# Patient Record
Sex: MALE | Race: BLACK OR AFRICAN AMERICAN | Employment: UNEMPLOYED | URBAN - METROPOLITAN AREA
[De-identification: names, ages, dates, MRNs, and addresses within clinical notes are randomized per-mention and may not be internally consistent; named-entity substitution may affect disease eponyms.]

---

## 2019-10-17 ENCOUNTER — APPOINTMENT (OUTPATIENT)
Dept: GENERAL RADIOLOGY | Age: 64
End: 2019-10-17
Attending: EMERGENCY MEDICINE
Payer: MEDICARE

## 2019-10-17 ENCOUNTER — HOSPITAL ENCOUNTER (EMERGENCY)
Age: 64
Discharge: HOME | End: 2019-10-17
Attending: EMERGENCY MEDICINE
Payer: MEDICARE

## 2019-10-17 VITALS
SYSTOLIC BLOOD PRESSURE: 116 MMHG | OXYGEN SATURATION: 94 % | HEART RATE: 57 BPM | RESPIRATION RATE: 16 BRPM | TEMPERATURE: 98 F | DIASTOLIC BLOOD PRESSURE: 74 MMHG

## 2019-10-17 DIAGNOSIS — R07.9 CHEST PAIN, UNSPECIFIED TYPE: Primary | ICD-10-CM

## 2019-10-17 LAB
ALBUMIN SERPL-MCNC: 3.2 G/DL (ref 3.5–5)
ALBUMIN/GLOB SERPL: 0.7 {RATIO} (ref 1.1–2.2)
ALP SERPL-CCNC: 135 U/L (ref 45–117)
ALT SERPL-CCNC: 73 U/L (ref 12–78)
ANION GAP SERPL CALC-SCNC: 4 MMOL/L (ref 5–15)
APTT PPP: 28 SEC (ref 22.1–32)
AST SERPL-CCNC: 76 U/L (ref 15–37)
BASOPHILS # BLD: 0 K/UL (ref 0–0.1)
BASOPHILS NFR BLD: 1 % (ref 0–1)
BILIRUB SERPL-MCNC: 0.6 MG/DL (ref 0.2–1)
BUN SERPL-MCNC: 25 MG/DL (ref 6–20)
BUN/CREAT SERPL: 25 (ref 12–20)
CALCIUM SERPL-MCNC: 8.6 MG/DL (ref 8.5–10.1)
CHLORIDE SERPL-SCNC: 106 MMOL/L (ref 97–108)
CO2 SERPL-SCNC: 24 MMOL/L (ref 21–32)
COMMENT, HOLDF: NORMAL
CREAT SERPL-MCNC: 0.99 MG/DL (ref 0.7–1.3)
D DIMER PPP FEU-MCNC: 0.45 MG/L FEU (ref 0–0.65)
DIFFERENTIAL METHOD BLD: ABNORMAL
EOSINOPHIL # BLD: 0.2 K/UL (ref 0–0.4)
EOSINOPHIL NFR BLD: 4 % (ref 0–7)
ERYTHROCYTE [DISTWIDTH] IN BLOOD BY AUTOMATED COUNT: 18.8 % (ref 11.5–14.5)
GLOBULIN SER CALC-MCNC: 4.8 G/DL (ref 2–4)
GLUCOSE SERPL-MCNC: 82 MG/DL (ref 65–100)
HCT VFR BLD AUTO: 40.3 % (ref 36.6–50.3)
HGB BLD-MCNC: 13 G/DL (ref 12.1–17)
IMM GRANULOCYTES # BLD AUTO: 0 K/UL (ref 0–0.04)
IMM GRANULOCYTES NFR BLD AUTO: 0 % (ref 0–0.5)
INR PPP: 1.1 (ref 0.9–1.1)
LIPASE SERPL-CCNC: 128 U/L (ref 73–393)
LYMPHOCYTES # BLD: 1.5 K/UL (ref 0.8–3.5)
LYMPHOCYTES NFR BLD: 41 % (ref 12–49)
MCH RBC QN AUTO: 28.7 PG (ref 26–34)
MCHC RBC AUTO-ENTMCNC: 32.3 G/DL (ref 30–36.5)
MCV RBC AUTO: 89 FL (ref 80–99)
MONOCYTES # BLD: 0.6 K/UL (ref 0–1)
MONOCYTES NFR BLD: 16 % (ref 5–13)
NEUTS SEG # BLD: 1.4 K/UL (ref 1.8–8)
NEUTS SEG NFR BLD: 38 % (ref 32–75)
NRBC # BLD: 0 K/UL (ref 0–0.01)
NRBC BLD-RTO: 0 PER 100 WBC
PLATELET # BLD AUTO: 82 K/UL (ref 150–400)
PMV BLD AUTO: 12.1 FL (ref 8.9–12.9)
POTASSIUM SERPL-SCNC: 4.1 MMOL/L (ref 3.5–5.1)
PROT SERPL-MCNC: 8 G/DL (ref 6.4–8.2)
PROTHROMBIN TIME: 11.4 SEC (ref 9–11.1)
RBC # BLD AUTO: 4.53 M/UL (ref 4.1–5.7)
SAMPLES BEING HELD,HOLD: NORMAL
SODIUM SERPL-SCNC: 134 MMOL/L (ref 136–145)
THERAPEUTIC RANGE,PTTT: NORMAL SECS (ref 58–77)
TROPONIN I BLD-MCNC: <0.04 NG/ML (ref 0–0.08)
TROPONIN I SERPL-MCNC: <0.05 NG/ML
TROPONIN I SERPL-MCNC: <0.05 NG/ML
WBC # BLD AUTO: 3.7 K/UL (ref 4.1–11.1)

## 2019-10-17 PROCEDURE — 85025 COMPLETE CBC W/AUTO DIFF WBC: CPT

## 2019-10-17 PROCEDURE — 80053 COMPREHEN METABOLIC PANEL: CPT

## 2019-10-17 PROCEDURE — 74011000250 HC RX REV CODE- 250: Performed by: EMERGENCY MEDICINE

## 2019-10-17 PROCEDURE — 85730 THROMBOPLASTIN TIME PARTIAL: CPT

## 2019-10-17 PROCEDURE — 84484 ASSAY OF TROPONIN QUANT: CPT

## 2019-10-17 PROCEDURE — 74011250636 HC RX REV CODE- 250/636: Performed by: EMERGENCY MEDICINE

## 2019-10-17 PROCEDURE — 36415 COLL VENOUS BLD VENIPUNCTURE: CPT

## 2019-10-17 PROCEDURE — 94640 AIRWAY INHALATION TREATMENT: CPT

## 2019-10-17 PROCEDURE — 85379 FIBRIN DEGRADATION QUANT: CPT

## 2019-10-17 PROCEDURE — 74011250637 HC RX REV CODE- 250/637: Performed by: EMERGENCY MEDICINE

## 2019-10-17 PROCEDURE — 83690 ASSAY OF LIPASE: CPT

## 2019-10-17 PROCEDURE — 85610 PROTHROMBIN TIME: CPT

## 2019-10-17 PROCEDURE — 99285 EMERGENCY DEPT VISIT HI MDM: CPT

## 2019-10-17 PROCEDURE — 96374 THER/PROPH/DIAG INJ IV PUSH: CPT

## 2019-10-17 PROCEDURE — 71045 X-RAY EXAM CHEST 1 VIEW: CPT

## 2019-10-17 PROCEDURE — 93005 ELECTROCARDIOGRAM TRACING: CPT

## 2019-10-17 RX ORDER — NITROGLYCERIN 0.4 MG/1
0.4 TABLET SUBLINGUAL
Status: DISCONTINUED | OUTPATIENT
Start: 2019-10-17 | End: 2019-10-18 | Stop reason: HOSPADM

## 2019-10-17 RX ORDER — IPRATROPIUM BROMIDE AND ALBUTEROL SULFATE 2.5; .5 MG/3ML; MG/3ML
3 SOLUTION RESPIRATORY (INHALATION)
Status: COMPLETED | OUTPATIENT
Start: 2019-10-17 | End: 2019-10-17

## 2019-10-17 RX ORDER — PANTOPRAZOLE SODIUM 40 MG/1
40 TABLET, DELAYED RELEASE ORAL DAILY
Qty: 20 TAB | Refills: 0 | Status: SHIPPED | OUTPATIENT
Start: 2019-10-17 | End: 2019-11-06

## 2019-10-17 RX ADMIN — FAMOTIDINE 20 MG: 10 INJECTION, SOLUTION INTRAVENOUS at 22:48

## 2019-10-17 RX ADMIN — IPRATROPIUM BROMIDE AND ALBUTEROL SULFATE 3 ML: .5; 3 SOLUTION RESPIRATORY (INHALATION) at 22:55

## 2019-10-17 RX ADMIN — NITROGLYCERIN 0.4 MG: 0.4 TABLET, ORALLY DISINTEGRATING SUBLINGUAL at 18:18
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
## 2019-10-17 NOTE — ED PROVIDER NOTES
75 y.o. male with past medical history significant for NIDDM, DJD, substance abuse, depression, HTN who presents via EMS to the ED with chief complaint of chest pain. Patient reports onset of chest pain that is a 8/10 in severity 45 minutes PTA. Patient describes pain as sharp, with no radiation to back, and associated shortness of breath. EMS reports that patient was given ASA en route. Patient endorses history of angina when he was younger and states that he has a history of bleeding ulcer. He also states that he has diabetes, for which he takes Metformin. Patient denies nausea, vomiting or blood in stool. There are no other acute medical concerns at this time. Social Hx: current Tobacco use (0.25 ppd), no EtOH use, no Illicit Drug use  PCP: John Meyers MD    Note written by Daysi Mosher, as dictated by Elisa Guardado MD 5:46 PM      The history is provided by the patient. No  was used.         Past Medical History:   Diagnosis Date    Depression     DJD (degenerative joint disease) 3/8/2010    HTN (hypertension) 3/8/2010    STATES NO LONGER TAKING MEDICATION-STATES MD STOPPED    Insomnia     NIDDM (non-insulin dependent diabetes mellitus) 3/8/2010    Other ill-defined conditions(799.89)     BPH    Other ill-defined conditions(799.89)     previous hx of DTs from ETOH withdrawal    Other ill-defined conditions(799.89)     DDD/back problems    Psychiatric disorder     paranoid schizophrenia, anxiety    Substance abuse ([facility] [address]     Suicidal thoughts        Past Surgical History:   Procedure Laterality Date    BIOPSY PROSTATE      HX UROLOGICAL      turp         Family History:   Problem Relation Age of Onset    Cancer Mother         lymphoma    Heart Disease Father         CHF       Social History     Socioeconomic History    Marital status: LEGALLY      Spouse name: Not on file    Number of children: Not on file    Years of education: Not on file  Highest education level: Not on file   Occupational History    Not on file   Social Needs    Financial resource strain: Not on file    Food insecurity:     Worry: Not on file     Inability: Not on file    Transportation needs:     Medical: Not on file     Non-medical: Not on file   Tobacco Use    Smoking status: Current Every Day Smoker     Packs/day: 0.25    Smokeless tobacco: Never Used    Tobacco comment: patient has been decreasing amount of cigarettes   Substance and Sexual Activity    Alcohol use: No     Alcohol/week: 35.0 standard drinks     Types: 42 Cans of beer per week     Comment: states stopped drinking 2 ,onths ago    Drug use: No    Sexual activity: Not on file   Lifestyle    Physical activity:     Days per week: Not on file     Minutes per session: Not on file    Stress: Not on file   Relationships    Social connections:     Talks on phone: Not on file     Gets together: Not on file     Attends Rastafari service: Not on file     Active member of club or organization: Not on file     Attends meetings of clubs or organizations: Not on file     Relationship status: Not on file    Intimate partner violence:     Fear of current or ex partner: Not on file     Emotionally abused: Not on file     Physically abused: Not on file     Forced sexual activity: Not on file   Other Topics Concern    Not on file   Social History Narrative    Not on file         ALLERGIES: Patient has no known allergies. Review of Systems   Constitutional: Negative for activity change, appetite change and fatigue. HENT: Negative for ear pain, facial swelling, sore throat and trouble swallowing. Eyes: Negative for pain, discharge and visual disturbance. Respiratory: Positive for shortness of breath. Negative for chest tightness and wheezing. Cardiovascular: Positive for chest pain. Negative for palpitations. Gastrointestinal: Negative for abdominal pain, blood in stool, nausea and vomiting. Genitourinary: Negative for difficulty urinating, flank pain and hematuria. Musculoskeletal: Negative for arthralgias, joint swelling, myalgias and neck pain. Skin: Negative for color change and rash. Neurological: Negative for dizziness, weakness, numbness and headaches. Hematological: Negative for adenopathy. Does not bruise/bleed easily. Psychiatric/Behavioral: Negative for behavioral problems, confusion and sleep disturbance. All other systems reviewed and are negative. Vitals:    10/17/19 1743   BP: 123/80   Pulse: 62   Resp: 18   Temp: 97.8 °F (36.6 °C)   SpO2: 96%            Physical Exam   Constitutional: He is oriented to person, place, and time. He appears well-developed and well-nourished. No distress. HENT:   Head: Normocephalic and atraumatic. Nose: Nose normal.   Mouth/Throat: Oropharynx is clear and moist.   Eyes: Pupils are equal, round, and reactive to light. Conjunctivae and EOM are normal. No scleral icterus. Neck: Normal range of motion. Neck supple. No JVD present. No tracheal deviation present. No thyromegaly present. No carotid bruits noted. Cardiovascular: Normal rate, regular rhythm, normal heart sounds and intact distal pulses. Exam reveals no gallop and no friction rub. No murmur heard. Pulmonary/Chest: Effort normal and breath sounds normal. No respiratory distress. He has no wheezes. He has no rales. He exhibits no tenderness. Abdominal: Soft. Bowel sounds are normal. He exhibits no distension and no mass. There is no tenderness. There is no rebound and no guarding. Musculoskeletal: Normal range of motion. He exhibits no edema or tenderness. Lymphadenopathy:     He has no cervical adenopathy. Neurological: He is alert and oriented to person, place, and time. He has normal reflexes. No cranial nerve deficit. Coordination normal.   Skin: Skin is warm and dry. No rash noted. No erythema. Psychiatric: He has a normal mood and affect.  His behavior is normal. Judgment and thought content normal.   Nursing note and vitals reviewed. Note written by Daysi Byrd, as dictated by Keron Chan MD 5:46 PM    MDM  Number of Diagnoses or Management Options  Chest pain, unspecified type: new and requires workup     Amount and/or Complexity of Data Reviewed  Clinical lab tests: ordered and reviewed  Tests in the radiology section of CPT®: ordered and reviewed  Decide to obtain previous medical records or to obtain history from someone other than the patient: yes  Review and summarize past medical records: yes  Independent visualization of images, tracings, or specimens: yes    Risk of Complications, Morbidity, and/or Mortality  Presenting problems: high  Diagnostic procedures: high  Management options: high    Patient Progress  Patient progress: stable         Procedures    ED EKG interpretation:  Rhythm: normal sinus rhythm; and regular . Rate (approx.): 60 bpm; Axis: normal; ST/T wave: No acute changes; normal intervals     Note written by Daysi Byrd, as dictated by Keron Chan MD 6:46 PM    PROGRESS NOTE:  7:08 PM  Patient is feeling better, chest pain is gone, he now states that he is hungry    PROGRESS NOTE:  7:18 PM  Patient has history of ulcers, lipase and troponin were normal. First troponin was completed at 1800 and a repeat Troponin will be completed at 2120     PROGRESS NOTE:  9:21 PM  Currently patient is receiving repeat troponin    PROGRESS NOTE:  10:00 PM  No elevation in troponin, suspect that pain is related to GI.  Will treat conservatively if repeat troponin is normal.

## 2019-10-18 LAB
ATRIAL RATE: 60 BPM
CALCULATED P AXIS, ECG09: 9 DEGREES
CALCULATED R AXIS, ECG10: 52 DEGREES
CALCULATED T AXIS, ECG11: 43 DEGREES
DIAGNOSIS, 93000: NORMAL
P-R INTERVAL, ECG05: 184 MS
Q-T INTERVAL, ECG07: 456 MS
QRS DURATION, ECG06: 96 MS
QTC CALCULATION (BEZET), ECG08: 456 MS
VENTRICULAR RATE, ECG03: 60 BPM

## 2019-10-18 NOTE — GROUP NOTE
IP  GROUP DOCUMENTATION INDIVIDUAL Group Therapy Note Date: October 17 Group Start Time: 2100 Group End Time: 2130 Group Topic: Reflection/Relaxation [address] [place] Hai Lombardo RN 
 
[facility] GROUP DOCUMENTATION GROUP Group Therapy Note Attendees Attendance: {[facility] GROUP DOC ATTENDANCE:67223} Patient's Goal:  *** Interventions/techniques: {[facility] GROUP DOC INTERVENTIONS/TECHNIQUES:12149} Follows Directions: {[facility] GROUP DOC FOLLOWS DIRECTION:00802} Interactions: {[facility] GROUP DOC INTERACTIONS:00839} Mental Status: {[facility] MENTAL REEBRY:71163} Behavior/appearance: {[facility] GROUP DOC BEHAVIOR/APPEARANCE:46632} Goals Achieved: {[facility] GROUP DOC GOALS ACHIEVED:31560} Additional Notes:  *** Kandi Brooke RN

## 2019-10-18 NOTE — DISCHARGE INSTRUCTIONS
Home to stay on a bland diet. Use the medications as directed. Liquid antacids 1 to 2 tablespoons every hour or 2. Do this for pain. Follow-up with your own physician in 3 to 4 days if no improvement in symptoms. Also try to cut back on your cigarette smoking. See your doctor sooner or return if symptoms worsen.

## 2019-11-12 ENCOUNTER — HOSPITAL ENCOUNTER (OUTPATIENT)
Dept: ULTRASOUND IMAGING | Age: 64
Discharge: HOME | End: 2019-11-12
Attending: FAMILY MEDICINE
Payer: MEDICARE

## 2019-11-12 DIAGNOSIS — D69.6 THROMBOCYTOPENIA, UNSPECIFIED (HCC): ICD-10-CM

## 2019-11-12 PROCEDURE — 76700 US EXAM ABDOM COMPLETE: CPT

## 2019-11-12 PROCEDURE — 76705 ECHO EXAM OF ABDOMEN: CPT

## 2019-11-14 ENCOUNTER — TELEPHONE (OUTPATIENT)
Dept: NEUROLOGY | Age: 64
End: 2019-11-14

## 2019-11-20 ENCOUNTER — TELEPHONE (OUTPATIENT)
Dept: NEUROLOGY | Age: 64
End: 2019-11-20

## 2019-11-20 NOTE — TELEPHONE ENCOUNTER
Called, spoke to Janell Dakins was informed that I called patient three times include 11/19/19, pt never return the call the schedule EMG.

## 2019-11-20 NOTE — TELEPHONE ENCOUNTER
----- Message from Beatriz Francis sent at 11/19/2019  3:30 PM EST -----  Regarding: FW: Dr Aracely Og      ----- Message -----  From: Lamar Patel  Sent: 11/19/2019   2:56 PM EST  To: [facility] Front Office  Subject: Dr Aracely Og                              General Message/Vendor Calls    Caller's first and last name: Bob Addison      Reason for call: caller is following up on appt requested to be scheduled for an EMG with Dr Cecelia Duarte required yes/no and why:yes      Best contact number(s):[phone]      Details to clarify the request: caller was advised that pt has scheduled NP appt  With Dr Luigi Park

## 2019-12-17 ENCOUNTER — HOSPITAL ENCOUNTER (OUTPATIENT)
Dept: ULTRASOUND IMAGING | Age: 64
Discharge: HOME | End: 2019-12-17
Attending: FAMILY MEDICINE

## 2019-12-17 DIAGNOSIS — D69.6 THROMBOCYTOPENIA, UNSPECIFIED (HCC): ICD-10-CM

## 2019-12-31 ENCOUNTER — OFFICE VISIT (OUTPATIENT)
Dept: NEUROLOGY | Age: 64
End: 2019-12-31

## 2019-12-31 VITALS
BODY MASS INDEX: 36.06 KG/M2 | HEART RATE: 76 BPM | DIASTOLIC BLOOD PRESSURE: 70 MMHG | WEIGHT: 290 LBS | RESPIRATION RATE: 16 BRPM | SYSTOLIC BLOOD PRESSURE: 110 MMHG | OXYGEN SATURATION: 97 % | HEIGHT: 75 IN

## 2019-12-31 DIAGNOSIS — M79.642 PAIN IN BOTH HANDS: Primary | ICD-10-CM

## 2019-12-31 DIAGNOSIS — M79.641 PAIN IN BOTH HANDS: Primary | ICD-10-CM

## 2019-12-31 NOTE — PROGRESS NOTES
[facility]  [address]  Phone [phone] Fax [phone]  Test Date:  2019    Patient: Tash Elizabeth : 1955 Physician: Rosa Isela Reynoso MD   Sex: Male Height: 6' 3\" Ref Phys: Sudarshan Lakhani MD   ID#: [id number] Weight: 290 lbs. Technician: Paula Severino. .EMG TECH     Patient Complaints:    Bilateral upper extremity pain    Patient History / Exam:    Mr. Claudene Franks is a pleasant [age]year-old right-hand-dominant former small referred to EMG lab for evaluation of bilateral hand pain particularly with use/movement of the thumb. Examination demonstrates painful passive manipulation of the thumb, with 5 out of 5 strength otherwise noted. Referred for evaluation of carpal tunnel. NCV & EMG Findings:  Sensory nerve conduction study (NCS) of the right median nerve and right radial demonstrated normal peak latency and amplitude. Sensory NCS of the right ulnar nerve was unobtainable. Sensory NCS of the left ulnar nerve demonstrated severe peak latency prolongation with normal amplitude. Motor NCS of the right median nerve demonstrated normal onset latency, amplitude and conduction velocity. Motor NCS of the right ulnar nerve demonstrated normal onset latency, amplitude and conduction velocity. F-wave latency of the right ulnar nerve was normal.    Electromyography (EMG) of the right FDI, ABP, biceps, triceps and deltoid were without evidence for active denervation or chronic reinnervation changes. Impression:    Is a technically abnormal nerve condition study/EMG based on absent right ulnar sensory response and significant latency prolongation in the left ulnar nerve, which are felt to represent possible incipient ulnar neuropathy is not correlated on motor studies. Otherwise no evidence for peripheral neuropathy, median neuropathy or cervical polyradiculopathy are noted. Recommendations:   If not already done so, could consider imaging the joints of the hand as patient's complaints appear related to musculoskeletal etiology, given the absence of neuropathic symptoms (numbness, tingling, weakness) and repeat production of pain with manipulation of the thumbs and deep palpation of the proximal joint at the base of the thumb. Regarding the absent ulnar sensory findings these could be considered findings of an subclinical ulnar neuropathy and the patient was provided education adding behavioral measures to mitigate against worsening of such process.   They are unrelated to his clinical complaint.    ___________________________  Bing Guzman MD        Nerve Conduction Studies  Anti Sensory Summary Table     Stim Site NR Peak (ms) Norm Peak (ms) P-T Amp (µV) Norm P-T Amp Onset (ms) Site1 Site2 Delta-P (ms) Dist (cm) Braxton (m/s) Norm Braxton (m/s)   Right Median Anti Sensory (2nd Digit)  32.9°C   Wrist    3.5 <3.6 10.3 >10 2.9 Wrist 2nd Digit 3.5 14.0 40 >39   Right Radial Anti Sensory (Base 1st Digit)  33.5°C   Wrist    2.1 <3.1 24.5  1.7 Wrist Base 1st Digit 2.1 10.0 48    Left Ulnar Anti Sensory (5th Digit)  33.4°C   Wrist    6.4 <3.7 17.0 >15.0 5.9 Wrist 5th Digit 6.4 14.0 22 >38   Right Ulnar Anti Sensory (5th Digit)  33.4°C   Wrist NR  <3.7  >15.0  Wrist 5th Digit  14.0  >38     Motor Summary Table     Stim Site NR Onset (ms) Norm Onset (ms) O-P Amp (mV) Norm O-P Amp Site1 Site2 Delta-0 (ms) Dist (cm) Braxton (m/s) Norm Braxton (m/s)   Right Median Motor (Abd Poll Brev)  32.6°C   Wrist    3.9 <4.2 8.9 >5 Elbow Wrist 4.5 25.0 56 >50   Elbow    8.4  8.5          Right Ulnar Motor (Abd Dig Minimi)  33.4°C   Wrist    3.0 <4.2 8.8 >3 B Elbow Wrist 4.6 23.0 50 >53   B Elbow    7.6  8.5  A Elbow B Elbow 2.0 10.0 50 >53   A Elbow    9.6  8.3            F Wave Studies     NR F-Lat (ms) Lat Norm (ms) L-R F-Lat (ms) L-R Lat Norm   Right Ulnar (Mrkrs) (Abd Dig Min)  33.3°C      33.86 <36  <2.5     EMG     Side Muscle Nerve Root Ins Act Fibs Psw Amp Dur Poly Recrt Int S[place] Comment   Right Abd Poll Brev Median C8-T1 Nml Nml Nml Nml Nml 0 Nml Nml    Right 1stDorInt Ulnar C8-T1 Nml Nml Nml Nml Nml 0 Nml Nml    Right Biceps Musculocut C5-6 Nml Nml Nml Nml Nml 0 Nml Nml    Right Triceps Radial C6-7-8 Nml Nml Nml Nml Nml 0 Nml Nml    Right Deltoid Axillary C5-6 Nml Nml Nml Nml Nml 0 Nml Nml          Waveforms:

## 2020-03-16 ENCOUNTER — HOSPITAL ENCOUNTER (INPATIENT)
Age: 65
LOS: 3 days | Discharge: LEFT BEFORE DISCHARGE | DRG: 894 | End: 2020-03-19
Attending: EMERGENCY MEDICINE | Admitting: STUDENT IN AN ORGANIZED HEALTH CARE EDUCATION/TRAINING PROGRAM
Payer: MEDICARE

## 2020-03-16 ENCOUNTER — APPOINTMENT (OUTPATIENT)
Dept: CT IMAGING | Age: 65
DRG: 894 | End: 2020-03-16
Attending: STUDENT IN AN ORGANIZED HEALTH CARE EDUCATION/TRAINING PROGRAM
Payer: MEDICARE

## 2020-03-16 DIAGNOSIS — F10.10 ALCOHOL ABUSE: Primary | ICD-10-CM

## 2020-03-16 DIAGNOSIS — F10.932 ALCOHOL WITHDRAWAL SYNDROME WITH PERCEPTUAL DISTURBANCE (HCC): ICD-10-CM

## 2020-03-16 PROBLEM — F10.939 ALCOHOL WITHDRAWAL (HCC): Status: ACTIVE | Noted: 2020-03-16

## 2020-03-16 LAB
ALBUMIN SERPL-MCNC: 3.2 G/DL (ref 3.5–5)
ALBUMIN/GLOB SERPL: 0.7 {RATIO} (ref 1.1–2.2)
ALP SERPL-CCNC: 128 U/L (ref 45–117)
ALT SERPL-CCNC: 111 U/L (ref 12–78)
ANION GAP SERPL CALC-SCNC: 7 MMOL/L (ref 5–15)
AST SERPL-CCNC: 126 U/L (ref 15–37)
BASOPHILS # BLD: 0 K/UL (ref 0–0.1)
BASOPHILS NFR BLD: 0 % (ref 0–1)
BILIRUB SERPL-MCNC: 1.2 MG/DL (ref 0.2–1)
BUN SERPL-MCNC: 24 MG/DL (ref 6–20)
BUN/CREAT SERPL: 24 (ref 12–20)
CALCIUM SERPL-MCNC: 8.1 MG/DL (ref 8.5–10.1)
CHLORIDE SERPL-SCNC: 100 MMOL/L (ref 97–108)
CO2 SERPL-SCNC: 28 MMOL/L (ref 21–32)
CREAT SERPL-MCNC: 1 MG/DL (ref 0.7–1.3)
DIFFERENTIAL METHOD BLD: ABNORMAL
EOSINOPHIL # BLD: 0 K/UL (ref 0–0.4)
EOSINOPHIL NFR BLD: 0 % (ref 0–7)
ERYTHROCYTE [DISTWIDTH] IN BLOOD BY AUTOMATED COUNT: 13.3 % (ref 11.5–14.5)
ETHANOL SERPL-MCNC: <10 MG/DL
GLOBULIN SER CALC-MCNC: 4.7 G/DL (ref 2–4)
GLUCOSE SERPL-MCNC: 123 MG/DL (ref 65–100)
HCT VFR BLD AUTO: 44.1 % (ref 36.6–50.3)
HGB BLD-MCNC: 15 G/DL (ref 12.1–17)
IMM GRANULOCYTES # BLD AUTO: 0 K/UL
IMM GRANULOCYTES NFR BLD AUTO: 0 %
INR PPP: 1.2 (ref 0.9–1.1)
LACTATE SERPL-SCNC: 2.1 MMOL/L (ref 0.4–2)
LYMPHOCYTES # BLD: 2 K/UL (ref 0.8–3.5)
LYMPHOCYTES NFR BLD: 41 % (ref 12–49)
MAGNESIUM SERPL-MCNC: 1.8 MG/DL (ref 1.6–2.4)
MCH RBC QN AUTO: 29.6 PG (ref 26–34)
MCHC RBC AUTO-ENTMCNC: 34 G/DL (ref 30–36.5)
MCV RBC AUTO: 87 FL (ref 80–99)
MONOCYTES # BLD: 0.8 K/UL (ref 0–1)
MONOCYTES NFR BLD: 17 % (ref 5–13)
NEUTS SEG # BLD: 2.1 K/UL (ref 1.8–8)
NEUTS SEG NFR BLD: 42 % (ref 32–75)
NRBC # BLD: 0 K/UL (ref 0–0.01)
NRBC BLD-RTO: 0 PER 100 WBC
PLATELET # BLD AUTO: 84 K/UL (ref 150–400)
PMV BLD AUTO: 11.6 FL (ref 8.9–12.9)
POTASSIUM SERPL-SCNC: 5.2 MMOL/L (ref 3.5–5.1)
PROT SERPL-MCNC: 7.9 G/DL (ref 6.4–8.2)
PROTHROMBIN TIME: 11.9 SEC (ref 9–11.1)
RBC # BLD AUTO: 5.07 M/UL (ref 4.1–5.7)
RBC MORPH BLD: ABNORMAL
SODIUM SERPL-SCNC: 135 MMOL/L (ref 136–145)
WBC # BLD AUTO: 4.9 K/UL (ref 4.1–11.1)

## 2020-03-16 PROCEDURE — 70450 CT HEAD/BRAIN W/O DYE: CPT

## 2020-03-16 PROCEDURE — 80307 DRUG TEST PRSMV CHEM ANLYZR: CPT

## 2020-03-16 PROCEDURE — 74011250637 HC RX REV CODE- 250/637: Performed by: STUDENT IN AN ORGANIZED HEALTH CARE EDUCATION/TRAINING PROGRAM

## 2020-03-16 PROCEDURE — 80053 COMPREHEN METABOLIC PANEL: CPT

## 2020-03-16 PROCEDURE — 65270000029 HC RM PRIVATE

## 2020-03-16 PROCEDURE — 85610 PROTHROMBIN TIME: CPT

## 2020-03-16 PROCEDURE — 94761 N-INVAS EAR/PLS OXIMETRY MLT: CPT

## 2020-03-16 PROCEDURE — 93005 ELECTROCARDIOGRAM TRACING: CPT

## 2020-03-16 PROCEDURE — 96365 THER/PROPH/DIAG IV INF INIT: CPT

## 2020-03-16 PROCEDURE — 74011250637 HC RX REV CODE- 250/637: Performed by: EMERGENCY MEDICINE

## 2020-03-16 PROCEDURE — 83605 ASSAY OF LACTIC ACID: CPT

## 2020-03-16 PROCEDURE — 74011000250 HC RX REV CODE- 250: Performed by: EMERGENCY MEDICINE

## 2020-03-16 PROCEDURE — 85025 COMPLETE CBC W/AUTO DIFF WBC: CPT

## 2020-03-16 PROCEDURE — 74011000258 HC RX REV CODE- 258: Performed by: STUDENT IN AN ORGANIZED HEALTH CARE EDUCATION/TRAINING PROGRAM

## 2020-03-16 PROCEDURE — 99285 EMERGENCY DEPT VISIT HI MDM: CPT

## 2020-03-16 PROCEDURE — 83735 ASSAY OF MAGNESIUM: CPT

## 2020-03-16 PROCEDURE — 74011250636 HC RX REV CODE- 250/636: Performed by: EMERGENCY MEDICINE

## 2020-03-16 PROCEDURE — 36415 COLL VENOUS BLD VENIPUNCTURE: CPT

## 2020-03-16 RX ORDER — DEXTROSE MONOHYDRATE AND SODIUM CHLORIDE 5; .45 G/100ML; G/100ML
75 INJECTION, SOLUTION INTRAVENOUS CONTINUOUS
Status: DISCONTINUED | OUTPATIENT
Start: 2020-03-16 | End: 2020-03-17

## 2020-03-16 RX ORDER — PROMETHAZINE HYDROCHLORIDE 25 MG/ML
12.5 INJECTION, SOLUTION INTRAMUSCULAR; INTRAVENOUS
Status: DISCONTINUED | OUTPATIENT
Start: 2020-03-16 | End: 2020-03-20 | Stop reason: HOSPADM

## 2020-03-16 RX ORDER — LORAZEPAM 1 MG/1
2 TABLET ORAL
Status: DISCONTINUED | OUTPATIENT
Start: 2020-03-16 | End: 2020-03-17

## 2020-03-16 RX ORDER — LORAZEPAM 2 MG/ML
2 INJECTION INTRAMUSCULAR
Status: DISCONTINUED | OUTPATIENT
Start: 2020-03-16 | End: 2020-03-17

## 2020-03-16 RX ORDER — LANOLIN ALCOHOL/MO/W.PET/CERES
100 CREAM (GRAM) TOPICAL DAILY
Status: DISCONTINUED | OUTPATIENT
Start: 2020-03-17 | End: 2020-03-20 | Stop reason: HOSPADM

## 2020-03-16 RX ORDER — IPRATROPIUM BROMIDE AND ALBUTEROL SULFATE 2.5; .5 MG/3ML; MG/3ML
3 SOLUTION RESPIRATORY (INHALATION)
Status: DISCONTINUED | OUTPATIENT
Start: 2020-03-16 | End: 2020-03-20 | Stop reason: HOSPADM

## 2020-03-16 RX ORDER — LORAZEPAM 1 MG/1
2 TABLET ORAL
Status: DISPENSED | OUTPATIENT
Start: 2020-03-16 | End: 2020-03-16

## 2020-03-16 RX ORDER — IBUPROFEN 200 MG
1 TABLET ORAL DAILY
Status: DISCONTINUED | OUTPATIENT
Start: 2020-03-17 | End: 2020-03-20 | Stop reason: HOSPADM

## 2020-03-16 RX ORDER — FOLIC ACID 1 MG/1
1 TABLET ORAL DAILY
Status: DISCONTINUED | OUTPATIENT
Start: 2020-03-17 | End: 2020-03-20 | Stop reason: HOSPADM

## 2020-03-16 RX ORDER — LORAZEPAM 2 MG/ML
4 INJECTION INTRAMUSCULAR
Status: DISCONTINUED | OUTPATIENT
Start: 2020-03-16 | End: 2020-03-17

## 2020-03-16 RX ORDER — PHENOBARBITAL 30 MG/1
30 TABLET ORAL 4 TIMES DAILY
COMMUNITY
End: 2020-03-26

## 2020-03-16 RX ADMIN — GABAPENTIN 400 MG: 100 CAPSULE ORAL at 20:23

## 2020-03-16 RX ADMIN — DEXTROSE MONOHYDRATE AND SODIUM CHLORIDE 75 ML/HR: 5; .45 INJECTION, SOLUTION INTRAVENOUS at 20:22

## 2020-03-16 RX ADMIN — LORAZEPAM 2 MG: 1 TABLET ORAL at 20:23

## 2020-03-16 RX ADMIN — LORAZEPAM 2 MG: 1 TABLET ORAL at 16:11

## 2020-03-16 RX ADMIN — LORAZEPAM 2 MG: 1 TABLET ORAL at 17:35

## 2020-03-16 RX ADMIN — FOLIC ACID: 5 INJECTION, SOLUTION INTRAMUSCULAR; INTRAVENOUS; SUBCUTANEOUS at 16:04

## 2020-03-16 NOTE — PROGRESS NOTES
TRANSFER - IN REPORT:    Verbal report received from [address] Salgado Rn(name) on Shani Marie  being received from ED(unit) for routine progression of care      Report consisted of patients Situation, Background, Assessment and   Recommendations(SBAR). Information from the following report(s) SBAR, Kardex, ED Summary, Intake/Output, MAR, Recent Results, Med Rec Status and Cardiac Rhythm NSR was reviewed with the receiving nurse. Opportunity for questions and clarification was provided. Assessment completed upon patients arrival to unit and care assumed.

## 2020-03-16 NOTE — ED NOTES
Pt given two doses of ativan 2 mg po. Repeat CIWA is 8 down from initial score of 20. Dr. Huynh Button at bedside third dose held.

## 2020-03-16 NOTE — ED NOTES
Pt here for evaluation of ETOH withdrawal. According to pt he drinks everyday and his last drink was two days ago. Pt CIWA score is 20  Pt is A+Ox3, clear speaking. Emergency Department Nursing Plan of Care       The Nursing Plan of Care is developed from the Nursing assessment and Emergency Department Attending provider initial evaluation. The plan of care may be reviewed in the ED Provider note.     The Plan of Care was developed with the following considerations:   Patient / Family readiness to learn indicated by:verbalized understanding  Persons(s) to be included in education: patient  Barriers to Learning/Limitations:No    Signed     Gerhard Kat RN    3/16/2020   2:58 PM

## 2020-03-16 NOTE — ED PROVIDER NOTES
EMERGENCY DEPARTMENT HISTORY AND PHYSICAL EXAM      Date: 3/16/2020  Patient Name: Amirah Strong    History of Presenting Illness     Chief Complaint   Patient presents with    Alcohol Problem     History Provided By: Patient    HPI: Amirah Strong, 77 y.o. male with past medical history significant for anxiety, depression, hypertension, diabetes, substance abuse, and a documented history of paranoid schizophrenia (though patient denies) who presents via private vehicle to the ED with cc of alcohol withdrawal.  Patient states he normally drinks 2x750 mL bottles of wine a day and his last drink was 2 days ago. He endorses tremors, nausea, vomiting, diarrhea, and frequent falls over the past 24 hours. He has tried to quit drinking in the past with the most recent time being approximately 4 to 5 weeks ago. He usually starts to develop withdrawal symptoms about 24 hours after his last drink. In the past when he is withdrawn, he states that he has had withdrawal seizures and even had visual hallucinations. He denies any seizures or hallucinations currently. He does state that he was seen by [facility] for his psychiatric illnesses in the past but his case was recently closed. PMHx: Anxiety, depression, hypertension, diabetes, questionable paranoid schizophrenia  Social Hx: Smokes 1 pack/day, daily alcohol use, denies illegal drug use    PCP: Michelle Sevilla MD    There are no other complaints, changes, or physical findings at this time. No current facility-administered medications on file prior to encounter. Current Outpatient Medications on File Prior to Encounter   Medication Sig Dispense Refill    albuterol sulfate 90 mcg/actuation aepb Take 2 Puffs by inhalation every four (4) hours as needed (breathing). 1 Inhaler 0    traMADol (ULTRAM) 50 mg tablet Take 1 Tab by mouth every six (6) hours as needed for Pain.  Max Daily Amount: 200 mg. 12 Tab 0    methocarbamol (ROBAXIN) 500 mg tablet Take 1 Tab by mouth three (3) times daily. 15 Tab 0    pregabalin (LYRICA) 150 mg capsule Take  by mouth.  IBUPROFEN (MOTRIN PO) Take  by mouth.  metformin (GLUCOPHAGE) 500 mg tablet Take 500 mg by mouth two (2) times daily (with meals). Past History     Past Medical History:  Past Medical History:   Diagnosis Date    Depression     DJD (degenerative joint disease) 3/8/2010    HTN (hypertension) 3/8/2010    STATES NO LONGER TAKING MEDICATION-STATES MD STOPPED    Insomnia     NIDDM (non-insulin dependent diabetes mellitus) 3/8/2010    Other ill-defined conditions(799.89)     BPH    Other ill-defined conditions(799.89)     previous hx of DTs from ETOH withdrawal    Other ill-defined conditions(799.89)     DDD/back problems    Psychiatric disorder     paranoid schizophrenia, anxiety    Substance abuse ([facility] [address]     Suicidal thoughts      Past Surgical History:  Past Surgical History:   Procedure Laterality Date    BIOPSY PROSTATE      HX UROLOGICAL      turp     Family History:  Family History   Problem Relation Age of Onset    Cancer Mother         lymphoma    Heart Disease Father         CHF     Social History:  Social History     Tobacco Use    Smoking status: Current Every Day Smoker     Packs/day: 0.25    Smokeless tobacco: Never Used    Tobacco comment: patient has been decreasing amount of cigarettes   Substance Use Topics    Alcohol use: No     Alcohol/week: 35.0 standard drinks     Types: 42 Cans of beer per week     Comment: states stopped drinking 2 ,onths ago    Drug use: No     Allergies:  No Known Allergies  Review of Systems   Review of Systems   Constitutional: Positive for diaphoresis. Negative for chills and fever. HENT: Negative for congestion, rhinorrhea, sneezing and sore throat. Eyes: Negative for redness and visual disturbance. Respiratory: Negative for shortness of breath. Cardiovascular: Negative for chest pain and leg swelling.    Gastrointestinal: Positive for diarrhea, nausea and vomiting. Negative for abdominal pain. Genitourinary: Negative for difficulty urinating and frequency. Musculoskeletal: Negative for back pain, myalgias and neck stiffness. Skin: Negative for rash. Neurological: Positive for dizziness and tremors. Negative for syncope, weakness and headaches. Hematological: Negative for adenopathy. Psychiatric/Behavioral: Positive for agitation. The patient is nervous/anxious. All other systems reviewed and are negative. Physical Exam   Physical Exam  Vitals signs and nursing note reviewed. Constitutional:       Appearance: Normal appearance. He is well-developed. HENT:      Head: Normocephalic. Abrasion (Tip of the nose) present. Neck:      Musculoskeletal: Full passive range of motion without pain, normal range of motion and neck supple. Cardiovascular:      Rate and Rhythm: Normal rate and regular rhythm. Pulses: Normal pulses. Heart sounds: Normal heart sounds. No murmur. Pulmonary:      Effort: Pulmonary effort is normal. No respiratory distress. Breath sounds: Normal breath sounds. Chest:      Chest wall: No tenderness. Abdominal:      General: Bowel sounds are normal.      Palpations: Abdomen is soft. Tenderness: There is no abdominal tenderness. There is no guarding or rebound. Skin:     General: Skin is warm and dry. Findings: No erythema or rash. Neurological:      Mental Status: He is alert. Motor: Tremor present. Comments: Alert and oriented to self   Psychiatric:         Speech: Speech normal.         Behavior: Behavior normal.         Thought Content: Thought content normal.         Judgment: Judgment normal.      Comments: Avoids eye contact       Diagnostic Study Results   Labs -     Recent Results (from the past 12 hour(s))   CBC WITH AUTOMATED DIFF    Collection Time: 03/16/20  3:38 PM   Result Value Ref Range    WBC 4.9 4.1 - 11.1 K/uL    RBC 5.07 4. 10 - 5.70 M/uL    HGB 15.0 12.1 - 17.0 g/dL    HCT 44.1 36.6 - 50.3 %    MCV 87.0 80.0 - 99.0 FL    MCH 29.6 26.0 - 34.0 PG    MCHC 34.0 30.0 - 36.5 g/dL    RDW 13.3 11.5 - 14.5 %    PLATELET 84 (L) 223 - 400 K/uL    MPV 11.6 8.9 - 12.9 FL    NRBC 0.0 0  WBC    ABSOLUTE NRBC 0.00 0.00 - 0.01 K/uL    NEUTROPHILS 42 32 - 75 %    LYMPHOCYTES 41 12 - 49 %    MONOCYTES 17 (H) 5 - 13 %    EOSINOPHILS 0 0 - 7 %    BASOPHILS 0 0 - 1 %    IMMATURE GRANULOCYTES 0 %    ABS. NEUTROPHILS 2.1 1.8 - 8.0 K/UL    ABS. LYMPHOCYTES 2.0 0.8 - 3.5 K/UL    ABS. MONOCYTES 0.8 0.0 - 1.0 K/UL    ABS. EOSINOPHILS 0.0 0.0 - 0.4 K/UL    ABS. BASOPHILS 0.0 0.0 - 0.1 K/UL    ABS. IMM. GRANS. 0.0 K/UL    DF SMEAR SCANNED      RBC COMMENTS NORMOCYTIC, NORMOCHROMIC     ETHYL ALCOHOL    Collection Time: 03/16/20  3:38 PM   Result Value Ref Range    ALCOHOL(ETHYL),SERUM <10 <10 MG/DL   MAGNESIUM    Collection Time: 03/16/20  3:38 PM   Result Value Ref Range    Magnesium 1.8 1.6 - 2.4 mg/dL   METABOLIC PANEL, COMPREHENSIVE    Collection Time: 03/16/20  3:38 PM   Result Value Ref Range    Sodium 135 (L) 136 - 145 mmol/L    Potassium 5.2 (H) 3.5 - 5.1 mmol/L    Chloride 100 97 - 108 mmol/L    CO2 28 21 - 32 mmol/L    Anion gap 7 5 - 15 mmol/L    Glucose 123 (H) 65 - 100 mg/dL    BUN 24 (H) 6 - 20 MG/DL    Creatinine 1.00 0.70 - 1.30 MG/DL    BUN/Creatinine ratio 24 (H) 12 - 20      GFR est AA >60 >60 ml/min/1.73m2    GFR est non-AA >60 >60 ml/min/1.73m2    Calcium 8.1 (L) 8.5 - 10.1 MG/DL    Bilirubin, total 1.2 (H) 0.2 - 1.0 MG/DL    ALT (SGPT) 111 (H) 12 - 78 U/L    AST (SGOT) 126 (H) 15 - 37 U/L    Alk. phosphatase 128 (H) 45 - 117 U/L    Protein, total 7.9 6.4 - 8.2 g/dL    Albumin 3.2 (L) 3.5 - 5.0 g/dL    Globulin 4.7 (H) 2.0 - 4.0 g/dL    A-G Ratio 0.7 (L) 1.1 - 2.2         Radiologic Studies -   No orders to display     No results found. Medical Decision Making   I am the first provider for this patient.     I reviewed the vital signs, available nursing notes, past medical history, past surgical history, family history and social history. Vital Signs-Reviewed the patient's vital signs. Patient Vitals for the past 12 hrs:   Temp Pulse Resp BP SpO2   03/16/20 1513  93 20  96 %   03/16/20 1505  92 24  96 %   03/16/20 1451   18 146/79 97 %   03/16/20 1414 98.7 °F (37.1 °C) 99 18 (!) 152/91 97 %     Pulse Oximetry Analysis - 96% on RA    Cardiac Monitor:   Rate: 99 bpm  Rhythm: Normal Sinus Rhythm     Records Reviewed: Nursing Notes and Old Medical Records    Provider Notes (Medical Decision Making):   69-year-old male presents with multiple symptoms concerning for alcohol withdrawal.  He endorses DTs and withdrawal seizures from past withdrawal episodes. Will initiate CIWA protocol and check labs. ED Course:   Initial assessment performed. The patients presenting problems have been discussed, and they are in agreement with the care plan formulated and outlined with them. I have encouraged them to ask questions as they arise throughout their visit. 3:12 PM  Per nursing, patient has an initial CIWA score of 20. Progress Note  5:19 PM  I have re-evaluated pt and his heart rate is still just below 100 but he continues to have some mild tremors. Will discuss with the hospitalist for possible admission. 5:23 PM  Bridgett Green MD spoke with Dr. Elena Larios, Consult for Hospitalist. Discussed HPI and PE, available diagnostic tests and clinical findings. She is in agreement with care plans as outlined. She agrees to admit the patient. She would also like us to check coags which she will order. Progress Note:   Updated pt on all returned results and findings. Discussed the importance of proper follow up as referred below along with return precautions. Pt in agreement with the care plan and expresses agreement with and understanding of all items discussed. Disposition:  ADMIT NOTE:  5:24 PM  The patient is being admitted to the hospital by Dr. Elena Larios. The results of their tests and reasons for their admission have been discussed with the patient and/or available family. They convey agreement and understanding for the need to be admitted and for their admission diagnosis. PLAN:  1. Admit    Diagnosis     Clinical Impression:   1. Alcohol abuse    2. Alcohol withdrawal syndrome with perceptual disturbance [facility])            Please note that this dictation was completed with Dragon, computer voice recognition software. Quite often unanticipated grammatical, syntax, homophones, and other interpretive errors are inadvertently transcribed by the computer software. Please disregard these errors. Additionally, please excuse any errors that have escaped final proofreading.

## 2020-03-16 NOTE — H&P
Hospitalist Admission Note    NAME: Joseph Goode   :  1955   MRN:  [id number]   Room Number: ER10/10  @ [facility]     Date/Time:  3/16/2020 6:37 PM    Patient PCP: Adán Valdez MD  ______________________________________________________________________  Given the patient's current clinical presentation, I have a high level of concern for decompensation if discharged from the emergency department. Complex decision making was performed, which includes reviewing the patient's available past medical records, laboratory results, and x-ray films. My assessment of this patient's clinical condition and my plan of care is as follows. Assessment / Plan: Active Problems:    Alcohol withdrawal ([facility] [address] (3/16/2020)    Alcohol withdrawal   Alcohol dependence, severe   Hx of complicated alcohol withdrawal with seizures, hallucinations, ICU admission in the past.     - Admit to step down  - Loading dose lorazepam, PRN Lorazepam per CIWA  - Thiamine, folic acid. - Patient was counseled extensively on the need to abstain from alcohol  its addictive tendencies, its deleterious effects on the brain, cardiovascular system, liver as well as its financial & social sequelae. Elevated lactic acid :   Due to hypovolemia. - Provide hydration and recheck. Recurrent falls   Ecchymoses and abrasion on face  Patient reports that he had multiple falls with head trauma and syncope. Reports it was when he was withdrawing.     - Local wound care per nursing  - Check CT Head   - Fall precautions      Elevated transaminases :   Due to alcohol abuse. Last US in 2019 shows hepatic steatosis, splenomegaly.     - Monitor LFT  - Alcohol cessation counseling provided. - check viral hepatitis panel. Thrombocytopenia :   Chronic, due to chronic alcohol induced BM suppression, splenomegaly.     - Monitor for bleeding. Hypertension   Documented in records.  Not on medications at home. - Treat underlying alcohol withdrawal.       Low back pain   - lidocaine patch  - Gabapentin TID        Type 2 diabetes  On metformin at home. Last A1c as of December 2019 was 5.5%. - insulin sliding scale  - consistent carb diet  - recheck A1C      Low back pain   - continue lidocaine patch. Substance induced mood disorder  Patient declines history of the schizophrenia, depression. Per chart review, he has previously been on risperdal.     - Outpatient follow up with PCP, mental health practitioner. Tobacco dependence   - continue nicotine patch    Body mass index is 30.81 kg/m². Obesity   Counseled on Lifestyle modifications, AHA Diet ,weight loss strategies. Code Status:full   Surrogate Decision Maker:    DVT Prophylaxis: Hep SQ  GI Prophylaxis: not indicated    Baseline: ambulatory independently         Subjective:   CHIEF COMPLAINT: alcohol withdrawal.     HISTORY OF PRESENT ILLNESS:     Ana Maria Mancini is a 77 y.o.  male with PMH of HTN, Depression, Paranoid schizophrenia, diabetes, substance abuse who presents to ED with c/o alcohol withdrawal     Patient reports last drink was 2 days ago. Usually drinks 2 bottles of wine daily. Decided to quit drinking 2 days ago and since then has been experiencing anxiety, tremors, craving, diaphoresis, nausea, vomiting, diarrhea, frequent falls. Currently having visual hallucinations seeing people. Initial CIWA score was 20. He has a history of withdrawal seizures and hallucinations. Received loading dose lorazepam in the ED. Uses nicotine patch at home  Denies current drug use  We were asked to admit for work up and evaluation of the above problems.      Past Medical History:   Diagnosis Date    Depression     DJD (degenerative joint disease) 3/8/2010    HTN (hypertension) 3/8/2010    STATES NO LONGER TAKING MEDICATION-STATES MD STOPPED    Insomnia     NIDDM (non-insulin dependent diabetes mellitus) 3/8/2010    Other ill-defined conditions(799.89)     BPH    Other ill-defined conditions(799.89)     previous hx of DTs from ETOH withdrawal    Other ill-defined conditions(799.89)     DDD/back problems    Psychiatric disorder     paranoid schizophrenia, anxiety    Substance abuse ([facility] [address]     Suicidal thoughts         Past Surgical History:   Procedure Laterality Date    BIOPSY PROSTATE      HX UROLOGICAL      turp       Social History     Tobacco Use    Smoking status: Current Every Day Smoker     Packs/day: 0.25    Smokeless tobacco: Never Used    Tobacco comment: patient has been decreasing amount of cigarettes   Substance Use Topics    Alcohol use: No     Alcohol/week: 35.0 standard drinks     Types: 42 Cans of beer per week     Comment: states stopped drinking 2 ,onths ago        Family History   Problem Relation Age of Onset    Cancer Mother         lymphoma    Heart Disease Father         CHF     No Known Allergies     Prior to Admission medications    Medication Sig Start Date End Date Taking? Authorizing Provider   albuterol sulfate 90 mcg/actuation aepb Take 2 Puffs by inhalation every four (4) hours as needed (breathing). 10/17/19   Arian Moreno MD   traMADol Mykel Rubins) 50 mg tablet Take 1 Tab by mouth every six (6) hours as needed for Pain. Max Daily Amount: 200 mg. 12/12/16   HERRERA Chavez   methocarbamol (ROBAXIN) 500 mg tablet Take 1 Tab by mouth three (3) times daily. 12/12/16   HERRERA Chavez   pregabalin (LYRICA) 150 mg capsule Take  by mouth. Provider, Historical   IBUPROFEN (MOTRIN PO) Take  by mouth. Provider, Historical   metformin (GLUCOPHAGE) 500 mg tablet Take 500 mg by mouth two (2) times daily (with meals).     Damaso Sidhu MD       REVIEW OF SYSTEMS:       Total of 12 systems reviewed as follows:       POSITIVE= underlined text  Negative = text not underlined  General:  fever, chills, sweats, generalized weakness, weight loss/gain,      loss of appetite   Eyes:    blurred vision, eye pain, loss of vision, double vision  ENT:    rhinorrhea, pharyngitis   Respiratory:   cough, sputum production, SOB, SAPP, wheezing, pleuritic pain   Cardiology:   chest pain, palpitations, orthopnea, PND, edema, syncope   Gastrointestinal:  abdominal pain , N/V, diarrhea, dysphagia, constipation, bleeding   Genitourinary:  frequency, urgency, dysuria, hematuria, incontinence   Muskuloskeletal :  arthralgia, myalgia, back pain  Hematology:  easy bruising, nose or gum bleeding, lymphadenopathy   Dermatological: rash, ulceration, pruritis, color change / jaundice  Endocrine:   hot flashes or polydipsia   Neurological:  Tremors. headache, dizziness, confusion, focal weakness, paresthesia,     Speech difficulties, memory loss, gait difficulty  Psychological: Feelings of anxiety, depression, agitation    Objective:   VITALS:    Visit Vitals  /58 (BP 1 Location: Right arm, BP Patient Position: At rest)   Pulse 90   Temp 98.7 °F (37.1 °C)   Resp 23   Ht 6' 2\" (1.88 m)   Wt 108.9 kg (240 lb)   SpO2 95%   BMI 30.81 kg/m²       PHYSICAL EXAM:    General:    Alert, cooperative, appears stated age. HEENT: Atraumatic, anicteric sclerae, pink conjunctivae     No oral ulcers, mucosa moist, throat clear, dentition fair  Neck:  Supple, symmetrical,  thyroid: non tender  Lungs:   Clear to auscultation bilaterally. No Wheezing or Rhonchi. No rales. Chest wall:  No tenderness  No Accessory muscle use. Heart:   Regular  rhythm,  No  murmur   No edema  Abdomen:   Soft, non-tender. Not distended. Bowel sounds normal  Extremities: No cyanosis. No clubbing,      Skin turgor normal, Capillary refill normal, Radial dial pulse 2+  Skin:     Not pale. Not Jaundiced  No rashes   Psych:  Good insight. Not depressed. Anxious. Not  agitated. Neurologic: EOMs intact. No facial asymmetry. No aphasia or slurred speech. Symmetrical strength, Sensation grossly intact.  Alert and oriented X 4. + tremors. ______________________________________________________________________    Care Plan discussed with:  Patient/Family, Nurse and     Expected  Disposition:  Home w/Family  ________________________________________________________________________  TOTAL TIME:  40 Minutes    Critical Care Provided     Minutes non procedure based      Comments     Reviewed previous records   >50% of visit spent in counseling and coordination of care  Discussion with patient and/or family and questions answered       ________________________________________________________________________  Signed: Karen Parsons MD    Procedures: see electronic medical records for all procedures/Xrays and details which were not copied into this note but were reviewed prior to creation of Plan. LAB DATA REVIEWED:    Recent Results (from the past 24 hour(s))   CBC WITH AUTOMATED DIFF    Collection Time: 03/16/20  3:38 PM   Result Value Ref Range    WBC 4.9 4.1 - 11.1 K/uL    RBC 5.07 4. 10 - 5.70 M/uL    HGB 15.0 12.1 - 17.0 g/dL    HCT 44.1 36.6 - 50.3 %    MCV 87.0 80.0 - 99.0 FL    MCH 29.6 26.0 - 34.0 PG    MCHC 34.0 30.0 - 36.5 g/dL    RDW 13.3 11.5 - 14.5 %    PLATELET 84 (L) 992 - 400 K/uL    MPV 11.6 8.9 - 12.9 FL    NRBC 0.0 0  WBC    ABSOLUTE NRBC 0.00 0.00 - 0.01 K/uL    NEUTROPHILS 42 32 - 75 %    LYMPHOCYTES 41 12 - 49 %    MONOCYTES 17 (H) 5 - 13 %    EOSINOPHILS 0 0 - 7 %    BASOPHILS 0 0 - 1 %    IMMATURE GRANULOCYTES 0 %    ABS. NEUTROPHILS 2.1 1.8 - 8.0 K/UL    ABS. LYMPHOCYTES 2.0 0.8 - 3.5 K/UL    ABS. MONOCYTES 0.8 0.0 - 1.0 K/UL    ABS. EOSINOPHILS 0.0 0.0 - 0.4 K/UL    ABS. BASOPHILS 0.0 0.0 - 0.1 K/UL    ABS. IMM.  GRANS. 0.0 K/UL    DF SMEAR SCANNED      RBC COMMENTS NORMOCYTIC, NORMOCHROMIC     ETHYL ALCOHOL    Collection Time: 03/16/20  3:38 PM   Result Value Ref Range    ALCOHOL(ETHYL),SERUM <10 <10 MG/DL   MAGNESIUM    Collection Time: 03/16/20  3:38 PM   Result Value Ref Range    Magnesium 1.8 1.6 - 2.4 mg/dL   METABOLIC PANEL, COMPREHENSIVE    Collection Time: 03/16/20  3:38 PM   Result Value Ref Range    Sodium 135 (L) 136 - 145 mmol/L    Potassium 5.2 (H) 3.5 - 5.1 mmol/L    Chloride 100 97 - 108 mmol/L    CO2 28 21 - 32 mmol/L    Anion gap 7 5 - 15 mmol/L    Glucose 123 (H) 65 - 100 mg/dL    BUN 24 (H) 6 - 20 MG/DL    Creatinine 1.00 0.70 - 1.30 MG/DL    BUN/Creatinine ratio 24 (H) 12 - 20      GFR est AA >60 >60 ml/min/1.73m2    GFR est non-AA >60 >60 ml/min/1.73m2    Calcium 8.1 (L) 8.5 - 10.1 MG/DL    Bilirubin, total 1.2 (H) 0.2 - 1.0 MG/DL    ALT (SGPT) 111 (H) 12 - 78 U/L    AST (SGOT) 126 (H) 15 - 37 U/L    Alk.  phosphatase 128 (H) 45 - 117 U/L    Protein, total 7.9 6.4 - 8.2 g/dL    Albumin 3.2 (L) 3.5 - 5.0 g/dL    Globulin 4.7 (H) 2.0 - 4.0 g/dL    A-G Ratio 0.7 (L) 1.1 - 2.2

## 2020-03-16 NOTE — PROGRESS NOTES
Pt arrived on the unit,pt ambulate to his bed with unsteady gait,call bell in reach. Bedside and Verbal shift change report given to Miley Mendenhall (oncoming nurse) by Ela Chopra (offgoing nurse). Report included the following information SBAR, Kardex, ED Summary, Intake/Output, MAR, Recent Results, Med Rec Status and Cardiac Rhythm NSR.

## 2020-03-16 NOTE — ED TRIAGE NOTES
Patient presents to the ED with c/o alcohol withdraw. Pt reports last drink two days ago. Pt reports a fall yesterday. Pt reports drinking two bottles of wine daily. Pt reports vomiting and diarrhea.  Pt reports dizziness

## 2020-03-16 NOTE — ED NOTES
Per pharmacy, unable to make goodie bag at this time, it may take 30-45 mins before pharmacy can make.  This RN informed pharmacy we will prepare in the ED

## 2020-03-16 NOTE — ED NOTES
Report called to unit and accepted by RN. Plan of care accepted by pt. TRANSFER - OUT REPORT:    Verbal report given to Chaitanya RN(name) on Landon Miller  being transferred to PCU3(unit) for routine progression of care       Report consisted of patients Situation, Background, Assessment and   Recommendations(SBAR). Information from the following report(s) SBAR, Kardex and ED Summary was reviewed with the receiving nurse. Lines:   Peripheral IV 03/16/20 Left Antecubital (Active)   Site Assessment Clean, dry, & intact 3/16/2020  3:45 PM   Phlebitis Assessment 0 3/16/2020  3:45 PM   Infiltration Assessment 0 3/16/2020  3:45 PM   Dressing Status Clean, dry, & intact 3/16/2020  3:45 PM   Hub Color/Line Status Pink 3/16/2020  3:45 PM        Opportunity for questions and clarification was provided.       Patient transported with:   Registered Nurse

## 2020-03-17 LAB
ALBUMIN SERPL-MCNC: 2.8 G/DL (ref 3.5–5)
ALBUMIN/GLOB SERPL: 0.7 {RATIO} (ref 1.1–2.2)
ALP SERPL-CCNC: 118 U/L (ref 45–117)
ALT SERPL-CCNC: 83 U/L (ref 12–78)
AMPHET UR QL SCN: NEGATIVE
ANION GAP SERPL CALC-SCNC: 4 MMOL/L (ref 5–15)
AST SERPL-CCNC: 86 U/L (ref 15–37)
ATRIAL RATE: 76 BPM
ATRIAL RATE: 89 BPM
BARBITURATES UR QL SCN: POSITIVE
BASOPHILS # BLD: 0 K/UL (ref 0–0.1)
BASOPHILS NFR BLD: 1 % (ref 0–1)
BENZODIAZ UR QL: NEGATIVE
BILIRUB DIRECT SERPL-MCNC: 0.3 MG/DL (ref 0–0.2)
BILIRUB SERPL-MCNC: 1.3 MG/DL (ref 0.2–1)
BUN SERPL-MCNC: 24 MG/DL (ref 6–20)
BUN/CREAT SERPL: 29 (ref 12–20)
CALCIUM SERPL-MCNC: 7.9 MG/DL (ref 8.5–10.1)
CALCULATED P AXIS, ECG09: 10 DEGREES
CALCULATED P AXIS, ECG09: 31 DEGREES
CALCULATED R AXIS, ECG10: 52 DEGREES
CALCULATED R AXIS, ECG10: 57 DEGREES
CALCULATED T AXIS, ECG11: 29 DEGREES
CALCULATED T AXIS, ECG11: 37 DEGREES
CANNABINOIDS UR QL SCN: NEGATIVE
CHLORIDE SERPL-SCNC: 102 MMOL/L (ref 97–108)
CO2 SERPL-SCNC: 30 MMOL/L (ref 21–32)
COCAINE UR QL SCN: NEGATIVE
CREAT SERPL-MCNC: 0.83 MG/DL (ref 0.7–1.3)
DIAGNOSIS, 93000: NORMAL
DIAGNOSIS, 93000: NORMAL
DIFFERENTIAL METHOD BLD: ABNORMAL
DRUG SCRN COMMENT,DRGCM: ABNORMAL
EOSINOPHIL # BLD: 0.1 K/UL (ref 0–0.4)
EOSINOPHIL NFR BLD: 2 % (ref 0–7)
ERYTHROCYTE [DISTWIDTH] IN BLOOD BY AUTOMATED COUNT: 13.1 % (ref 11.5–14.5)
GLOBULIN SER CALC-MCNC: 3.9 G/DL (ref 2–4)
GLUCOSE SERPL-MCNC: 105 MG/DL (ref 65–100)
HBV SURFACE AB SER QL: NONREACTIVE
HBV SURFACE AB SER QL: REACTIVE
HBV SURFACE AB SER-ACNC: 10.73 MIU/ML
HBV SURFACE AB SER-ACNC: 8.83 MIU/ML
HBV SURFACE AG SER QL: 718.07 INDEX
HBV SURFACE AG SER QL: >1000 INDEX
HBV SURFACE AG SER QL: POSITIVE
HBV SURFACE AG SER QL: POSITIVE
HCT VFR BLD AUTO: 40.2 % (ref 36.6–50.3)
HCV AB SERPL QL IA: NONREACTIVE
HCV COMMENT,HCGAC: NORMAL
HGB BLD-MCNC: 13.3 G/DL (ref 12.1–17)
IMM GRANULOCYTES # BLD AUTO: 0 K/UL (ref 0–0.04)
IMM GRANULOCYTES NFR BLD AUTO: 0 % (ref 0–0.5)
LACTATE SERPL-SCNC: 1.1 MMOL/L (ref 0.4–2)
LYMPHOCYTES # BLD: 2.3 K/UL (ref 0.8–3.5)
LYMPHOCYTES NFR BLD: 49 % (ref 12–49)
MAGNESIUM SERPL-MCNC: 2 MG/DL (ref 1.6–2.4)
MCH RBC QN AUTO: 30 PG (ref 26–34)
MCHC RBC AUTO-ENTMCNC: 33.1 G/DL (ref 30–36.5)
MCV RBC AUTO: 90.5 FL (ref 80–99)
METHADONE UR QL: NEGATIVE
MONOCYTES # BLD: 0.7 K/UL (ref 0–1)
MONOCYTES NFR BLD: 15 % (ref 5–13)
NEUTS SEG # BLD: 1.6 K/UL (ref 1.8–8)
NEUTS SEG NFR BLD: 33 % (ref 32–75)
NRBC # BLD: 0 K/UL (ref 0–0.01)
NRBC BLD-RTO: 0 PER 100 WBC
OPIATES UR QL: NEGATIVE
P-R INTERVAL, ECG05: 166 MS
P-R INTERVAL, ECG05: 180 MS
PCP UR QL: NEGATIVE
PHOSPHATE SERPL-MCNC: 2.5 MG/DL (ref 2.6–4.7)
PLATELET # BLD AUTO: 66 K/UL (ref 150–400)
PMV BLD AUTO: 10.7 FL (ref 8.9–12.9)
POTASSIUM SERPL-SCNC: 4 MMOL/L (ref 3.5–5.1)
PROT SERPL-MCNC: 6.7 G/DL (ref 6.4–8.2)
Q-T INTERVAL, ECG07: 408 MS
Q-T INTERVAL, ECG07: 502 MS
QRS DURATION, ECG06: 170 MS
QRS DURATION, ECG06: 90 MS
QTC CALCULATION (BEZET), ECG08: 459 MS
QTC CALCULATION (BEZET), ECG08: 610 MS
RBC # BLD AUTO: 4.44 M/UL (ref 4.1–5.7)
SODIUM SERPL-SCNC: 136 MMOL/L (ref 136–145)
VENTRICULAR RATE, ECG03: 76 BPM
VENTRICULAR RATE, ECG03: 89 BPM
WBC # BLD AUTO: 4.8 K/UL (ref 4.1–11.1)

## 2020-03-17 PROCEDURE — 86704 HEP B CORE ANTIBODY TOTAL: CPT

## 2020-03-17 PROCEDURE — 80048 BASIC METABOLIC PNL TOTAL CA: CPT

## 2020-03-17 PROCEDURE — 87340 HEPATITIS B SURFACE AG IA: CPT

## 2020-03-17 PROCEDURE — 74011250637 HC RX REV CODE- 250/637: Performed by: STUDENT IN AN ORGANIZED HEALTH CARE EDUCATION/TRAINING PROGRAM

## 2020-03-17 PROCEDURE — 86803 HEPATITIS C AB TEST: CPT

## 2020-03-17 PROCEDURE — 86706 HEP B SURFACE ANTIBODY: CPT

## 2020-03-17 PROCEDURE — 74011000258 HC RX REV CODE- 258: Performed by: STUDENT IN AN ORGANIZED HEALTH CARE EDUCATION/TRAINING PROGRAM

## 2020-03-17 PROCEDURE — 83605 ASSAY OF LACTIC ACID: CPT

## 2020-03-17 PROCEDURE — 65270000029 HC RM PRIVATE

## 2020-03-17 PROCEDURE — 80307 DRUG TEST PRSMV CHEM ANLYZR: CPT

## 2020-03-17 PROCEDURE — 74011250637 HC RX REV CODE- 250/637: Performed by: EMERGENCY MEDICINE

## 2020-03-17 PROCEDURE — 84100 ASSAY OF PHOSPHORUS: CPT

## 2020-03-17 PROCEDURE — 74011250636 HC RX REV CODE- 250/636: Performed by: STUDENT IN AN ORGANIZED HEALTH CARE EDUCATION/TRAINING PROGRAM

## 2020-03-17 PROCEDURE — 93005 ELECTROCARDIOGRAM TRACING: CPT

## 2020-03-17 PROCEDURE — 85025 COMPLETE CBC W/AUTO DIFF WBC: CPT

## 2020-03-17 PROCEDURE — 80076 HEPATIC FUNCTION PANEL: CPT

## 2020-03-17 PROCEDURE — 36415 COLL VENOUS BLD VENIPUNCTURE: CPT

## 2020-03-17 PROCEDURE — 83735 ASSAY OF MAGNESIUM: CPT

## 2020-03-17 RX ORDER — LANOLIN ALCOHOL/MO/W.PET/CERES
400 CREAM (GRAM) TOPICAL DAILY
Status: DISCONTINUED | OUTPATIENT
Start: 2020-03-18 | End: 2020-03-20 | Stop reason: HOSPADM

## 2020-03-17 RX ORDER — LANOLIN ALCOHOL/MO/W.PET/CERES
400 CREAM (GRAM) TOPICAL ONCE
Status: COMPLETED | OUTPATIENT
Start: 2020-03-17 | End: 2020-03-17

## 2020-03-17 RX ORDER — PANTOPRAZOLE SODIUM 40 MG/1
40 TABLET, DELAYED RELEASE ORAL
Status: DISCONTINUED | OUTPATIENT
Start: 2020-03-18 | End: 2020-03-20 | Stop reason: HOSPADM

## 2020-03-17 RX ORDER — TAMSULOSIN HYDROCHLORIDE 0.4 MG/1
0.4 CAPSULE ORAL ONCE
Status: COMPLETED | OUTPATIENT
Start: 2020-03-17 | End: 2020-03-17

## 2020-03-17 RX ORDER — GABAPENTIN 300 MG/1
600 CAPSULE ORAL 3 TIMES DAILY
Status: DISCONTINUED | OUTPATIENT
Start: 2020-03-17 | End: 2020-03-20 | Stop reason: HOSPADM

## 2020-03-17 RX ORDER — IBUPROFEN 200 MG
1 TABLET ORAL EVERY 24 HOURS
Status: ON HOLD | COMMUNITY
End: 2020-03-22 | Stop reason: CLARIF

## 2020-03-17 RX ORDER — HEPARIN SODIUM 5000 [USP'U]/ML
5000 INJECTION, SOLUTION INTRAVENOUS; SUBCUTANEOUS EVERY 8 HOURS
Status: DISCONTINUED | OUTPATIENT
Start: 2020-03-17 | End: 2020-03-20 | Stop reason: HOSPADM

## 2020-03-17 RX ORDER — TAMSULOSIN HYDROCHLORIDE 0.4 MG/1
0.4 CAPSULE ORAL DAILY
Status: DISCONTINUED | OUTPATIENT
Start: 2020-03-18 | End: 2020-03-20 | Stop reason: HOSPADM

## 2020-03-17 RX ORDER — PANTOPRAZOLE SODIUM 40 MG/1
40 TABLET, DELAYED RELEASE ORAL ONCE
Status: COMPLETED | OUTPATIENT
Start: 2020-03-17 | End: 2020-03-17

## 2020-03-17 RX ORDER — TAMSULOSIN HYDROCHLORIDE 0.4 MG/1
0.4 CAPSULE ORAL DAILY
COMMUNITY
End: 2020-11-14

## 2020-03-17 RX ORDER — TRAMADOL HYDROCHLORIDE 50 MG/1
50 TABLET ORAL
COMMUNITY
End: 2020-03-26

## 2020-03-17 RX ORDER — BACLOFEN 10 MG/1
10 TABLET ORAL
COMMUNITY
End: 2020-11-14

## 2020-03-17 RX ORDER — GABAPENTIN 600 MG/1
600 TABLET ORAL 3 TIMES DAILY
Status: ON HOLD | COMMUNITY
End: 2021-08-23 | Stop reason: SDUPTHER

## 2020-03-17 RX ORDER — SERTRALINE HYDROCHLORIDE 50 MG/1
50 TABLET, FILM COATED ORAL DAILY
COMMUNITY
End: 2020-03-26

## 2020-03-17 RX ORDER — QUETIAPINE FUMARATE 100 MG/1
100 TABLET, FILM COATED ORAL
COMMUNITY
End: 2020-03-26

## 2020-03-17 RX ORDER — HYDROXYZINE 50 MG/1
50 TABLET, FILM COATED ORAL
Status: ON HOLD | COMMUNITY
End: 2020-03-25 | Stop reason: SDUPTHER

## 2020-03-17 RX ORDER — LORAZEPAM 1 MG/1
2 TABLET ORAL
Status: DISCONTINUED | OUTPATIENT
Start: 2020-03-17 | End: 2020-03-20 | Stop reason: HOSPADM

## 2020-03-17 RX ORDER — LORAZEPAM 1 MG/1
4 TABLET ORAL
Status: DISCONTINUED | OUTPATIENT
Start: 2020-03-17 | End: 2020-03-20 | Stop reason: HOSPADM

## 2020-03-17 RX ADMIN — FOLIC ACID 1 MG: 1 TABLET ORAL at 08:41

## 2020-03-17 RX ADMIN — LORAZEPAM 2 MG: 1 TABLET ORAL at 13:35

## 2020-03-17 RX ADMIN — LORAZEPAM 4 MG: 1 TABLET ORAL at 17:28

## 2020-03-17 RX ADMIN — HEPARIN SODIUM 5000 UNITS: 5000 INJECTION, SOLUTION INTRAVENOUS; SUBCUTANEOUS at 17:27

## 2020-03-17 RX ADMIN — GABAPENTIN 400 MG: 100 CAPSULE ORAL at 15:28

## 2020-03-17 RX ADMIN — Medication 100 MG: at 08:41

## 2020-03-17 RX ADMIN — GABAPENTIN 400 MG: 100 CAPSULE ORAL at 08:40

## 2020-03-17 RX ADMIN — LORAZEPAM 4 MG: 1 TABLET ORAL at 20:55

## 2020-03-17 RX ADMIN — Medication 400 MG: at 20:04

## 2020-03-17 RX ADMIN — LORAZEPAM 2 MG: 1 TABLET ORAL at 04:04

## 2020-03-17 RX ADMIN — LORAZEPAM 2 MG: 1 TABLET ORAL at 07:49

## 2020-03-17 RX ADMIN — TAMSULOSIN HYDROCHLORIDE 0.4 MG: 0.4 CAPSULE ORAL at 20:04

## 2020-03-17 RX ADMIN — LORAZEPAM 2 MG: 1 TABLET ORAL at 11:02

## 2020-03-17 RX ADMIN — LORAZEPAM 4 MG: 1 TABLET ORAL at 23:03

## 2020-03-17 RX ADMIN — PANTOPRAZOLE SODIUM 40 MG: 40 TABLET, DELAYED RELEASE ORAL at 20:04

## 2020-03-17 RX ADMIN — LORAZEPAM 2 MG: 1 TABLET ORAL at 15:28

## 2020-03-17 RX ADMIN — DEXTROSE MONOHYDRATE AND SODIUM CHLORIDE 75 ML/HR: 5; .45 INJECTION, SOLUTION INTRAVENOUS at 10:48

## 2020-03-17 RX ADMIN — GABAPENTIN 600 MG: 300 CAPSULE ORAL at 20:58

## 2020-03-17 RX ADMIN — LORAZEPAM 2 MG: 1 TABLET ORAL at 23:45

## 2020-03-17 NOTE — PROGRESS NOTES
Med list obtained from [facility]     Pantoprazole 40 mg daily  Magnesium Oxide 400 mg daily  Potassium chloride 10 meq daily  Tamsulosin 0.4 mg daily  Tramadol 50 mg twice daily as needed for pain   Acetaminophen 500 mg extra strength 1 tablet orally 4 times daily as needed  Baclofen 10 mg daily as needed  Folic acid 1 mg daily   Gabapentin 600 mg TID

## 2020-03-17 NOTE — PROGRESS NOTES
Reason for Admission:   77year old male admitted for alcohol withdrawal.  Medical history includes ETOH abuse,  Chronic Thrombocytopenia r/t ETOH abuse, HTN, back pain, DM II, Mood D/O related to substance abuse and is a smoker. RUR Score:     8                Plan for utilizing home health: For wound care    PCP: First and Last name:  Dr. Dianelys Anderson   [facility]    Are you a current patient: Yes/No: Yes   Approximate date of last visit:  One month ago                    Current Advanced Directive/Advance Care Plan:  No written plan in place. Transition of Care Plan:  Met with patient to begin plan of care and assessment. Follow-up PCP appointment arranged for 3/23 at 11:30am.    Care Management Interventions  PCP Verified by CM:  Yes  Mode of Transport at Discharge: Self  Transition of Care Consult (CM Consult): Discharge Planning, [address]: No  Reason Outside [place]: Managed care specific requirement  Physical Therapy Consult: Yes  Confirm Follow Up Transport: Self  The Plan for Transition of Care is Related to the Following Treatment Goals : PCP Follow-up, wound care, substance abuse resources  Name of the Patient Representative Who was Provided with a Choice of Provider and Agrees with the Discharge Plan: Patient  Freedom of Choice List was Provided with Basic Dialogue that Supports the Patient's Individualized Plan of Care/Goals, Treatment Preferences and Shares the Quality Data Associated with the Providers?: Yes  Discharge Location  Discharge Placement: Home with home health     BRIANDA Grover/CORRIE  [phone]

## 2020-03-17 NOTE — PROGRESS NOTES
Hospitalist Progress Note    NAME: Demetria Dwyer   :  1955   MRN:  [id number]   Room Number:  CVF2/61  @ [facility]       Interim Hospital Summary: 77 y.o. male whom presented on 3/16/2020 with      Assessment / Plan:  Alcohol withdrawal   Alcohol dependence, severe   Hx of complicated alcohol withdrawal with seizures, hallucinations, ICU admission in the past. Repeat EKG reviewed : normal sinus rhythm, narrow QRS, QTc 459 ms, no ST-T changes.        -  PRN Lorazepam orally per CIWA. - Thiamine, folic acid. -  Patient was counseled extensively on the need to abstain from alcohol  its addictive tendencies, its deleterious effects on the brain, cardiovascular system, liver as well as its financial & social sequelae.           Recurrent falls   Ecchymoses and abrasion on face  Patient reports that he had multiple falls with head trauma and syncope. Reports it was when he was withdrawing. CT Head reviewed - does not show acute hemorrhage.      - Local wound care per nursing  - Fall precautions          Elevated transaminases, downtrending :   Due to alcohol abuse. Last US in 2019 shows hepatic steatosis, splenomegaly. Hep C screen negative. Hepatitis B surface Ag and hepatitis B surface Ab both positive, not consistent with any clinical scenario. Per [facility] records and [facility] records (per Dr. Marcelina Lanes), history includes Chronic hepatitis B acquired from a blood transfusion. Patient does not recall ever being told he has hepatitis B.      - Alcohol cessation counseling provided. - Await Hep B Core Ab pending.   - Will repeat Hepatitis B panel.          Thrombocytopenia, PLT 66 :   Chronic, due to chronic alcohol induced BM suppression, splenomegaly.      - Monitor for bleeding.   - Repeat PLT tomorrow. - Will do SCD Px, Heparin sc.    - Pharmacologic DVT Px contra-indicated if PLT <50,000        GERD :   Hx of peptic ulcer disease     Pantoprazole      BPH :   - tamsulosin       Hypertension   Documented in records. Not on medications at home.      - Treat underlying alcohol withdrawal.           Low back pain :   - lidocaine patch  - Gabapentin TID           Type 2 diabetes  On metformin at home. Last A1c as of December 2019 was 5.5%.    - insulin sliding scale  - consistent carb diet        Substance induced mood disorder  Patient declines history of the schizophrenia but reports he has depression,anxiety. Per chart review, he has previously been on risperdal. Per Aldon File, he has had multiple psych admissions to [facility], Feb 2/9-2/16 -> SI w/ cocaine and alcohol use. Recent inpatient psych admission to [facility] for 9 days, DC on 3/11/2020. He was apparently accepted at [address] [place] [address] for detox but decided to go home to pay bills first and started drinking again.      - Will need outpatient follow up with [facility]. I have communicated with [facility] director of transitional care, Dr. Sandy Judge (cell : [phone]).    Tobacco dependence   - continue nicotine patch       Elevated lactic acid, resolved:   Due to hypovolemia. Body mass index is 30.81 kg/m². Obesity   Counseled on Lifestyle modifications, AHA Diet ,weight loss strategies.          Code Status:full   Surrogate Decision Maker: wife, Chris Vuong      DVT Prophylaxis: Hep SQ  GI Prophylaxis: not indicated     Baseline: ambulatory independently       Lines : PIV, left antecubital 3/16  Drains : none  Mcfadden : none  Restraints : none  Wounds : none  Baseline: ambulatory independently         Subjective:     Chief Complaint / Reason for Physician Visit  \"less tremors today\". Discussed with RN events overnight. Review of Systems:  No fevers, chills, appetite change, cough, sputum production, shortness of breath, dyspnea on exertion, nausea, vomitting, diarrhea, constipation, chest pain, leg edema, abdominal pain, joint pain, rash, itching. Tolerating diet.        Objective:     VITALS: Last 24hrs VS reviewed since prior progress note. Most recent are:  Patient Vitals for the past 24 hrs:   Temp Pulse Resp BP SpO2   03/17/20 1500    129/67    03/17/20 1300  76 19 122/62 99 %   03/17/20 1212 98.2 °F (36.8 °C) 78 15 133/78 99 %   03/17/20 1100  77 20 127/69 96 %   03/17/20 0900  75 19 124/71 96 %   03/17/20 0800  71 13 130/73 97 %   03/17/20 0746 98.1 °F (36.7 °C) 71 15 132/71 97 %   03/17/20 0300 98.7 °F (37.1 °C) 73  132/71 96 %   03/17/20 0200  74  121/66 94 %   03/17/20 0100  75  123/70 95 %   03/17/20 0000 98.7 °F (37.1 °C) 77 20 129/72 95 %   03/16/20 2100  83 20 124/68 95 %   03/16/20 2000  87 24 128/73 96 %   03/16/20 1936 98.7 °F (37.1 °C) 92 19 128/75 96 %   03/16/20 1830  90 28 133/73 95 %   03/16/20 1800  90 23 120/58 95 %   03/16/20 1730  89 16 125/78 95 %   03/16/20 1700  86 24 129/67 95 %   03/16/20 1630  87 18 140/79 94 %   03/16/20 1611     95 %   03/16/20 1610 98.7 °F (37.1 °C) 93 15 134/73        Intake/Output Summary (Last 24 hours) at 3/17/2020 1554  Last data filed at 3/17/2020 0747  Gross per 24 hour   Intake 2485 ml   Output 525 ml   Net 1960 ml        PHYSICAL EXAM:  General: WD, WN. Alert, cooperative, no acute distress    EENT:  EOMI. Anicteric sclerae. MMM  Resp:  CTA bilaterally, no wheezing or rales. No accessory muscle use  CV:  Regular  rhythm,  normal S1/S2, no murmurs rubs gallops, No edema  GI:  Soft, Non distended, Non tender. +Bowel sounds  Neurologic:  Alert and oriented X 3, normal speech, + mild tremors  Psych:   Good insight. Not anxious nor agitated  Skin:  No rashes.   No jaundice    Reviewed most current lab test results and cultures  YES  Reviewed most current radiology test results   YES  Review and summation of old records today    NO  Reviewed patient's current orders and MAR    YES  PMH/SH reviewed - no change compared to H&P  ________________________________________________________________________  Care Plan discussed with:    Comments   Patient x    Family      RN x    Care Manager     Consultant                        Multidiciplinary team rounds were held today with , nursing, pharmacist and clinical coordinator. Patient's plan of care was discussed; medications were reviewed and discharge planning was addressed. ________________________________________________________________________  Total NON critical care TIME: 35 Minutes    Total CRITICAL CARE TIME Spent:   Minutes non procedure based      Comments   >50% of visit spent in counseling and coordination of care     ________________________________________________________________________  Lucy Gómez MD     Procedures: see electronic medical records for all procedures/Xrays and details which were not copied into this note but were reviewed prior to creation of Plan. LABS:  I reviewed today's most current labs and imaging studies.   Pertinent labs include:  Recent Labs     03/17/20 0255 03/16/20  1538   WBC 4.8 4.9   HGB 13.3 15.0   HCT 40.2 44.1   PLT 66* 84*     Recent Labs     03/17/20  0255 03/16/20  1838 03/16/20  1538     --  135*   K 4.0  --  5.2*     --  100   CO2 30  --  28   *  --  123*   BUN 24*  --  24*   CREA 0.83  --  1.00   CA 7.9*  --  8.1*   MG 2.0  --  1.8   PHOS 2.5*  --   --    ALB 2.8*  --  3.2*   TBILI 1.3*  --  1.2*   SGOT 86*  --  126*   ALT 83*  --  111*   INR  --  1.2*  --        Signed: Lucy Gómez MD

## 2020-03-17 NOTE — ACP (ADVANCE CARE PLANNING)
Advance Care Planning (ACP) Provider Note - Comprehensive     Date of ACP Conversation: 03/17/20  Persons included in Conversation:  patient  Length of ACP Conversation in minutes:  <16 minutes (Non-Billable)    Authorized Decision Maker (if patient is incapable of making informed decisions): This person is: Other Legally Authorized Decision Maker (e.g. Next of Kin)          General ACP for ALL Patients with Decision Making Capacity:   Importance of advance care planning, including choosing a healthcare agent to communicate patient's healthcare decisions if patient lost the ability to make decisions, such as after a sudden illness or accident  Understanding of the healthcare agent role was assessed and information provided  Exploration of values, goals, and preferences if recovery is not expected, even with continued medical treatment in the event of: Imminent death  Severe, permanent brain injury  \"In these circumstances, what matters most to you? \"  Care focused more on comfort or quality of life. Review of Existing Advance Directive:  Does not have existing Advance directive    For Serious or Chronic Illness:  Understanding of medical condition    Understanding of CPR, goals and expected outcomes, benefits and burdens discussed. Information on CPR success rates provided (e.g. for CPR in hospital, survival to d/c at two weeks is 22%, for chronically ill or elderly/frail survival is less than 3%); Individual asked to communicate understanding of information in his/her own words.   Explored fears and concerns regarding CPR or possible outcomes    Interventions Provided:  Referral made for ACP follow-up assistance to:    Recommended communicating the plan and making copies for the healthcare agent, personal physician, and others as appropriate (e.g., health system)  Recommended review of completed ACP document annually or upon change in health status

## 2020-03-17 NOTE — PROGRESS NOTES
Pt medicated with ativan as needed per Ciwa scale. Bedside and Verbal shift change report given to Miley Mendenhall (oncoming nurse) by Nolberto Herrera (offgoing nurse).  Report included the following information SBAR, Kardex, Intake/Output, MAR, Recent Results, Med Rec Status and Cardiac Rhythm SR.

## 2020-03-17 NOTE — PROGRESS NOTES
Problem: Falls - Risk of  Goal: *Absence of Falls  Description: Document Bo Josue Fall Risk and appropriate interventions in the flowsheet. Outcome: Progressing Towards Goal  Note: Fall Risk Interventions:  Mobility Interventions: Communicate number of staff needed for ambulation/transfer, Patient to call before getting OOB    Mentation Interventions: Adequate sleep, hydration, pain control    Medication Interventions: Bed/chair exit alarm, Patient to call before getting OOB    Elimination Interventions:  Toilet paper/wipes in reach, Toileting schedule/hourly rounds, Urinal in reach    History of Falls Interventions: Bed/chair exit alarm, Door open when patient unattended, Evaluate medications/consider consulting pharmacy, Room close to nurse's station         Problem: Patient Education: Go to Patient Education Activity  Goal: Patient/Family Education  Outcome: Progressing Towards Goal     Problem: General Medical Care Plan  Goal: *Vital signs within specified parameters  Outcome: Progressing Towards Goal  Goal: *Labs within defined limits  Outcome: Progressing Towards Goal  Goal: *Absence of infection signs and symptoms  Outcome: Progressing Towards Goal  Goal: *Optimal pain control at patient's stated goal  Outcome: Progressing Towards Goal  Goal: *Skin integrity maintained  Outcome: Progressing Towards Goal  Goal: *Fluid volume balance  Outcome: Progressing Towards Goal  Goal: *Optimize nutritional status  Outcome: Progressing Towards Goal  Goal: *Anxiety reduced or absent  Outcome: Progressing Towards Goal  Goal: *Progressive mobility and function (eg: ADL's)  Outcome: Progressing Towards Goal     Problem: Alcohol Withdrawal  Goal: Interventions  Outcome: Progressing Towards Goal

## 2020-03-18 LAB
ALBUMIN SERPL-MCNC: 2.7 G/DL (ref 3.5–5)
ALBUMIN/GLOB SERPL: 0.7 {RATIO} (ref 1.1–2.2)
ALP SERPL-CCNC: 134 U/L (ref 45–117)
ALT SERPL-CCNC: 70 U/L (ref 12–78)
ANION GAP SERPL CALC-SCNC: 5 MMOL/L (ref 5–15)
AST SERPL-CCNC: 72 U/L (ref 15–37)
BASOPHILS # BLD: 0 K/UL (ref 0–0.1)
BASOPHILS NFR BLD: 1 % (ref 0–1)
BILIRUB DIRECT SERPL-MCNC: 0.3 MG/DL (ref 0–0.2)
BILIRUB SERPL-MCNC: 1 MG/DL (ref 0.2–1)
BUN SERPL-MCNC: 19 MG/DL (ref 6–20)
BUN/CREAT SERPL: 22 (ref 12–20)
CALCIUM SERPL-MCNC: 8 MG/DL (ref 8.5–10.1)
CHLORIDE SERPL-SCNC: 104 MMOL/L (ref 97–108)
CO2 SERPL-SCNC: 27 MMOL/L (ref 21–32)
CREAT SERPL-MCNC: 0.85 MG/DL (ref 0.7–1.3)
DIFFERENTIAL METHOD BLD: ABNORMAL
EOSINOPHIL # BLD: 0.1 K/UL (ref 0–0.4)
EOSINOPHIL NFR BLD: 4 % (ref 0–7)
ERYTHROCYTE [DISTWIDTH] IN BLOOD BY AUTOMATED COUNT: 12.6 % (ref 11.5–14.5)
GLOBULIN SER CALC-MCNC: 4 G/DL (ref 2–4)
GLUCOSE SERPL-MCNC: 88 MG/DL (ref 65–100)
HBV CORE AB SERPL QL IA: POSITIVE
HBV CORE IGM SERPL QL IA: NEGATIVE
HCT VFR BLD AUTO: 38.4 % (ref 36.6–50.3)
HGB BLD-MCNC: 12.7 G/DL (ref 12.1–17)
IMM GRANULOCYTES # BLD AUTO: 0 K/UL (ref 0–0.04)
IMM GRANULOCYTES NFR BLD AUTO: 0 % (ref 0–0.5)
LYMPHOCYTES # BLD: 1.5 K/UL (ref 0.8–3.5)
LYMPHOCYTES NFR BLD: 48 % (ref 12–49)
MAGNESIUM SERPL-MCNC: 2 MG/DL (ref 1.6–2.4)
MCH RBC QN AUTO: 29.7 PG (ref 26–34)
MCHC RBC AUTO-ENTMCNC: 33.1 G/DL (ref 30–36.5)
MCV RBC AUTO: 89.9 FL (ref 80–99)
MONOCYTES # BLD: 0.4 K/UL (ref 0–1)
MONOCYTES NFR BLD: 11 % (ref 5–13)
NEUTS SEG # BLD: 1.2 K/UL (ref 1.8–8)
NEUTS SEG NFR BLD: 36 % (ref 32–75)
NRBC # BLD: 0 K/UL (ref 0–0.01)
NRBC BLD-RTO: 0 PER 100 WBC
PHOSPHATE SERPL-MCNC: 3.4 MG/DL (ref 2.6–4.7)
PLATELET # BLD AUTO: 53 K/UL (ref 150–400)
PMV BLD AUTO: 10.8 FL (ref 8.9–12.9)
POTASSIUM SERPL-SCNC: 4 MMOL/L (ref 3.5–5.1)
PROT SERPL-MCNC: 6.7 G/DL (ref 6.4–8.2)
RBC # BLD AUTO: 4.27 M/UL (ref 4.1–5.7)
SODIUM SERPL-SCNC: 136 MMOL/L (ref 136–145)
WBC # BLD AUTO: 3.2 K/UL (ref 4.1–11.1)

## 2020-03-18 PROCEDURE — 80076 HEPATIC FUNCTION PANEL: CPT

## 2020-03-18 PROCEDURE — 65270000029 HC RM PRIVATE

## 2020-03-18 PROCEDURE — 83735 ASSAY OF MAGNESIUM: CPT

## 2020-03-18 PROCEDURE — 80048 BASIC METABOLIC PNL TOTAL CA: CPT

## 2020-03-18 PROCEDURE — 74011250637 HC RX REV CODE- 250/637: Performed by: STUDENT IN AN ORGANIZED HEALTH CARE EDUCATION/TRAINING PROGRAM

## 2020-03-18 PROCEDURE — 85025 COMPLETE CBC W/AUTO DIFF WBC: CPT

## 2020-03-18 PROCEDURE — 36415 COLL VENOUS BLD VENIPUNCTURE: CPT

## 2020-03-18 PROCEDURE — 84100 ASSAY OF PHOSPHORUS: CPT

## 2020-03-18 PROCEDURE — 74011250636 HC RX REV CODE- 250/636: Performed by: STUDENT IN AN ORGANIZED HEALTH CARE EDUCATION/TRAINING PROGRAM

## 2020-03-18 RX ORDER — LORAZEPAM 1 MG/1
4 TABLET ORAL EVERY 4 HOURS
Status: DISCONTINUED | OUTPATIENT
Start: 2020-03-18 | End: 2020-03-18

## 2020-03-18 RX ORDER — LORAZEPAM 1 MG/1
2 TABLET ORAL EVERY 4 HOURS
Status: DISCONTINUED | OUTPATIENT
Start: 2020-03-18 | End: 2020-03-19

## 2020-03-18 RX ADMIN — Medication 100 MG: at 09:38

## 2020-03-18 RX ADMIN — LORAZEPAM 4 MG: 1 TABLET ORAL at 01:10

## 2020-03-18 RX ADMIN — FOLIC ACID 1 MG: 1 TABLET ORAL at 09:38

## 2020-03-18 RX ADMIN — LORAZEPAM 4 MG: 1 TABLET ORAL at 07:28

## 2020-03-18 RX ADMIN — GABAPENTIN 600 MG: 300 CAPSULE ORAL at 15:35

## 2020-03-18 RX ADMIN — LORAZEPAM 2 MG: 1 TABLET ORAL at 15:35

## 2020-03-18 RX ADMIN — LORAZEPAM 2 MG: 1 TABLET ORAL at 20:51

## 2020-03-18 RX ADMIN — LORAZEPAM 2 MG: 1 TABLET ORAL at 19:48

## 2020-03-18 RX ADMIN — GABAPENTIN 600 MG: 300 CAPSULE ORAL at 20:55

## 2020-03-18 RX ADMIN — PANTOPRAZOLE SODIUM 40 MG: 40 TABLET, DELAYED RELEASE ORAL at 07:28

## 2020-03-18 RX ADMIN — HEPARIN SODIUM 5000 UNITS: 5000 INJECTION, SOLUTION INTRAVENOUS; SUBCUTANEOUS at 01:10

## 2020-03-18 RX ADMIN — HEPARIN SODIUM 5000 UNITS: 5000 INJECTION, SOLUTION INTRAVENOUS; SUBCUTANEOUS at 10:48

## 2020-03-18 RX ADMIN — TAMSULOSIN HYDROCHLORIDE 0.4 MG: 0.4 CAPSULE ORAL at 09:38

## 2020-03-18 RX ADMIN — HEPARIN SODIUM 5000 UNITS: 5000 INJECTION, SOLUTION INTRAVENOUS; SUBCUTANEOUS at 17:00

## 2020-03-18 RX ADMIN — LORAZEPAM 2 MG: 1 TABLET ORAL at 17:00

## 2020-03-18 RX ADMIN — Medication 400 MG: at 09:38

## 2020-03-18 RX ADMIN — LORAZEPAM 2 MG: 1 TABLET ORAL at 11:59

## 2020-03-18 RX ADMIN — LORAZEPAM 2 MG: 1 TABLET ORAL at 23:48

## 2020-03-18 RX ADMIN — GABAPENTIN 600 MG: 300 CAPSULE ORAL at 09:38

## 2020-03-18 RX ADMIN — LORAZEPAM 2 MG: 1 TABLET ORAL at 04:41

## 2020-03-18 NOTE — PROGRESS NOTES
Problem: Falls - Risk of  Goal: *Absence of Falls  Description: Document Diego Alas Fall Risk and appropriate interventions in the flowsheet.   Outcome: Progressing Towards Goal  Note: Fall Risk Interventions:  Mobility Interventions: Bed/chair exit alarm, Utilize walker, cane, or other assistive device    Mentation Interventions: Bed/chair exit alarm, Room close to nurse's station    Medication Interventions: Bed/chair exit alarm, Patient to call before getting OOB    Elimination Interventions: Bed/chair exit alarm, Urinal in reach    History of Falls Interventions: Bed/chair exit alarm, Room close to nurse's station         Problem: Patient Education: Go to Patient Education Activity  Goal: Patient/Family Education  Outcome: Progressing Towards Goal     Problem: General Medical Care Plan  Goal: *Vital signs within specified parameters  Outcome: Progressing Towards Goal  Goal: *Labs within defined limits  Outcome: Progressing Towards Goal  Goal: *Absence of infection signs and symptoms  Outcome: Progressing Towards Goal  Goal: *Optimal pain control at patient's stated goal  Outcome: Progressing Towards Goal  Goal: *Skin integrity maintained  Outcome: Progressing Towards Goal  Goal: *Fluid volume balance  Outcome: Progressing Towards Goal  Goal: *Optimize nutritional status  Outcome: Progressing Towards Goal  Goal: *Anxiety reduced or absent  Outcome: Progressing Towards Goal  Goal: *Progressive mobility and function (eg: ADL's)  Outcome: Progressing Towards Goal     Problem: Alcohol Withdrawal  Goal: Interventions  Outcome: Progressing Towards Goal     Problem: Breathing Pattern - Ineffective  Goal: *Absence of hypoxia  Outcome: Progressing Towards Goal

## 2020-03-18 NOTE — PROGRESS NOTES
Hospitalist Progress Note    NAME: Mana Bose   :  1955   MRN:  [id number]   Room Number:  BEB9/55  @ [facility]       Interim Hospital Summary: 77 y.o. male whom presented on 3/16/2020 with      Assessment / Plan:  Alcohol withdrawal   Alcohol dependence, severe   Hx of complicated alcohol withdrawal with seizures, hallucinations, ICU admission in the past.     - Scheduled lorazepam 2 mg Q4H oral.   - PRN Lorazepam orally per CIWA. - Thiamine, folic acid. - Patient was counseled extensively on the need to abstain from alcohol  its addictive tendencies, its deleterious effects on the brain, cardiovascular system, liver as well as its financial & social sequelae.            Recurrent falls   Ecchymoses and abrasion on face  Patient reports that he had multiple falls with head trauma and syncope. Reports it was when he was withdrawing. CT Head reviewed - does not show acute hemorrhage.      - Local wound care per nursing  - Fall precautions           Elevated transaminases, downtrending   Hepatitis B infection   Due to alcohol abuse. Last US in 2019 shows hepatic steatosis, splenomegaly. Hep C screen negative. Hepatitis B surface Ag and hepatitis B surface Ab both positive, not consistent with any clinical scenario. Per [facility] records and [facility] psych records (per Dr. David Mendiola), history includes Chronic hepatitis B acquired from a blood transfusion ? Patient not aware of the same. Repeat Hepatitis B panel suggestive of Hepatitis B infection.      - Alcohol cessation counseling provided. - Await Hep B Core Ab pending.   - Outpatient hepatology and infectious disease follow up.            Thrombocytopenia, :   Chronic, due to chronic alcohol induced BM suppression, splenomegaly.      - Monitor for bleeding.   - Will do SCD Px, Heparin sc.    - Pharmacologic DVT Px contra-indicated if PLT <50,000  - Repeat CBC tomorrow.           GERD :   Hx of peptic ulcer disease    Pantoprazole          BPH :   - tamsulosin          Hypertension   Documented in records. Not on medications at home.              Low back pain :   - lidocaine patch  - Gabapentin TID           Type 2 diabetes  On metformin at home. Last A1c as of December 2019 was 5.5%.    - insulin sliding scale  - consistent carb diet           Substance induced mood disorder  Patient declines history of the schizophrenia but reports he has depression,anxiety. Per chart review, he has previously been on risperdal. Per Rudolph Metcalf, he has had multiple psych admissions to [facility], Feb 2/9-2/16 -> SI w/ cocaine and alcohol use. Recent inpatient psych admission to [facility] for 9 days, DC on 3/11/2020. He was apparently accepted at [facility] for detox but decided to go home to pay bills first and started drinking again.      - Will need outpatient follow up with [facility]. I have communicated with [facility] director of transitional care, Dr. Alice Vera (cell : [phone]).       Tobacco dependence   - continue nicotine patch        Elevated lactic acid, resolved:   Due to hypovolemia.            Body mass index is 30.81 kg/m².   Obesity   Counseled on Lifestyle modifications, AHA Diet ,weight loss strategies.          Code Status:full   Surrogate Decision Maker: wife, Carly Houston      DVT Prophylaxis: Hep SQ  GI Prophylaxis: not indicated     Baseline: ambulatory independently         Lines : PIV, left antecubital 3/16  Drains : none  Mcfadden : none  Restraints : none  Wounds : none  Baseline: ambulatory independently        Subjective:     Chief Complaint / Reason for Physician Visit  \"I feel ok now. I will stop drinking. I want to walk\". Discussed with RN events overnight -> patient's CIWA scores remained high due to disorientation, confusion, anxiety. Received several PRNs overnight.      Review of Systems:  No fevers, chills, appetite change, cough, sputum production, shortness of breath, dyspnea on exertion, nausea, vomitting, diarrhea, constipation, chest pain, leg edema, abdominal pain, joint pain, rash, itching. Tolerating diet. Objective:     VITALS:   Last 24hrs VS reviewed since prior progress note. Most recent are:  Patient Vitals for the past 24 hrs:   Temp Pulse Resp BP SpO2   03/18/20 1200 97.8 °F (36.6 °C) 73 18 121/71 98 %   03/18/20 0830  72 18  97 %   03/18/20 0732 98 °F (36.7 °C) 65 18 127/71 97 %   03/18/20 0600  66 17 127/79 99 %   03/18/20 0500  67 19 136/86 98 %   03/18/20 0400  68 19 133/81 97 %   03/18/20 0300 98.8 °F (37.1 °C) 67 24 122/79 98 %   03/18/20 0200  69 21 129/84 98 %   03/18/20 0100  71 22 132/79 97 %   03/18/20 0000  69 22 134/79 98 %   03/17/20 2300 98.7 °F (37.1 °C) 70 21 (!) 120/93 98 %   03/17/20 2200  71 19 126/68 98 %   03/17/20 2100  75 19 125/67 100 %   03/17/20 2000 98.7 °F (37.1 °C) 76 23 106/62 95 %   03/17/20 1900  77 22 113/64 95 %   03/17/20 1800  78 19 116/77 100 %   03/17/20 1730  70 17 134/79 96 %   03/17/20 1700  70 21 111/53 95 %   03/17/20 1530 98.6 °F (37 °C) 71 18 126/66 99 %   03/17/20 1500    129/67        Intake/Output Summary (Last 24 hours) at 3/18/2020 1318  Last data filed at 3/18/2020 1204  Gross per 24 hour   Intake 240 ml   Output 1875 ml   Net -1635 ml        PHYSICAL EXAM:  General: WD, WN. Alert, cooperative, no acute distress    EENT:  EOMI. Anicteric sclerae. MMM  Resp:  CTA bilaterally, no wheezing or rales. No accessory muscle use  CV:  Regular  rhythm,  normal S1/S2, no murmurs rubs gallops, No edema  GI:  Soft, Non distended, Non tender. +Bowel sounds  Neurologic:  Alert and oriented X 3, normal speech,   Psych:   Good insight. Not anxious nor agitated  Skin:  No rashes.   No jaundice    Reviewed most current lab test results and cultures  YES  Reviewed most current radiology test results   YES  Review and summation of old records today    NO  Reviewed patient's current orders and MAR    YES  PMH/SH reviewed - no change compared to H&P  ________________________________________________________________________  Care Plan discussed with:    Comments   Patient x    Family      RN x    Care Manager x    Consultant                        Multidiciplinary team rounds were held today with , nursing, pharmacist and clinical coordinator. Patient's plan of care was discussed; medications were reviewed and discharge planning was addressed. ________________________________________________________________________  Total NON critical care TIME:  25   Minutes    Total CRITICAL CARE TIME Spent:   Minutes non procedure based      Comments   >50% of visit spent in counseling and coordination of care     ________________________________________________________________________  Adam Love MD     Procedures: see electronic medical records for all procedures/Xrays and details which were not copied into this note but were reviewed prior to creation of Plan. LABS:  I reviewed today's most current labs and imaging studies.   Pertinent labs include:  Recent Labs     03/18/20  0440 03/17/20  0255 03/16/20  1538   WBC 3.2* 4.8 4.9   HGB 12.7 13.3 15.0   HCT 38.4 40.2 44.1   PLT 53* 66* 84*     Recent Labs     03/18/20  0440 03/17/20  0255 03/16/20  1838 03/16/20  1538    136  --  135*   K 4.0 4.0  --  5.2*    102  --  100   CO2 27 30  --  28   GLU 88 105*  --  123*   BUN 19 24*  --  24*   CREA 0.85 0.83  --  1.00   CA 8.0* 7.9*  --  8.1*   MG 2.0 2.0  --  1.8   PHOS 3.4 2.5*  --   --    ALB 2.7* 2.8*  --  3.2*   TBILI 1.0 1.3*  --  1.2*   SGOT 72* 86*  --  126*   ALT 70 83*  --  111*   INR  --   --  1.2*  --        Signed: Adam Love MD

## 2020-03-18 NOTE — PROGRESS NOTES
Problem: Falls - Risk of  Goal: *Absence of Falls  Description: Document Diego Alas Fall Risk and appropriate interventions in the flowsheet.   Outcome: Progressing Towards Goal  Note: Fall Risk Interventions:  Mobility Interventions: Communicate number of staff needed for ambulation/transfer, Patient to call before getting OOB    Mentation Interventions: Bed/chair exit alarm, Door open when patient unattended, Reorient patient, Room close to nurse's station, Toileting rounds    Medication Interventions: Patient to call before getting OOB, Teach patient to arise slowly    Elimination Interventions: Bed/chair exit alarm, Call light in reach, Patient to call for help with toileting needs    History of Falls Interventions: Bed/chair exit alarm, Room close to nurse's station         Problem: Patient Education: Go to Patient Education Activity  Goal: Patient/Family Education  Outcome: Progressing Towards Goal     Problem: General Medical Care Plan  Goal: *Vital signs within specified parameters  Outcome: Progressing Towards Goal  Goal: *Labs within defined limits  Outcome: Progressing Towards Goal  Goal: *Absence of infection signs and symptoms  Outcome: Progressing Towards Goal  Goal: *Optimal pain control at patient's stated goal  Outcome: Progressing Towards Goal  Goal: *Skin integrity maintained  Outcome: Progressing Towards Goal  Goal: *Fluid volume balance  Outcome: Progressing Towards Goal  Goal: *Optimize nutritional status  Outcome: Progressing Towards Goal  Goal: *Anxiety reduced or absent  Outcome: Progressing Towards Goal  Goal: *Progressive mobility and function (eg: ADL's)  Outcome: Progressing Towards Goal     Problem: Alcohol Withdrawal  Goal: Interventions  Outcome: Progressing Towards Goal     Problem: Breathing Pattern - Ineffective  Goal: *Absence of hypoxia  Outcome: Progressing Towards Goal

## 2020-03-18 NOTE — PROGRESS NOTES
Initial Nutrition Assessment:    INTERVENTIONS/RECOMMENDATIONS:   ·  CC diet as tolerated  ·  2200 kcal diet ordered  · Supplements: Commercial supplement, Multivitamin/mineral, Vitamin (specify)(thiamin, folate)  ·  Glucerna lunch trays  ·  cont thiamine, folate  ·  Add B12  ·  Vit D3 2000 IUs/day    ASSESSMENT:   Pt admitted with ETOH w/drawal.  Has had complicated w/drawals in the past.  Thiamine, folate ordered. I increased his kcal to meet his ~ needs at [facility]. Hx notable for GERD, HTH, NIDDM (metformin at home, last A1c in Dec was 5.5). Pt meal compliant, wanted more food. Glucerna on lunch trays for supplementation, nutrients    Diet Order: Consistent carb  % Eaten:  No data found. %Supplement Intake:    Pertinent Medications: [x]Reviewed []Other  Pertinent Labs: [x]Reviewed []Other  Food Allergies: [x]None []Other   Last BM:    [x]Active     []Hyperactive  []Hypoactive       [] Absent BS  Skin:    [x] Intact   [] Incision  [] Breakdown  [] Other:    Anthropometrics:   Height: 6' 2\" (188 cm) Weight: 110.7 kg (244 lb)   IBW (%IBW): 86.2 kg (190 lb) (128.42 %) UBW (%UBW):   (  %)   Last Weight Metrics:  Weight Loss Metrics 3/18/2020 12/31/2019 12/23/2016 12/12/2016 4/11/2016 9/6/2014 12/15/2012   Today's Wt 244 lb 290 lb 290 lb 290 lb 315 lb 290 lb 265 lb   BMI 31.33 kg/m2 36.25 kg/m2 36.25 kg/m2 36.25 kg/m2 39.37 kg/m2 37.22 kg/m2 33.12 kg/m2   Some encounter information is confidential and restricted. Go to Review Flowsheets activity to see all data. BMI: Body mass index is 31.33 kg/m². This BMI is indicative of:   []Underweight    []Normal    []Overweight    [x] Obesity   [] Extreme Obesity (BMI>40)     Estimated Nutrition Needs (Based on):   2269 Kcals/day(2450 x 1.2 - 300) , 81.3 g(.73 g/kg) Protein  Carbohydrate:  At Least 130 g/day  Fluids: 2270 mL/day (1ml/kcal)    NUTRITION DIAGNOSES:   Problem:  Limited adherence to nutrition-related recommendations      Etiology: related to poor diet, poor compliance, ETOH abuse     Signs/Symptoms: as evidenced by BMI, med hx, labs, UDS      NUTRITION INTERVENTIONS:      Supplements: Commercial supplement, Multivitamin/mineral, Vitamin (specify)(thiamin, folate)              GOAL:   PO intake of meals and ONS > 75% 4-7 days    LEARNING NEEDS (Diet, Food/Nutrient-Drug Interaction):    [x] None Identified   [] Identified and Education Provided/Documented   [x] Identified and Pt declined/was not appropriate     Cultureal, Adventist, OR Ethnic Dietary Needs:    [x] None Identified   [] Identified and Addressed     [x] Interdisciplinary Care Plan Reviewed/Documented    [x] Discharge Planning: CC diet with supplements. MONITORING /EVALUATION:   Behavioral-Environmental Outcomes: Behavior, Readiness to change  Food/Nutrient Intake Outcomes:  Total energy intake  Physical Signs/Symptoms Outcomes: Weight/weight change, Acid-base balance, Electrolyte and renal profile, Glucose profile    NUTRITION RISK:    [] Patient At Nutritional Risk        [x] Patient Not At Nutritional Risk    PT SEEN FOR:    []  MD Consult: []Calorie Count      []Diabetic Diet Education        []Diet Education     []Electrolyte Management     [x]General Nutrition Management and Supplements     []Management of Tube Feeding     []TPN Recommendations    []  RN Referral:  []MST score >=2     []Enteral/Parenteral Nutrition PTA     []Pregnant: Gestational DM or Multigestation     []Pressure Ulcer/Wound Care needs        []  Low BMI  []  SADE Navarrete, PhD, [address] [place] [address]   Pager [phone]

## 2020-03-18 NOTE — PROGRESS NOTES
Pt ambulated w/ walker and 1 staff assist for 1 lap around unit. Slow but steady gait. Pt freq requesting Ativan \"for his shakes. \" No tremors observed by nurse. Pt is calm and cooperative, very mild anxiety only. CIWA score currently remains at a 3. Will wait until scheduled Ativan is due.

## 2020-03-18 NOTE — PROGRESS NOTES
Patient increasingly agitated despite P.O. [address] Schwab. Took to the bathroom and then patient insisted on getting in the shower. Sat on commode in shower and had a bowel movement. Assisted back to bed, bed alarm on. CIWA remains above 8 treated per orders.

## 2020-03-18 NOTE — PROGRESS NOTES
Transition of Care     CM discussed in rounds with MD and team this am.     Received call from  at [facility] this am. Ms.C. Rivers Bumpers [phone]. Indicates   patient was at [place] recently about 9 days at that time was supposed to go to the [place] campus. He missed his follow-up with them. Asked had he been approved. Not able to provide this information. Agreed we will discuss with patient again options for Substance Abuse Treatment and what he is willing to accept help with. Will need to see who is accepting patient at this time for inpatient treatment. With the state of emergency due to COVID-19 lots of changes made for inpatient care is limited now. Ms. Mercy Medina  BSW to see patient and discussed the above again.      [facility] MSW RN   [phone]

## 2020-03-19 VITALS
SYSTOLIC BLOOD PRESSURE: 132 MMHG | HEART RATE: 80 BPM | OXYGEN SATURATION: 94 % | DIASTOLIC BLOOD PRESSURE: 80 MMHG | HEIGHT: 74 IN | RESPIRATION RATE: 18 BRPM | BODY MASS INDEX: 31.32 KG/M2 | TEMPERATURE: 97.2 F | WEIGHT: 244 LBS

## 2020-03-19 LAB
ALBUMIN SERPL-MCNC: 2.9 G/DL (ref 3.5–5)
ALBUMIN/GLOB SERPL: 0.7 {RATIO} (ref 1.1–2.2)
ALP SERPL-CCNC: 139 U/L (ref 45–117)
ALT SERPL-CCNC: 72 U/L (ref 12–78)
ANION GAP SERPL CALC-SCNC: 7 MMOL/L (ref 5–15)
AST SERPL-CCNC: 70 U/L (ref 15–37)
BASOPHILS # BLD: 0 K/UL (ref 0–0.1)
BASOPHILS NFR BLD: 1 % (ref 0–1)
BILIRUB DIRECT SERPL-MCNC: 0.2 MG/DL (ref 0–0.2)
BILIRUB SERPL-MCNC: 0.7 MG/DL (ref 0.2–1)
BUN SERPL-MCNC: 17 MG/DL (ref 6–20)
BUN/CREAT SERPL: 22 (ref 12–20)
CALCIUM SERPL-MCNC: 8.2 MG/DL (ref 8.5–10.1)
CHLORIDE SERPL-SCNC: 103 MMOL/L (ref 97–108)
CO2 SERPL-SCNC: 26 MMOL/L (ref 21–32)
CREAT SERPL-MCNC: 0.79 MG/DL (ref 0.7–1.3)
DIFFERENTIAL METHOD BLD: ABNORMAL
EOSINOPHIL # BLD: 0.2 K/UL (ref 0–0.4)
EOSINOPHIL NFR BLD: 5 % (ref 0–7)
ERYTHROCYTE [DISTWIDTH] IN BLOOD BY AUTOMATED COUNT: 12.6 % (ref 11.5–14.5)
GLOBULIN SER CALC-MCNC: 4.3 G/DL (ref 2–4)
GLUCOSE SERPL-MCNC: 87 MG/DL (ref 65–100)
HBV CORE AB SERPL QL IA: POSITIVE
HBV CORE IGM SERPL QL IA: NEGATIVE
HCT VFR BLD AUTO: 40 % (ref 36.6–50.3)
HGB BLD-MCNC: 13.4 G/DL (ref 12.1–17)
IMM GRANULOCYTES # BLD AUTO: 0 K/UL (ref 0–0.04)
IMM GRANULOCYTES NFR BLD AUTO: 0 % (ref 0–0.5)
LYMPHOCYTES # BLD: 1.5 K/UL (ref 0.8–3.5)
LYMPHOCYTES NFR BLD: 41 % (ref 12–49)
MAGNESIUM SERPL-MCNC: 2 MG/DL (ref 1.6–2.4)
MCH RBC QN AUTO: 29.8 PG (ref 26–34)
MCHC RBC AUTO-ENTMCNC: 33.5 G/DL (ref 30–36.5)
MCV RBC AUTO: 89.1 FL (ref 80–99)
MONOCYTES # BLD: 0.3 K/UL (ref 0–1)
MONOCYTES NFR BLD: 9 % (ref 5–13)
NEUTS SEG # BLD: 1.7 K/UL (ref 1.8–8)
NEUTS SEG NFR BLD: 45 % (ref 32–75)
NRBC # BLD: 0 K/UL (ref 0–0.01)
NRBC BLD-RTO: 0 PER 100 WBC
PHOSPHATE SERPL-MCNC: 4.2 MG/DL (ref 2.6–4.7)
PLATELET # BLD AUTO: 53 K/UL (ref 150–400)
PMV BLD AUTO: 11 FL (ref 8.9–12.9)
POTASSIUM SERPL-SCNC: 4 MMOL/L (ref 3.5–5.1)
PROT SERPL-MCNC: 7.2 G/DL (ref 6.4–8.2)
RBC # BLD AUTO: 4.49 M/UL (ref 4.1–5.7)
SODIUM SERPL-SCNC: 136 MMOL/L (ref 136–145)
WBC # BLD AUTO: 3.8 K/UL (ref 4.1–11.1)

## 2020-03-19 PROCEDURE — 77010033678 HC OXYGEN DAILY

## 2020-03-19 PROCEDURE — 84100 ASSAY OF PHOSPHORUS: CPT

## 2020-03-19 PROCEDURE — 85025 COMPLETE CBC W/AUTO DIFF WBC: CPT

## 2020-03-19 PROCEDURE — 80048 BASIC METABOLIC PNL TOTAL CA: CPT

## 2020-03-19 PROCEDURE — 74011250636 HC RX REV CODE- 250/636: Performed by: STUDENT IN AN ORGANIZED HEALTH CARE EDUCATION/TRAINING PROGRAM

## 2020-03-19 PROCEDURE — 83735 ASSAY OF MAGNESIUM: CPT

## 2020-03-19 PROCEDURE — 36415 COLL VENOUS BLD VENIPUNCTURE: CPT

## 2020-03-19 PROCEDURE — 74011250637 HC RX REV CODE- 250/637: Performed by: STUDENT IN AN ORGANIZED HEALTH CARE EDUCATION/TRAINING PROGRAM

## 2020-03-19 PROCEDURE — 80076 HEPATIC FUNCTION PANEL: CPT

## 2020-03-19 RX ORDER — LORAZEPAM 1 MG/1
1 TABLET ORAL EVERY 4 HOURS
Status: DISCONTINUED | OUTPATIENT
Start: 2020-03-19 | End: 2020-03-20 | Stop reason: HOSPADM

## 2020-03-19 RX ADMIN — GABAPENTIN 600 MG: 300 CAPSULE ORAL at 15:31

## 2020-03-19 RX ADMIN — LORAZEPAM 2 MG: 1 TABLET ORAL at 03:58

## 2020-03-19 RX ADMIN — GABAPENTIN 600 MG: 300 CAPSULE ORAL at 08:32

## 2020-03-19 RX ADMIN — LORAZEPAM 1 MG: 1 TABLET ORAL at 15:31

## 2020-03-19 RX ADMIN — HEPARIN SODIUM 5000 UNITS: 5000 INJECTION, SOLUTION INTRAVENOUS; SUBCUTANEOUS at 01:00

## 2020-03-19 RX ADMIN — LORAZEPAM 2 MG: 1 TABLET ORAL at 02:41

## 2020-03-19 RX ADMIN — HEPARIN SODIUM 5000 UNITS: 5000 INJECTION, SOLUTION INTRAVENOUS; SUBCUTANEOUS at 08:33

## 2020-03-19 RX ADMIN — TAMSULOSIN HYDROCHLORIDE 0.4 MG: 0.4 CAPSULE ORAL at 08:32

## 2020-03-19 RX ADMIN — LORAZEPAM 1 MG: 1 TABLET ORAL at 19:22

## 2020-03-19 RX ADMIN — LORAZEPAM 1 MG: 1 TABLET ORAL at 14:12

## 2020-03-19 RX ADMIN — LORAZEPAM 2 MG: 1 TABLET ORAL at 07:42

## 2020-03-19 RX ADMIN — PANTOPRAZOLE SODIUM 40 MG: 40 TABLET, DELAYED RELEASE ORAL at 07:42

## 2020-03-19 RX ADMIN — FOLIC ACID 1 MG: 1 TABLET ORAL at 08:32

## 2020-03-19 RX ADMIN — LORAZEPAM 2 MG: 1 TABLET ORAL at 10:38

## 2020-03-19 RX ADMIN — Medication 100 MG: at 08:32

## 2020-03-19 RX ADMIN — Medication 400 MG: at 08:32

## 2020-03-19 RX ADMIN — HEPARIN SODIUM 5000 UNITS: 5000 INJECTION, SOLUTION INTRAVENOUS; SUBCUTANEOUS at 17:26

## 2020-03-19 NOTE — PROGRESS NOTES
0700 - Bedside and Verbal shift change report given to dixon lópez (oncoming nurse) by NeoVista grabiel (offgoing nurse). Report included the following information SBAR, Kardex, Intake/Output, MAR, Recent Results and Cardiac Rhythm NSR.   1915 - Bedside and Verbal shift change report given to Darien Fernandez (oncoming nurse) by Sparkle Alanis (offgoing nurse). Report included the following information SBAR, Kardex, Intake/Output, MAR, Recent Results and Cardiac Rhythm NSR.

## 2020-03-19 NOTE — PROGRESS NOTES
Problem: Falls - Risk of  Goal: *Absence of Falls  Description: Document Candido Vidal Fall Risk and appropriate interventions in the flowsheet.   Outcome: Progressing Towards Goal  Note: Fall Risk Interventions:  Mobility Interventions: Bed/chair exit alarm    Mentation Interventions: Bed/chair exit alarm    Medication Interventions: Bed/chair exit alarm    Elimination Interventions: Bed/chair exit alarm    History of Falls Interventions: Bed/chair exit alarm         Problem: Patient Education: Go to Patient Education Activity  Goal: Patient/Family Education  Outcome: Progressing Towards Goal     Problem: General Medical Care Plan  Goal: *Vital signs within specified parameters  Outcome: Progressing Towards Goal  Goal: *Labs within defined limits  Outcome: Progressing Towards Goal  Goal: *Absence of infection signs and symptoms  Outcome: Progressing Towards Goal  Goal: *Optimal pain control at patient's stated goal  Outcome: Progressing Towards Goal  Goal: *Skin integrity maintained  Outcome: Progressing Towards Goal  Goal: *Fluid volume balance  Outcome: Progressing Towards Goal  Goal: *Optimize nutritional status  Outcome: Progressing Towards Goal  Goal: *Anxiety reduced or absent  Outcome: Progressing Towards Goal  Goal: *Progressive mobility and function (eg: ADL's)  Outcome: Progressing Towards Goal     Problem: Alcohol Withdrawal  Goal: Interventions  Outcome: Progressing Towards Goal     Problem: Breathing Pattern - Ineffective  Goal: *Absence of hypoxia  Outcome: Progressing Towards Goal

## 2020-03-19 NOTE — PROGRESS NOTES
IDT met to review plan of care. Anticipated discharge over the weekend. CM met with patient to discuss plans. Patient stated that he is open to inpatient treatment for substance abuse. CM to look into programs and give him information to make call. Patient also needs to requalify  for [employer] housing. He need to requalify every 2 years. CM to call [facility] Phone: [phone]   and inquire about what needs to be done as patient is currently inpatient. CM called [facility] and was unable to speak with a representative. Left a message for a return call. CM called [facility] to inquire about process for treatment. Given number that patient will need to call for services. Will also provide patient with handout regarding other substance abuse programs. 2:52pm- Gave patient information about substance abuse programs in the area to call and check about availability.         BRIANDA Avalos/CORRIE  [phone]

## 2020-03-19 NOTE — PROGRESS NOTES
Hospitalist Progress Note    NAME: Pedro Menard   :  1955   MRN:  [id number]   Room Number:  CKO1/09  @ [facility]       Interim Hospital Summary: 77 y.o. male whom presented on 3/16/2020 with      Assessment / Plan:  Alcohol withdrawal, improving gradually  Alcohol dependence, severe   Hx of complicated alcohol withdrawal with seizures, hallucinations, ICU admission in the past.     - Scheduled lorazepam 1 mg Q4H oral.   - PRN Lorazepam orally per CIWA.   - Thiamine, folic acid. - Patient was counseled extensively on the need to abstain from alcohol  its addictive tendencies, its deleterious effects on the brain, cardiovascular system, liver as well as its financial & social sequelae.            Recurrent falls   Ecchymoses and abrasion on face  Patient reports that he had multiple falls with head trauma and syncope. Reports it was when he was withdrawing. CT Head reviewed - does not show acute hemorrhage.      - Local wound care per nursing  - Fall precautions           Elevated transaminases, downtrending   Chronic active Hepatitis B infection   Due to alcohol abuse. Last US in 2019 shows hepatic steatosis, splenomegaly. Hep C screen negative. Hepatitis B surface Ag and hepatitis B surface Ab both positive, not consistent with any clinical scenario. Per [facility] records and [facility] records (per Dr. Edmund Olson), history includes Chronic hepatitis B acquired from a blood transfusion ? Patient not aware of the same. Repeat Hepatitis B panel suggestive of chronic active Hepatitis B infection.      - Alcohol cessation counseling provided.    - Outpatient hepatology and infectious disease follow up.            Thrombocytopenia, PLT 53:   Chronic, due to chronic alcohol induced BM suppression, splenomegaly.      - Monitor for bleeding.   - Will do SCD Px, Heparin sc.   - Pharmacologic DVT Px contra-indicated if PLT <50,000  - Repeat CBC tomorrow.            GERD :   Hx of peptic ulcer disease      - Pantoprazole           BPH :   - tamsulosin           Hypertension   Documented in records. Not on medications at home.               Low back pain :   - lidocaine patch  - Gabapentin TID           Type 2 diabetes  On metformin at home. Last A1c as of December 2019 was 5.5%.    - insulin sliding scale  - consistent carb diet           Substance induced mood disorder  Patient declines history of the schizophrenia but reports he has depression,anxiety. Per chart review, he has previously been on risperdal. Per James Judge, he has had multiple psych admissions to [facility], Feb 2/9-2/16 -> SI w/ cocaine and alcohol use. Recent inpatient psych admission to [place] for 9 days, DC on 3/11/2020. He was apparently accepted at [facility] for detox but decided to go home to pay bills first and started drinking again.      - Will need outpatient follow up with [facility]. I have communicated with [facility] director of transitional care, Dr. Sophie Dotson (cell : [phone]).       Tobacco dependence   - continue nicotine patch        Elevated lactic acid, resolved:   Due to hypovolemia.            Body mass index is 30.81 kg/m².   Obesity   Counseled on Lifestyle modifications, AHA Diet ,weight loss strategies.          Code Status:full   Surrogate Decision Maker: wife, Vielka Rojas      DVT Prophylaxis: Hep SQ  GI Prophylaxis: not indicated     Baseline: ambulatory independently         Lines : PIV, left antecubital 3/16  Drains : none  [place]cfadden : none  Restraints : none  Wounds : none  Baseline: ambulatory independently     Subjective:     Chief Complaint / Reason for Physician Visit  \"I still have tremors\". Discussed with RN events overnight. Review of Systems:  No fevers, chills, appetite change, cough, sputum production, shortness of breath, dyspnea on exertion, nausea, vomitting, diarrhea, constipation, chest pain, leg edema, abdominal pain, joint pain, rash, itching. Tolerating diet. Objective:     VITALS:   Last 24hrs VS reviewed since prior progress note. Most recent are:  Patient Vitals for the past 24 hrs:   Temp Pulse Resp BP SpO2   03/19/20 1118 98 °F (36.7 °C) 74 16 123/77 96 %   03/19/20 0742 97.4 °F (36.3 °C) 70 14 140/75 96 %   03/19/20 0600  66 23 116/70 96 %   03/19/20 0400  65 20 139/83 94 %   03/19/20 0300  66 24  95 %   03/19/20 0250 98.7 °F (37.1 °C) 72 23 145/90 96 %   03/19/20 0243   23 142/90 97 %   03/19/20 0000 98.4 °F (36.9 °C) 67 21 133/86 96 %   03/18/20 2300  68 21  98 %   03/18/20 2200  69 20 139/90 97 %   03/18/20 2100  70 24  96 %   03/18/20 2000  70  123/71 97 %   03/18/20 1952 97.8 °F (36.6 °C)   122/78 94 %   03/18/20 1600 98.2 °F (36.8 °C) 74 19 134/82 99 %       Intake/Output Summary (Last 24 hours) at 3/19/2020 1320  Last data filed at 3/19/2020 1113  Gross per 24 hour   Intake 840 ml   Output 3525 ml   Net -2685 ml        PHYSICAL EXAM:  General: Alert, cooperative, no acute distress    EENT:  EOMI. Anicteric sclerae. MMM  Resp:  CTA bilaterally, no wheezing or rales. No accessory muscle use  CV:  Regular  rhythm,  normal S1/S2, no murmurs rubs gallops, No edema  GI:  Soft, Non distended, Non tender. +Bowel sounds  Neurologic:  Alert and oriented X 3, normal speech, + tremors   Psych:   Good insight. Not anxious nor agitated  Skin:  No rashes. No jaundice    Reviewed most current lab test results and cultures  YES  Reviewed most current radiology test results   YES  Review and summation of old records today    NO  Reviewed patient's current orders and MAR    YES  PMH/SH reviewed - no change compared to H&P  ________________________________________________________________________  Care Plan discussed with:    Comments   Patient x    Family      RN x    Care Manager x    Consultant                        Multidiciplinary team rounds were held today with , nursing, pharmacist and clinical coordinator.   Patient's plan of care was discussed; medications were reviewed and discharge planning was addressed. ________________________________________________________________________  Total NON critical care TIME:  25   Minutes    Total CRITICAL CARE TIME Spent:   Minutes non procedure based      Comments   >50% of visit spent in counseling and coordination of care     ________________________________________________________________________  Dk Sharma MD     Procedures: see electronic medical records for all procedures/Xrays and details which were not copied into this note but were reviewed prior to creation of Plan. LABS:  I reviewed today's most current labs and imaging studies.   Pertinent labs include:  Recent Labs     03/19/20  0244 03/18/20  0440 03/17/20  0255   WBC 3.8* 3.2* 4.8   HGB 13.4 12.7 13.3   HCT 40.0 38.4 40.2   PLT 53* 53* 66*     Recent Labs     03/19/20  0244 03/18/20  0440 03/17/20  0255 03/16/20  1838    136 136  --    K 4.0 4.0 4.0  --     104 102  --    CO2 26 27 30  --    GLU 87 88 105*  --    BUN 17 19 24*  --    CREA 0.79 0.85 0.83  --    CA 8.2* 8.0* 7.9*  --    MG 2.0 2.0 2.0  --    PHOS 4.2 3.4 2.5*  --    ALB 2.9* 2.7* 2.8*  --    TBILI 0.7 1.0 1.3*  --    SGOT 70* 72* 86*  --    ALT 72 70 83*  --    INR  --   --   --  1.2*       Signed: Dk Sharma MD

## 2020-03-19 NOTE — PROGRESS NOTES
Problem: Falls - Risk of  Goal: *Absence of Falls  Description: Document Jhoana Poe Fall Risk and appropriate interventions in the flowsheet. Outcome: Progressing Towards Goal  Note: Fall Risk Interventions:  Mobility Interventions: Bed/chair exit alarm, Patient to call before getting OOB    Mentation Interventions: Bed/chair exit alarm, Adequate sleep, hydration, pain control    Medication Interventions: Patient to call before getting OOB, Bed/chair exit alarm    Elimination Interventions: Patient to call for help with toileting needs, Call light in reach    History of Falls Interventions:  Investigate reason for fall         Problem: General Medical Care Plan  Goal: *Vital signs within specified parameters  Outcome: Progressing Towards Goal  Goal: *Labs within defined limits  Outcome: Progressing Towards Goal     Problem: Alcohol Withdrawal  Goal: Interventions  Outcome: Progressing Towards Goal

## 2020-03-20 ENCOUNTER — PATIENT OUTREACH (OUTPATIENT)
Dept: INTERNAL MEDICINE CLINIC | Age: 65
End: 2020-03-20
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
## 2020-03-20 NOTE — PROGRESS NOTES
Patient has been instructed that they have been given Ativan* which contains opioids, benzodiazepines, or other sedating drugs. Patient is aware that they  will need to refrain from driving or operating heavy machinery after taking this medication. Patient also instructed that they need to avoid drinking alcohol and using other products containing opioids, benzodiazepines, or other sedating drugs. Patient verbalized understanding.

## 2020-03-20 NOTE — DISCHARGE SUMMARY
Hospitalist Discharge Summary     Patient ID:  Stefan Giles  [id number]  89 y.o.  1955    PCP on record: Harman Jules MD    Admit date: 3/16/2020  Discharge date and time: 3/20/2020      Admission Diagnoses: Alcohol withdrawal [facility]) [F10.239]    Discharge Diagnoses: Active Problems:    Alcohol withdrawal ([address] (3/16/2020)       Patient left AMA on 3/19/2020. Hospital Course:   Alcohol withdrawal, improving gradually  Alcohol dependence, severe   Hx of complicated alcohol withdrawal with seizures, hallucinations, ICU admission in the past.Patient was counseled extensively on the need to abstain from alcohol  its addictive tendencies, its deleterious effects on the brain, cardiovascular system, liver as well as its financial & social sequelae. He was on treatment with lorazepam per [facility] protocol but decided to leave AMA.          Recurrent falls   Ecchymoses and abrasion on face  Patient reports that he had multiple falls with head trauma and syncope. Reports it was when he was withdrawing. CT Head reviewed - does not show acute hemorrhage.      - Local wound care per nursing  - Fall precautions           Elevated transaminases, downtrending   Chronic active Hepatitis B infection   Due to alcohol abuse. Last US in November 2019 shows hepatic steatosis, splenomegaly. Hep C screen negative. Hepatitis B surface Ag and hepatitis B surface Ab both positive, not consistent with any clinical scenario. Per [facility] records and [place] psych records (per Dr. Ramya Fernando), history includes Chronic hepatitis B acquired from a blood transfusion ? Patient not aware of the same. Repeat Hepatitis B panel suggestive of chronic active Hepatitis B infection.      - Alcohol cessation counseling provided.    - Outpatient hepatology and infectious disease follow up.            Thrombocytopenia, PLT 53:   Chronic, due to chronic alcohol induced BM suppression, splenomegaly.              GERD :   Hx of peptic ulcer disease      - Pantoprazole           BPH :   - tamsulosin           Hypertension   Documented in records. Not on medications at home.               Low back pain :   - lidocaine patch  - Gabapentin TID           Type 2 diabetes  On metformin at home. Last A1c as of December 2019 was 5.5%.          Substance induced mood disorder  Patient declines history of the schizophrenia but reports he has depression,anxiety. Per chart review, he has previously been on risperdal. Per Abel Shepherd, he has had multiple psych admissions to [facility], Feb 2/9-2/16 -> SI w/ cocaine and alcohol use. Recent inpatient psych admission to [facility] for 9 days, DC on 3/11/2020. He was apparently accepted at [facility] for detox but decided to go home to pay bills first and started drinking again.      - Will need outpatient follow up with [facility]. I have communicated with [facility] director of transitional care, Dr. Mercedez Almazan (cell : [phone]).       Tobacco dependence   continue nicotine patch        Elevated lactic acid, resolved:   Due to hypovolemia.            Body mass index is 30.81 kg/m².   Obesity   Counseled on Lifestyle modifications, AHA Diet ,weight loss strategies.           CONSULTATIONS:  None    Excerpted HPI from H&P of Jesus López MD:  77 y.o.  male with PMH of HTN, Depression, Paranoid schizophrenia, diabetes, substance abuse who presents to ED with c/o alcohol withdrawal      Patient reports last drink was 2 days ago. Usually drinks 2 bottles of wine daily. Decided to quit drinking 2 days ago and since then has been experiencing anxiety, tremors, craving, diaphoresis, nausea, vomiting, diarrhea, frequent falls. Currently having visual hallucinations seeing people. Initial CIWA score was 20. He has a history of withdrawal seizures and hallucinations.  Received loading dose lorazepam in the ED.      Uses nicotine patch at home  Denies current drug use  We were asked to admit for work up and evaluation of the above problems. ______________________________________________________________________  DISCHARGE SUMMARY/HOSPITAL COURSE:  for full details see H&P, daily progress notes, labs, consult notes. Please refer to examination documented on progress note from 3/19.   _______________________________________________________________________  DISCHARGE MEDICATIONS:   Discharge Medication List as of 3/19/2020  9:16 PM          My Recommended Diet, Activity, Wound Care, and follow-up labs are listed in the patient's Discharge Insturctions which I have personally completed and reviewed.     _______________________________________________________________________  DISPOSITION:     Home with Family:    Home with HH/PT/OT/RN:    SNF/LTC:    HI:    OTHER: AMA       Condition at Discharge:  Stable  _______________________________________________________________________  Follow up with:   PCP : Vic Lai MD  Follow-up Information     Follow up With Specialties Details Why Contact Info    Vic Lai, [facility]   [address]  [phone]                Total time in minutes spent coordinating this discharge (includes going over instructions, follow-up, prescriptions, and preparing report for sign off to her PCP) :  10 minutes    Signed:  Eulalia Dubose MD

## 2020-03-21 ENCOUNTER — HOSPITAL ENCOUNTER (EMERGENCY)
Age: 65
Discharge: PSYCHIATRIC OR SUBSTANCE USE FACILITY | End: 2020-03-21
Attending: EMERGENCY MEDICINE
Payer: MEDICARE

## 2020-03-21 ENCOUNTER — APPOINTMENT (OUTPATIENT)
Dept: GENERAL RADIOLOGY | Age: 65
End: 2020-03-21
Attending: EMERGENCY MEDICINE
Payer: MEDICARE

## 2020-03-21 ENCOUNTER — HOSPITAL ENCOUNTER (INPATIENT)
Age: 65
LOS: 5 days | Discharge: HOME | DRG: 885 | End: 2020-03-26
Attending: PSYCHIATRY & NEUROLOGY | Admitting: PSYCHIATRY & NEUROLOGY
Payer: MEDICARE

## 2020-03-21 ENCOUNTER — APPOINTMENT (OUTPATIENT)
Dept: CT IMAGING | Age: 65
End: 2020-03-21
Attending: EMERGENCY MEDICINE
Payer: MEDICARE

## 2020-03-21 VITALS
DIASTOLIC BLOOD PRESSURE: 79 MMHG | OXYGEN SATURATION: 98 % | WEIGHT: 40 LBS | RESPIRATION RATE: 18 BRPM | BODY MASS INDEX: 5.13 KG/M2 | SYSTOLIC BLOOD PRESSURE: 144 MMHG | HEART RATE: 93 BPM | HEIGHT: 74 IN | TEMPERATURE: 97.4 F

## 2020-03-21 DIAGNOSIS — F22 PARANOIA (HCC): ICD-10-CM

## 2020-03-21 DIAGNOSIS — Z91.199 COMPLIANCE POOR: ICD-10-CM

## 2020-03-21 DIAGNOSIS — F10.10 ALCOHOL ABUSE: Primary | ICD-10-CM

## 2020-03-21 PROBLEM — F19.94 SUBSTANCE INDUCED MOOD DISORDER (HCC): Status: ACTIVE | Noted: 2020-03-21

## 2020-03-21 LAB
ALBUMIN SERPL-MCNC: 3.3 G/DL (ref 3.5–5)
ALBUMIN/GLOB SERPL: 0.6 {RATIO} (ref 1.1–2.2)
ALP SERPL-CCNC: 162 U/L (ref 45–117)
ALT SERPL-CCNC: 96 U/L (ref 12–78)
AMPHET UR QL SCN: NEGATIVE
ANION GAP SERPL CALC-SCNC: 11 MMOL/L (ref 5–15)
ANION GAP SERPL CALC-SCNC: 20 MMOL/L (ref 5–15)
AST SERPL-CCNC: 92 U/L (ref 15–37)
BARBITURATES UR QL SCN: POSITIVE
BASOPHILS # BLD: 0.1 K/UL (ref 0–0.1)
BASOPHILS NFR BLD: 1 % (ref 0–1)
BENZODIAZ UR QL: NEGATIVE
BILIRUB SERPL-MCNC: 0.3 MG/DL (ref 0.2–1)
BUN SERPL-MCNC: 15 MG/DL (ref 6–20)
BUN SERPL-MCNC: 16 MG/DL (ref 6–20)
BUN/CREAT SERPL: 16 (ref 12–20)
BUN/CREAT SERPL: 16 (ref 12–20)
CALCIUM SERPL-MCNC: 8.4 MG/DL (ref 8.5–10.1)
CALCIUM SERPL-MCNC: 8.5 MG/DL (ref 8.5–10.1)
CANNABINOIDS UR QL SCN: NEGATIVE
CHLORIDE SERPL-SCNC: 103 MMOL/L (ref 97–108)
CHLORIDE SERPL-SCNC: 105 MMOL/L (ref 97–108)
CO2 SERPL-SCNC: 17 MMOL/L (ref 21–32)
CO2 SERPL-SCNC: 23 MMOL/L (ref 21–32)
COCAINE UR QL SCN: NEGATIVE
COMMENT, HOLDF: NORMAL
CREAT SERPL-MCNC: 0.94 MG/DL (ref 0.7–1.3)
CREAT SERPL-MCNC: 1.03 MG/DL (ref 0.7–1.3)
DIFFERENTIAL METHOD BLD: ABNORMAL
DRUG SCRN COMMENT,DRGCM: ABNORMAL
EOSINOPHIL # BLD: 0.1 K/UL (ref 0–0.4)
EOSINOPHIL NFR BLD: 2 % (ref 0–7)
ERYTHROCYTE [DISTWIDTH] IN BLOOD BY AUTOMATED COUNT: 13.4 % (ref 11.5–14.5)
ETHANOL SERPL-MCNC: 97 MG/DL
GLOBULIN SER CALC-MCNC: 5.1 G/DL (ref 2–4)
GLUCOSE BLD STRIP.AUTO-MCNC: 106 MG/DL (ref 65–100)
GLUCOSE SERPL-MCNC: 118 MG/DL (ref 65–100)
GLUCOSE SERPL-MCNC: 128 MG/DL (ref 65–100)
HCT VFR BLD AUTO: 46.1 % (ref 36.6–50.3)
HGB BLD-MCNC: 15.4 G/DL (ref 12.1–17)
IMM GRANULOCYTES # BLD AUTO: 0 K/UL (ref 0–0.04)
IMM GRANULOCYTES NFR BLD AUTO: 1 % (ref 0–0.5)
LIPASE SERPL-CCNC: 105 U/L (ref 73–393)
LYMPHOCYTES # BLD: 2.3 K/UL (ref 0.8–3.5)
LYMPHOCYTES NFR BLD: 46 % (ref 12–49)
MAGNESIUM SERPL-MCNC: 1.8 MG/DL (ref 1.6–2.4)
MCH RBC QN AUTO: 30.1 PG (ref 26–34)
MCHC RBC AUTO-ENTMCNC: 33.4 G/DL (ref 30–36.5)
MCV RBC AUTO: 90 FL (ref 80–99)
METHADONE UR QL: NEGATIVE
MONOCYTES # BLD: 0.6 K/UL (ref 0–1)
MONOCYTES NFR BLD: 12 % (ref 5–13)
NEUTS SEG # BLD: 2 K/UL (ref 1.8–8)
NEUTS SEG NFR BLD: 39 % (ref 32–75)
NRBC # BLD: 0 K/UL (ref 0–0.01)
NRBC BLD-RTO: 0 PER 100 WBC
OPIATES UR QL: NEGATIVE
PCP UR QL: NEGATIVE
PLATELET # BLD AUTO: 96 K/UL (ref 150–400)
PMV BLD AUTO: 10.6 FL (ref 8.9–12.9)
POTASSIUM SERPL-SCNC: 4 MMOL/L (ref 3.5–5.1)
POTASSIUM SERPL-SCNC: 4.1 MMOL/L (ref 3.5–5.1)
PROT SERPL-MCNC: 8.4 G/DL (ref 6.4–8.2)
RBC # BLD AUTO: 5.12 M/UL (ref 4.1–5.7)
SAMPLES BEING HELD,HOLD: NORMAL
SERVICE CMNT-IMP: ABNORMAL
SODIUM SERPL-SCNC: 139 MMOL/L (ref 136–145)
SODIUM SERPL-SCNC: 140 MMOL/L (ref 136–145)
TROPONIN I SERPL-MCNC: <0.05 NG/ML
WBC # BLD AUTO: 5.1 K/UL (ref 4.1–11.1)

## 2020-03-21 PROCEDURE — 83735 ASSAY OF MAGNESIUM: CPT

## 2020-03-21 PROCEDURE — 71045 X-RAY EXAM CHEST 1 VIEW: CPT

## 2020-03-21 PROCEDURE — 74011250637 HC RX REV CODE- 250/637: Performed by: NURSE PRACTITIONER

## 2020-03-21 PROCEDURE — 36415 COLL VENOUS BLD VENIPUNCTURE: CPT

## 2020-03-21 PROCEDURE — 85025 COMPLETE CBC W/AUTO DIFF WBC: CPT

## 2020-03-21 PROCEDURE — 96360 HYDRATION IV INFUSION INIT: CPT

## 2020-03-21 PROCEDURE — 72125 CT NECK SPINE W/O DYE: CPT

## 2020-03-21 PROCEDURE — 82962 GLUCOSE BLOOD TEST: CPT

## 2020-03-21 PROCEDURE — 74011250636 HC RX REV CODE- 250/636: Performed by: EMERGENCY MEDICINE

## 2020-03-21 PROCEDURE — 96361 HYDRATE IV INFUSION ADD-ON: CPT

## 2020-03-21 PROCEDURE — 70450 CT HEAD/BRAIN W/O DYE: CPT

## 2020-03-21 PROCEDURE — 99285 EMERGENCY DEPT VISIT HI MDM: CPT

## 2020-03-21 PROCEDURE — 84484 ASSAY OF TROPONIN QUANT: CPT

## 2020-03-21 PROCEDURE — 80307 DRUG TEST PRSMV CHEM ANLYZR: CPT

## 2020-03-21 PROCEDURE — 80053 COMPREHEN METABOLIC PANEL: CPT

## 2020-03-21 PROCEDURE — 65220000003 HC RM SEMIPRIVATE PSYCH

## 2020-03-21 PROCEDURE — 83690 ASSAY OF LIPASE: CPT

## 2020-03-21 PROCEDURE — 93005 ELECTROCARDIOGRAM TRACING: CPT

## 2020-03-21 RX ORDER — OLANZAPINE 2.5 MG/1
2.5 TABLET ORAL
Status: DISCONTINUED | OUTPATIENT
Start: 2020-03-21 | End: 2020-03-26 | Stop reason: HOSPADM

## 2020-03-21 RX ORDER — BENZTROPINE MESYLATE 1 MG/1
0.5 TABLET ORAL
Status: DISCONTINUED | OUTPATIENT
Start: 2020-03-21 | End: 2020-03-26 | Stop reason: HOSPADM

## 2020-03-21 RX ORDER — ADHESIVE BANDAGE
30 BANDAGE TOPICAL DAILY PRN
Status: DISCONTINUED | OUTPATIENT
Start: 2020-03-21 | End: 2020-03-26 | Stop reason: HOSPADM

## 2020-03-21 RX ORDER — DIPHENHYDRAMINE HYDROCHLORIDE 50 MG/ML
25 INJECTION, SOLUTION INTRAMUSCULAR; INTRAVENOUS
Status: DISCONTINUED | OUTPATIENT
Start: 2020-03-21 | End: 2020-03-26 | Stop reason: HOSPADM

## 2020-03-21 RX ORDER — FOLIC ACID 1 MG/1
1 TABLET ORAL DAILY
Status: DISCONTINUED | OUTPATIENT
Start: 2020-03-22 | End: 2020-03-26 | Stop reason: HOSPADM

## 2020-03-21 RX ORDER — HALOPERIDOL 5 MG/ML
2.5 INJECTION INTRAMUSCULAR
Status: DISCONTINUED | OUTPATIENT
Start: 2020-03-21 | End: 2020-03-26 | Stop reason: HOSPADM

## 2020-03-21 RX ORDER — PHENOBARBITAL 64.8 MG/1
64.8 TABLET ORAL 4 TIMES DAILY
Status: DISCONTINUED | OUTPATIENT
Start: 2020-03-21 | End: 2020-03-23

## 2020-03-21 RX ORDER — PHENOBARBITAL 64.8 MG/1
64.8 TABLET ORAL
Status: DISCONTINUED | OUTPATIENT
Start: 2020-03-21 | End: 2020-03-26 | Stop reason: HOSPADM

## 2020-03-21 RX ORDER — ACETAMINOPHEN 325 MG/1
650 TABLET ORAL
Status: DISCONTINUED | OUTPATIENT
Start: 2020-03-21 | End: 2020-03-26 | Stop reason: HOSPADM

## 2020-03-21 RX ORDER — LANOLIN ALCOHOL/MO/W.PET/CERES
100 CREAM (GRAM) TOPICAL DAILY
Status: DISCONTINUED | OUTPATIENT
Start: 2020-03-22 | End: 2020-03-26 | Stop reason: HOSPADM

## 2020-03-21 RX ADMIN — SODIUM CHLORIDE 1000 ML: 900 INJECTION, SOLUTION INTRAVENOUS at 13:38

## 2020-03-21 RX ADMIN — PHENOBARBITAL 64.8 MG: 64.8 TABLET ORAL at 23:29

## 2020-03-21 NOTE — ED PROVIDER NOTES
EMERGENCY DEPARTMENT HISTORY AND PHYSICAL EXAM      Date: 3/21/2020  Patient Name: Pavithra Purdy    History of Presenting Illness     Chief Complaint   Patient presents with    Alcohol Problem       History Provided By: Patient and EMS    HPI: Pavithra Purdy, 77 y.o. male presents to the ED with cc of concerned about alcohol withdrawal.  Patient is a poor historian as on arrival he is sluggish but responds with a very low voice. He was apparently admitted here 2 days prior for alcohol withdrawal and left AGAINST MEDICAL ADVICE. The patient called 911 today concerned he was having alcohol withdrawal.  EMS on arrival noted that he was incontinent of urine. Documentation reveals he has a history of alcohol withdrawal seizures. He states he did have a fall secondary to feeling generally weak today. He denies any other illicit drug use. He denies any midline neck pain, chest pain, abdominal pain. He states he did vomit once. Remaining history is limited due to the patient's mental status. Blood sugar per EMS was 124. There are no other complaints, changes, or physical findings at this time. PCP: Laura Anderson MD    No current facility-administered medications on file prior to encounter. Current Outpatient Medications on File Prior to Encounter   Medication Sig Dispense Refill    metformin (GLUCOPHAGE) 500 mg tablet Take 500 mg by mouth daily (with breakfast).  traMADoL (ULTRAM) 50 mg tablet Take 50 mg by mouth two (2) times daily as needed for Pain.  baclofen (LIORESAL) 10 mg tablet Take 10 mg by mouth daily as needed.  gabapentin (NEURONTIN) 600 mg tablet Take 600 mg by mouth three (3) times daily.  hydrOXYzine HCL (ATARAX) 50 mg tablet Take 50 mg by mouth every four (4) hours as needed for Anxiety.  nicotine (NICODERM CQ) 21 mg/24 hr 1 Patch by TransDERmal route every twenty-four (24) hours.  QUEtiapine (SEROquel) 100 mg tablet Take 100 mg by mouth nightly.       sertraline (ZOLOFT) 50 mg tablet Take 50 mg by mouth daily.  tamsulosin (FLOMAX) 0.4 mg capsule Take 0.4 mg by mouth daily.  PHENobarbitaL (LUMINAL) 30 mg tablet Take 30 mg by mouth four (4) times daily.  albuterol sulfate 90 mcg/actuation aepb Take 2 Puffs by inhalation every four (4) hours as needed (breathing). 1 Inhaler 0       Past History     Past Medical History:  Past Medical History:   Diagnosis Date    Depression     DJD (degenerative joint disease) 3/8/2010    HTN (hypertension) 3/8/2010    STATES NO LONGER TAKING MEDICATION-STATES MD STOPPED    Insomnia     NIDDM (non-insulin dependent diabetes mellitus) 3/8/2010    Other ill-defined conditions(799.89)     BPH    Other ill-defined conditions(799.89)     previous hx of DTs from ETOH withdrawal    Other ill-defined conditions(799.89)     DDD/back problems    Psychiatric disorder     paranoid schizophrenia, anxiety    Substance abuse ([facility] [address]     Suicidal thoughts        Past Surgical History:  Past Surgical History:   Procedure Laterality Date    BIOPSY PROSTATE      HX UROLOGICAL      turp       Family History:  Family History   Problem Relation Age of Onset    Cancer Mother         lymphoma    Heart Disease Father         CHF       Social History:  Social History     Tobacco Use    Smoking status: Current Every Day Smoker     Packs/day: 1.00     Years: 10.00     Pack years: 10.00    Smokeless tobacco: Never Used    Tobacco comment: patient has been decreasing amount of cigarettes   Substance Use Topics    Alcohol use: No     Alcohol/week: 35.0 standard drinks     Types: 42 Cans of beer per week     Comment: states stopped drinking 2 ,onths ago    Drug use: No       Allergies: Allergies   Allergen Reactions    Motrin [Ibuprofen] Hives         Review of Systems   Review of Systems   Unable to perform ROS: Mental status change       Physical Exam   Physical Exam  Vitals signs and nursing note reviewed.    Constitutional: General: He is not in acute distress. Appearance: He is not ill-appearing or diaphoretic. HENT:      Head: Normocephalic and atraumatic. Right Ear: Tympanic membrane normal.      Left Ear: Tympanic membrane normal.      Nose: Nose normal.      Mouth/Throat:      Mouth: Mucous membranes are moist.   Eyes:      General:         Right eye: No discharge. Left eye: No discharge. Pupils: Pupils are equal, round, and reactive to light. Neck:      Musculoskeletal: Normal range of motion and neck supple. Cardiovascular:      Rate and Rhythm: Normal rate and regular rhythm. Pulses: Normal pulses. Pulmonary:      Effort: Pulmonary effort is normal.   Abdominal:      General: Abdomen is flat. Bowel sounds are normal.   Musculoskeletal: Normal range of motion. General: No swelling, tenderness, deformity or signs of injury. Right lower leg: No edema. Left lower leg: No edema. Skin:     General: Skin is warm. Coloration: Skin is not jaundiced. Findings: No bruising. Neurological:      General: No focal deficit present. Mental Status: He is oriented to person, place, and time. 3:33 PM nurse states patient is more alert. Patient was able to get up to go to the bathroom. We will continue to monitor. Suspect possible alcohol withdrawal seizure. He has stable vital signs at this time. He has no other overt signs of withdrawal however. 4:56 PM ivette case with hospitalist.  They are aware the patient was recently in the hospital for similar complaints. They will come assist with final disposition. 5:25 PM-patient was seen by the hospitalist nurse practitioner who did not feel patient met criteria for medical admission. Discussed this with the patient and he felt that he was not stable to go home and he admits to noncompliance with his psychiatric medications.   Therefore will consult psychiatry to see if there is any need for further psychiatric stabilization. 5:51 PM psychiatric counseling has seen the patient and feels that he meets criteria for admission as he has paranoia and some subtle concerns about harming himself. She is aware that the patient is at risk for possible alcohol withdrawal but at this time does not appear to be having withdrawal symptoms. 6:25 PM the psychiatric counselor did talk with the patient. She states he expressed possibility of taking extra metformin this morning. On my discussion with the patient he states he took approximately 4 metformin 500 mg. Very forthcoming whether this was suicidal or not. He also states he took 2 back pain medications. On his initial blood work he did have a mild anion gap probably related to a seizure that he had had earlier. Plan will be to repeat basic metabolic  if that has normalized suspect this is not related to metformin toxicity. In fact if he only took force doubt that that represents a significant toxic dose. 6:52 PM patient had repeat basic metabolic that showed a normal blood sugar, normal CO2 and anion gap so doubt this represents a significant metformin ingestion if he took any at all. Diagnostic Study Results     Labs -     Recent Results (from the past 12 hour(s))   METABOLIC PANEL, COMPREHENSIVE    Collection Time: 03/22/20  4:24 AM   Result Value Ref Range    Sodium 137 136 - 145 mmol/L    Potassium 4.0 3.5 - 5.1 mmol/L    Chloride 105 97 - 108 mmol/L    CO2 25 21 - 32 mmol/L    Anion gap 7 5 - 15 mmol/L    Glucose 85 65 - 100 mg/dL    BUN 23 (H) 6 - 20 MG/DL    Creatinine 0.90 0.70 - 1.30 MG/DL    BUN/Creatinine ratio 26 (H) 12 - 20      GFR est AA >60 >60 ml/min/1.73m2    GFR est non-AA >60 >60 ml/min/1.73m2    Calcium 8.5 8.5 - 10.1 MG/DL    Bilirubin, total 0.8 0.2 - 1.0 MG/DL    ALT (SGPT) 80 (H) 12 - 78 U/L    AST (SGOT) 66 (H) 15 - 37 U/L    Alk.  phosphatase 128 (H) 45 - 117 U/L    Protein, total 7.3 6.4 - 8.2 g/dL    Albumin 3.0 (L) 3.5 - 5.0 g/dL Globulin 4.3 (H) 2.0 - 4.0 g/dL    A-G Ratio 0.7 (L) 1.1 - 2.2     GLUCOSE, FASTING    Collection Time: 03/22/20  4:24 AM   Result Value Ref Range    Glucose 85 65 - 100 MG/DL   TSH 3RD GENERATION    Collection Time: 03/22/20  4:24 AM   Result Value Ref Range    TSH 0.85 0.36 - 3.74 uIU/mL   LIPID PANEL    Collection Time: 03/22/20  4:24 AM   Result Value Ref Range    LIPID PROFILE          Cholesterol, total 141 <200 MG/DL    Triglyceride 71 <150 MG/DL    HDL Cholesterol 73 MG/DL    LDL, calculated 53.8 0 - 100 MG/DL    VLDL, calculated 14.2 MG/DL    CHOL/HDL Ratio 1.9 0.0 - 5.0         Radiologic Studies -   CT HEAD WO CONT   Final Result   IMPRESSION: No evidence of acute process. CT SPINE CERV WO CONT   Final Result   IMPRESSION: Degenerative disc disease C5-6 and C6-7 with no acute fracture or   aggressive lesion. XR CHEST PORT   Final Result   Impression:   No acute cardiopulmonary process. CT Results  (Last 48 hours)               03/21/20 1404  CT HEAD WO CONT Final result    Impression:  IMPRESSION: No evidence of acute process. Narrative:  EXAM: CT HEAD WO CONT       INDICATION: AMS       COMPARISON: 2/16/2020. CONTRAST: None. TECHNIQUE: Unenhanced CT of the head was performed using 5 mm images. Brain and   bone windows were generated. CT dose reduction was achieved through use of a   standardized protocol tailored for this examination and automatic exposure   control for dose modulation. FINDINGS:   The ventricles and sulci are normal in size, shape and configuration and   midline. There is no significant white matter disease. There is no intracranial   hemorrhage, extra-axial collection, mass, mass effect or midline shift. The   basilar cisterns are open. No acute infarct is identified. The bone windows   demonstrate no abnormalities.  The visualized portions of the paranasal sinuses   and mastoid air cells are clear.           03/21/20 1404  CT SPINE CERV WO CONT Final result    Impression:  IMPRESSION: Degenerative disc disease C5-6 and C6-7 with no acute fracture or   aggressive lesion. Narrative:  EXAM:  CT CERVICAL SPINE WITHOUT CONTRAST       INDICATION: Altered mental status. COMPARISON: None. CONTRAST:  None. TECHNIQUE: Multislice helical CT of the cervical spine was performed without   intravenous contrast administration. Sagittal and coronal reconstructions were   generated. CT dose reduction was achieved through use of a standardized   protocol tailored for this examination and automatic exposure control for dose   modulation. FINDINGS:       The alignment is within normal limits. There is no fracture or subluxation. The   odontoid process is intact. The craniocervical junction is within normal limits. The prevertebral soft tissues are within normal limits. The vertebral body   heights are preserved with no evident fracture or aggressive lesion. There is   loss of vertical disc height at the C5-6 and C6-7 level with secondary to   osteophytes. There is no spinal canal stenosis or neural foraminal narrowing   evident on an osseous basis. The surrounding soft tissues and visualized lung   bases appear unremarkable. CXR Results  (Last 48 hours)               03/21/20 1403  XR CHEST PORT Final result    Impression:  Impression:   No acute cardiopulmonary process. Narrative:  Chest portable AP       History: AMS       Comparison: 10/17/2019       Findings: The lungs are well expanded. No focal consolidation, pleural   effusion, or pneumothorax. The cardiomediastinal silhouette is unremarkable. The visualized osseous structures are unremarkable. Medical Decision Making   I am the first provider for this patient. I reviewed the vital signs, available nursing notes, past medical history, past surgical history, family history and social history.     Vital Signs-Reviewed the patient's vital signs. Patient Vitals for the past 12 hrs:   Temp   03/21/20 2000 97.4 °F (36.3 °C)       EKG interpretation: (Preliminary)  Rhythm: normal sinus rhythm; and regular . Rate (approx.): 96; Axis: normal; TN interval: normal; QRS interval: normal ; ST/T wave: normal; Other findings: normal.    Records Reviewed: Nursing Notes, Old Medical Records, Previous Radiology Studies and Previous Laboratory Studies    Provider Notes (Medical Decision Making):   Differential diagnosis-Warneke's Korsakoff syndrome, withdrawal seizure, electrolyte abnormality, alcohol withdrawal, CVA, intracranial hemorrhage, cervical spine injury    Impression/plan-patient 77years of age to the ER for possible alcohol withdrawal.  Patient is not very forthcoming with history and appears to be sluggish as if possibly postictal on arrival.  He called 911 concerned he was having alcohol withdrawal.  Review of the records reveal he was admitted 2 days prior for alcohol withdrawal and left AGAINST MEDICAL ADVICE. He states he was weak today and possibly had a fall. Plan will be to obtain head CT cervical CT blood work and monitor for seizure activity. Patient may need readmission due to recurrence of symptoms. ED Course:   Initial assessment performed. The patients presenting problems have been discussed, and they are in agreement with the care plan formulated and outlined with them. I have encouraged them to ask questions as they arise throughout their visit. Critical Care Time:   CRITICAL CARE NOTE :      Acacia Bailey MD      Disposition:  Transfer    DISCHARGE PLAN:  1. Discharge Medication List as of 3/21/2020  8:37 PM        2. Follow-up Information    None       3. Return to ED if worse     Diagnosis     Clinical Impression:   1. Alcohol abuse    2. Paranoia ([facility] [address]    3.  Compliance poor        Attestations:    Acacia Bailey MD    Please note that this dictation was completed with [employer], the [employer] voice recognition software. Quite often unanticipated grammatical, syntax, homophones, and other interpretive errors are inadvertently transcribed by the computer software. Please disregard these errors. Please excuse any errors that have escaped final proofreading. Thank you.

## 2020-03-21 NOTE — ED NOTES
Pt still unsteady, states he still feels bad, feels shaky, and that he wants to be admitted for his withdrawal.

## 2020-03-21 NOTE — ED NOTES
Per [facility], pt to be admitted, pt stating now visual hallucinations, that sometimes he \"sees a man in the dark. \" Pt now also stating that sometimes he feels SI, but does not have a plan. Denies HI.  Pt is cleared medically and does not need medical admit per the hospitalist.

## 2020-03-21 NOTE — ED NOTES
TRANSFER - OUT REPORT:    Verbal report given to Julita Beard on Sydney Iverson  being transferred to [address] [place] [address] (unit) for routine progression of care       Report consisted of patients Situation, Background, Assessment and   Recommendations(SBAR). Information from the following report(s) SBAR was reviewed with the receiving nurse. Lines:   Peripheral IV 03/21/20 Left Antecubital (Active)   Site Assessment Clean, dry, & intact 3/21/2020  1:04 PM   Phlebitis Assessment 0 3/21/2020  1:04 PM   Infiltration Assessment 0 3/21/2020  1:04 PM   Dressing Status Clean, dry, & intact 3/21/2020  1:04 PM   Dressing Type 4 X 4 3/21/2020  1:04 PM   Hub Color/Line Status Pink 3/21/2020  1:04 PM        Opportunity for questions and clarification was provided.       Patient transported with:   EMS

## 2020-03-21 NOTE — BH NOTES
TRANSFER - IN REPORT:    Verbal report received from Gisela Helene Amin  on Itmann Folds  being received from [facility]/ED for routine progression of care      Report consisted of patients Situation, Background, Assessment and   Recommendations(SBAR). Information from the following report(s) Kardex, ED Summary, MAR and Recent Results was reviewed with the receiving nurse. Opportunity for questions and clarification was provided. Patient ambulates independently and is Voluntary Status. Assessment completed upon patients arrival to unit and care assumed.

## 2020-03-21 NOTE — ED TRIAGE NOTES
Pt comes via EMS for etoh withdrawal, recently admitted for same and left AMA. VSS on arrival, pt drowsy. Emergency Department Nursing Plan of Care       The Nursing Plan of Care is developed from the Nursing assessment and Emergency Department Attending provider initial evaluation. The plan of care may be reviewed in the ED Provider note.     The Plan of Care was developed with the following considerations:   Patient / Family readiness to learn indicated by:verbalized understanding  Persons(s) to be included in education: patient  Barriers to Learning/Limitations:No    Signed     Ange Moore RN    3/21/2020   12:57 PM

## 2020-03-21 NOTE — ED NOTES
Bedside and Verbal shift change report given to Jae Mendoza. RN (oncoming nurse) by Joan Magana RN (offgoing nurse). Report included the following information SBAR, ED Summary, MAR and Recent Results.

## 2020-03-22 LAB
ALBUMIN SERPL-MCNC: 3 G/DL (ref 3.5–5)
ALBUMIN/GLOB SERPL: 0.7 {RATIO} (ref 1.1–2.2)
ALP SERPL-CCNC: 128 U/L (ref 45–117)
ALT SERPL-CCNC: 80 U/L (ref 12–78)
ANION GAP SERPL CALC-SCNC: 7 MMOL/L (ref 5–15)
AST SERPL-CCNC: 66 U/L (ref 15–37)
BILIRUB SERPL-MCNC: 0.8 MG/DL (ref 0.2–1)
BUN SERPL-MCNC: 23 MG/DL (ref 6–20)
BUN/CREAT SERPL: 26 (ref 12–20)
CALCIUM SERPL-MCNC: 8.5 MG/DL (ref 8.5–10.1)
CHLORIDE SERPL-SCNC: 105 MMOL/L (ref 97–108)
CHOLEST SERPL-MCNC: 141 MG/DL
CO2 SERPL-SCNC: 25 MMOL/L (ref 21–32)
CREAT SERPL-MCNC: 0.9 MG/DL (ref 0.7–1.3)
GLOBULIN SER CALC-MCNC: 4.3 G/DL (ref 2–4)
GLUCOSE P FAST SERPL-MCNC: 85 MG/DL (ref 65–100)
GLUCOSE SERPL-MCNC: 85 MG/DL (ref 65–100)
HDLC SERPL-MCNC: 73 MG/DL
HDLC SERPL: 1.9 {RATIO} (ref 0–5)
LDLC SERPL CALC-MCNC: 53.8 MG/DL (ref 0–100)
LIPID PROFILE,FLP: NORMAL
POTASSIUM SERPL-SCNC: 4 MMOL/L (ref 3.5–5.1)
PROT SERPL-MCNC: 7.3 G/DL (ref 6.4–8.2)
SODIUM SERPL-SCNC: 137 MMOL/L (ref 136–145)
TRIGL SERPL-MCNC: 71 MG/DL (ref ?–150)
TSH SERPL DL<=0.05 MIU/L-ACNC: 0.85 UIU/ML (ref 0.36–3.74)
VLDLC SERPL CALC-MCNC: 14.2 MG/DL

## 2020-03-22 PROCEDURE — 80053 COMPREHEN METABOLIC PANEL: CPT

## 2020-03-22 PROCEDURE — 84443 ASSAY THYROID STIM HORMONE: CPT

## 2020-03-22 PROCEDURE — 74011250637 HC RX REV CODE- 250/637: Performed by: NURSE PRACTITIONER

## 2020-03-22 PROCEDURE — 74011250637 HC RX REV CODE- 250/637: Performed by: INTERNAL MEDICINE

## 2020-03-22 PROCEDURE — 82947 ASSAY GLUCOSE BLOOD QUANT: CPT

## 2020-03-22 PROCEDURE — 74011250637 HC RX REV CODE- 250/637: Performed by: PSYCHIATRY & NEUROLOGY

## 2020-03-22 PROCEDURE — 80061 LIPID PANEL: CPT

## 2020-03-22 PROCEDURE — 36415 COLL VENOUS BLD VENIPUNCTURE: CPT

## 2020-03-22 PROCEDURE — 65220000003 HC RM SEMIPRIVATE PSYCH

## 2020-03-22 RX ORDER — HYDROXYZINE 50 MG/1
50 TABLET, FILM COATED ORAL 3 TIMES DAILY
Status: DISCONTINUED | OUTPATIENT
Start: 2020-03-22 | End: 2020-03-26 | Stop reason: HOSPADM

## 2020-03-22 RX ORDER — ALBUTEROL SULFATE 0.83 MG/ML
2.5 SOLUTION RESPIRATORY (INHALATION)
Status: DISCONTINUED | OUTPATIENT
Start: 2020-03-22 | End: 2020-03-26 | Stop reason: HOSPADM

## 2020-03-22 RX ORDER — GABAPENTIN 600 MG/1
600 TABLET ORAL 3 TIMES DAILY
Status: DISCONTINUED | OUTPATIENT
Start: 2020-03-22 | End: 2020-03-26 | Stop reason: HOSPADM

## 2020-03-22 RX ORDER — TAMSULOSIN HYDROCHLORIDE 0.4 MG/1
0.4 CAPSULE ORAL DAILY
Status: DISCONTINUED | OUTPATIENT
Start: 2020-03-22 | End: 2020-03-26 | Stop reason: HOSPADM

## 2020-03-22 RX ORDER — METFORMIN HYDROCHLORIDE 500 MG/1
500 TABLET ORAL
Status: DISCONTINUED | OUTPATIENT
Start: 2020-03-23 | End: 2020-03-26 | Stop reason: HOSPADM

## 2020-03-22 RX ORDER — SERTRALINE HYDROCHLORIDE 50 MG/1
50 TABLET, FILM COATED ORAL DAILY
Status: DISCONTINUED | OUTPATIENT
Start: 2020-03-23 | End: 2020-03-24

## 2020-03-22 RX ORDER — ALBUTEROL SULFATE 0.83 MG/ML
2.5 SOLUTION RESPIRATORY (INHALATION)
Status: DISCONTINUED | OUTPATIENT
Start: 2020-03-22 | End: 2020-03-22 | Stop reason: CLARIF

## 2020-03-22 RX ORDER — QUETIAPINE FUMARATE 100 MG/1
100 TABLET, FILM COATED ORAL
Status: DISCONTINUED | OUTPATIENT
Start: 2020-03-22 | End: 2020-03-24

## 2020-03-22 RX ADMIN — HYDROXYZINE HYDROCHLORIDE 50 MG: 50 TABLET ORAL at 16:42

## 2020-03-22 RX ADMIN — PHENOBARBITAL 64.8 MG: 64.8 TABLET ORAL at 04:14

## 2020-03-22 RX ADMIN — PHENOBARBITAL 64.8 MG: 64.8 TABLET ORAL at 17:34

## 2020-03-22 RX ADMIN — FOLIC ACID 1 MG: 1 TABLET ORAL at 08:41

## 2020-03-22 RX ADMIN — QUETIAPINE FUMARATE 100 MG: 100 TABLET ORAL at 21:35

## 2020-03-22 RX ADMIN — Medication 100 MG: at 08:42

## 2020-03-22 RX ADMIN — GABAPENTIN 600 MG: 600 TABLET, FILM COATED ORAL at 21:35

## 2020-03-22 RX ADMIN — PHENOBARBITAL 64.8 MG: 64.8 TABLET ORAL at 08:41

## 2020-03-22 RX ADMIN — PHENOBARBITAL 64.8 MG: 64.8 TABLET ORAL at 21:36

## 2020-03-22 RX ADMIN — HYDROXYZINE HYDROCHLORIDE 50 MG: 50 TABLET ORAL at 21:36

## 2020-03-22 RX ADMIN — MULTIPLE VITAMINS W/ MINERALS TAB 1 TABLET: TAB at 08:41

## 2020-03-22 RX ADMIN — TAMSULOSIN HYDROCHLORIDE 0.4 MG: 0.4 CAPSULE ORAL at 12:07

## 2020-03-22 RX ADMIN — PHENOBARBITAL 64.8 MG: 64.8 TABLET ORAL at 12:07

## 2020-03-22 RX ADMIN — GABAPENTIN 600 MG: 600 TABLET, FILM COATED ORAL at 16:42

## 2020-03-22 NOTE — PROGRESS NOTES
[address] [facility] 60  Master Treatment Plan for Deborah Starks    Date Treatment Plan Initiated: 03/22/2020    Treatment Plan Modalities:  Type of Modality Amount  (x minutes) Frequency (x/week) Duration (x days) Name of Responsible Staff   [address] [facility] meetings to encourage peer interactions 15 7 1 Tom MURO     Group psychotherapy to assist in building coping skills and internal controls 60 7 1 Sam Ramírez   Therapeutic activity groups to build coping skills 60 7 1 Sam Ramírez   Psychoeducation in group setting to address:   Medication education   15 7 1 Digna AVITIA RN   Coping skills         Relaxation techniques         Symptom management         Discharge planning   60 2 255 [facility]    60 2 1 Chaplain LOOMIS   61 1 1 volunteer   Recovery/AA/NA      volunteer   Physician medication management   13 7 1 Dr. Marina Pisano meeting/discharge planning   15 2 1400 [facility] and Ivette Joy                                Goals will be met by 03/29/2020    Problem: Falls - Risk of  Goal: *Absence of Falls  Description: Document Ramon Fall Risk and appropriate interventions in the flowsheet. Outcome: Progressing Towards Goal  Note: Fall Risk Interventions:            Medication Interventions: Teach patient to arise slowly                   Problem: Depressed Mood (Adult/Pediatric)  Goal: *STG: Participates in treatment plan  Outcome: Progressing Towards Goal  Note: Patient is participatory in treatment team. Feels depressed and appears disheveled. Diagnosed with schizophrenia \"maybe 20 years ago\". Previous psych history significant for depression and anxiety. States that he has been a heavy drinker since he was a teenager. Recently had AH \"about a week ago. I was seeing things\". Last had a seizure withdrawing from alcohol 2 years ago. Denies using any other drugs. Seroquel, Zoloft restarted.    Goal: *STG: Remains safe in hospital  Outcome: Progressing Towards Goal  Note: Safe  Goal: *STG: Complies with medication therapy  Outcome: Progressing Towards Goal  Note: Compliant  Goal: Interventions  Outcome: Progressing Towards Goal

## 2020-03-22 NOTE — CONSULTS
Hospitalist History and Physical  Jitendra Navarro MD  Answering service: [phone] OR [phone] from in house phone        Date of Service:  3/22/2020  NAME:  Demetria Dwyer  :  1955  MRN:  [id number]  Primary Care Provider: Makenzie Stokes MD    Chief Complaint: Suicidal ideation    History of Present Illness: Demetria Dwyer is a 77 y.o. male with past medical history of depression, alcoholism, chronic hep B infection who comes in with complaints of alcohol withdrawal and [place] ideation. Patient is awake, alert and oriented able to answer my question, follow my requests. Data also obtained from extensive chart review. As per collective reports, patient is a 49-year-old male with a history of alcoholism, chronic hep B, who was admitted at [facility] for alcohol withdrawal and alcohol dependence with recurrent falls who left AMA and came to ED yesterday complains of alcohol withdrawal as well as hallucinations along with suicidal ideation without any plan. He reports seeing things and hearing voices which were not there, there were concerns for self-harm and patient was sent to behavioral unit at [facility].  Patient is currently awake, alert, comfortable, denies any ongoing hallucination at the moment, denies any suicidal ideation to me, complains of back pain. Afebrile, no nausea or vomiting. He is receiving phenobarbital for withdrawal.    Chart reviewed at length. Review of Systems:  Pertinent positives noted in HPI. All other systems were reviewed and are negative.     Past Medical and surgical history:   Past Medical History:   Diagnosis Date    Depression     DJD (degenerative joint disease) 3/8/2010    HTN (hypertension) 3/8/2010    STATES NO LONGER TAKING MEDICATION-STATES MD STOPPED    Insomnia     NIDDM (non-insulin dependent diabetes mellitus) 3/8/2010    Other ill-defined conditions(799.89)     BPH    Other ill-defined conditions(799.89) previous hx of DTs from ETOH withdrawal    Other ill-defined conditions(799.89)     DDD/back problems    Psychiatric disorder     paranoid schizophrenia, anxiety    Substance abuse ([facility] [address]     Suicidal thoughts       Past Surgical History:   Procedure Laterality Date    BIOPSY PROSTATE      HX UROLOGICAL      turp       Home medications:  Prior to Admission medications    Medication Sig Start Date End Date Taking? Authorizing Provider   traMADoL (ULTRAM) 50 mg tablet Take 50 mg by mouth two (2) times daily as needed for Pain. Provider, Historical   baclofen (LIORESAL) 10 mg tablet Take 10 mg by mouth daily as needed. Provider, Historical   gabapentin (NEURONTIN) 600 mg tablet Take 600 mg by mouth three (3) times daily. Provider, Historical   hydrOXYzine HCL (ATARAX) 50 mg tablet Take 50 mg by mouth every four (4) hours as needed for Anxiety. Provider, Historical   nicotine (NICODERM CQ) 21 mg/24 hr 1 Patch by TransDERmal route every twenty-four (24) hours. Provider, Historical   QUEtiapine (SEROquel) 100 mg tablet Take 100 mg by mouth nightly. Provider, Historical   sertraline (ZOLOFT) 50 mg tablet Take 50 mg by mouth daily. Provider, Historical   tamsulosin (FLOMAX) 0.4 mg capsule Take 0.4 mg by mouth daily. Provider, Historical   PHENobarbitaL (LUMINAL) 30 mg tablet Take 30 mg by mouth four (4) times daily. Provider, Historical   albuterol sulfate 90 mcg/actuation aepb Take 2 Puffs by inhalation every four (4) hours as needed (breathing). 10/17/19   Manoj Ingram MD   metformin (GLUCOPHAGE) 500 mg tablet Take 500 mg by mouth daily (with breakfast). Other, MD Damaso       Allergies: Allergies   Allergen Reactions    Motrin [Ibuprofen] Hives       Family history:   Family History   Problem Relation Age of Onset    Cancer Mother         lymphoma    Heart Disease Father         CHF        SOCIAL HISTORY:  Patient resides at Home. Patient ambulates with out device.    Smoking history: reports that he has been smoking. He has a 10.00 pack-year smoking history. He has never used smokeless tobacco.  Drug History:  reports no history of drug use. Alcohol history:  reports no history of alcohol use. Objective:       Physical Exam:   Visit Vitals  /87   Pulse 73   Temp 97.9 °F (36.6 °C)   Resp 16   Ht 6' 2\" (1.88 m)   Wt 105.5 kg (232 lb 9.6 oz)   SpO2 96%   BMI 29.86 kg/m²     General appearance: fatigued, appears older than stated age, disheveled  Head: Normocephalic, without obvious abnormality, atraumatic  Lungs: clear to auscultation bilaterally, no wheezing, rales  Heart: RRR, no murmurs, rubs  Abdomen: Soft, nontender  Extremities: No edema  Skin: Warm and dry  Neurologic: Alert and oriented X 3, normal strength and tone. Laboratory and other diagnostic Data Review: All diagnostic labs and studies have been reviewed. Ct Head Wo Cont    Result Date: 3/21/2020  EXAM: CT HEAD WO CONT INDICATION: AMS COMPARISON: 2/16/2020. CONTRAST: None. TECHNIQUE: Unenhanced CT of the head was performed using 5 mm images. Brain and bone windows were generated. CT dose reduction was achieved through use of a standardized protocol tailored for this examination and automatic exposure control for dose modulation. FINDINGS: The ventricles and sulci are normal in size, shape and configuration and midline. There is no significant white matter disease. There is no intracranial hemorrhage, extra-axial collection, mass, mass effect or midline shift. The basilar cisterns are open. No acute infarct is identified. The bone windows demonstrate no abnormalities. The visualized portions of the paranasal sinuses and mastoid air cells are clear. IMPRESSION: No evidence of acute process. Ct Head Wo Cont    Result Date: 3/16/2020  HEAD CT WITHOUT CONTRAST: 3/16/2020 9:45 PM INDICATION: fall, head trauma, thrombocytopenia. rule out brain bleed.  COMPARISON: 6/21/2012 PROCEDURE: Axial images of the head were obtained without contrast. Coronal and sagittal reformats were performed. CT dose reduction was achieved through use of a standardized protocol tailored for this examination and automatic exposure control for dose modulation. FINDINGS: The ventricles and sulci are appropriate in size and configuration for age. No loss of gray-white differentiation to suggest late acute or early subacute infarction. No mass effect or intracranial hemorrhage. IMPRESSION: No acute intracranial abnormality. Ct Spine Cerv Wo Cont    Result Date: 3/21/2020  EXAM:  CT CERVICAL SPINE WITHOUT CONTRAST INDICATION: Altered mental status. COMPARISON: None. CONTRAST:  None. TECHNIQUE: Multislice helical CT of the cervical spine was performed without intravenous contrast administration. Sagittal and coronal reconstructions were generated. CT dose reduction was achieved through use of a standardized protocol tailored for this examination and automatic exposure control for dose modulation. FINDINGS: The alignment is within normal limits. There is no fracture or subluxation. The odontoid process is intact. The craniocervical junction is within normal limits. The prevertebral soft tissues are within normal limits. The vertebral body heights are preserved with no evident fracture or aggressive lesion. There is loss of vertical disc height at the C5-6 and C6-7 level with secondary to osteophytes. There is no spinal canal stenosis or neural foraminal narrowing evident on an osseous basis. The surrounding soft tissues and visualized lung bases appear unremarkable. IMPRESSION: Degenerative disc disease C5-6 and C6-7 with no acute fracture or aggressive lesion. Xr Chest Port    Result Date: 3/21/2020  Chest portable AP History: AMS Comparison: 10/17/2019 Findings: The lungs are well expanded. No focal consolidation, pleural effusion, or pneumothorax. The cardiomediastinal silhouette is unremarkable.  The visualized osseous structures are unremarkable. Impression: No acute cardiopulmonary process. Patient Vitals for the past 12 hrs:   Temp Pulse Resp BP SpO2   03/22/20 0819 97.9 °F (36.6 °C) 73 16 135/87 96 %       Recent Labs     03/21/20  1307   WBC 5.1   HGB 15.4   HCT 46.1   PLT 96*     Recent Labs     03/22/20  0424 03/21/20  1821 03/21/20  1307    139 140   K 4.0 4.1 4.0    105 103   CO2 25 23 17*   BUN 23* 16 15   CREA 0.90 1.03 0.94   GLU 85  85 118* 128*   CA 8.5 8.4* 8.5   MG  --   --  1.8     Recent Labs     03/22/20  0424 03/21/20  1307   SGOT 66* 92*   ALT 80* 96*   * 162*   TBILI 0.8 0.3   TP 7.3 8.4*   ALB 3.0* 3.3*   GLOB 4.3* 5.1*   LPSE  --  105     No results for input(s): INR, PTP, APTT, INREXT in the last 72 hours. No results for input(s): FE, TIBC, PSAT, FERR in the last 72 hours. No results found for: FOL, RBCF   No results for input(s): PH, PCO2, PO2 in the last 72 hours.   Recent Labs     03/21/20  1307   TROIQ <0.05     Lab Results   Component Value Date/Time    Cholesterol, total 141 03/22/2020 04:24 AM    HDL Cholesterol 73 03/22/2020 04:24 AM    LDL, calculated 53.8 03/22/2020 04:24 AM    Triglyceride 71 03/22/2020 04:24 AM    CHOL/HDL Ratio 1.9 03/22/2020 04:24 AM     Lab Results   Component Value Date/Time    Glucose (POC) 106 (H) 03/21/2020 01:33 PM    Glucose (POC) 111 (H) 01/22/2013 08:09 AM    Glucose (POC) 95 01/21/2013 08:54 PM    Glucose (POC) 245 (H) 01/21/2013 04:23 PM    Glucose (POC) 84 01/21/2013 08:16 AM     Lab Results   Component Value Date/Time    Color YELLOW/STRAW 12/23/2016 12:47 PM    Appearance CLEAR 12/23/2016 12:47 PM    Specific gravity 1.026 12/23/2016 12:47 PM    Specific gravity 1.010 08/27/2011 10:15 AM    pH (UA) 5.5 12/23/2016 12:47 PM    Protein NEGATIVE  12/23/2016 12:47 PM    Glucose NEGATIVE  12/23/2016 12:47 PM    Ketone TRACE (A) 12/23/2016 12:47 PM    Bilirubin NEGATIVE  12/23/2016 12:47 PM    Urobilinogen 0.2 12/23/2016 12:47 PM Nitrites NEGATIVE  12/23/2016 12:47 PM    Leukocyte Esterase NEGATIVE  12/23/2016 12:47 PM    Epithelial cells FEW 12/23/2016 12:47 PM    Bacteria 1+ (A) 12/23/2016 12:47 PM    WBC 0-4 12/23/2016 12:47 PM    RBC 0-5 12/23/2016 12:47 PM       Assessment:   Given the patient's current clinical presentation, I have a high level of concern for decompensation if discharged from the emergency department. Complex decision making was performed, which includes reviewing the patient's available past medical records, laboratory results, and x-ray films. My assessment of this patient's clinical condition and my plan of care is as follows. Active Problems:    Substance induced mood disorder ([facility] [address] (3/21/2020)        Plan:     -Substance-induced mood disorder  -Alcoholism, in withdrawal  -Hallucinations and suicidal ideation  Management as per psych, continue folate, B12, B1  Suicide precautions  Alcohol cessation counseling provided    -GERD with history of peptic ulcer disease-continue Protonix    -BPH, continue tamsulosin    - Thrombocytopenia, due to alcoholism and alcohol induced bone marrow suppression as clinically  Stable, platelet 96, no ongoing bleeding    -Chronic active hepatitis B infection, follow up with [facility] for further care  Needs outpatient counseling with hematology and ID    - Peripheral neuropathy-continue gabapentin    -Diabetes mellitus type 2-continue metformin    Hospitalist signing off, please call if questions.     Signed By: Marely Parker MD     03/22/20  10:47 AM        Patient's emergency contacts:  Extended Emergency Contact Information  Primary Emergency Contact: Casey Phone: [phone]  Relation: Child

## 2020-03-22 NOTE — BH NOTES
Patient admitted from [facility] /ED  to [facility] Psychiatry, under the services of . Patient currently denies suicidal ideation. Patient currently denies homicidal ideation. Patient does not have psychotic symptoms. Pt  Does admit to ETOH use. Pt  Is  Volunteer Status. A & O x 4. Cooperative with staff. Primary Nurse Nazia Javier RN and Ramana Delgado RN performed a dual skin assessment on this patient No impairment noted. Old scar observed on  Back,Lower Right Flank. Lit score is 23     Personal belongings were searched and stored in [employer] and Money sent to Security.

## 2020-03-22 NOTE — INTERDISCIPLINARY ROUNDS
Behavioral Health Interdisciplinary Rounds     Patient Name: Pavithra Purdy  Age: 77 y.o. Room/Bed:  729/  Primary Diagnosis: <principal problem not specified>   Admission Status: Voluntary     Readmission within 30 days: no  Power of  in place: no  Patient requires a blocked bed: no          Reason for blocked bed:     VTE Prophylaxis: No    Mobility needs/Fall risk: yes  Flu Vaccine : no   Nutritional Plan: no  Consults:          Labs/Testing due today?: yes    Sleep hours:  5      Participation in Care/Groups:  no  Medication Compliant?: Yes  PRNS (last 24 hours): None    Restraints (last 24 hours):  no     CIWA (range last 24 hours): CIWA-Ar Total: 5   COWS (range last 24 hours):      Alcohol screening (AUDIT) completed -   AUDIT Score: 35     If applicable, date SBIRT discussed in treatment team AND documented:   AUDIT Screen Score: AUDIT Score: 35      Document Brief Intervention (corresponds directly with the 5 A's, Ask, Advise, Assess, Assist, and Arrange): At- Risk Patients (Score 7-15 for women; 8-15 for men)  Discuss concern patient is drinking at unhealthy levels known to increase risk of alcohol-related health problems. Is Patient ready to commit to change? If No:   Encourage reflection   Discuss short term and long term health risks of consuming alcohol   Barriers to change   Reaffirm willingness to help / Educational materials provided  If Yes:   Set goal  tritrue provided    Harmful use or Dependence (Score 16 or greater)   Discuss short term and long term health risks of consuming alcohol   Recommendations   Negotiate drinking goal   Recommend addiction specialist/center   Arrange follow-up appointments.     Tobacco - patient is a smoker: Have You Used Tobacco in the Past 30 Days: Yes  Illegal Drugs use: Have You Used Any Illegal Substances Over the Past 12 Months: No    24 hour chart check complete: yes     Patient goal(s) for today: Treatment team focus/goals:   Progress note     LOS:  1  Expected LOS:     Financial concerns/prescription coverage:    Family contact:       Family requesting physician contact today:    Discharge plan:   Access to weapons :         Outpatient provider(s):   Patient's preferred phone number for follow up call :     Participating treatment team members:  Flores Li, * (assigned SW),

## 2020-03-22 NOTE — GROUP NOTE
JERILYN  GROUP DOCUMENTATION INDIVIDUAL                                                                          Group Therapy Note    Date: 3/22/2020    Group Start Time: 1500  Group End Time: 2632  Group Topic: Social Work Group    [facility] [address] BEHAV YESICA Mondragon    IP [facility] GROUP DOCUMENTATION GROUP    Group Therapy Note    Attendees: 10         Attendance: Did not attend    Patient's Goal:      Interventions/techniques: Follows Directions:     Interactions:     Mental Status:     Behavior/appearance:     Goals Achieved:        Additional Notes:      Pablito Aaron

## 2020-03-23 LAB
ATRIAL RATE: 96 BPM
CALCULATED P AXIS, ECG09: 24 DEGREES
CALCULATED R AXIS, ECG10: 53 DEGREES
CALCULATED T AXIS, ECG11: 26 DEGREES
DIAGNOSIS, 93000: NORMAL
P-R INTERVAL, ECG05: 168 MS
Q-T INTERVAL, ECG07: 372 MS
QRS DURATION, ECG06: 90 MS
QTC CALCULATION (BEZET), ECG08: 469 MS
VENTRICULAR RATE, ECG03: 96 BPM

## 2020-03-23 PROCEDURE — 74011250637 HC RX REV CODE- 250/637: Performed by: INTERNAL MEDICINE

## 2020-03-23 PROCEDURE — 74011250637 HC RX REV CODE- 250/637: Performed by: PSYCHIATRY & NEUROLOGY

## 2020-03-23 PROCEDURE — 65220000003 HC RM SEMIPRIVATE PSYCH

## 2020-03-23 PROCEDURE — 74011250637 HC RX REV CODE- 250/637: Performed by: NURSE PRACTITIONER

## 2020-03-23 RX ORDER — PHENOBARBITAL 32.4 MG/1
32.4 TABLET ORAL 4 TIMES DAILY
Status: DISCONTINUED | OUTPATIENT
Start: 2020-03-23 | End: 2020-03-26 | Stop reason: HOSPADM

## 2020-03-23 RX ADMIN — GABAPENTIN 600 MG: 600 TABLET, FILM COATED ORAL at 22:03

## 2020-03-23 RX ADMIN — PHENOBARBITAL 64.8 MG: 64.8 TABLET ORAL at 02:17

## 2020-03-23 RX ADMIN — TAMSULOSIN HYDROCHLORIDE 0.4 MG: 0.4 CAPSULE ORAL at 08:43

## 2020-03-23 RX ADMIN — HYDROXYZINE HYDROCHLORIDE 50 MG: 50 TABLET ORAL at 22:02

## 2020-03-23 RX ADMIN — PHENOBARBITAL 64.8 MG: 64.8 TABLET ORAL at 08:43

## 2020-03-23 RX ADMIN — PHENOBARBITAL 32.4 MG: 32.4 TABLET ORAL at 12:51

## 2020-03-23 RX ADMIN — GABAPENTIN 600 MG: 600 TABLET, FILM COATED ORAL at 17:29

## 2020-03-23 RX ADMIN — PHENOBARBITAL 32.4 MG: 32.4 TABLET ORAL at 22:02

## 2020-03-23 RX ADMIN — Medication 100 MG: at 08:43

## 2020-03-23 RX ADMIN — GABAPENTIN 600 MG: 600 TABLET, FILM COATED ORAL at 08:43

## 2020-03-23 RX ADMIN — PHENOBARBITAL 32.4 MG: 32.4 TABLET ORAL at 17:29

## 2020-03-23 RX ADMIN — QUETIAPINE FUMARATE 100 MG: 100 TABLET ORAL at 22:02

## 2020-03-23 RX ADMIN — SERTRALINE HYDROCHLORIDE 50 MG: 50 TABLET ORAL at 08:43

## 2020-03-23 RX ADMIN — HYDROXYZINE HYDROCHLORIDE 50 MG: 50 TABLET ORAL at 17:29

## 2020-03-23 RX ADMIN — HYDROXYZINE HYDROCHLORIDE 50 MG: 50 TABLET ORAL at 08:43

## 2020-03-23 RX ADMIN — MULTIPLE VITAMINS W/ MINERALS TAB 1 TABLET: TAB at 08:43

## 2020-03-23 RX ADMIN — METFORMIN HYDROCHLORIDE 500 MG: 500 TABLET ORAL at 08:43

## 2020-03-23 RX ADMIN — ACETAMINOPHEN 650 MG: 325 TABLET ORAL at 12:50

## 2020-03-23 RX ADMIN — FOLIC ACID 1 MG: 1 TABLET ORAL at 08:43

## 2020-03-23 NOTE — GROUP NOTE
JERILYN  GROUP DOCUMENTATION INDIVIDUAL                                                                          Group Therapy Note    Date: 3/22/2020    Group Start Time: 7635  Group End Time: 0130  Group Topic: Process Group - Inpatient    [facility] [facility] BEHAV HLTH    Tonye Labor    IP [address] [facility] GROUP DOCUMENTATION GROUP    Group Therapy Note    Attendees: 9         Attendance: Did not attend    Patient's Goal:      Interventions/techniques: Follows Directions:     Interactions:     Mental Status:     Behavior/appearance:   Goals Achieved:        Additional Notes:      Yosef Cardoza

## 2020-03-23 NOTE — BH NOTES
Chief Complaint:  I am a little dizzy    Length of Stay: 2 Days    Interval History:  Mr. Анна Douglass reports feeling the same. Says he is still hearing voices but they are less frequent today. His mood is still down and has passive thoughts of dying and death. Notes feeling dizzy on standing up and given advise on avoiding orthostasis. He verbalized understanding. No adverse events are noted from his medications today. Says he slept 4 hours last night and woke up several times.      Past Medical History:  Past Medical History:   Diagnosis Date    Depression     DJD (degenerative joint disease) 3/8/2010    HTN (hypertension) 3/8/2010    STATES NO LONGER TAKING MEDICATION-STATES MD STOPPED    Insomnia     NIDDM (non-insulin dependent diabetes mellitus) 3/8/2010    Other ill-defined conditions(799.89)     BPH    Other ill-defined conditions(799.89)     previous hx of DTs from ETOH withdrawal    Other ill-defined conditions(799.89)     DDD/back problems    Psychiatric disorder     paranoid schizophrenia, anxiety    Substance abuse ([facility] [address]     Suicidal thoughts            Labs:  Lab Results   Component Value Date/Time    WBC 5.1 03/21/2020 01:07 PM    Hemoglobin (POC) 16.3 01/14/2013 05:28 PM    HGB 15.4 03/21/2020 01:07 PM    Hematocrit (POC) 48 01/14/2013 05:28 PM    HCT 46.1 03/21/2020 01:07 PM    PLATELET 96 (L) 42/78/5797 01:07 PM    MCV 90.0 03/21/2020 01:07 PM      Lab Results   Component Value Date/Time    Sodium 137 03/22/2020 04:24 AM    Potassium 4.0 03/22/2020 04:24 AM    Chloride 105 03/22/2020 04:24 AM    CO2 25 03/22/2020 04:24 AM    Anion gap 7 03/22/2020 04:24 AM    Glucose 85 03/22/2020 04:24 AM    Glucose 85 03/22/2020 04:24 AM    BUN 23 (H) 03/22/2020 04:24 AM    Creatinine 0.90 03/22/2020 04:24 AM    BUN/Creatinine ratio 26 (H) 03/22/2020 04:24 AM    GFR est AA >60 03/22/2020 04:24 AM    GFR est non-AA >60 03/22/2020 04:24 AM    Calcium 8.5 03/22/2020 04:24 AM    Bilirubin, total 0.8 03/22/2020 04:24 AM    AST (SGOT) 66 (H) 03/22/2020 04:24 AM    Alk.  phosphatase 128 (H) 03/22/2020 04:24 AM    Protein, total 7.3 03/22/2020 04:24 AM    Albumin 3.0 (L) 03/22/2020 04:24 AM    Globulin 4.3 (H) 03/22/2020 04:24 AM    A-G Ratio 0.7 (L) 03/22/2020 04:24 AM    ALT (SGPT) 80 (H) 03/22/2020 04:24 AM      Vitals:    03/22/20 1241 03/22/20 1538 03/22/20 2033 03/23/20 0842   BP: 135/80 (!) 147/94 144/83 129/81   Pulse: 67 71 69 70   Resp: 18 16 16 16   Temp: 97.4 °F (36.3 °C) 97.6 °F (36.4 °C) 98 °F (36.7 °C) 97.9 °F (36.6 °C)   SpO2: 98% 95% 100% 97%   Weight:       Height:             Current Facility-Administered Medications   Medication Dose Route Frequency Provider Last Rate Last Dose    gabapentin (NEURONTIN) tablet 600 mg  600 mg Oral TID Norma Gaines MD   600 mg at 03/23/20 0843    metFORMIN (GLUCOPHAGE) tablet 500 mg  500 mg Oral DAILY WITH Nereida Jean MD   500 mg at 03/23/20 0843    tamsulosin (FLOMAX) capsule 0.4 mg  0.4 mg Oral DAILY Norma Gaines MD   0.4 mg at 03/23/20 0843    albuterol (PROVENTIL VENTOLIN) nebulizer solution 2.5 mg  2.5 mg Nebulization Q4H PRN Norma Gaines MD        hydrOXYzine HCL (ATARAX) tablet 50 mg  50 mg Oral TID Nicklas Hodgkin, MD   50 mg at 03/23/20 5806    QUEtiapine (SEROquel) tablet 100 mg  100 mg Oral QHS Nicklas Hodgkin, MD   100 mg at 03/22/20 2135    sertraline (ZOLOFT) tablet 50 mg  50 mg Oral DAILY Nicklas Hodgkin, MD   50 mg at 03/23/20 0843    OLANZapine (ZyPREXA) tablet 2.5 mg  2.5 mg Oral Q6H PRN Srikanth Oliva, FNP        haloperidol lactate (HALDOL) injection 2.5 mg  2.5 mg IntraMUSCular Q6H PRN Srikanth Toro' V, FNP        benztropine (COGENTIN) tablet 0.5 mg  0.5 mg Oral BID PRN Srikanth Toro' V, FNP        diphenhydrAMINE (BENADRYL) injection 25 mg  25 mg IntraMUSCular BID PRN Srikanth Toro' V, FNP        acetaminophen (TYLENOL) tablet 650 mg  650 mg Oral Q4H PRN Srikanth Reinoso, FNP        magnesium hydroxide (MILK OF MAGNESIA) 400 mg/5 mL oral suspension 30 mL  30 mL Oral DAILY PRN Norva CordobaAmado Nian, FNP        PHENobarbitaL (LUMINAL) tablet 64.8 mg  64.8 mg Oral QID Norva Nick' V, FNP   64.8 mg at 93/89/66 9518    folic acid (FOLVITE) tablet 1 mg  1 mg Oral DAILY Norva Nick' V, FNP   1 mg at 03/23/20 5000    thiamine HCL (B-1) tablet 100 mg  100 mg Oral DAILY Norva Nick' V, FNP   100 mg at 03/23/20 0811    multivitamin, tx-iron-ca-min (THERA-M w/ IRON) tablet 1 Tab  1 Tab Oral DAILY Norva Nick' V, FNP   1 Tab at 03/23/20 2173    PHENobarbitaL (LUMINAL) tablet 64.8 mg  64.8 mg Oral Q6H PRN Norva Nick' V, FNP   64.8 mg at 03/23/20 0217    influenza vaccine 2019-20 (6 mos+)(PF) (FLUARIX/FLULAVAL/FLUZONE QUAD) injection 0.5 mL  0.5 mL IntraMUSCular PRIOR TO DISCHARGE Anisha Bey MD             Mental Status Exam:  Eye contact: limited  Unshaven,   Grooming: poorly groomed  Psychomotor activity: Decreased  Speech is spontaneous  Mood is \"low\"  Affect: flat  Perception: AH +  Suicidal ideation: Passive SI +  Cognition is grossly intact       Physical Exam:  Body habitus: mod obese  Musculoskeletal system: normal gait  Tremor - present in upper limbs. Cog wheeling - neg      Assessment and Plan:  Demetria Dwyer meets criteria for a diagnosis of Schizoaffective disorder, depressed type; alcohol use disorder, severe. 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 Taper phenobarb. Continue the medication regimen as prescribed  Disposition planning to continue.    I certify that this patients inpatient psychiatric hospital services furnished since the previous certification were, and continue to be, required for treatment that could reasonably be expected to improve the patient's condition, or for diagnostic study, and that the patient continues to need, on a daily basis, active treatment furnished directly by or requiring the supervision of inpatient psychiatric facility personnel. In addition, the hospital records show that services furnished were intensive treatment services, admission or related services, or equivalent services.

## 2020-03-23 NOTE — H&P
[address] [facility] HISTORY AND PHYSICAL    Name:  Benny Encinas  MR#:  [id number]  :  1955  ACCOUNT #:  [de-identified]  ADMIT DATE:  2020      INITIAL PSYCHIATRIC INTERVIEW    CHIEF COMPLAINT:  \"I was very depressed and drinking too much. \"    HISTORY OF PRESENT ILLNESS:  The patient is a 49-year-old  man who is currently admitted with a history of coming to the ER requesting help for alcoholism and auditory hallucinations. He reports that he has been drinking since he was a teenager and over the past week has been drinking about three bottles of wine daily. He was discharged from [facility] 2 weeks ago and had not had a drink for about a week after that. Denies use of any other recreational drugs and urine drug screen was negative. States that he tends to have auditory and visual hallucinations but has not had any for the past 2 weeks. However, he has been sleeping poorly, has had little energy or motivation and feels depressed every day. He started having thoughts of suicide and decided to come the hospital for treatment. Denies any psychotic symptoms at the present time. PAST MEDICAL HISTORY:  Reviewed as per the history and physical exam.      Past Medical History:   Diagnosis Date    Depression     DJD (degenerative joint disease) 3/8/2010    HTN (hypertension) 3/8/2010    STATES NO LONGER TAKING MEDICATION-STATES MD STOPPED    Insomnia     NIDDM (non-insulin dependent diabetes mellitus) 3/8/2010    Other ill-defined conditions(799.89)     BPH    Other ill-defined conditions(799.89)     previous hx of DTs from ETOH withdrawal    Other ill-defined conditions(799.89)     DDD/back problems    Psychiatric disorder     paranoid schizophrenia, anxiety    Substance abuse ([facility]     Suicidal thoughts      Prior to Admission medications    Medication Sig Start Date End Date Taking?  Authorizing Provider   traMADoL (ULTRAM) 50 mg tablet Take 50 mg by mouth two (2) times daily as needed for Pain. Provider, Historical   baclofen (LIORESAL) 10 mg tablet Take 10 mg by mouth daily as needed. Provider, Historical   gabapentin (NEURONTIN) 600 mg tablet Take 600 mg by mouth three (3) times daily. Provider, Historical   hydrOXYzine HCL (ATARAX) 50 mg tablet Take 50 mg by mouth every four (4) hours as needed for Anxiety. Provider, Historical   QUEtiapine (SEROquel) 100 mg tablet Take 100 mg by mouth nightly. Provider, Historical   sertraline (ZOLOFT) 50 mg tablet Take 50 mg by mouth daily. Provider, Historical   tamsulosin (FLOMAX) 0.4 mg capsule Take 0.4 mg by mouth daily. Provider, Historical   PHENobarbitaL (LUMINAL) 30 mg tablet Take 30 mg by mouth four (4) times daily. Provider, Historical   albuterol sulfate 90 mcg/actuation aepb Take 2 Puffs by inhalation every four (4) hours as needed (breathing). 10/17/19   Shorty Rios MD   metformin (GLUCOPHAGE) 500 mg tablet Take 500 mg by mouth daily (with breakfast).     Other, MD Damaso     Vitals:    03/22/20 1241 03/22/20 1538 03/22/20 2033 03/23/20 0842   BP: 135/80 (!) 147/94 144/83 129/81   Pulse: 67 71 69 70   Resp: 18 16 16 16   Temp: 97.4 °F (36.3 °C) 97.6 °F (36.4 °C) 98 °F (36.7 °C) 97.9 °F (36.6 °C)   SpO2: 98% 95% 100% 97%   Weight:       Height:         Lab Results   Component Value Date/Time    WBC 5.1 03/21/2020 01:07 PM    Hemoglobin (POC) 16.3 01/14/2013 05:28 PM    HGB 15.4 03/21/2020 01:07 PM    Hematocrit (POC) 48 01/14/2013 05:28 PM    HCT 46.1 03/21/2020 01:07 PM    PLATELET 96 (L) 20/17/4454 01:07 PM    MCV 90.0 03/21/2020 01:07 PM     Lab Results   Component Value Date/Time    Sodium 137 03/22/2020 04:24 AM    Potassium 4.0 03/22/2020 04:24 AM    Chloride 105 03/22/2020 04:24 AM    CO2 25 03/22/2020 04:24 AM    Anion gap 7 03/22/2020 04:24 AM    Glucose 85 03/22/2020 04:24 AM    Glucose 85 03/22/2020 04:24 AM    BUN 23 (H) 03/22/2020 04:24 AM    Creatinine 0.90 03/22/2020 04:24 AM    BUN/Creatinine ratio 26 (H) 03/22/2020 04:24 AM    GFR est AA >60 03/22/2020 04:24 AM    GFR est non-AA >60 03/22/2020 04:24 AM    Calcium 8.5 03/22/2020 04:24 AM    Bilirubin, total 0.8 03/22/2020 04:24 AM    AST (SGOT) 66 (H) 03/22/2020 04:24 AM    Alk. phosphatase 128 (H) 03/22/2020 04:24 AM    Protein, total 7.3 03/22/2020 04:24 AM    Albumin 3.0 (L) 03/22/2020 04:24 AM    Globulin 4.3 (H) 03/22/2020 04:24 AM    A-G Ratio 0.7 (L) 03/22/2020 04:24 AM    ALT (SGPT) 80 (H) 03/22/2020 04:24 AM     No results found for: VALF2, VALAC, VALP, VALPR, DS6, CRBAM, CRBAMP, CARB2, XCRBAM  No results found for: LITHM  RADIOLOGY REPORTS:(reviewed/updated 3/23/2020)  Ct Head Wo Cont    Result Date: 3/21/2020  EXAM: CT HEAD WO CONT INDICATION: AMS COMPARISON: 2/16/2020. CONTRAST: None. TECHNIQUE: Unenhanced CT of the head was performed using 5 mm images. Brain and bone windows were generated. CT dose reduction was achieved through use of a standardized protocol tailored for this examination and automatic exposure control for dose modulation. FINDINGS: The ventricles and sulci are normal in size, shape and configuration and midline. There is no significant white matter disease. There is no intracranial hemorrhage, extra-axial collection, mass, mass effect or midline shift. The basilar cisterns are open. No acute infarct is identified. The bone windows demonstrate no abnormalities. The visualized portions of the paranasal sinuses and mastoid air cells are clear. IMPRESSION: No evidence of acute process. Ct Head Wo Cont    Result Date: 3/16/2020  HEAD CT WITHOUT CONTRAST: 3/16/2020 9:45 PM INDICATION: fall, head trauma, thrombocytopenia. rule out brain bleed. COMPARISON: 6/21/2012 PROCEDURE: Axial images of the head were obtained without contrast. Coronal and sagittal reformats were performed.  CT dose reduction was achieved through use of a standardized protocol tailored for this examination and automatic exposure control for dose modulation. FINDINGS: The ventricles and sulci are appropriate in size and configuration for age. No loss of gray-white differentiation to suggest late acute or early subacute infarction. No mass effect or intracranial hemorrhage. IMPRESSION: No acute intracranial abnormality. Ct Spine Cerv Wo Cont    Result Date: 3/21/2020  EXAM:  CT CERVICAL SPINE WITHOUT CONTRAST INDICATION: Altered mental status. COMPARISON: None. CONTRAST:  None. TECHNIQUE: Multislice helical CT of the cervical spine was performed without intravenous contrast administration. Sagittal and coronal reconstructions were generated. CT dose reduction was achieved through use of a standardized protocol tailored for this examination and automatic exposure control for dose modulation. FINDINGS: The alignment is within normal limits. There is no fracture or subluxation. The odontoid process is intact. The craniocervical junction is within normal limits. The prevertebral soft tissues are within normal limits. The vertebral body heights are preserved with no evident fracture or aggressive lesion. There is loss of vertical disc height at the C5-6 and C6-7 level with secondary to osteophytes. There is no spinal canal stenosis or neural foraminal narrowing evident on an osseous basis. The surrounding soft tissues and visualized lung bases appear unremarkable. IMPRESSION: Degenerative disc disease C5-6 and C6-7 with no acute fracture or aggressive lesion. Xr Chest Port    Result Date: 3/21/2020  Chest portable AP History: AMS Comparison: 10/17/2019 Findings: The lungs are well expanded. No focal consolidation, pleural effusion, or pneumothorax. The cardiomediastinal silhouette is unremarkable. The visualized osseous structures are unremarkable. Impression: No acute cardiopulmonary process.       PAST PSYCHIATRIC HISTORY:  The patient reports that he was first hospitalized for anxiety and depression at the age of 19 years and has had numerous psychiatric hospitalizations since then. He has had hospitalizations at [facility] in 02/2013, 05/2011, and 10/2010, and as noted above, he was just discharged from [facility] 2 weeks ago. There is a long history of alcohol abuse since he was a teenager and says he was also diagnosed with schizophrenia at the age of 50 years, although he has only had intermittent psychotic symptoms since then. There is no prior history of suicide attempts. PSYCHOSOCIAL HISTORY:  The patient currently lives in [place] by himself. States that he is  from his wife and they had five children together. He keeps in occasional touch with them. Currently, he is retired and gets social security income. 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 Denies any major legal or financial stressors. MENTAL STATUS EXAM:  The patient is an elderly male who is dressed in hospital apparel. He is somewhat disheveled and has not combed his hair or shaved in many days. Makes limited eye contact. Psychomotor activity is decreased. Affect is depressed and mood is reported as being down. Denies any perceptual abnormalities. Passive suicidal ideation is present but feels safe in the hospital.  Denies any delusions. His thought process is logical and goal-directed. Cognitively, he is awake and alert, oriented to time, place, and person. Intelligence is average, memory is intact, and fund of knowledge is adequate. Insight is partial.  Judgment is poor. ASSESSMENT AND PLAN/DIAGNOSES:  Schizoaffective disorder, depressed type; alcohol use disorder, severe. 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 I will continue his inpatient stay. He will be provided with support and attend groups. Estimated length of stay is 5-7 days. His strengths include his ability to seek help and support from his therapeutic providers.       Emi Jensen MD HAYDEN [id number]  D:  03/22/2020 16:44  T:  03/22/2020 21:26  JOB #:  [id number]

## 2020-03-23 NOTE — PROGRESS NOTES
Problem: Depressed Mood (Adult/Pediatric)  Goal: *STG: Participates in treatment plan  Outcome: Progressing Towards Goal  Note: Patient is participatory in treatment team. States that he feels \"kind of faint\". Educated on detox medication that can cause feeling lightheaded. SI have lessened in severity. Discharge planning reviewed.    Goal: *STG: Verbalizes anger, guilt, and other feelings in a constructive manor  Outcome: Progressing Towards Goal  Note: Verbalizes feelings  Goal: *STG: Remains safe in hospital  Outcome: Progressing Towards Goal  Note: Safe  Goal: *STG: Complies with medication therapy  Outcome: Progressing Towards Goal  Note: Compliant  Goal: Interventions  Outcome: Progressing Towards Goal

## 2020-03-23 NOTE — BH NOTES
PSYCHOSOCIAL ASSESSMENT  :Patient identifying info: Fernandez Severino is a 77 y.o., male admitted 3/21/2020  9:55 PM     Presenting problem and precipitating factors: Pt was voluntarily admitted to [facility] for worsening depression and SI following an intentional OD. Patient is poor historian, reports non adherence to treatment, and increased alcohol use. Patient also endorses VH of a man appearing in his home at night. Mental status assessment: alert, oriented, calm, cooperative    Strengths: voluntarily seeking treatment, stable income, connected with outpatient providers    Collateral information: daughterJuan ([phone])    Current psychiatric /substance abuse providers and contact info: [facility]     Previous psychiatric/substance abuse providers and response to treatment: 2016    Family history of mental illness or substance abuse: none inidcated    Substance abuse history:  EtOH  Social History     Tobacco Use    Smoking status: Current Every Day Smoker     Packs/day: 1.00     Years: 10.00     Pack years: 10.00    Smokeless tobacco: Never Used    Tobacco comment: patient has been decreasing amount of cigarettes   Substance Use Topics    Alcohol use: No     Alcohol/week: 35.0 standard drinks     Types: 42 Cans of beer per week     Comment: states stopped drinking 2 ,onths ago       History of biomedical complications associated with substance abuse :hx of alcohol withdrawal seizures per medical record     Patient's current acceptance of treatment or motivation for change: voluntary    Family constellation: 1 adult son    Is significant other involved?  No, single    Describe support system: limited    Describe living arrangements and home environment: lives in apartment    Health issues:   Hospital Problems  Date Reviewed: 3/17/2020          Codes Class Noted POA    [facility] ICD-10-CM: Q59.42  ICD-9-CM: 292.84  3/21/2020 Unknown              Trauma history: none inidcated    Legal issues: none inidcated    History of  service: no    Financial status: SSDI    Jain/cultural factors: Hinduism    Education/work history: not assessed    Have you been licensed as a health care professional (current or ): no    Leisure and recreation preferences: not assessed    Describe coping skills: limited, ineffective    Ivette Bartholomew  3/23/2020

## 2020-03-23 NOTE — INTERDISCIPLINARY ROUNDS
Behavioral Health Interdisciplinary Rounds     Patient Name: Akua Small  Age: 77 y.o. Room/Bed:  729/  Primary Diagnosis: <principal problem not specified>   Admission Status: Voluntary     Readmission within 30 days: no  Power of  in place: no  Patient requires a blocked bed: no          Reason for blocked bed:     VTE Prophylaxis: No    Mobility needs/Fall risk: no  Flu Vaccine : no   Nutritional Plan: no  Consults:          Labs/Testing due today?: no    Sleep hours:  7      Participation in Care/Groups:  no  Medication Compliant?: Yes  PRNS (last 24 hours): Phenobarb    Restraints (last 24 hours):  no     CIWA (range last 24 hours): CIWA-Ar Total: 0   COWS (range last 24 hours):      Alcohol screening (AUDIT) completed -   AUDIT Score: 35     If applicable, date SBIRT discussed in treatment team AND documented:   AUDIT Screen Score: AUDIT Score: 35      Document Brief Intervention (corresponds directly with the 5 A's, Ask, Advise, Assess, Assist, and Arrange): At- Risk Patients (Score 7-15 for women; 8-15 for men)  Discuss concern patient is drinking at unhealthy levels known to increase risk of alcohol-related health problems. Is Patient ready to commit to change? If No:   Encourage reflection   Discuss short term and long term health risks of consuming alcohol   Barriers to change   Reaffirm willingness to help / Educational materials provided  If Yes:   Set goal  [employer] provided    Harmful use or Dependence (Score 16 or greater)   Discuss short term and long term health risks of consuming alcohol   Recommendations   Negotiate drinking goal   Recommend addiction specialist/center   Arrange follow-up appointments.     Tobacco - patient is a smoker: Have You Used Tobacco in the Past 30 Days: Yes  Illegal Drugs use: Have You Used Any Illegal Substances Over the Past 12 Months: No    24 hour chart check complete: yes     Patient goal(s) for today: meet treatment team, acclimate to unit  Treatment team focus/goals: assess needs for treatment and safe discharge,   Progress note:      LOS:  2  Expected LOS: 5-7    Financial concerns/prescription coverage:  Medicaid & Medicare  Family contact:  daughterPraveena ([phone])  Family requesting physician contact today:  no  Discharge plan: return home or to [address] [place] [address] Avenue to weapons : no     Outpatient provider(s): [facility]  Patient's preferred phone number for follow up call : [phone]     Participating treatment team members:  Dr. Denis Monroe; Chloe Sanches, WINSTON; Hunter Maloney, PharmD; Michelle Chapman MSW

## 2020-03-23 NOTE — PROGRESS NOTES
Problem: Falls - Risk of  Goal: *Absence of Falls  Description: Document Tramaine Jacobson Fall Risk and appropriate interventions in the flowsheet. Outcome: Progressing Towards Goal  Note: Fall Risk Interventions:            Medication Interventions: Teach patient to arise slowly                   Problem: Depressed Mood (Adult/Pediatric)  Goal: *STG: Remains safe in hospital  Outcome: Progressing Towards Goal     Problem: Depressed Mood (Adult/Pediatric)  Goal: *STG: Complies with medication therapy  Outcome: Progressing Towards Goal     Problem: Depressed Mood (Adult/Pediatric)  Goal: *STG: Verbalizes anger, guilt, and other feelings in a constructive manor  Outcome: Not Progressing Towards Goal     Problem: Depressed Mood (Adult/Pediatric)  Goal: *STG: Attends activities and groups  Outcome: Not Progressing Towards Goal     Problem: Depressed Mood (Adult/Pediatric)  Goal: *STG: Demonstrates reduction in symptoms and increase in insight into coping skills/future focused  Outcome: Not Progressing Towards Goal    Patient only out of bed for dinner, snacks, and meds. Ate 100% dinner and snacks.   Continues on CIWA and Phenobarbital detox protocol

## 2020-03-23 NOTE — PROGRESS NOTES
Problem: Depressed Mood (Adult/Pediatric)  Goal: *STG: Remains safe in hospital  Outcome: Progressing Towards Goal  Note: Out in milieu with peers for short period watching tv. Minimal interaction with others. Affect flat and sad. Meal and medication compliant. Staff will continue to monitor q15 min checks.

## 2020-03-23 NOTE — PROGRESS NOTES
Pt appears to be sleeping, NAD, respirations even and unlabored. Will continue to monitor q15 for safety. Problem: Falls - Risk of  Goal: *Absence of Falls  Description: Document Easter Getting Fall Risk and appropriate interventions in the flowsheet.   Outcome: Progressing Towards Goal  Note: Fall Risk Interventions:            Medication Interventions: Teach patient to arise slowly

## 2020-03-23 NOTE — GROUP NOTE
JERILYN  GROUP DOCUMENTATION INDIVIDUAL                                                                          Group Therapy Note    Date: 3/23/2020    Group Start Time: 1000  Group End Time: 1100  Group Topic: Process Group - Inpatient    [facility] 7W GEN BEHAV HLYESICA Potts    IP [facility] GROUP DOCUMENTATION GROUP    Group Therapy Note    Attendees: 6         Attendance: Did not attend    Patient's Goal:      Interventions/techniques: Follows Directions:     Interactions:     Mental Status:     Behavior/appearance:     Goals Achieved:        Additional Notes:      Patterson Habermann

## 2020-03-24 PROCEDURE — 74011250637 HC RX REV CODE- 250/637: Performed by: INTERNAL MEDICINE

## 2020-03-24 PROCEDURE — 74011250637 HC RX REV CODE- 250/637: Performed by: NURSE PRACTITIONER

## 2020-03-24 PROCEDURE — 65220000003 HC RM SEMIPRIVATE PSYCH

## 2020-03-24 PROCEDURE — 74011250637 HC RX REV CODE- 250/637: Performed by: PSYCHIATRY & NEUROLOGY

## 2020-03-24 RX ORDER — QUETIAPINE FUMARATE 100 MG/1
200 TABLET, FILM COATED ORAL
Status: DISCONTINUED | OUTPATIENT
Start: 2020-03-24 | End: 2020-03-26 | Stop reason: HOSPADM

## 2020-03-24 RX ORDER — SERTRALINE HYDROCHLORIDE 50 MG/1
100 TABLET, FILM COATED ORAL DAILY
Status: DISCONTINUED | OUTPATIENT
Start: 2020-03-25 | End: 2020-03-26 | Stop reason: HOSPADM

## 2020-03-24 RX ADMIN — PHENOBARBITAL 32.4 MG: 32.4 TABLET ORAL at 08:01

## 2020-03-24 RX ADMIN — MULTIPLE VITAMINS W/ MINERALS TAB 1 TABLET: TAB at 08:01

## 2020-03-24 RX ADMIN — HYDROXYZINE HYDROCHLORIDE 50 MG: 50 TABLET ORAL at 15:40

## 2020-03-24 RX ADMIN — HYDROXYZINE HYDROCHLORIDE 50 MG: 50 TABLET ORAL at 21:28

## 2020-03-24 RX ADMIN — QUETIAPINE FUMARATE 200 MG: 100 TABLET ORAL at 22:53

## 2020-03-24 RX ADMIN — PHENOBARBITAL 32.4 MG: 32.4 TABLET ORAL at 21:28

## 2020-03-24 RX ADMIN — FOLIC ACID 1 MG: 1 TABLET ORAL at 08:01

## 2020-03-24 RX ADMIN — PHENOBARBITAL 32.4 MG: 32.4 TABLET ORAL at 13:09

## 2020-03-24 RX ADMIN — METFORMIN HYDROCHLORIDE 500 MG: 500 TABLET ORAL at 08:01

## 2020-03-24 RX ADMIN — TAMSULOSIN HYDROCHLORIDE 0.4 MG: 0.4 CAPSULE ORAL at 08:01

## 2020-03-24 RX ADMIN — GABAPENTIN 600 MG: 600 TABLET, FILM COATED ORAL at 08:01

## 2020-03-24 RX ADMIN — HYDROXYZINE HYDROCHLORIDE 50 MG: 50 TABLET ORAL at 08:01

## 2020-03-24 RX ADMIN — GABAPENTIN 600 MG: 600 TABLET, FILM COATED ORAL at 15:39

## 2020-03-24 RX ADMIN — SERTRALINE HYDROCHLORIDE 50 MG: 50 TABLET ORAL at 08:01

## 2020-03-24 RX ADMIN — GABAPENTIN 600 MG: 600 TABLET, FILM COATED ORAL at 21:28

## 2020-03-24 RX ADMIN — PHENOBARBITAL 32.4 MG: 32.4 TABLET ORAL at 17:36

## 2020-03-24 RX ADMIN — Medication 100 MG: at 08:01

## 2020-03-24 NOTE — PROGRESS NOTES
Problem: Depressed Mood (Adult/Pediatric)  Goal: *STG: Participates in treatment plan  Outcome: Progressing Towards Goal  Goal: *STG: Attends activities and groups  Outcome: Progressing Towards Goal  Goal: *STG: Demonstrates reduction in symptoms and increase in insight into coping skills/future focused  Outcome: Progressing Towards Goal    Patient participates in treatment plan. Participates in treatment team. Mood and affect improved. Expressed contacted persons regarding housing.

## 2020-03-24 NOTE — PROGRESS NOTES
Problem: Depressed Mood (Adult/Pediatric)  Goal: *STG: Remains safe in hospital  Outcome: Progressing Towards Goal  Note: Pt less isolative to room this evening. Observed sitting out milieu watching tv with peers. Some mild anxiety noted at times. Meal and medication compliant. Staff will continue to monitor q 15 min checks.

## 2020-03-24 NOTE — PROGRESS NOTES
Laboratory Monitoring for Antipsychotics: This patient is currently prescribed the following medication(s):   Current Facility-Administered Medications   Medication Dose Route Frequency    QUEtiapine (SEROquel) tablet 200 mg  200 mg Oral QHS    [START ON 3/25/2020] sertraline (ZOLOFT) tablet 100 mg  100 mg Oral DAILY    PHENobarbitaL (LUMINAL) tablet 32.4 mg  32.4 mg Oral QID    gabapentin (NEURONTIN) tablet 600 mg  600 mg Oral TID    metFORMIN (GLUCOPHAGE) tablet 500 mg  500 mg Oral DAILY WITH BREAKFAST    tamsulosin (FLOMAX) capsule 0.4 mg  0.4 mg Oral DAILY    hydrOXYzine HCL (ATARAX) tablet 50 mg  50 mg Oral TID    folic acid (FOLVITE) tablet 1 mg  1 mg Oral DAILY    thiamine HCL (B-1) tablet 100 mg  100 mg Oral DAILY    multivitamin, tx-iron-ca-min (THERA-M w/ IRON) tablet 1 Tab  1 Tab Oral DAILY    influenza vaccine 2019-20 (6 mos+)(PF) (FLUARIX/FLULAVAL/FLUZONE QUAD) injection 0.5 mL  0.5 mL IntraMUSCular PRIOR TO DISCHARGE     The following labs have been completed for monitoring of antipsychotics and/or mood stabilizers:    Height, Weight, BMI Estimation  Estimated body mass index is 29.86 kg/m² as calculated from the following:    Height as of this encounter: 188 cm (74\"). Weight as of this encounter: 105.5 kg (232 lb 9.6 oz). Vital Signs/Blood Pressure  Visit Vitals  /86   Pulse 60   Temp 97.8 °F (36.6 °C)   Resp 16   Ht 188 cm (74\")   Wt 105.5 kg (232 lb 9.6 oz)   SpO2 97%   BMI 29.86 kg/m²     Renal Function, Hepatic Function and Chemistry  Estimated Creatinine Clearance: 103.1 mL/min (by C-G formula based on SCr of 0.9 mg/dL).     Lab Results   Component Value Date/Time    Sodium 137 03/22/2020 04:24 AM    Potassium 4.0 03/22/2020 04:24 AM    Chloride 105 03/22/2020 04:24 AM    CO2 25 03/22/2020 04:24 AM    Anion gap 7 03/22/2020 04:24 AM    BUN 23 (H) 03/22/2020 04:24 AM    Creatinine 0.90 03/22/2020 04:24 AM    BUN/Creatinine ratio 26 (H) 03/22/2020 04:24 AM    Bilirubin, total 0.8 03/22/2020 04:24 AM    Protein, total 7.3 03/22/2020 04:24 AM    Albumin 3.0 (L) 03/22/2020 04:24 AM    Globulin 4.3 (H) 03/22/2020 04:24 AM    A-G Ratio 0.7 (L) 03/22/2020 04:24 AM    ALT (SGPT) 80 (H) 03/22/2020 04:24 AM    Alk. phosphatase 128 (H) 03/22/2020 04:24 AM     Lab Results   Component Value Date/Time    Glucose 85 03/22/2020 04:24 AM    Glucose 85 03/22/2020 04:24 AM    Glucose (POC) 106 (H) 03/21/2020 01:33 PM     Lab Results   Component Value Date/Time    Hemoglobin A1c 5.4 01/15/2013 09:21 PM     Hematology  Lab Results   Component Value Date/Time    WBC 5.1 03/21/2020 01:07 PM    RBC 5.12 03/21/2020 01:07 PM    HGB 15.4 03/21/2020 01:07 PM    HCT 46.1 03/21/2020 01:07 PM    MCV 90.0 03/21/2020 01:07 PM    MCH 30.1 03/21/2020 01:07 PM    MCHC 33.4 03/21/2020 01:07 PM    RDW 13.4 03/21/2020 01:07 PM    PLATELET 96 (L) 38/85/0859 01:07 PM     Lipids  Lab Results   Component Value Date/Time    Cholesterol, total 141 03/22/2020 04:24 AM    HDL Cholesterol 73 03/22/2020 04:24 AM    LDL, calculated 53.8 03/22/2020 04:24 AM    Triglyceride 71 03/22/2020 04:24 AM    CHOL/HDL Ratio 1.9 03/22/2020 04:24 AM     Thyroid Function  Lab Results   Component Value Date/Time    TSH 0.85 03/22/2020 04:24 AM     Assessment/Plan:  Recommended baseline laboratory monitoring has been completed based on this patient's current medication regimen. The patient's estimated 10-year ASCVD risk is 12.3%; therefore, the patient is a candidate for a high-intensity statin. Recommend initiation of atorvastatin 40mg daily. Follow-up metabolic monitoring labs should be completed in 3 months (quarterly for the first year of antipsychotic therapy).      Promise Christine, MARLENYD

## 2020-03-24 NOTE — INTERDISCIPLINARY ROUNDS
Behavioral Health Interdisciplinary Rounds     Patient Name: Ana Maria Mancini  Age: 77 y.o. Room/Bed:  729/  Primary Diagnosis: <principal problem not specified>   Admission Status: Voluntary     Readmission within 30 days: no  Power of  in place: no  Patient requires a blocked bed: no          Reason for blocked bed:     VTE Prophylaxis: No    Mobility needs/Fall risk: no  Flu Vaccine : no   Nutritional Plan: no  Consults:          Labs/Testing due today?: no    Sleep hours:  5.5      Participation in Care/Groups:  no  Medication Compliant?: Yes  PRNS (last 24 hours): Phenobarb, pain    Restraints (last 24 hours):  no     CIWA (range last 24 hours): CIWA-Ar Total: 0   COWS (range last 24 hours):      Alcohol screening (AUDIT) completed -   AUDIT Score: 35     If applicable, date SBIRT discussed in treatment team AND documented:   AUDIT Screen Score: AUDIT Score: 35      Document Brief Intervention (corresponds directly with the 5 A's, Ask, Advise, Assess, Assist, and Arrange): At- Risk Patients (Score 7-15 for women; 8-15 for men)  Discuss concern patient is drinking at unhealthy levels known to increase risk of alcohol-related health problems. Is Patient ready to commit to change? If No:   Encourage reflection   Discuss short term and long term health risks of consuming alcohol   Barriers to change   Reaffirm willingness to help / Educational materials provided  If Yes:   Set goal  [employer] provided    Harmful use or Dependence (Score 16 or greater)   Discuss short term and long term health risks of consuming alcohol   Recommendations   Negotiate drinking goal   Recommend addiction specialist/center   Arrange follow-up appointments.     Tobacco - patient is a smoker: Have You Used Tobacco in the Past 30 Days: Yes  Illegal Drugs use: Have You Used Any Illegal Substances Over the Past 12 Months: No    24 hour chart check complete: yes     Patient goal(s) for today: meet treatment team, acclimate to unit  Treatment team focus/goals: titrate medication, increase zoloft,   Progress note:  Pt is alert, oriented, anxious and reported difficulty sleeping. He reports hearing noises at night. LOS:  3  Expected LOS: 5-7    Financial concerns/prescription coverage:  Medicaid & Medicare  Family contact:  daughterRuthie ([phone])  Family requesting physician contact today:  no  Discharge plan: return home or to [facility]  Access to weapons : no     Outpatient provider(s): [facility]  Patient's preferred phone number for follow up call : [phone]     Participating treatment team members:  Dr. Ryan Yoder; Susu Desouza, RN; Sae Cotto, PharmD; Cornelia Acosta MSW

## 2020-03-24 NOTE — BH NOTES
GROUP THERAPY PROGRESS NOTE    Patient is participating in Substance abuse/ Coping Skill group. Group time: 45 minutes    Personal goal for participation: To understand addiction and relapse and identify triggers that increase urges and relapse. Goal orientation: Personal    Group therapy participation: active    Therapeutic interventions reviewed and discussed: Group discussion of substance use, abuse, and dependence and the urge cycle. Patients were able to explore their own urges to use various substances including cigarettes, alcohol, heroin, cocaine, and others. Group discussed how they feel when they are unable to use and ways substance use has hindered their lives. Triggers for use and coping skills to avoid use or manage symptoms until craving subsides were discussed. Those without addiction issues used the urges handout to write down and draw pictures of things that make them anxious, angry, or sad. Impression of participation: Vickie Aguilar shared that he struggles with being around a lot of people or negative people. He tries to keep to himself and avoid people when he feels bad or when they are causing issues.     Mahogany Means LPC [facility]

## 2020-03-24 NOTE — PROGRESS NOTES
Pt received resting quietly in bed with eyes closed. Respirations even and unlabored, NAD noted. Staff to continue q15 minute checks for safety. Problem: Falls - Risk of  Goal: *Absence of Falls  Description: Document Bo Josue Fall Risk and appropriate interventions in the flowsheet.   Outcome: Progressing Towards Goal  Note: Fall Risk Interventions:    Medication Interventions: Teach patient to arise slowly    History of Falls Interventions: Door open when patient unattended         Problem: Depressed Mood (Adult/Pediatric)  Goal: *STG: Remains safe in hospital  Outcome: Progressing Towards Goal

## 2020-03-24 NOTE — PROGRESS NOTES
Problem: Discharge Planning  Goal: *Discharge to safe environment  Outcome: Progressing Towards Goal  Note: Patient lives in subsidized housing. Patient identifies home as a safe environment and plans to return upon discharge. Goal: *Knowledge of medication management  Outcome: Progressing Towards Goal  Note: Patient is taking medications as prescribed. Goal: *Knowledge of discharge instructions  Outcome: Progressing Towards Goal  Note: Patient verbalizes understanding of goals for treatment and safe discharge.

## 2020-03-25 ENCOUNTER — PATIENT OUTREACH (OUTPATIENT)
Dept: INTERNAL MEDICINE CLINIC | Age: 65
End: 2020-03-25

## 2020-03-25 PROCEDURE — 74011250637 HC RX REV CODE- 250/637: Performed by: NURSE PRACTITIONER

## 2020-03-25 PROCEDURE — 65220000003 HC RM SEMIPRIVATE PSYCH

## 2020-03-25 PROCEDURE — 74011250637 HC RX REV CODE- 250/637: Performed by: INTERNAL MEDICINE

## 2020-03-25 PROCEDURE — 74011250637 HC RX REV CODE- 250/637: Performed by: PSYCHIATRY & NEUROLOGY

## 2020-03-25 RX ORDER — HYDROXYZINE 50 MG/1
50 TABLET, FILM COATED ORAL
Qty: 90 TAB | Refills: 0 | Status: SHIPPED | OUTPATIENT
Start: 2020-03-25 | End: 2020-03-25 | Stop reason: SDUPTHER

## 2020-03-25 RX ORDER — HYDROXYZINE 50 MG/1
50 TABLET, FILM COATED ORAL
Qty: 90 TAB | Refills: 0 | Status: ON HOLD | OUTPATIENT
Start: 2020-03-25 | End: 2021-08-23 | Stop reason: SDUPTHER

## 2020-03-25 RX ORDER — SERTRALINE HYDROCHLORIDE 100 MG/1
100 TABLET, FILM COATED ORAL DAILY
Qty: 30 TAB | Refills: 0 | Status: ON HOLD | OUTPATIENT
Start: 2020-03-26 | End: 2021-08-23 | Stop reason: SDUPTHER

## 2020-03-25 RX ORDER — QUETIAPINE FUMARATE 200 MG/1
200 TABLET, FILM COATED ORAL
Qty: 30 TAB | Refills: 0 | Status: ON HOLD | OUTPATIENT
Start: 2020-03-25 | End: 2021-08-23 | Stop reason: SDUPTHER

## 2020-03-25 RX ORDER — SERTRALINE HYDROCHLORIDE 100 MG/1
100 TABLET, FILM COATED ORAL DAILY
Qty: 30 TAB | Refills: 0 | Status: SHIPPED | OUTPATIENT
Start: 2020-03-26 | End: 2020-03-25 | Stop reason: SDUPTHER

## 2020-03-25 RX ORDER — QUETIAPINE FUMARATE 200 MG/1
200 TABLET, FILM COATED ORAL
Qty: 30 TAB | Refills: 0 | Status: SHIPPED | OUTPATIENT
Start: 2020-03-25 | End: 2020-03-25 | Stop reason: SDUPTHER

## 2020-03-25 RX ADMIN — GABAPENTIN 600 MG: 600 TABLET, FILM COATED ORAL at 08:49

## 2020-03-25 RX ADMIN — PHENOBARBITAL 32.4 MG: 32.4 TABLET ORAL at 08:48

## 2020-03-25 RX ADMIN — FOLIC ACID 1 MG: 1 TABLET ORAL at 08:52

## 2020-03-25 RX ADMIN — HYDROXYZINE HYDROCHLORIDE 50 MG: 50 TABLET ORAL at 16:26

## 2020-03-25 RX ADMIN — PHENOBARBITAL 32.4 MG: 32.4 TABLET ORAL at 17:31

## 2020-03-25 RX ADMIN — MULTIPLE VITAMINS W/ MINERALS TAB 1 TABLET: TAB at 08:48

## 2020-03-25 RX ADMIN — PHENOBARBITAL 32.4 MG: 32.4 TABLET ORAL at 13:03

## 2020-03-25 RX ADMIN — PHENOBARBITAL 32.4 MG: 32.4 TABLET ORAL at 21:38

## 2020-03-25 RX ADMIN — QUETIAPINE FUMARATE 200 MG: 100 TABLET ORAL at 22:46

## 2020-03-25 RX ADMIN — TAMSULOSIN HYDROCHLORIDE 0.4 MG: 0.4 CAPSULE ORAL at 08:52

## 2020-03-25 RX ADMIN — GABAPENTIN 600 MG: 600 TABLET, FILM COATED ORAL at 21:38

## 2020-03-25 RX ADMIN — METFORMIN HYDROCHLORIDE 500 MG: 500 TABLET ORAL at 08:47

## 2020-03-25 RX ADMIN — Medication 100 MG: at 08:47

## 2020-03-25 RX ADMIN — HYDROXYZINE HYDROCHLORIDE 50 MG: 50 TABLET ORAL at 08:49

## 2020-03-25 RX ADMIN — HYDROXYZINE HYDROCHLORIDE 50 MG: 50 TABLET ORAL at 21:38

## 2020-03-25 RX ADMIN — GABAPENTIN 600 MG: 600 TABLET, FILM COATED ORAL at 16:26

## 2020-03-25 RX ADMIN — SERTRALINE HYDROCHLORIDE 100 MG: 50 TABLET ORAL at 08:49

## 2020-03-25 NOTE — INTERDISCIPLINARY ROUNDS
Behavioral Health Interdisciplinary Rounds     Patient Name: Joseph Goode  Age: 77 y.o. Room/Bed:  729/  Primary Diagnosis: <principal problem not specified>   Admission Status: Voluntary     Readmission within 30 days: no  Power of  in place: no  Patient requires a blocked bed: no          Reason for blocked bed:     VTE Prophylaxis: No    Mobility needs/Fall risk: no  Flu Vaccine : no   Nutritional Plan: no  Consults:          Labs/Testing due today?: no    Sleep hours:  6      Participation in Care/Groups:  yes  Medication Compliant?: Yes  PRNS (last 24 hours): None    Restraints (last 24 hours):  no     CIWA (range last 24 hours): CIWA-Ar Total: 0   COWS (range last 24 hours):      Alcohol screening (AUDIT) completed -   AUDIT Score: 35     If applicable, date SBIRT discussed in treatment team AND documented:   AUDIT Screen Score: AUDIT Score: 35      Document Brief Intervention (corresponds directly with the 5 A's, Ask, Advise, Assess, Assist, and Arrange): At- Risk Patients (Score 7-15 for women; 8-15 for men)  Discuss concern patient is drinking at unhealthy levels known to increase risk of alcohol-related health problems. Is Patient ready to commit to change? If No:   Encourage reflection   Discuss short term and long term health risks of consuming alcohol   Barriers to change   Reaffirm willingness to help / Educational materials provided  If Yes:   Set goal  [employer] provided    Harmful use or Dependence (Score 16 or greater)   Discuss short term and long term health risks of consuming alcohol   Recommendations   Negotiate drinking goal   Recommend addiction specialist/center   Arrange follow-up appointments.     Tobacco - patient is a smoker: Have You Used Tobacco in the Past 30 Days: Yes  Illegal Drugs use: Have You Used Any Illegal Substances Over the Past 12 Months: No    24 hour chart check complete:     Patient goal(s) for today: attend groups, take medications  Treatment team focus/goals: titrate medication, coordinate follow up. Progress note:  Pt is alert, oriented, calm and cooperative with treatment. He is feeling better, future focused and planing for discharge tomorrow 3/26/2020. LOS:  4  Expected LOS: 5     Financial concerns/prescription coverage:  Medicaid & Medicare  Family contact:  daughterAdam ([phone])  Family requesting physician contact today:  no  Discharge plan: return home  Access to weapons : no                                    Outpatient provider(s): [facility]  Patient's preferred phone number for follow up call : [phone]      Participating treatment team members:  Dr. Shanelle Seymour; Guevara Smiley, RN; Margarita Lantigua, PharmD; Judi Rapp MSW

## 2020-03-25 NOTE — BH NOTES
Chief Complaint:  I feel better now    Length of Stay: 4 Days    Interval History:  Mr. Jimmy Rae reports doing well. Says he is sleeping well and his moods are \"much better\". Denies any SI or plan. No AH or VH. He has been visible in the milieu, interactive with staff and pleasant with peers. Denies any withdrawal symptoms. Past Medical History:  Past Medical History:   Diagnosis Date    Depression     DJD (degenerative joint disease) 3/8/2010    HTN (hypertension) 3/8/2010    STATES NO LONGER TAKING MEDICATION-STATES MD STOPPED    Insomnia     NIDDM (non-insulin dependent diabetes mellitus) 3/8/2010    Other ill-defined conditions(799.89)     BPH    Other ill-defined conditions(799.89)     previous hx of DTs from ETOH withdrawal    Other ill-defined conditions(799.89)     DDD/back problems    Psychiatric disorder     paranoid schizophrenia, anxiety    Substance abuse ([facility] [address]     Suicidal thoughts            Labs:  Lab Results   Component Value Date/Time    WBC 5.1 03/21/2020 01:07 PM    Hemoglobin (POC) 16.3 01/14/2013 05:28 PM    HGB 15.4 03/21/2020 01:07 PM    Hematocrit (POC) 48 01/14/2013 05:28 PM    HCT 46.1 03/21/2020 01:07 PM    PLATELET 96 (L) 35/91/0354 01:07 PM    MCV 90.0 03/21/2020 01:07 PM      Lab Results   Component Value Date/Time    Sodium 137 03/22/2020 04:24 AM    Potassium 4.0 03/22/2020 04:24 AM    Chloride 105 03/22/2020 04:24 AM    CO2 25 03/22/2020 04:24 AM    Anion gap 7 03/22/2020 04:24 AM    Glucose 85 03/22/2020 04:24 AM    Glucose 85 03/22/2020 04:24 AM    BUN 23 (H) 03/22/2020 04:24 AM    Creatinine 0.90 03/22/2020 04:24 AM    BUN/Creatinine ratio 26 (H) 03/22/2020 04:24 AM    GFR est AA >60 03/22/2020 04:24 AM    GFR est non-AA >60 03/22/2020 04:24 AM    Calcium 8.5 03/22/2020 04:24 AM    Bilirubin, total 0.8 03/22/2020 04:24 AM    AST (SGOT) 66 (H) 03/22/2020 04:24 AM    Alk.  phosphatase 128 (H) 03/22/2020 04:24 AM    Protein, total 7.3 03/22/2020 04:24 AM    Albumin 3.0 (L) 03/22/2020 04:24 AM    Globulin 4.3 (H) 03/22/2020 04:24 AM    A-G Ratio 0.7 (L) 03/22/2020 04:24 AM    ALT (SGPT) 80 (H) 03/22/2020 04:24 AM      Vitals:    03/24/20 1134 03/24/20 1602 03/24/20 1948 03/24/20 2335   BP: 114/76 (!) 152/94 122/84 108/72   Pulse: 69 65 70 69   Resp: 16 18 20 18   Temp: 98 °F (36.7 °C) 98.2 °F (36.8 °C) 98.1 °F (36.7 °C) 97.5 °F (36.4 °C)   SpO2: 98% 99% 97% 95%   Weight:       Height:             Current Facility-Administered Medications   Medication Dose Route Frequency Provider Last Rate Last Dose    QUEtiapine (SEROquel) tablet 200 mg  200 mg Oral QHS Yvrose Garrido MD   200 mg at 03/24/20 2253    sertraline (ZOLOFT) tablet 100 mg  100 mg Oral DAILY Yvrose Garrido MD   100 mg at 03/25/20 0849    PHENobarbitaL (LUMINAL) tablet 32.4 mg  32.4 mg Oral QID Yvrose Garrido MD   32.4 mg at 03/25/20 0848    gabapentin (NEURONTIN) tablet 600 mg  600 mg Oral TID Caroline Hensley MD   600 mg at 03/25/20 0849    metFORMIN (GLUCOPHAGE) tablet 500 mg  500 mg Oral DAILY WITH Jose Cervantes MD   500 mg at 03/25/20 0847    tamsulosin (FLOMAX) capsule 0.4 mg  0.4 mg Oral DAILY Caroline Hensley MD   0.4 mg at 03/25/20 0852    albuterol (PROVENTIL VENTOLIN) nebulizer solution 2.5 mg  2.5 mg Nebulization Q4H PRN Caroline Hensley MD        hydrOXYzine HCL (ATARAX) tablet 50 mg  50 mg Oral TID Yvrose Garrido MD   50 mg at 03/25/20 0849    OLANZapine (ZyPREXA) tablet 2.5 mg  2.5 mg Oral Q6H PRN [place] [employer], FNP        haloperidol lactate (HALDOL) injection 2.5 mg  2.5 mg IntraMUSCular Q6H PRN [place] [employer] V, FNP        benztropine (COGENTIN) tablet 0.5 mg  0.5 mg Oral BID PRN [place] [employer], FNP        diphenhydrAMINE (BENADRYL) injection 25 mg  25 mg IntraMUSCular BID PRN [place] [employer], FNP        acetaminophen (TYLENOL) tablet 650 mg  650 mg Oral Q4H PRN [place] [employer], FNP   650 mg at 03/23/20 1250    magnesium hydroxide (MILK OF MAGNESIA) 400 mg/5 mL oral suspension 30 mL  30 mL Oral DAILY PRN Arvell Easton' V, FNP        folic acid (FOLVITE) tablet 1 mg  1 mg Oral DAILY Arvell Easton' V, FNP   1 mg at 03/24/20 8846    thiamine HCL (B-1) tablet 100 mg  100 mg Oral DAILY Arvell Easton' V, FNP   100 mg at 03/25/20 1675    multivitamin, tx-iron-ca-min (THERA-M w/ IRON) tablet 1 Tab  1 Tab Oral DAILY Arvell Easton' V, FNP   1 Tab at 03/25/20 0848    PHENobarbitaL (LUMINAL) tablet 64.8 mg  64.8 mg Oral Q6H PRN Arvell Easton' V, FNP   64.8 mg at 03/23/20 0217    influenza vaccine 2019-20 (6 mos+)(PF) (FLUARIX/FLULAVAL/FLUZONE QUAD) injection 0.5 mL  0.5 mL IntraMUSCular PRIOR TO DISCHARGE Madi Lala MD             Mental Status Exam:  Eye contact: limited  Unshaven,   Grooming: poorly groomed  Psychomotor activity: Decreased  Speech is spontaneous  Mood is \"okay\"  Affect: flat  Perception:  Denies   Suicidal ideation: Denies   Cognition is grossly intact       Physical Exam:  Body habitus: mod obese  Musculoskeletal system: normal gait  Tremor - present in upper limbs. Cog wheeling - neg      Assessment and Plan:  Joseph Goode meets criteria for a diagnosis of Schizoaffective disorder, depressed type; alcohol use disorder, severe. 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 Taper phenobarb. Continue the medication regimen as prescribed  Disposition planning to continue. I certify that this patients inpatient psychiatric hospital services furnished since the previous certification were, and continue to be, required for treatment that could reasonably be expected to improve the patient's condition, or for diagnostic study, and that the patient continues to need, on a daily basis, active treatment furnished directly by or requiring the supervision of inpatient psychiatric facility personnel.  In addition, the hospital records show that services furnished were intensive treatment services, admission or related services, or equivalent services.

## 2020-03-25 NOTE — PROGRESS NOTES
Problem: Falls - Risk of  Goal: *Absence of Falls  Description: Document Deloria Acres Fall Risk and appropriate interventions in the flowsheet.   Outcome: Progressing Towards Goal  Note: Fall Risk Interventions:            Medication Interventions: Teach patient to arise slowly, Evaluate medications/consider consulting pharmacy         History of Falls Interventions: Room close to nurse's station         Problem: Depressed Mood (Adult/Pediatric)  Goal: *STG: Remains safe in hospital  Outcome: Progressing Towards Goal  Goal: *STG: Complies with medication therapy  Outcome: Progressing Towards Goal

## 2020-03-25 NOTE — BH NOTES
GROUP THERAPY PROGRESS NOTE    Jaime Brooks is participating in D/C Planning Group: \" Getting / Yaniv Mckenzie / Tono Jones / Recovery    Group time: 1.5 hour    Personal goal for participation: Tool for Recovery     Goal orientation: Removing Barriers That Hinder Recovery    Group therapy participation: active    Therapeutic interventions reviewed and discussed:     Impression of participation: Pt cited alcohol abuse and negative behaviors are major hinder that affect his recovery. Pt stated he wants to remain somber but he has to rid of some persons in his life are like pacifiers that keep him entangled in a world of drinking.

## 2020-03-25 NOTE — GROUP NOTE
[facility] GROUP DOCUMENTATION INDIVIDUAL Group Therapy Note Date: 3/24/2020 Group Start Time: 2040 Group End Time: 2050 Group Topic: Reflection/Relaxation [address] [facility] 
 
[facility] GROUP DOCUMENTATION GROUP Group Therapy Note Attendees:  
 
  
 
Attendance: Attended Patient's Goal:  Unit orientation and daily progress Interventions/techniques: Supported Follows Directions: Followed directions Interactions: Interacted appropriately Mental Status: Calm and Flat Behavior/appearance: Cooperative Goals Achieved: Able to listen to others Additional Notes:  Seems in fair spirits. Voiced no complaints. [facility] [id number]

## 2020-03-25 NOTE — PROGRESS NOTES
10:46 am-plan for discharge tomorrow. Problem: Falls - Risk of  Goal: *Absence of Falls  Description: Document Keri Rico Fall Risk and appropriate interventions in the flowsheet. Outcome: Progressing Towards Goal  Note: Fall Risk Interventions:            Medication Interventions: Teach patient to arise slowly         History of Falls Interventions: Room close to nurse's station         Problem: Patient Education: Go to Patient Education Activity  Goal: Patient/Family Education  Outcome: Progressing Towards Goal     Problem: Depressed Mood (Adult/Pediatric)  Goal: *STG: Participates in treatment plan  Outcome: Progressing Towards Goal  Note: Pt participated in treatment team. Reviewed discharge plans during treatment team. Visible on the unit and interacting with peers and staff. Attending groups and participating. Mood is brighter.   Goal: Interventions  Outcome: Progressing Towards Goal     Problem: Patient Education: Go to Patient Education Activity  Goal: Patient/Family Education  Outcome: Progressing Towards Goal

## 2020-03-25 NOTE — PROGRESS NOTES
Problem: Falls - Risk of  Goal: *Absence of Falls  Description: Document Edgar Brunner Fall Risk and appropriate interventions in the flowsheet. Outcome: Progressing Towards Goal  Note: Fall Risk Interventions:  Medication Interventions: Teach patient to arise slowly  History of Falls Interventions: Door open when patient unattended     Pt received resting quietly with no acute distress noted and respirations equal. Will continue to monitor throughout shift.

## 2020-03-26 VITALS
RESPIRATION RATE: 18 BRPM | BODY MASS INDEX: 29.85 KG/M2 | HEART RATE: 60 BPM | WEIGHT: 232.6 LBS | OXYGEN SATURATION: 97 % | DIASTOLIC BLOOD PRESSURE: 83 MMHG | SYSTOLIC BLOOD PRESSURE: 123 MMHG | HEIGHT: 74 IN | TEMPERATURE: 97.9 F

## 2020-03-26 PROCEDURE — 90471 IMMUNIZATION ADMIN: CPT

## 2020-03-26 PROCEDURE — 74011250637 HC RX REV CODE- 250/637: Performed by: NURSE PRACTITIONER

## 2020-03-26 PROCEDURE — 90686 IIV4 VACC NO PRSV 0.5 ML IM: CPT | Performed by: PSYCHIATRY & NEUROLOGY

## 2020-03-26 PROCEDURE — 74011250636 HC RX REV CODE- 250/636: Performed by: PSYCHIATRY & NEUROLOGY

## 2020-03-26 PROCEDURE — 74011250637 HC RX REV CODE- 250/637: Performed by: PSYCHIATRY & NEUROLOGY

## 2020-03-26 PROCEDURE — 74011250637 HC RX REV CODE- 250/637: Performed by: INTERNAL MEDICINE

## 2020-03-26 RX ADMIN — SERTRALINE HYDROCHLORIDE 100 MG: 50 TABLET ORAL at 08:36

## 2020-03-26 RX ADMIN — PHENOBARBITAL 32.4 MG: 32.4 TABLET ORAL at 08:36

## 2020-03-26 RX ADMIN — METFORMIN HYDROCHLORIDE 500 MG: 500 TABLET ORAL at 08:36

## 2020-03-26 RX ADMIN — GABAPENTIN 600 MG: 600 TABLET, FILM COATED ORAL at 08:35

## 2020-03-26 RX ADMIN — INFLUENZA VIRUS VACCINE 0.5 ML: 15; 15; 15; 15 SUSPENSION INTRAMUSCULAR at 08:47

## 2020-03-26 RX ADMIN — Medication 100 MG: at 08:36

## 2020-03-26 RX ADMIN — MULTIPLE VITAMINS W/ MINERALS TAB 1 TABLET: TAB at 08:36

## 2020-03-26 RX ADMIN — FOLIC ACID 1 MG: 1 TABLET ORAL at 08:36

## 2020-03-26 RX ADMIN — TAMSULOSIN HYDROCHLORIDE 0.4 MG: 0.4 CAPSULE ORAL at 08:36

## 2020-03-26 RX ADMIN — HYDROXYZINE HYDROCHLORIDE 50 MG: 50 TABLET ORAL at 08:36

## 2020-03-26 NOTE — PROGRESS NOTES
Pharmacist Discharge Medication Reconciliation    Discharging Provider: Dr. Fan Sabillon PMH:   Past Medical History:   Diagnosis Date    Depression     DJD (degenerative joint disease) 3/8/2010    HTN (hypertension) 3/8/2010    STATES NO LONGER TAKING MEDICATION-STATES MD STOPPED    Insomnia     NIDDM (non-insulin dependent diabetes mellitus) 3/8/2010    Other ill-defined conditions(799.89)     BPH    Other ill-defined conditions(799.89)     previous hx of DTs from ETOH withdrawal    Other ill-defined conditions(799.89)     DDD/back problems    Psychiatric disorder     paranoid schizophrenia, anxiety    Substance abuse ([facility] [address]     Suicidal thoughts      Chief Complaint for this Admission: No chief complaint on file. Allergies: Motrin [ibuprofen]    Discharge Medications:   Current Discharge Medication List        CONTINUE these medications which have CHANGED    Details   QUEtiapine (SEROquel) 200 mg tablet Take 1 Tab by mouth nightly. Indications: mood  Qty: 30 Tab, Refills: 0      sertraline (ZOLOFT) 100 mg tablet Take 1 Tab by mouth daily. Indications: major depressive disorder  Qty: 30 Tab, Refills: 0      hydrOXYzine HCL (ATARAX) 50 mg tablet Take 1 Tab by mouth every six (6) hours as needed for Anxiety. Indications: anxious  Qty: 90 Tab, Refills: 0           CONTINUE these medications which have NOT CHANGED    Details   baclofen (LIORESAL) 10 mg tablet Take 10 mg by mouth daily as needed. gabapentin (NEURONTIN) 600 mg tablet Take 600 mg by mouth three (3) times daily. tamsulosin (FLOMAX) 0.4 mg capsule Take 0.4 mg by mouth daily. albuterol sulfate 90 mcg/actuation aepb Take 2 Puffs by inhalation every four (4) hours as needed (breathing). Qty: 1 Inhaler, Refills: 0      metformin (GLUCOPHAGE) 500 mg tablet Take 500 mg by mouth daily (with breakfast). STOP taking these medications       traMADoL (ULTRAM) 50 mg tablet Comments:   Reason for Stopping:          PHENobarbitaL (LUMINAL) 30 mg tablet Comments:   Reason for Stopping:             The patient's chart, MAR and AVS were reviewed by Hussein Herrera PHARMD.

## 2020-03-26 NOTE — PROGRESS NOTES
Problem: Falls - Risk of  Goal: *Absence of Falls  Description: Document Bernadette Sunshine Fall Risk and appropriate interventions in the flowsheet. Outcome: Progressing Towards Goal  Note: Fall Risk Interventions:            Medication Interventions: Patient to call before getting OOB         History of Falls Interventions: Room close to nurse's station    Pt. Received resting in bed, NAD, respirations even and unlabored. Will continue q15 safety checks.

## 2020-03-26 NOTE — INTERDISCIPLINARY ROUNDS
Behavioral Health Interdisciplinary Rounds     Patient Name: Honor Libman  Age: 77 y.o. Room/Bed:  748/  Primary Diagnosis: <principal problem not specified>   Admission Status: Voluntary     Readmission within 30 days: no  Power of  in place: no  Patient requires a blocked bed: no          Reason for blocked bed:     VTE Prophylaxis: No    Mobility needs/Fall risk: no  Flu Vaccine : no   Nutritional Plan: no  Consults:          Labs/Testing due today?: no    Sleep hours:   6.5     Participation in Care/Groups:  yes  Medication Compliant?: Yes  PRNS (last 24 hours): None    Restraints (last 24 hours):  no     CIWA (range last 24 hours): CIWA-Ar Total: 0   COWS (range last 24 hours):      Alcohol screening (AUDIT) completed -   AUDIT Score: 35     If applicable, date SBIRT discussed in treatment team AND documented:   AUDIT Screen Score: AUDIT Score: 35      Document Brief Intervention (corresponds directly with the 5 A's, Ask, Advise, Assess, Assist, and Arrange): At- Risk Patients (Score 7-15 for women; 8-15 for men)  Discuss concern patient is drinking at unhealthy levels known to increase risk of alcohol-related health problems. Is Patient ready to commit to change? Yes    If Yes:   Set goal   Plan   Educational materials provided    Harmful use or Dependence (Score 16 or greater)   Discuss short term and long term health risks of consuming alcohol   Recommendations   Negotiate drinking goal   Recommend addiction specialist/center   Arrange follow-up appointments.     Tobacco - patient is a smoker: Have You Used Tobacco in the Past 30 Days: Yes  Illegal Drugs use: Have You Used Any Illegal Substances Over the Past 12 Months: No    24 hour chart check complete: yes     Patient goal(s) for today: prepare for discharge  Treatment team focus/goals: reconcile medications and facilitate discharge  Progress note: Pt is alert, oriented, clam, cooperative and psychiatrically stable for discharge.     LOS:  5  Expected LOS: 5     Financial concerns/prescription coverage:  Medicaid & Medicare  Family contact: Kennedi John ([phone])  Family requesting physician contact today:  no  Discharge plan: return home  Access to weapons : QL                                    Outpatient provider(s): [facility]  Patient's preferred phone number for follow up call : [phone]      Participating treatment team Dr. Eligio Jones RN; Will Waldrop, PharmD; CLARK Guzman

## 2020-03-26 NOTE — DISCHARGE INSTRUCTIONS
DISCHARGE SUMMARY    NAME:Ilya Deleon  : 1955  MRN: [id number]    The patient Joseph Goode exhibits the ability to control behavior in a less restrictive environment. Patient's level of functioning is improving. No assaultive/destructive behavior has been observed for the past 24 hours. No suicidal/homicidal threat or behavior has been observed for the past 24 hours. There is no evidence of serious medication side effects. Patient has not been in physical or protective restraints for at least the past 24 hours. If weapons involved, how are they secured? NA    Is patient aware of and in agreement with discharge plan? Yes    Arrangements for medication:  Prescriptions given to patient, filled at hospital.     Copy of discharge instructions to provider?:  [address] for transportation home:  [place]    Keep all follow up appointments as scheduled, continue to take prescribed medications per physician instructions. Mental health crisis number:  [phone] or your local mental health crisis line number at [phone]. DISCHARGE SUMMARY from Nurse    PATIENT INSTRUCTIONS:      What to do at Home:  Recommended activity: Activity as tolerated,     If you experience any of the following symptoms thoughts of harming self, feeling overwhelmed with hopelessness and auditory hallucinations, please follow up with your  Carlota Briceno and your [address] [place] [address] providers. If you can not reach them or their offices and symptoms continues immediately call your local crisis number at [phone]        *  Please give a list of your current medications to your Primary Care Provider. *  Please update this list whenever your medications are discontinued, doses are      changed, or new medications (including over-the-counter products) are added. *  Please carry medication information at all times in case of emergency situations.     These are general instructions for a healthy lifestyle:    No smoking/ No tobacco products/ Avoid exposure to second hand smoke  Surgeon General's Warning:  Quitting smoking now greatly reduces serious risk to your health. Obesity, smoking, and sedentary lifestyle greatly increases your risk for illness    A healthy diet, regular physical exercise & weight monitoring are important for maintaining a healthy lifestyle    You may be retaining fluid if you have a history of heart failure or if you experience any of the following symptoms:  Weight gain of 3 pounds or more overnight or 5 pounds in a week, increased swelling in our hands or feet or shortness of breath while lying flat in bed. Please call your doctor as soon as you notice any of these symptoms; do not wait until your next office visit. The discharge information has been reviewed with the patient. The patient verbalized understanding. Discharge medications reviewed with the patient and appropriate educational materials and side effects teaching were provided.   ___________________________________________________________________________________________________________________________________

## 2020-03-26 NOTE — BH NOTES
Behavioral Health Transition Record to Provider    Patient Name: Amirah Strong  YOB: 1955  Medical Record Number: [id number]  Date of Admission: 3/21/2020  Date of Discharge: 3/26/2020    Attending Provider: No att. providers found  Discharging Provider: Cory Dela Cruz MD  To contact this individual call [phone] and ask the  to page. If unavailable, ask to be transferred to [facility] Provider on call. [facility] Provider will be available on call 24/7 and during holidays. Primary Care Provider: Michelle Sevilla MD    Allergies   Allergen Reactions    Motrin [Ibuprofen] Hives       Reason for Admission: CHIEF COMPLAINT:  \"I was very depressed and drinking too much. \"     Admission Diagnosis: [facility] induced [facility] [F19.94]    * No surgery found *    Results for orders placed or performed during the hospital encounter of 67/53/04   METABOLIC PANEL, COMPREHENSIVE   Result Value Ref Range    Sodium 137 136 - 145 mmol/L    Potassium 4.0 3.5 - 5.1 mmol/L    Chloride 105 97 - 108 mmol/L    CO2 25 21 - 32 mmol/L    Anion gap 7 5 - 15 mmol/L    Glucose 85 65 - 100 mg/dL    BUN 23 (H) 6 - 20 MG/DL    Creatinine 0.90 0.70 - 1.30 MG/DL    BUN/Creatinine ratio 26 (H) 12 - 20      GFR est AA >60 >60 ml/min/1.73m2    GFR est non-AA >60 >60 ml/min/1.73m2    Calcium 8.5 8.5 - 10.1 MG/DL    Bilirubin, total 0.8 0.2 - 1.0 MG/DL    ALT (SGPT) 80 (H) 12 - 78 U/L    AST (SGOT) 66 (H) 15 - 37 U/L    Alk.  phosphatase 128 (H) 45 - 117 U/L    Protein, total 7.3 6.4 - 8.2 g/dL    Albumin 3.0 (L) 3.5 - 5.0 g/dL    Globulin 4.3 (H) 2.0 - 4.0 g/dL    A-G Ratio 0.7 (L) 1.1 - 2.2     GLUCOSE, FASTING   Result Value Ref Range    Glucose 85 65 - 100 MG/DL   TSH 3RD GENERATION   Result Value Ref Range    TSH 0.85 0.36 - 3.74 uIU/mL   LIPID PANEL   Result Value Ref Range    LIPID PROFILE          Cholesterol, total 141 <200 MG/DL    Triglyceride 71 <150 MG/DL    HDL Cholesterol 73 MG/DL LDL, calculated 53.8 0 - 100 MG/DL    VLDL, calculated 14.2 MG/DL    CHOL/HDL Ratio 1.9 0.0 - 5.0         Immunizations administered during this encounter:   Immunization History   Administered Date(s) Administered    (RETIRED) Pneumococcal Vaccine (Unspecified Type) 10/21/2010    Influenza Vaccine (Quad) PF 03/26/2020    Influenza Vaccine Split 10/21/2010       Screening for Metabolic Disorders for Patients on Antipsychotic Medications  (Data obtained from the EMR)    Estimated Body Mass Index  Estimated body mass index is 29.86 kg/m² as calculated from the following:    Height as of this encounter: 6' 2\" (1.88 m). Weight as of this encounter: 105.5 kg (232 lb 9.6 oz). Vital Signs/Blood Pressure  Visit Vitals  /83   Pulse 60   Temp 97.9 °F (36.6 °C)   Resp 18   Ht 6' 2\" (1.88 m)   Wt 105.5 kg (232 lb 9.6 oz)   SpO2 97%   BMI 29.86 kg/m²       Blood Glucose/Hemoglobin A1c  Lab Results   Component Value Date/Time    Glucose 85 03/22/2020 04:24 AM    Glucose 85 03/22/2020 04:24 AM    Glucose (POC) 106 (H) 03/21/2020 01:33 PM       Lab Results   Component Value Date/Time    Hemoglobin A1c 5.4 01/15/2013 09:21 PM        Lipid Panel  Lab Results   Component Value Date/Time    Cholesterol, total 141 03/22/2020 04:24 AM    HDL Cholesterol 73 03/22/2020 04:24 AM    LDL, calculated 53.8 03/22/2020 04:24 AM    Triglyceride 71 03/22/2020 04:24 AM    CHOL/HDL Ratio 1.9 03/22/2020 04:24 AM        Discharge Diagnosis: Schizoaffective disorder, depressed type; alcohol use disorder, severe.  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 The patient Tyson Hermosillo exhibits the ability to control behavior in a less restrictive environment. Patient's level of functioning is improving. No assaultive/destructive behavior has been observed for the past 24 hours.   No suicidal/homicidal threat or behavior has been observed for the past 24 hours. There is no evidence of serious medication side effects. Patient has not been in physical or protective restraints for at least the past 24 hours. If weapons involved, how are they secured? NA    Is patient aware of and in agreement with discharge plan? Yes    Arrangements for medication:  Prescriptions given to patient, filled at hospital.     Copy of discharge instructions to provider?:  [address] for transportation home:  [place]    Keep all follow up appointments as scheduled, continue to take prescribed medications per physician instructions. Mental health crisis number:  [phone] or your local mental health crisis line number at [phone]. Discharge Medication List and Instructions:   Discharge Medication List as of 3/26/2020  9:35 AM      CONTINUE these medications which have CHANGED    Details   QUEtiapine (SEROquel) 200 mg tablet Take 1 Tab by mouth nightly. Indications: mood, Normal, Disp-30 Tab, R-0      sertraline (ZOLOFT) 100 mg tablet Take 1 Tab by mouth daily. Indications: major depressive disorder, Normal, Disp-30 Tab, R-0      hydrOXYzine HCL (ATARAX) 50 mg tablet Take 1 Tab by mouth every six (6) hours as needed for Anxiety. Indications: anxious, Normal, Disp-90 Tab, R-0         CONTINUE these medications which have NOT CHANGED    Details   baclofen (LIORESAL) 10 mg tablet Take 10 mg by mouth daily as needed., Historical Med      gabapentin (NEURONTIN) 600 mg tablet Take 600 mg by mouth three (3) times daily. , Historical Med      tamsulosin (FLOMAX) 0.4 mg capsule Take 0.4 mg by mouth daily. , Historical Med      albuterol sulfate 90 mcg/actuation aepb Take 2 Puffs by inhalation every four (4) hours as needed (breathing). , Print, Disp-1 Inhaler, R-0      metformin (GLUCOPHAGE) 500 mg tablet Take 500 mg by mouth daily (with breakfast). , Historical Med         STOP taking these medications       traMADoL (ULTRAM) 50 mg tablet Comments: Reason for Stopping: PHENobarbitaL (LUMINAL) 30 mg tablet Comments:   Reason for Stopping:               Unresulted Labs (24h ago, onward)    None        To obtain results of studies pending at discharge, please contact [phone]    Follow-up Information     Follow up With Specialties Details Why Contact Andre Baptiste,   Call on 3/26/2020 Your  as asked your call her between 12pm and 2pm day of discharge [facility] during COVID 19 resitricted hours. [address]  phone [phone]  fax [phone]    Kathrine Marx   You must complete your renewal packet that was mailed to your home in March and return to Cali Saunders by due date in April, Be sure to include your hospital discharge paperwork in your packet. If you cannot find your renewal packet call Cali Saunders immediately. Jacqui Angela and [address]  [place], [address]  Direct Line: [phone]  Phone: [phone]      Jhoana Barton MD Family Practice Go on 3/27/2020 10AM appointment [address]  [phone]            Advanced Directive:   Does the patient have an appointed surrogate decision maker? No  Does the patient have a Medical Advance Directive? No  Does the patient have a Psychiatric Advance Directive? No  If the patient does not have a surrogate or Medical Advance Directive AND Psychiatric Advance Directive, the patient was offered information on these advance directives Patient declined to complete    Patient Instructions: Please continue all medications until otherwise directed by physician. Tobacco Cessation Discharge Plan:   Is the patient a smoker and needs referral for smoking cessation? Yes  Patient referred to the following for smoking cessation with an appointment? Refused     Patient was offered medication to assist with smoking cessation at discharge?  Refused  Was education for smoking cessation added to the discharge instructions? Yes    Alcohol/Substance Abuse Discharge Plan:   Does the patient have a history of substance/alcohol abuse and requires a referral for treatment? Yes  Patient referred to the following for substance/alcohol abuse treatment with an appointment? Yes - [facility]  Patient was offered medication to assist with alcohol cessation at discharge? Refused  Was education for substance/alcohol abuse added to discharge instructions? No    Patient discharged to Home; discussed with patient/caregiver and provided to the patient/caregiver either in hard copy or electronically.

## 2020-03-26 NOTE — GROUP NOTE
[facility] GROUP DOCUMENTATION INDIVIDUAL Group Therapy Note Date: 3/25/2020 Group Start Time: 36 Group End Time: 2035 Group Topic: Reflection/Relaxation [address] [place] [facility] GROUP DOCUMENTATION GROUP Group Therapy Note Attendees: 8/9 Attendance: Attended Patient's Goal:  Discuss healthy sleep habits Interventions/techniques: Informed and Supported Follows Directions: Followed directions Interactions: Interacted appropriately Mental Status: Calm Behavior/appearance: Cooperative Goals Achieved: Able to listen to others Additional Notes: Staff provided worksheet that discusses the \"do's and don'ts\" of falling asleep and staying asleep. Staff reviewed items listed on sheet and asked group to share from personal experience as well. Staff reviewed unit rules and also asked if group had questions regarding treatment team tomorrow. Lastly, staff reiterated that this unit focuses more on medication and that it is important to follow up with therapy and follow up appointments once discharged. Pt did not contribute much to conversation but followed along with worksheet provided by staff. Kimani Webster

## 2020-03-26 NOTE — PROGRESS NOTES
Problem: Depressed Mood (Adult/Pediatric)  Goal: *STG: Participates in treatment plan  Outcome: Progressing Towards Goal  Note: Out on unit engaged. Mood and affect stable and full range. Denies SI. Interacting with peers and is engaged in his d/c plans by making appointments for follow up care. Daily goal is to d/c home.  Staff focus is on d/c planning and education  Goal: *STG: Verbalizes anger, guilt, and other feelings in a constructive manor  Outcome: Progressing Towards Goal  Goal: *STG: Attends activities and groups  Outcome: Progressing Towards Goal  Goal: *STG: Demonstrates reduction in symptoms and increase in insight into coping skills/future focused  Outcome: Progressing Towards Goal  Goal: *STG: Remains safe in hospital  Outcome: Progressing Towards Goal  Goal: *STG: Complies with medication therapy  Outcome: Progressing Towards Goal  Goal: Interventions  Outcome: Progressing Towards Goal

## 2020-03-26 NOTE — DISCHARGE SUMMARY
Some parts of the discharge summary are from the initial Psychiatric interview that was done on admission by the admitting psychiatrist.     Date of Admission: 3/21/2020    Date of Discharge: 3/26/2020     TYPE OF DISCHARGE:   REGULAR -  YES    AMA  RELEASED BY THE [facility] COURT    CHIEF COMPLAINT:  \"I was very depressed and drinking too much. \"     HISTORY OF PRESENT ILLNESS:  The patient is a 42-year-old  man who is currently admitted with a history of coming to the ER requesting help for alcoholism and auditory hallucinations. He reports that he has been drinking since he was a teenager and over the past week has been drinking about three bottles of wine daily. He was discharged from [facility] 2 weeks ago and had not had a drink for about a week after that. Denies use of any other recreational drugs and urine drug screen was negative. States that he tends to have auditory and visual hallucinations but has not had any for the past 2 weeks. However, he has been sleeping poorly, has had little energy or motivation and feels depressed every day. He started having thoughts of suicide and decided to come the hospital for treatment.   Denies any psychotic symptoms at the present time.     PAST MEDICAL HISTORY:  Reviewed as per the history and physical exam.             Past Medical History:   Diagnosis Date    Depression      DJD (degenerative joint disease) 3/8/2010    HTN (hypertension) 3/8/2010     STATES NO LONGER TAKING MEDICATION-STATES MD STOPPED    Insomnia      NIDDM (non-insulin dependent diabetes mellitus) 3/8/2010    Other ill-defined conditions(799.89)       BPH    Other ill-defined conditions(799.89)       previous hx of DTs from ETOH withdrawal    Other ill-defined conditions(799.89)       DDD/back problems    Psychiatric disorder       paranoid schizophrenia, anxiety    Substance abuse ([facility] [address]      Suicidal thoughts                Prior to Admission medications    Medication Sig Start Date End Date Taking? Authorizing Provider   traMADoL (ULTRAM) 50 mg tablet Take 50 mg by mouth two (2) times daily as needed for Pain.       Provider, Historical   baclofen (LIORESAL) 10 mg tablet Take 10 mg by mouth daily as needed.       Provider, Historical   gabapentin (NEURONTIN) 600 mg tablet Take 600 mg by mouth three (3) times daily.       Provider, Historical   hydrOXYzine HCL (ATARAX) 50 mg tablet Take 50 mg by mouth every four (4) hours as needed for Anxiety.       Provider, Historical   QUEtiapine (SEROquel) 100 mg tablet Take 100 mg by mouth nightly.       Provider, Historical   sertraline (ZOLOFT) 50 mg tablet Take 50 mg by mouth daily.       Provider, Historical   tamsulosin (FLOMAX) 0.4 mg capsule Take 0.4 mg by mouth daily.       Provider, Historical   PHENobarbitaL (LUMINAL) 30 mg tablet Take 30 mg by mouth four (4) times daily.       Provider, Historical   albuterol sulfate 90 mcg/actuation aepb Take 2 Puffs by inhalation every four (4) hours as needed (breathing). 10/17/19     Yvon Patel MD   metformin (GLUCOPHAGE) 500 mg tablet Take 500 mg by mouth daily (with breakfast).        Other, MD Damaso             Vitals:     03/22/20 1241 03/22/20 1538 03/22/20 2033 03/23/20 0842   BP: 135/80 (!) 147/94 144/83 129/81   Pulse: 67 71 69 70   Resp: 18 16 16 16   Temp: 97.4 °F (36.3 °C) 97.6 °F (36.4 °C) 98 °F (36.7 °C) 97.9 °F (36.6 °C)   SpO2: 98% 95% 100% 97%   Weight:           Height:                    Lab Results   Component Value Date/Time     WBC 5.1 03/21/2020 01:07 PM     Hemoglobin (POC) 16.3 01/14/2013 05:28 PM     HGB 15.4 03/21/2020 01:07 PM     Hematocrit (POC) 48 01/14/2013 05:28 PM     HCT 46.1 03/21/2020 01:07 PM     PLATELET 96 (L) 00/01/1120 01:07 PM     MCV 90.0 03/21/2020 01:07 PM      Lab Results   Component Value Date/Time     Sodium 137 03/22/2020 04:24 AM     Potassium 4.0 03/22/2020 04:24 AM     Chloride 105 03/22/2020 04:24 AM     CO2 25 03/22/2020 04:24 AM     Anion gap 7 03/22/2020 04:24 AM     Glucose 85 03/22/2020 04:24 AM     Glucose 85 03/22/2020 04:24 AM     BUN 23 (H) 03/22/2020 04:24 AM     Creatinine 0.90 03/22/2020 04:24 AM     BUN/Creatinine ratio 26 (H) 03/22/2020 04:24 AM     GFR est AA >60 03/22/2020 04:24 AM     GFR est non-AA >60 03/22/2020 04:24 AM     Calcium 8.5 03/22/2020 04:24 AM     Bilirubin, total 0.8 03/22/2020 04:24 AM     AST (SGOT) 66 (H) 03/22/2020 04:24 AM     Alk. phosphatase 128 (H) 03/22/2020 04:24 AM     Protein, total 7.3 03/22/2020 04:24 AM     Albumin 3.0 (L) 03/22/2020 04:24 AM     Globulin 4.3 (H) 03/22/2020 04:24 AM     A-G Ratio 0.7 (L) 03/22/2020 04:24 AM     ALT (SGPT) 80 (H) 03/22/2020 04:24 AM      No results found for: VALF2, VALAC, VALP, VALPR, DS6, CRBAM, CRBAMP, CARB2, XCRBAM  No results found for: LITHM  RADIOLOGY REPORTS:(reviewed/updated 3/23/2020)  Ct Head Wo Cont     Result Date: 3/21/2020  EXAM: CT HEAD WO CONT INDICATION: AMS COMPARISON: 2/16/2020. CONTRAST: None. TECHNIQUE: Unenhanced CT of the head was performed using 5 mm images. Brain and bone windows were generated. CT dose reduction was achieved through use of a standardized protocol tailored for this examination and automatic exposure control for dose modulation. FINDINGS: The ventricles and sulci are normal in size, shape and configuration and midline. There is no significant white matter disease. There is no intracranial hemorrhage, extra-axial collection, mass, mass effect or midline shift. The basilar cisterns are open. No acute infarct is identified. The bone windows demonstrate no abnormalities. The visualized portions of the paranasal sinuses and mastoid air cells are clear.      IMPRESSION: No evidence of acute process.      Ct Head Wo Cont     Result Date: 3/16/2020  HEAD CT WITHOUT CONTRAST: 3/16/2020 9:45 PM INDICATION: fall, head trauma, thrombocytopenia. rule out brain bleed.  COMPARISON: 6/21/2012 PROCEDURE: Axial images of the head were obtained without contrast. Coronal and sagittal reformats were performed. CT dose reduction was achieved through use of a standardized protocol tailored for this examination and automatic exposure control for dose modulation. FINDINGS: The ventricles and sulci are appropriate in size and configuration for age. No loss of gray-white differentiation to suggest late acute or early subacute infarction. No mass effect or intracranial hemorrhage.      IMPRESSION: No acute intracranial abnormality.     Ct Spine Cerv Wo Cont     Result Date: 3/21/2020  EXAM:  CT CERVICAL SPINE WITHOUT CONTRAST INDICATION: Altered mental status. COMPARISON: None. CONTRAST:  None. TECHNIQUE: Multislice helical CT of the cervical spine was performed without intravenous contrast administration. Sagittal and coronal reconstructions were generated. CT dose reduction was achieved through use of a standardized protocol tailored for this examination and automatic exposure control for dose modulation. FINDINGS: The alignment is within normal limits. There is no fracture or subluxation. The odontoid process is intact. The craniocervical junction is within normal limits. The prevertebral soft tissues are within normal limits. The vertebral body heights are preserved with no evident fracture or aggressive lesion. There is loss of vertical disc height at the C5-6 and C6-7 level with secondary to osteophytes. There is no spinal canal stenosis or neural foraminal narrowing evident on an osseous basis. The surrounding soft tissues and visualized lung bases appear unremarkable.      IMPRESSION: Degenerative disc disease C5-6 and C6-7 with no acute fracture or aggressive lesion.     Xr Chest Port     Result Date: 3/21/2020  Chest portable AP History: AMS Comparison: 10/17/2019 Findings: The lungs are well expanded. No focal consolidation, pleural effusion, or pneumothorax. The cardiomediastinal silhouette is unremarkable.  The visualized osseous structures are unremarkable.      Impression: No acute cardiopulmonary process.        PAST PSYCHIATRIC HISTORY:  The patient reports that he was first hospitalized for anxiety and depression at the age of 22 years and has had numerous psychiatric hospitalizations since then. He has had hospitalizations at [facility] in 02/2013, 05/2011, and 10/2010, and as noted above, he was just discharged from [facility] 2 weeks ago. There is a long history of alcohol abuse since he was a teenager and says he was also diagnosed with schizophrenia at the age of 50 years, although he has only had intermittent psychotic symptoms since then. There is no prior history of suicide attempts.     PSYCHOSOCIAL HISTORY:  The patient currently lives in [place] by himself. States that he is  from his wife and they had five children together. He keeps in occasional touch with them. Currently, he is retired and gets social security income. 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 Denies any major legal or financial stressors.     MENTAL STATUS EXAM:  The patient is an elderly male who is dressed in hospital apparel. He is somewhat disheveled and has not combed his hair or shaved in many days. Makes limited eye contact. Psychomotor activity is decreased. Affect is depressed and mood is reported as being down. Denies any perceptual abnormalities. Passive suicidal ideation is present but feels safe in the hospital.  Denies any delusions. His thought process is logical and goal-directed. Cognitively, he is awake and alert, oriented to time, place, and person. Intelligence is average, memory is intact, and fund of knowledge is adequate. Insight is partial.  Judgment is poor.     ASSESSMENT AND PLAN/DIAGNOSES:  Schizoaffective disorder, depressed type; alcohol use disorder, severe. 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       COURSE IN THE HOSPITAL:    Patient was admitted to the inpatient psychiatry unit for acute psychiatric stabilization in regards to symptomatology as described in the HPI above and placed on Q15 minute checks and withdrawal precautions. While on the unit Deborah Starks was involved in individual, group, occupational and milieu therapy. She was started back on her usual medication regimen as well as PRN medications including Seroquel, Zoloft, Vistaril for anxiety and Trazodone for sleep. She improved gradually and was able to integrate into the milieu with help from the nursing staff. Detox was done with phenobarbitone without any complications. Patients symptoms improved gradually including AH, poor sleep, SI, low moods and low energy. She was quite on the unit, appropriate in her interactions, and cooperative with medications and the unit routine. Please see individual progress notes for more specific details regarding patient's hospitalization course. Patient was discharged as per the plan. She had been doing well on the unit as per the report of the nursing staff and my observations. No PRN medication for agitation, seclusion or restraints were required during the last 48 hours of her stay. Deborah Starks had improved progressively to the point of being stable for discharge and outpatient FU. At this time she did not offer any complaints. Patient denied any SI or HI. Denied any AH or VH. She denied any delusions. Was not considered a danger to self or to others and is safe for discharge. Will FU with her appointments and remains motivated to be in treatment. The patient verbalized understanding of her discharge instructions. DISCHARGE DIAGNOSIS:  Schizoaffective disorder, depressed type; alcohol use disorder, severe. 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 MENTAL STATUS EXAM ON DISCHARGE:    General appearance: Deborah Starks is a 77 y.o.  BLACK OR  male who is well groomed, psychomotor activity is WNL  Eye contact: makes good eye contact  Speech: Spontaneous and coherent  Affect : Euthymic  Mood: \"OK\"  Thought Process: Logical, goal directed  Perception: Denies any AH or VH. Thought Content: Denies any SI or Plan  Insight: Partial  Judgement: Fair  Cognition: Intact grossly. Discharge Medication List as of 3/26/2020  9:35 AM      CONTINUE these medications which have CHANGED    Details   QUEtiapine (SEROquel) 200 mg tablet Take 1 Tab by mouth nightly. Indications: mood, Normal, Disp-30 Tab, R-0      sertraline (ZOLOFT) 100 mg tablet Take 1 Tab by mouth daily. Indications: major depressive disorder, Normal, Disp-30 Tab, R-0      hydrOXYzine HCL (ATARAX) 50 mg tablet Take 1 Tab by mouth every six (6) hours as needed for Anxiety. Indications: anxious, Normal, Disp-90 Tab, R-0         CONTINUE these medications which have NOT CHANGED    Details   baclofen (LIORESAL) 10 mg tablet Take 10 mg by mouth daily as needed., Historical Med      gabapentin (NEURONTIN) 600 mg tablet Take 600 mg by mouth three (3) times daily. , Historical Med      tamsulosin (FLOMAX) 0.4 mg capsule Take 0.4 mg by mouth daily. , Historical Med      albuterol sulfate 90 mcg/actuation aepb Take 2 Puffs by inhalation every four (4) hours as needed (breathing). , Print, Disp-1 Inhaler, R-0      metformin (GLUCOPHAGE) 500 mg tablet Take 500 mg by mouth daily (with breakfast). , Historical Med         STOP taking these medications       traMADoL (ULTRAM) 50 mg tablet Comments:   Reason for Stopping: PHENobarbitaL (LUMINAL) 30 mg tablet Comments:   Reason for Stopping: Follow-up Information     Follow up With Specialties Details Why Contact Info    Rashel Winslow,   Call on 3/26/2020 Your  as asked your call her between 12pm and 2pm day of discharge to [facility] follow up during COVID 19 resitricted hours.   [address]  phone [phone]  fax [phone]    Derrel Heady   You must complete your renewal packet that was mailed to your home in March and return to [facility]reen [facility]den by due date in April, Be sure to include your hospital discharge paperwork in your packet. If you cannot find your renewal packet call Nasreen Hayden immediately. Jacqui [address]  [address] [facility]  Direct Line: [phone]  Phone: [phone]      Devora Vicente MD Family Practice Go on 3/27/2020 10AM appointment [address]  [phone]          WOUND CARE: none needed. PROGNOSIS:   Good / Fair based on nature of patient's pathology/ies and treatment compliance issues. Prognosis is greatly dependent upon patient's ability to  follow up on psychiatric/psychotherapy appointments as well as to comply with psychiatric medications as prescribed.

## 2020-03-27 ENCOUNTER — PATIENT OUTREACH (OUTPATIENT)
Dept: INTERNAL MEDICINE CLINIC | Age: 65
End: 2020-03-27

## 2020-03-27 NOTE — CALL BACK NOTE
Reached out to patient at [phone] for follow up. Patient stated he was aware of follow up recommendations of [facility] and [facility].

## 2020-04-02 ENCOUNTER — PATIENT OUTREACH (OUTPATIENT)
Dept: INTERNAL MEDICINE CLINIC | Age: 65
End: 2020-04-02

## 2020-11-14 ENCOUNTER — HOSPITAL ENCOUNTER (EMERGENCY)
Age: 65
Discharge: HOME | End: 2020-11-14
Attending: EMERGENCY MEDICINE
Payer: MEDICARE

## 2020-11-14 VITALS
SYSTOLIC BLOOD PRESSURE: 137 MMHG | RESPIRATION RATE: 16 BRPM | TEMPERATURE: 97.5 F | BODY MASS INDEX: 40.81 KG/M2 | HEART RATE: 65 BPM | OXYGEN SATURATION: 96 % | HEIGHT: 67 IN | DIASTOLIC BLOOD PRESSURE: 81 MMHG | WEIGHT: 260 LBS

## 2020-11-14 DIAGNOSIS — K04.7 DENTAL ABSCESS: Primary | ICD-10-CM

## 2020-11-14 PROCEDURE — 74011250637 HC RX REV CODE- 250/637: Performed by: EMERGENCY MEDICINE

## 2020-11-14 PROCEDURE — 74011000250 HC RX REV CODE- 250: Performed by: EMERGENCY MEDICINE

## 2020-11-14 PROCEDURE — 99283 EMERGENCY DEPT VISIT LOW MDM: CPT

## 2020-11-14 RX ORDER — PENICILLIN V POTASSIUM 500 MG/1
500 TABLET, FILM COATED ORAL 4 TIMES DAILY
Qty: 40 TAB | Refills: 0 | Status: SHIPPED | OUTPATIENT
Start: 2020-11-14 | End: 2021-08-23

## 2020-11-14 RX ORDER — TRAMADOL HYDROCHLORIDE 50 MG/1
50 TABLET ORAL
Qty: 10 TAB | Refills: 0 | Status: SHIPPED | OUTPATIENT
Start: 2020-11-14 | End: 2020-11-17

## 2020-11-14 RX ORDER — TRAMADOL HYDROCHLORIDE 50 MG/1
50 TABLET ORAL
Status: COMPLETED | OUTPATIENT
Start: 2020-11-14 | End: 2020-11-14

## 2020-11-14 RX ORDER — TRAZODONE HYDROCHLORIDE 100 MG/1
100 TABLET ORAL
COMMUNITY
End: 2021-08-23

## 2020-11-14 RX ORDER — PENICILLIN V POTASSIUM 250 MG/1
500 TABLET, FILM COATED ORAL
Status: COMPLETED | OUTPATIENT
Start: 2020-11-14 | End: 2020-11-14

## 2020-11-14 RX ADMIN — PENICILLIN V POTASSIUM 500 MG: 250 TABLET, FILM COATED ORAL at 15:10

## 2020-11-14 RX ADMIN — DIPHENHYDRAMINE HYDROCHLORIDE: 12.5 LIQUID ORAL at 15:10

## 2020-11-14 RX ADMIN — TRAMADOL HYDROCHLORIDE 50 MG: 50 TABLET, FILM COATED ORAL at 15:10

## 2020-11-14 NOTE — ED NOTES
Patient (s) given copy of dc instructions and 1 paper script(s) and 1 electronic scripts. Patient (s) verbalized understanding of instructions and script (s). Patient given a current medication reconciliation form and verbalized understanding of their medications. Patient (s) verbalized understanding of the importance of discussing medications with  his or her physician or clinic they will be following up with. Patient alert and oriented and in no acute distress. Patient offered wheelchair from treatment area to hospital entrance, patient denies wheelchair.

## 2020-11-14 NOTE — ED NOTES
Pt presents ambulatory to ED complaining of dental pain. Pt reports mild pain x2 weeks ago. Pt reports worsening pain x3 days ago. Pt is alert and oriented x 4, RR even and unlabored, skin is warm and dry. Assesment completed and pt updated on plan of care. Emergency Department Nursing Plan of Care       The Nursing Plan of Care is developed from the Nursing assessment and Emergency Department Attending provider initial evaluation. The plan of care may be reviewed in the ED Provider note.     The Plan of Care was developed with the following considerations:   Patient / Family readiness to learn indicated by:verbalized understanding  Persons(s) to be included in education: patient  Barriers to Learning/Limitations:No    Signed     Steph Hurt    11/14/2020   3:20 PM

## 2020-11-14 NOTE — ED PROVIDER NOTES
EMERGENCY DEPARTMENT HISTORY AND PHYSICAL EXAM      Date: 11/14/2020  Patient Name: Eder Reveles    History of Presenting Illness     Chief Complaint   Patient presents with    Dental Pain       History Provided By: Patient    HPI: Eder Reveles, 77 y.o. male with PMHx of schizophrenia, insomnia, HTN, and depression presents to the ED with cc of mild to moderate toothache on the right side for a few days. PT reports going to he dentists but being sent here because of insurance. Denies any other associated symptoms. Denies alleviating and exacerbating factors. Denies fever, chills, SOB, CP, NVD. There are no other complaints, changes, or physical findings at this time. PCP: Wilian Sim MD    No current facility-administered medications on file prior to encounter. Current Outpatient Medications on File Prior to Encounter   Medication Sig Dispense Refill    traZODone (DESYREL) 100 mg tablet Take 100 mg by mouth nightly.  QUEtiapine (SEROquel) 200 mg tablet Take 1 Tab by mouth nightly. Indications: mood 30 Tab 0    sertraline (ZOLOFT) 100 mg tablet Take 1 Tab by mouth daily. Indications: major depressive disorder 30 Tab 0    hydrOXYzine HCL (ATARAX) 50 mg tablet Take 1 Tab by mouth every six (6) hours as needed for Anxiety. Indications: anxious 90 Tab 0    gabapentin (NEURONTIN) 600 mg tablet Take 600 mg by mouth three (3) times daily.  [DISCONTINUED] baclofen (LIORESAL) 10 mg tablet Take 10 mg by mouth daily as needed.  [DISCONTINUED] tamsulosin (FLOMAX) 0.4 mg capsule Take 0.4 mg by mouth daily.  albuterol sulfate 90 mcg/actuation aepb Take 2 Puffs by inhalation every four (4) hours as needed (breathing). 1 Inhaler 0    metformin (GLUCOPHAGE) 500 mg tablet Take 500 mg by mouth daily (with breakfast).          Past History     Past Medical History:  Past Medical History:   Diagnosis Date    Depression     DJD (degenerative joint disease) 3/8/2010    HTN (hypertension) 3/8/2010    STATES NO LONGER TAKING MEDICATION-STATES MD STOPPED    Insomnia     NIDDM (non-insulin dependent diabetes mellitus) 3/8/2010    Other ill-defined conditions(799.89)     BPH    Other ill-defined conditions(799.89)     previous hx of DTs from ETOH withdrawal    Other ill-defined conditions(799.89)     DDD/back problems    Psychiatric disorder     paranoid schizophrenia, anxiety    Substance abuse ([facility] [address]     Suicidal thoughts        Past Surgical History:  Past Surgical History:   Procedure Laterality Date    BIOPSY PROSTATE      HX UROLOGICAL      turp       Family History:  Family History   Problem Relation Age of Onset    Cancer Mother         lymphoma    Heart Disease Father         CHF       Social History:  Social History     Tobacco Use    Smoking status: Former Smoker     Packs/day: 1.00     Years: 10.00     Pack years: 10.00    Smokeless tobacco: Never Used    Tobacco comment: patient has been decreasing amount of cigarettes   Substance Use Topics    Alcohol use: No     Alcohol/week: 35.0 standard drinks     Types: 42 Cans of beer per week     Comment: states stopped drinking 1 month ago    Drug use: No       Allergies: Allergies   Allergen Reactions    Motrin [Ibuprofen] Hives         Review of Systems   Review of Systems   Constitutional: Negative. Negative for chills, diaphoresis and fever. HENT: Positive for dental problem. Negative for congestion, ear pain, sore throat and trouble swallowing. Eyes: Negative. Negative for photophobia, pain, redness and visual disturbance. Respiratory: Negative. Negative for cough, chest tightness, shortness of breath and wheezing. Cardiovascular: Negative. Negative for chest pain and palpitations. Gastrointestinal: Negative. Negative for abdominal pain, blood in stool, diarrhea, nausea and vomiting. Genitourinary: Negative for dysuria and frequency. Musculoskeletal: Negative.   Negative for back pain, joint swelling and neck pain. Skin: Negative. Neurological: Negative. Negative for seizures, syncope and headaches. Psychiatric/Behavioral: Negative. Negative for behavioral problems and confusion. The patient is not nervous/anxious. All other systems reviewed and are negative. Physical Exam   Physical Exam  Vitals signs and nursing note reviewed. Constitutional:       General: He is not in acute distress. HENT:      Head: Normocephalic and atraumatic. Mouth/Throat:      Mouth: Mucous membranes are moist.      Dentition: Dental abscesses present. Comments: Right lower 1st and 2nd molar abscess surrounding the gum area. PT is missing several teeth in the upper and lower area of mouth  Eyes:      Conjunctiva/sclera: Conjunctivae normal.      Pupils: Pupils are equal, round, and reactive to light. Neck:      Musculoskeletal: Normal range of motion. Cardiovascular:      Rate and Rhythm: Normal rate and regular rhythm. Pulses: Normal pulses. Pulmonary:      Effort: Pulmonary effort is normal. No respiratory distress. Breath sounds: Normal breath sounds. Abdominal:      General: Bowel sounds are normal. There is no distension. Palpations: Abdomen is soft. Tenderness: There is no abdominal tenderness. Musculoskeletal: Normal range of motion. Skin:     Capillary Refill: Capillary refill takes less than 2 seconds. Findings: No rash. Neurological:      General: No focal deficit present. Mental Status: He is alert and oriented to person, place, and time. Cranial Nerves: No cranial nerve deficit. Psychiatric:         Behavior: Behavior normal.         Diagnostic Study Results     Labs -   No results found for this or any previous visit (from the past 12 hour(s)).     Radiologic Studies -   No orders to display     CT Results  (Last 48 hours)    None        CXR Results  (Last 48 hours)    None          Medical Decision Making   I am the first provider for this patient. I reviewed the vital signs, available nursing notes, past medical history, past surgical history, family history and social history. Vital Signs-Reviewed the patient's vital signs. Patient Vitals for the past 12 hrs:   Temp Pulse Resp BP SpO2   11/14/20 1451 97.5 °F (36.4 °C) 65 16 137/81 96 %         Records Reviewed: Nursing Notes    Provider Notes (Medical Decision Making):   Ddx: dental abscess, toothache    ED Course:   Initial assessment performed. The patients presenting problems have been discussed, and they are in agreement with the care plan formulated and outlined with them. I have encouraged them to ask questions as they arise throughout their visit. Critical Care Time:   none      Disposition:  DISCHARGE  3:02 PM  The patient has been re-evaluated and is ready for discharge. Reviewed available results with patient. Counseled pt on diagnosis and care plan. Pt has expressed understanding, and all questions have been answered. Pt agrees with plan and agrees to follow up as recommended, or return to the ED if their symptoms worsen. Discharge instructions have been provided and explained to the pt, along with reasons to return to the ED. DISCHARGE PLAN:  1. Current Discharge Medication List      START taking these medications    Details   penicillin v potassium (VEETID) 500 mg tablet Take 1 Tab by mouth four (4) times daily. Qty: 40 Tab, Refills: 0      traMADoL (ULTRAM) 50 mg tablet Take 1 Tab by mouth every six (6) hours as needed for Pain for up to 3 days. Max Daily Amount: 200 mg. Indications: pain  Qty: 10 Tab, Refills: 0    Associated Diagnoses: Dental abscess           2. Follow-up Information     Follow up With Specialties Details Why Contact Info    [facility]  In 1 week  [address]  [phone]        3. Return to ED if worse     Diagnosis     Clinical Impression:   1. Dental abscess        Attestations:   This note is prepared by Esperanza Sandifer, acting as Scribe for Guillermo Abreu MD.     Guillermo Abreu MD. The scribe's documentation has been prepared under my direction and personally reviewed by me in its entirety. I confirm that the note above accurately reflects all work, treatment, procedures and medical decision making performed by me. Patient/surrogate refused vaccine...

## 2021-04-23 ENCOUNTER — HOSPITAL ENCOUNTER (OUTPATIENT)
Age: 66
Setting detail: OBSERVATION
Discharge: LEFT BEFORE DISCHARGE | End: 2021-04-25
Attending: EMERGENCY MEDICINE | Admitting: FAMILY MEDICINE
Payer: MEDICARE

## 2021-04-23 ENCOUNTER — APPOINTMENT (OUTPATIENT)
Dept: ULTRASOUND IMAGING | Age: 66
End: 2021-04-23
Attending: INTERNAL MEDICINE
Payer: MEDICARE

## 2021-04-23 DIAGNOSIS — F10.930 ALCOHOL WITHDRAWAL SYNDROME WITHOUT COMPLICATION (HCC): Primary | ICD-10-CM

## 2021-04-23 LAB
ALBUMIN SERPL-MCNC: 3.2 G/DL (ref 3.5–5)
ALBUMIN/GLOB SERPL: 0.7 {RATIO} (ref 1.1–2.2)
ALP SERPL-CCNC: 218 U/L (ref 45–117)
ALT SERPL-CCNC: 80 U/L (ref 12–78)
ANION GAP SERPL CALC-SCNC: 6 MMOL/L (ref 5–15)
AST SERPL-CCNC: 90 U/L (ref 15–37)
BASOPHILS # BLD: 0.1 K/UL (ref 0–0.1)
BASOPHILS NFR BLD: 1 % (ref 0–1)
BILIRUB DIRECT SERPL-MCNC: <0.1 MG/DL (ref 0–0.2)
BILIRUB SERPL-MCNC: 0.5 MG/DL (ref 0.2–1)
BUN SERPL-MCNC: 20 MG/DL (ref 6–20)
BUN/CREAT SERPL: 19 (ref 12–20)
CALCIUM SERPL-MCNC: 8.6 MG/DL (ref 8.5–10.1)
CHLORIDE SERPL-SCNC: 107 MMOL/L (ref 97–108)
CO2 SERPL-SCNC: 25 MMOL/L (ref 21–32)
COMMENT, HOLDF: NORMAL
CREAT SERPL-MCNC: 1.03 MG/DL (ref 0.7–1.3)
DIFFERENTIAL METHOD BLD: ABNORMAL
EOSINOPHIL # BLD: 0.1 K/UL (ref 0–0.4)
EOSINOPHIL NFR BLD: 3 % (ref 0–7)
ERYTHROCYTE [DISTWIDTH] IN BLOOD BY AUTOMATED COUNT: 15.1 % (ref 11.5–14.5)
ETHANOL SERPL-MCNC: 23 MG/DL
GLOBULIN SER CALC-MCNC: 4.6 G/DL (ref 2–4)
GLUCOSE SERPL-MCNC: 170 MG/DL (ref 65–100)
HCT VFR BLD AUTO: 47.1 % (ref 36.6–50.3)
HGB BLD-MCNC: 15.2 G/DL (ref 12.1–17)
IMM GRANULOCYTES # BLD AUTO: 0.1 K/UL (ref 0–0.04)
IMM GRANULOCYTES NFR BLD AUTO: 1 % (ref 0–0.5)
INR PPP: 1.1 (ref 0.9–1.1)
LYMPHOCYTES # BLD: 1.7 K/UL (ref 0.8–3.5)
LYMPHOCYTES NFR BLD: 35 % (ref 12–49)
MCH RBC QN AUTO: 29.7 PG (ref 26–34)
MCHC RBC AUTO-ENTMCNC: 32.3 G/DL (ref 30–36.5)
MCV RBC AUTO: 92.2 FL (ref 80–99)
MONOCYTES # BLD: 0.5 K/UL (ref 0–1)
MONOCYTES NFR BLD: 11 % (ref 5–13)
NEUTS SEG # BLD: 2.4 K/UL (ref 1.8–8)
NEUTS SEG NFR BLD: 49 % (ref 32–75)
NRBC # BLD: 0 K/UL (ref 0–0.01)
NRBC BLD-RTO: 0 PER 100 WBC
PLATELET # BLD AUTO: 66 K/UL (ref 150–400)
PLATELET COMMENTS,PCOM: ABNORMAL
PMV BLD AUTO: 11.2 FL (ref 8.9–12.9)
POTASSIUM SERPL-SCNC: 4.5 MMOL/L (ref 3.5–5.1)
PROT SERPL-MCNC: 7.8 G/DL (ref 6.4–8.2)
PROTHROMBIN TIME: 11.4 SEC (ref 9–11.1)
RBC # BLD AUTO: 5.11 M/UL (ref 4.1–5.7)
RBC MORPH BLD: ABNORMAL
SAMPLES BEING HELD,HOLD: NORMAL
SODIUM SERPL-SCNC: 138 MMOL/L (ref 136–145)
WBC # BLD AUTO: 4.9 K/UL (ref 4.1–11.1)

## 2021-04-23 PROCEDURE — 99218 HC RM OBSERVATION: CPT

## 2021-04-23 PROCEDURE — 85025 COMPLETE CBC W/AUTO DIFF WBC: CPT

## 2021-04-23 PROCEDURE — 76700 US EXAM ABDOM COMPLETE: CPT

## 2021-04-23 PROCEDURE — 85610 PROTHROMBIN TIME: CPT

## 2021-04-23 PROCEDURE — 80048 BASIC METABOLIC PNL TOTAL CA: CPT

## 2021-04-23 PROCEDURE — 99285 EMERGENCY DEPT VISIT HI MDM: CPT

## 2021-04-23 PROCEDURE — 74011250636 HC RX REV CODE- 250/636: Performed by: EMERGENCY MEDICINE

## 2021-04-23 PROCEDURE — 74011000250 HC RX REV CODE- 250: Performed by: INTERNAL MEDICINE

## 2021-04-23 PROCEDURE — 96374 THER/PROPH/DIAG INJ IV PUSH: CPT

## 2021-04-23 PROCEDURE — 74011250636 HC RX REV CODE- 250/636: Performed by: INTERNAL MEDICINE

## 2021-04-23 PROCEDURE — 96375 TX/PRO/DX INJ NEW DRUG ADDON: CPT

## 2021-04-23 PROCEDURE — 74011250637 HC RX REV CODE- 250/637: Performed by: INTERNAL MEDICINE

## 2021-04-23 PROCEDURE — 96376 TX/PRO/DX INJ SAME DRUG ADON: CPT

## 2021-04-23 PROCEDURE — 82077 ASSAY SPEC XCP UR&BREATH IA: CPT

## 2021-04-23 PROCEDURE — 80076 HEPATIC FUNCTION PANEL: CPT

## 2021-04-23 PROCEDURE — 96372 THER/PROPH/DIAG INJ SC/IM: CPT

## 2021-04-23 PROCEDURE — 36415 COLL VENOUS BLD VENIPUNCTURE: CPT

## 2021-04-23 RX ORDER — ONDANSETRON 2 MG/ML
4 INJECTION INTRAMUSCULAR; INTRAVENOUS
Status: DISCONTINUED | OUTPATIENT
Start: 2021-04-23 | End: 2021-04-26 | Stop reason: HOSPADM

## 2021-04-23 RX ORDER — ACETAMINOPHEN 650 MG/1
650 SUPPOSITORY RECTAL
Status: DISCONTINUED | OUTPATIENT
Start: 2021-04-23 | End: 2021-04-26 | Stop reason: HOSPADM

## 2021-04-23 RX ORDER — ACETAMINOPHEN 325 MG/1
650 TABLET ORAL
Status: DISCONTINUED | OUTPATIENT
Start: 2021-04-23 | End: 2021-04-26 | Stop reason: HOSPADM

## 2021-04-23 RX ORDER — LORAZEPAM 2 MG/ML
2 INJECTION INTRAMUSCULAR
Status: DISCONTINUED | OUTPATIENT
Start: 2021-04-23 | End: 2021-04-26 | Stop reason: HOSPADM

## 2021-04-23 RX ORDER — PROMETHAZINE HYDROCHLORIDE 25 MG/1
12.5 TABLET ORAL
Status: DISCONTINUED | OUTPATIENT
Start: 2021-04-23 | End: 2021-04-26 | Stop reason: HOSPADM

## 2021-04-23 RX ORDER — GABAPENTIN 600 MG/1
600 TABLET ORAL 3 TIMES DAILY
Status: DISCONTINUED | OUTPATIENT
Start: 2021-04-23 | End: 2021-04-26 | Stop reason: HOSPADM

## 2021-04-23 RX ORDER — TRAZODONE HYDROCHLORIDE 100 MG/1
100 TABLET ORAL
Status: DISCONTINUED | OUTPATIENT
Start: 2021-04-23 | End: 2021-04-26 | Stop reason: HOSPADM

## 2021-04-23 RX ORDER — QUETIAPINE FUMARATE 100 MG/1
200 TABLET, FILM COATED ORAL
Status: DISCONTINUED | OUTPATIENT
Start: 2021-04-23 | End: 2021-04-26 | Stop reason: HOSPADM

## 2021-04-23 RX ORDER — LORAZEPAM 2 MG/ML
4 INJECTION INTRAMUSCULAR
Status: DISCONTINUED | OUTPATIENT
Start: 2021-04-23 | End: 2021-04-26 | Stop reason: HOSPADM

## 2021-04-23 RX ORDER — POLYETHYLENE GLYCOL 3350 17 G/17G
17 POWDER, FOR SOLUTION ORAL DAILY PRN
Status: DISCONTINUED | OUTPATIENT
Start: 2021-04-23 | End: 2021-04-26 | Stop reason: HOSPADM

## 2021-04-23 RX ORDER — SODIUM CHLORIDE 0.9 % (FLUSH) 0.9 %
5-40 SYRINGE (ML) INJECTION EVERY 8 HOURS
Status: DISCONTINUED | OUTPATIENT
Start: 2021-04-23 | End: 2021-04-26 | Stop reason: HOSPADM

## 2021-04-23 RX ORDER — CHLORDIAZEPOXIDE HYDROCHLORIDE 25 MG/1
25 CAPSULE, GELATIN COATED ORAL 3 TIMES DAILY
Status: DISCONTINUED | OUTPATIENT
Start: 2021-04-23 | End: 2021-04-25

## 2021-04-23 RX ORDER — SODIUM CHLORIDE 0.9 % (FLUSH) 0.9 %
5-40 SYRINGE (ML) INJECTION AS NEEDED
Status: DISCONTINUED | OUTPATIENT
Start: 2021-04-23 | End: 2021-04-26 | Stop reason: HOSPADM

## 2021-04-23 RX ORDER — LORAZEPAM 2 MG/ML
1 INJECTION INTRAMUSCULAR ONCE
Status: COMPLETED | OUTPATIENT
Start: 2021-04-23 | End: 2021-04-23

## 2021-04-23 RX ORDER — ENOXAPARIN SODIUM 100 MG/ML
40 INJECTION SUBCUTANEOUS DAILY
Status: DISCONTINUED | OUTPATIENT
Start: 2021-04-23 | End: 2021-04-24

## 2021-04-23 RX ADMIN — CHLORDIAZEPOXIDE HYDROCHLORIDE 25 MG: 25 CAPSULE ORAL at 21:59

## 2021-04-23 RX ADMIN — LORAZEPAM 1 MG: 2 INJECTION INTRAMUSCULAR; INTRAVENOUS at 04:50

## 2021-04-23 RX ADMIN — FOLIC ACID: 5 INJECTION, SOLUTION INTRAMUSCULAR; INTRAVENOUS; SUBCUTANEOUS at 08:37

## 2021-04-23 RX ADMIN — GABAPENTIN 600 MG: 600 TABLET, FILM COATED ORAL at 16:42

## 2021-04-23 RX ADMIN — Medication 10 ML: at 08:37

## 2021-04-23 RX ADMIN — CHLORDIAZEPOXIDE HYDROCHLORIDE 25 MG: 25 CAPSULE ORAL at 10:17

## 2021-04-23 RX ADMIN — GABAPENTIN 600 MG: 600 TABLET, FILM COATED ORAL at 21:58

## 2021-04-23 RX ADMIN — GABAPENTIN 600 MG: 600 TABLET, FILM COATED ORAL at 10:17

## 2021-04-23 RX ADMIN — CHLORDIAZEPOXIDE HYDROCHLORIDE 25 MG: 25 CAPSULE ORAL at 16:42

## 2021-04-23 RX ADMIN — ACETAMINOPHEN 650 MG: 325 TABLET, FILM COATED ORAL at 13:07

## 2021-04-23 RX ADMIN — Medication 10 ML: at 13:11

## 2021-04-23 RX ADMIN — ENOXAPARIN SODIUM 40 MG: 40 INJECTION SUBCUTANEOUS at 08:36

## 2021-04-23 RX ADMIN — LORAZEPAM 2 MG: 2 INJECTION INTRAMUSCULAR; INTRAVENOUS at 17:16

## 2021-04-23 RX ADMIN — LORAZEPAM 2 MG: 2 INJECTION INTRAMUSCULAR; INTRAVENOUS at 09:09

## 2021-04-23 RX ADMIN — TRAZODONE HYDROCHLORIDE 100 MG: 100 TABLET ORAL at 21:59

## 2021-04-23 RX ADMIN — QUETIAPINE FUMARATE 200 MG: 100 TABLET ORAL at 21:59

## 2021-04-23 RX ADMIN — LORAZEPAM 2 MG: 2 INJECTION INTRAMUSCULAR; INTRAVENOUS at 22:28

## 2021-04-23 RX ADMIN — LORAZEPAM 2 MG: 2 INJECTION INTRAMUSCULAR; INTRAVENOUS at 13:08

## 2021-04-23 NOTE — H&P
History & Physical    Primary Care Provider: Ike Pereyra MD  Source of Information: Patient     History of Presenting Illness: Salma Cohen is a 74 y.o. male with MHx significant for depression, alcohol hx who presents to the hospital with symptoms of alcohol withdrawal.    Patient presented to the hospital after he started to have some nausea, vomiting, insomnia, restlessness and tremors of the extremities. Patient has been binge drinking for the last 2 weeks about 2 pints of hard liquor every day. He stated that he has been sober for 4 to 5 months until last 2 weeks ago where he started back again after the loss of his mother. He has history of alcohol withdrawal seizure about 2 years ago. Last drink on Wednesday. He now denies hallucination, SI/HI. The patient denies any fever, chills, chest pain, cough, congestion, recent illness, palpitations, or dysuria. Review of Systems:  Review of Systems   Constitutional: Negative for activity change, appetite change and fatigue. HENT: Negative for ear pain, facial swelling, sore throat and trouble swallowing.    Eyes: No visual disturbance. Negative for pain and discharge. Respiratory: Negative for chest tightness, shortness of breath and wheezing.    Cardiovascular: Negative for chest pain and palpitations. Gastrointestinal: Negative for abdominal pain, blood in stool, Per HPI   Genitourinary: Negative for difficulty urinating, flank pain and hematuria. Musculoskeletal: Negative for arthralgias, joint swelling, myalgias and neck pain. Skin: Negative for color change and rash. Neurological: Per HPI   Hematological: Negative for adenopathy. Does not bruise/bleed easily. Psychiatric/Behavioral: +Ve sleep disturbance.   All other systems reviewed and are negative.         Past Medical History:   Diagnosis Date    Depression     DJD (degenerative joint disease) 3/8/2010    HTN (hypertension) 3/8/2010 STATES NO LONGER TAKING MEDICATION-STATES MD STOPPED    Insomnia     NIDDM (non-insulin dependent diabetes mellitus) 3/8/2010    Other ill-defined conditions(799.89)     BPH    Other ill-defined conditions(799.89)     previous hx of DTs from ETOH withdrawal    Other ill-defined conditions(799.89)     DDD/back problems    Psychiatric disorder     paranoid schizophrenia, anxiety    Substance abuse ([facility] [address]     Suicidal thoughts       Past Surgical History:   Procedure Laterality Date    BIOPSY PROSTATE      HX UROLOGICAL      turp     Prior to Admission medications    Medication Sig Start Date End Date Taking? Authorizing Provider   traZODone (DESYREL) 100 mg tablet Take 100 mg by mouth nightly. Other, MD Damaso   penicillin v potassium (VEETID) 500 mg tablet Take 1 Tab by mouth four (4) times daily. 11/14/20   Adam Martínez MD   penicillin v potassium (VEETID) 500 mg tablet Take 1 Tab by mouth four (4) times daily. 11/14/20   Adam Martínez MD   QUEtiapine (SEROquel) 200 mg tablet Take 1 Tab by mouth nightly. Indications: mood 3/25/20   Patricia Lui MD   sertraline (ZOLOFT) 100 mg tablet Take 1 Tab by mouth daily. Indications: major depressive disorder 3/26/20   Patricia Lui MD   hydrOXYzine HCL (ATARAX) 50 mg tablet Take 1 Tab by mouth every six (6) hours as needed for Anxiety. Indications: anxious 3/25/20   Patricia Lui MD   gabapentin (NEURONTIN) 600 mg tablet Take 600 mg by mouth three (3) times daily. Provider, Historical   albuterol sulfate 90 mcg/actuation aepb Take 2 Puffs by inhalation every four (4) hours as needed (breathing). 10/17/19   Deisy Michael MD   metformin (GLUCOPHAGE) 500 mg tablet Take 500 mg by mouth daily (with breakfast).     Other, MD Damaso     Allergies   Allergen Reactions    Motrin [Ibuprofen] Hives      Family History   Problem Relation Age of Onset    Cancer Mother         lymphoma    Heart Disease Father         CHF        SOCIAL HISTORY:  Patient resides:  Independently x   Assisted Living    SNF    With family care       Smoking history:   None    Former x   Chronic      Alcohol history:   None    Social    Chronic x     Ambulates:   Independently x   w/cane    w/walker    w/wc    CODE STATUS:  DNR    Full x   Other      Objective:     Physical Exam:     Visit Vitals  /82   Pulse 70   Temp 97.5 °F (36.4 °C)   Resp 19   SpO2 92%      O2 Device: None (Room air)    General:  Alert, cooperative, no distress, appears stated age. Head:  Normocephalic, without obvious abnormality, atraumatic. Eyes:  Conjunctivae/corneas clear. PERRL, EOMs intact. Nose: Nares normal. Septum midline. Mucosa normal. No drainage or sinus tenderness. Throat: Lips, mucosa, and tongue normal. Teeth and gums normal.   Neck: Supple, symmetrical, trachea midline, no adenopathy, thyroid: no enlargement/tenderness/nodules, no carotid bruit and no JVD. Back:   Symmetric, no curvature. ROM normal. No CVA tenderness. Lungs:   Clear to auscultation bilaterally. Chest wall:  No tenderness or deformity. Heart:  Regular rate and rhythm, S1, S2 normal, no murmur, click, rub or gallop. Abdomen:   Soft, non-tender. Bowel sounds normal. No masses,  No organomegaly. Extremities: Extremities normal, atraumatic, no cyanosis or edema. Pulses: 2+ and symmetric all extremities. Skin: Skin color, texture, turgor normal. No rashes or lesions   Neurologic: CNII-XII intact. Mild tremor on extension of the hand          Data Review:     Recent Days:  Recent Labs     04/23/21  0427   WBC 4.9   HGB 15.2   HCT 47.1   PLT 66*     Recent Labs     04/23/21  0810 04/23/21  0427   NA  --  138   K  --  4.5   CL  --  107   CO2  --  25   GLU  --  170*   BUN  --  20   CREA  --  1.03   CA  --  8.6   ALB  --  3.2*   TBILI  --  0.5   ALT  --  80*   INR 1.1  --      No results for input(s): PH, PCO2, PO2, HCO3, FIO2 in the last 72 hours.     24 Hour Results:  Recent Results (from the past 24 hour(s)) CBC WITH AUTOMATED DIFF    Collection Time: 04/23/21  4:27 AM   Result Value Ref Range    WBC 4.9 4.1 - 11.1 K/uL    RBC 5.11 4.10 - 5.70 M/uL    HGB 15.2 12.1 - 17.0 g/dL    HCT 47.1 36.6 - 50.3 %    MCV 92.2 80.0 - 99.0 FL    MCH 29.7 26.0 - 34.0 PG    MCHC 32.3 30.0 - 36.5 g/dL    RDW 15.1 (H) 11.5 - 14.5 %    PLATELET 66 (L) 129 - 400 K/uL    MPV 11.2 8.9 - 12.9 FL    NRBC 0.0 0  WBC    ABSOLUTE NRBC 0.00 0.00 - 0.01 K/uL    NEUTROPHILS 49 32 - 75 %    LYMPHOCYTES 35 12 - 49 %    MONOCYTES 11 5 - 13 %    EOSINOPHILS 3 0 - 7 %    BASOPHILS 1 0 - 1 %    IMMATURE GRANULOCYTES 1 (H) 0.0 - 0.5 %    ABS. NEUTROPHILS 2.4 1.8 - 8.0 K/UL    ABS. LYMPHOCYTES 1.7 0.8 - 3.5 K/UL    ABS. MONOCYTES 0.5 0.0 - 1.0 K/UL    ABS. EOSINOPHILS 0.1 0.0 - 0.4 K/UL    ABS. BASOPHILS 0.1 0.0 - 0.1 K/UL    ABS. IMM. GRANS. 0.1 (H) 0.00 - 0.04 K/UL    DF SMEAR SCANNED      PLATELET COMMENTS Large Platelets      RBC COMMENTS ANISOCYTOSIS  1+       METABOLIC PANEL, BASIC    Collection Time: 04/23/21  4:27 AM   Result Value Ref Range    Sodium 138 136 - 145 mmol/L    Potassium 4.5 3.5 - 5.1 mmol/L    Chloride 107 97 - 108 mmol/L    CO2 25 21 - 32 mmol/L    Anion gap 6 5 - 15 mmol/L    Glucose 170 (H) 65 - 100 mg/dL    BUN 20 6 - 20 MG/DL    Creatinine 1.03 0.70 - 1.30 MG/DL    BUN/Creatinine ratio 19 12 - 20      GFR est AA >60 >60 ml/min/1.73m2    GFR est non-AA >60 >60 ml/min/1.73m2    Calcium 8.6 8.5 - 10.1 MG/DL   ETHYL ALCOHOL    Collection Time: 04/23/21  4:27 AM   Result Value Ref Range    ALCOHOL(ETHYL),SERUM 23 (H) <10 MG/DL   HEPATIC FUNCTION PANEL    Collection Time: 04/23/21  4:27 AM   Result Value Ref Range    Protein, total 7.8 6.4 - 8.2 g/dL    Albumin 3.2 (L) 3.5 - 5.0 g/dL    Globulin 4.6 (H) 2.0 - 4.0 g/dL    A-G Ratio 0.7 (L) 1.1 - 2.2      Bilirubin, total 0.5 0.2 - 1.0 MG/DL    Bilirubin, direct <0.1 0.0 - 0.2 MG/DL    Alk.  phosphatase 218 (H) 45 - 117 U/L    AST (SGOT) 90 (H) 15 - 37 U/L    ALT (SGPT) 80 (H) 12 - 78 U/L   SAMPLES BEING HELD    Collection Time: 04/23/21  4:27 AM   Result Value Ref Range    SAMPLES BEING HELD  1 BLUE     COMMENT        Add-on orders for these samples will be processed based on acceptable specimen integrity and analyte stability, which may vary by analyte.    PROTHROMBIN TIME + INR    Collection Time: 04/23/21  8:10 AM   Result Value Ref Range    INR 1.1 0.9 - 1.1      Prothrombin time 11.4 (H) 9.0 - 11.1 sec         Imaging:     Assessment:     # Alcohol intake w/ withdrawal symptoms  # Thrombocytopenia   # Hyperglycemia   # Depression        Plan:     # Alcohol intake w/ withdrawal symptoms  - on CIWA protocol, symptom triggered   - start on Librium   - on banana bag   - seizure and fall precaution     # Thrombocytopenia   Possible underlying cirrhosis vs bone marrow suppression from alcohol   - check PT/INR  - obtain liver US  - monitor cbc/plt     # Hyperglycemia: check A1c, not taking metformin at home   # Depression: resume home medication     DVT ppx; Enoxaparin   Code: Full        Signed By: Masoud Brennan MD     April 23, 2021

## 2021-04-23 NOTE — ED TRIAGE NOTES
Patient arrives ambulatory from home with CC of alcohol withdrawal.  Last drink was yesterday around 5pm. Patient states he is here because he would like to go back to work but is shaking too bad. Pt typically drinks \"a fifth of liquor a day\".

## 2021-04-23 NOTE — ED NOTES
Bedside and Verbal shift change report given to Sushma Soares RN (oncoming nurse) by Frankie Huertas RN (offgoing nurse). Report included the following information SBAR, ED Summary, Procedure Summary, Intake/Output, MAR and Recent Results.

## 2021-04-23 NOTE — ED NOTES
Bedside and Verbal shift change report given to Franco and Alfonzo Carias RN (oncoming nurse) by Anibal Ford RN (offgoing nurse). Report included the following information SBAR, ED Summary, MAR and Recent Results.

## 2021-04-23 NOTE — ROUTINE PROCESS
TRANSFER - OUT REPORT: 
 
Verbal report given to Clarke RN(name) on Clara Patel  being transferred to [facility](unit) for routine progression of care Report consisted of patients Situation, Background, Assessment and  
Recommendations(SBAR). Information from the following report(s) SBAR, ED Summary, [facility] and Recent Results was reviewed with the receiving nurse. Lines:  
Peripheral IV 04/23/21 Left Antecubital (Active) Site Assessment Clean, dry, & intact 04/23/21 0815 Phlebitis Assessment 0 04/23/21 0815 Infiltration Assessment 0 04/23/21 0815 Dressing Status Clean, dry, & intact 04/23/21 0815 Dressing Type Transparent 04/23/21 0815 Hub Color/Line Status Pink 04/23/21 0815 Opportunity for questions and clarification was provided. Patient transported with: 
 [employer]

## 2021-04-23 NOTE — ED NOTES
Verbal shift change report given to WINSTON Robledo  (oncoming nurse) by Carlito Cook  (offgoing nurse). Report included the following information SBAR, ED Summary, Intake/Output and Recent Results.

## 2021-04-23 NOTE — ED NOTES
Assumed care of patient. Verbal and bedside report received from [facility]. Patient resting quietly on stretcher, visitor at bedside. Pt appears in no acute distress, respirations equal and unlabored. VS WNL. Call bell within reach.

## 2021-04-23 NOTE — ED PROVIDER NOTES
Date of Service:  4/23/2021    Patient:  Salma Cohen    Chief Complaint:  Alcohol Problem       HPI:  Salma Cohen is a 74 y.o.  male who presents for evaluation of alochol withdrawal. Patient has been on a 2 week binge of drinking a fifth a day. Last etoh was on Wednesday evening. Arrives here with insomnia, restlessness, anxiousness, tremulous extremities. No other acute complaints. No SI/HI. History of admission for the same. No other comlpaints.             Past Medical History:   Diagnosis Date    Depression     DJD (degenerative joint disease) 3/8/2010    HTN (hypertension) 3/8/2010    STATES NO LONGER TAKING MEDICATION-STATES MD STOPPED    Insomnia     NIDDM (non-insulin dependent diabetes mellitus) 3/8/2010    Other ill-defined conditions(799.89)     BPH    Other ill-defined conditions(799.89)     previous hx of DTs from ETOH withdrawal    Other ill-defined conditions(799.89)     DDD/back problems    Psychiatric disorder     paranoid schizophrenia, anxiety    Substance abuse ([facility] [address]     Suicidal thoughts        Past Surgical History:   Procedure Laterality Date    BIOPSY PROSTATE      HX UROLOGICAL      turp         Family History:   Problem Relation Age of Onset    Cancer Mother         lymphoma    Heart Disease Father         CHF       Social History     Socioeconomic History    Marital status: LEGALLY      Spouse name: Not on file    Number of children: Not on file    Years of education: Not on file    Highest education level: Not on file   Occupational History    Not on file   Social Needs    Financial resource strain: Not on file    Food insecurity     Worry: Not on file     Inability: Not on file    Transportation needs     Medical: Not on file     Non-medical: Not on file   Tobacco Use    Smoking status: Former Smoker     Packs/day: 1.00     Years: 10.00     Pack years: 10.00    Smokeless tobacco: Never Used    Tobacco comment: patient has been decreasing amount of cigarettes   Substance and Sexual Activity    Alcohol use: No     Alcohol/week: 35.0 standard drinks     Types: 42 Cans of beer per week     Comment: states stopped drinking 1 month ago    Drug use: No    Sexual activity: Not Currently   Lifestyle    Physical activity     Days per week: Not on file     Minutes per session: Not on file    Stress: Not on file   Relationships    Social connections     Talks on phone: Not on file     Gets together: Not on file     Attends Yazdanism service: Not on file     Active member of club or organization: Not on file     Attends meetings of clubs or organizations: Not on file     Relationship status: Not on file    Intimate partner violence     Fear of current or ex partner: Not on file     Emotionally abused: Not on file     Physically abused: Not on file     Forced sexual activity: Not on file   Other Topics Concern     Service Not Asked    Blood Transfusions Not Asked    Caffeine Concern Not Asked    Occupational Exposure Not Asked   Michelle Plaster Hazards Not Asked    Sleep Concern Not Asked    Stress Concern Not Asked    Weight Concern Not Asked    Special Diet Not Asked    Back Care Not Asked    Exercise Not Asked    Bike Helmet Not Asked   [address] Not Asked    Self-Exams Not Asked   Social History Narrative    Not on file         ALLERGIES: Motrin [ibuprofen]    Review of Systems   Constitutional: Negative for fever. HENT: Negative for hearing loss. Eyes: Negative for visual disturbance. Respiratory: Negative for shortness of breath. Cardiovascular: Negative for chest pain. Gastrointestinal: Negative for abdominal pain. Genitourinary: Negative for flank pain. Musculoskeletal: Negative for back pain. Skin: Negative for rash. Neurological: Positive for tremors. Negative for dizziness and light-headedness. Psychiatric/Behavioral: Positive for agitation and sleep disturbance. Negative for hallucinations and suicidal ideas.  The patient is nervous/anxious. Vitals:    04/23/21 0333   BP: (!) 147/88   Pulse: 75   Resp: 16   Temp: 98.7 °F (37.1 °C)   SpO2: 98%            Physical Exam  Constitutional:       General: He is not in acute distress. Appearance: He is well-developed. He is not ill-appearing or diaphoretic. HENT:      Head: Normocephalic and atraumatic. Eyes:      General: No scleral icterus. Neck:      Musculoskeletal: Neck supple. Vascular: No JVD. Trachea: No tracheal deviation. Cardiovascular:      Rate and Rhythm: Normal rate and regular rhythm. Heart sounds: No murmur. Pulmonary:      Effort: Pulmonary effort is normal. No respiratory distress. Breath sounds: Normal breath sounds. Abdominal:      Palpations: Abdomen is soft. Musculoskeletal:      Right lower leg: No edema. Left lower leg: No edema. Skin:     General: Skin is warm and dry. Capillary Refill: Capillary refill takes less than 2 seconds. Findings: No rash. Neurological:      Mental Status: He is alert and oriented to person, place, and time. Comments: Arm tremmors   Psychiatric:         Attention and Perception: He does not perceive auditory or visual hallucinations. Mood and Affect: Mood normal.         Speech: Speech normal.         Thought Content: Thought content does not include homicidal or suicidal ideation. Thought content does not include homicidal or suicidal plan.           MDM  Number of Diagnoses or Management Options     Amount and/or Complexity of Data Reviewed  Decide to obtain previous medical records or to obtain history from someone other than the patient: yes      ED Course as of Apr 23 0548 Fri Apr 23, 2021   0543 PLATELET(!): 66 [GG]      ED Course User Index  [GG] Marielle Regan DO     VITAL SIGNS:  Patient Vitals for the past 4 hrs:   Temp Pulse Resp BP SpO2   04/23/21 0530  77 19 125/67    04/23/21 0529     95 %   04/23/21 0449  74 20 (!) 134/113 97 % 04/23/21 0333 98.7 °F (37.1 °C) 75 16 (!) 147/88 98 %         LABS:  Recent Results (from the past 6 hour(s))   CBC WITH AUTOMATED DIFF    Collection Time: 04/23/21  4:27 AM   Result Value Ref Range    WBC 4.9 4.1 - 11.1 K/uL    RBC 5.11 4.10 - 5.70 M/uL    HGB 15.2 12.1 - 17.0 g/dL    HCT 47.1 36.6 - 50.3 %    MCV 92.2 80.0 - 99.0 FL    MCH 29.7 26.0 - 34.0 PG    MCHC 32.3 30.0 - 36.5 g/dL    RDW 15.1 (H) 11.5 - 14.5 %    PLATELET 66 (L) 021 - 400 K/uL    MPV 11.2 8.9 - 12.9 FL    NRBC 0.0 0  WBC    ABSOLUTE NRBC 0.00 0.00 - 0.01 K/uL    NEUTROPHILS PENDING %    LYMPHOCYTES PENDING %    MONOCYTES PENDING %    EOSINOPHILS PENDING %    BASOPHILS PENDING %    IMMATURE GRANULOCYTES PENDING %    ABS. NEUTROPHILS PENDING K/UL    ABS. LYMPHOCYTES PENDING K/UL    ABS. MONOCYTES PENDING K/UL    ABS. EOSINOPHILS PENDING K/UL    ABS. BASOPHILS PENDING K/UL    ABS. IMM. GRANS. PENDING K/UL    DF PENDING    METABOLIC PANEL, BASIC    Collection Time: 04/23/21  4:27 AM   Result Value Ref Range    Sodium 138 136 - 145 mmol/L    Potassium 4.5 3.5 - 5.1 mmol/L    Chloride 107 97 - 108 mmol/L    CO2 25 21 - 32 mmol/L    Anion gap 6 5 - 15 mmol/L    Glucose 170 (H) 65 - 100 mg/dL    BUN 20 6 - 20 MG/DL    Creatinine 1.03 0.70 - 1.30 MG/DL    BUN/Creatinine ratio 19 12 - 20      GFR est AA >60 >60 ml/min/1.73m2    GFR est non-AA >60 >60 ml/min/1.73m2    Calcium 8.6 8.5 - 10.1 MG/DL   ETHYL ALCOHOL    Collection Time: 04/23/21  4:27 AM   Result Value Ref Range    ALCOHOL(ETHYL),SERUM 23 (H) <10 MG/DL   HEPATIC FUNCTION PANEL    Collection Time: 04/23/21  4:27 AM   Result Value Ref Range    Protein, total 7.8 6.4 - 8.2 g/dL    Albumin 3.2 (L) 3.5 - 5.0 g/dL    Globulin 4.6 (H) 2.0 - 4.0 g/dL    A-G Ratio 0.7 (L) 1.1 - 2.2      Bilirubin, total 0.5 0.2 - 1.0 MG/DL    Bilirubin, direct <0.1 0.0 - 0.2 MG/DL    Alk.  phosphatase 218 (H) 45 - 117 U/L    AST (SGOT) 90 (H) 15 - 37 U/L    ALT (SGPT) 80 (H) 12 - 78 U/L   SAMPLES BEING HELD    Collection Time: 04/23/21  4:27 AM   Result Value Ref Range    SAMPLES BEING HELD  1 BLUE     COMMENT        Add-on orders for these samples will be processed based on acceptable specimen integrity and analyte stability, which may vary by analyte. IMAGING:  No orders to display         Medications During Visit:  Medications   LORazepam (ATIVAN) injection 1 mg (1 mg IntraVENous Given 4/23/21 2053)         DECISION MAKING:  Jesus Alberto Lazcano is a 74 y.o. male who comes in as above. Here, patient has some tremulous activity and signs of anxiety and sleep deprivation. He states his last drink spent at least 36 hours however he has a slight amount of alcohol in his blood here. This time given his agitation and tremulous activity, I will bring him into the hospital.  I did give him a dose of Ativan here with almost all resolution of his symptoms. At this time patient remains hemodynamically stable. IMPRESSION:  1. Alcohol withdrawal syndrome without complication ([facility] [address]        DISPOSITION:  Admitted      Procedures    Perfect Serve Consult for Admission  5:48 AM    ED Room Number: ER20/20  Patient Name and age: Jesus Alberto Lazcano 74 y.o.  male  Working Diagnosis:   1. Alcohol withdrawal syndrome without complication ([facility] [address]        COVID-19 Suspicion:  no  Sepsis present:  no  Reassessment needed: no  Code Status:  Full Code  Readmission: no  Isolation Requirements:  no  Recommended Level of Care:  telemetry  Department:[facility] Adult ED - 21   Other:  Came in for ETOH withdrawal symptoms. Claimed no ETOH in about 36 hours. Came in with sleep disturbance, anxiety, tremulous. No hallucinations.

## 2021-04-23 NOTE — ED NOTES
Bedside and Verbal shift change report given to Corine Bueno RN (oncoming nurse) by Elpidio Hensley RN (offgoing nurse). Report given with SBAR, Kardex, Intake/Output, MAR and Recent Results.

## 2021-04-24 LAB
ANION GAP SERPL CALC-SCNC: 5 MMOL/L (ref 5–15)
BUN SERPL-MCNC: 21 MG/DL (ref 6–20)
BUN/CREAT SERPL: 24 (ref 12–20)
CALCIUM SERPL-MCNC: 8.1 MG/DL (ref 8.5–10.1)
CHLORIDE SERPL-SCNC: 108 MMOL/L (ref 97–108)
CO2 SERPL-SCNC: 24 MMOL/L (ref 21–32)
CREAT SERPL-MCNC: 0.88 MG/DL (ref 0.7–1.3)
ERYTHROCYTE [DISTWIDTH] IN BLOOD BY AUTOMATED COUNT: 15.1 % (ref 11.5–14.5)
GLUCOSE BLD STRIP.AUTO-MCNC: 127 MG/DL (ref 65–100)
GLUCOSE BLD STRIP.AUTO-MCNC: 421 MG/DL (ref 65–100)
GLUCOSE SERPL-MCNC: 106 MG/DL (ref 65–100)
HCT VFR BLD AUTO: 44.1 % (ref 36.6–50.3)
HGB BLD-MCNC: 14.2 G/DL (ref 12.1–17)
MCH RBC QN AUTO: 29.6 PG (ref 26–34)
MCHC RBC AUTO-ENTMCNC: 32.2 G/DL (ref 30–36.5)
MCV RBC AUTO: 91.9 FL (ref 80–99)
NRBC # BLD: 0 K/UL (ref 0–0.01)
NRBC BLD-RTO: 0 PER 100 WBC
PLATELET # BLD AUTO: 56 K/UL (ref 150–400)
PMV BLD AUTO: 11.8 FL (ref 8.9–12.9)
POTASSIUM SERPL-SCNC: 4.3 MMOL/L (ref 3.5–5.1)
RBC # BLD AUTO: 4.8 M/UL (ref 4.1–5.7)
SERVICE CMNT-IMP: ABNORMAL
SERVICE CMNT-IMP: ABNORMAL
SODIUM SERPL-SCNC: 137 MMOL/L (ref 136–145)
WBC # BLD AUTO: 3.4 K/UL (ref 4.1–11.1)

## 2021-04-24 PROCEDURE — 74011000250 HC RX REV CODE- 250: Performed by: INTERNAL MEDICINE

## 2021-04-24 PROCEDURE — 74011250637 HC RX REV CODE- 250/637: Performed by: INTERNAL MEDICINE

## 2021-04-24 PROCEDURE — 80048 BASIC METABOLIC PNL TOTAL CA: CPT

## 2021-04-24 PROCEDURE — 82962 GLUCOSE BLOOD TEST: CPT

## 2021-04-24 PROCEDURE — 74011250636 HC RX REV CODE- 250/636: Performed by: INTERNAL MEDICINE

## 2021-04-24 PROCEDURE — 85027 COMPLETE CBC AUTOMATED: CPT

## 2021-04-24 PROCEDURE — 74011636637 HC RX REV CODE- 636/637: Performed by: NURSE PRACTITIONER

## 2021-04-24 PROCEDURE — 96376 TX/PRO/DX INJ SAME DRUG ADON: CPT

## 2021-04-24 PROCEDURE — 99218 HC RM OBSERVATION: CPT

## 2021-04-24 PROCEDURE — 36415 COLL VENOUS BLD VENIPUNCTURE: CPT

## 2021-04-24 RX ORDER — DEXTROSE 50 % IN WATER (D50W) INTRAVENOUS SYRINGE
12.5-25 AS NEEDED
Status: DISCONTINUED | OUTPATIENT
Start: 2021-04-24 | End: 2021-04-26 | Stop reason: HOSPADM

## 2021-04-24 RX ORDER — INSULIN LISPRO 100 [IU]/ML
INJECTION, SOLUTION INTRAVENOUS; SUBCUTANEOUS
Status: DISCONTINUED | OUTPATIENT
Start: 2021-04-24 | End: 2021-04-26 | Stop reason: HOSPADM

## 2021-04-24 RX ORDER — INSULIN LISPRO 100 [IU]/ML
3 INJECTION, SOLUTION INTRAVENOUS; SUBCUTANEOUS ONCE
Status: COMPLETED | OUTPATIENT
Start: 2021-04-24 | End: 2021-04-24

## 2021-04-24 RX ORDER — MAGNESIUM SULFATE 100 %
4 CRYSTALS MISCELLANEOUS AS NEEDED
Status: DISCONTINUED | OUTPATIENT
Start: 2021-04-24 | End: 2021-04-26 | Stop reason: HOSPADM

## 2021-04-24 RX ADMIN — GABAPENTIN 600 MG: 600 TABLET, FILM COATED ORAL at 16:41

## 2021-04-24 RX ADMIN — CHLORDIAZEPOXIDE HYDROCHLORIDE 25 MG: 25 CAPSULE ORAL at 08:58

## 2021-04-24 RX ADMIN — LORAZEPAM 2 MG: 2 INJECTION INTRAMUSCULAR; INTRAVENOUS at 21:13

## 2021-04-24 RX ADMIN — TRAZODONE HYDROCHLORIDE 100 MG: 100 TABLET ORAL at 22:27

## 2021-04-24 RX ADMIN — CHLORDIAZEPOXIDE HYDROCHLORIDE 25 MG: 25 CAPSULE ORAL at 16:41

## 2021-04-24 RX ADMIN — GABAPENTIN 600 MG: 600 TABLET, FILM COATED ORAL at 08:58

## 2021-04-24 RX ADMIN — CHLORDIAZEPOXIDE HYDROCHLORIDE 25 MG: 25 CAPSULE ORAL at 22:27

## 2021-04-24 RX ADMIN — FOLIC ACID: 5 INJECTION, SOLUTION INTRAMUSCULAR; INTRAVENOUS; SUBCUTANEOUS at 09:06

## 2021-04-24 RX ADMIN — LORAZEPAM 2 MG: 2 INJECTION INTRAMUSCULAR; INTRAVENOUS at 13:06

## 2021-04-24 RX ADMIN — Medication 10 ML: at 13:07

## 2021-04-24 RX ADMIN — QUETIAPINE FUMARATE 200 MG: 100 TABLET ORAL at 22:27

## 2021-04-24 RX ADMIN — GABAPENTIN 600 MG: 600 TABLET, FILM COATED ORAL at 22:27

## 2021-04-24 RX ADMIN — INSULIN LISPRO 3 UNITS: 100 INJECTION, SOLUTION INTRAVENOUS; SUBCUTANEOUS at 22:27

## 2021-04-24 RX ADMIN — LORAZEPAM 2 MG: 2 INJECTION INTRAMUSCULAR; INTRAVENOUS at 09:01

## 2021-04-24 NOTE — PROGRESS NOTES
[facility] Adult  Hospitalist Group                                                                                          Hospitalist Progress Note  Gaston Maria MD  Answering service: [phone] OR [phone] from in house phone        Date of Service:  2021  NAME:  Jm Kiser  :  1955  MRN:  [id number]    This documentation was facilitated by a Voice Recognition software and may contain inadvertent typographical errors. Admission Summary:   From H&P  Reji Vallejo is a 74 y.o. male with MHx significant for depression, alcohol hx who presents to the hospital with symptoms of alcohol withdrawal.  Patient presented to the hospital after he started to have some nausea, vomiting, insomnia, restlessness and tremors of the extremities. Patient has been binge drinking for the last 2 weeks about 2 pints of hard liquor every day. He stated that he has been sober for 4 to 5 months until last 2 weeks ago where he started back again after the loss of his mother. He has history of alcohol withdrawal seizure about 2 years ago. Last drink on Wednesday. He now denies hallucination, SI/HI. \"      Interval history / Subjective: Follow-up for alcohol withdrawal   Patient was sound asleep, snoring and he awoke when I walked in. He is alert and appropriately answers questions. He denies any complaints. He denies hallucination or delusions. He says his last drink was Wednesday but does not remember the time. Assessment & Plan:   # Alcohol intake w/ withdrawal symptoms  - on CIWA protocol, symptom triggered   -Started on Librium on admission, taper off  - on banana bag   - seizure and fall precaution      # Thrombocytopenia without bleeding complications  Possible underlying cirrhosis vs bone marrow suppression from alcohol   -Liver ultrasound was normal.  INR normal.     #Type II DM   -Patient reports renal taking Metformin.   Order Accu-Cheks along with high-sensitivity Humalog sliding scale. A1c pending      # Depression: resume home medication      DVT ppx; SCD, discontinue Eleazar Lorin apparent due to dropping platelet count  Code: Full     Hospital Problems  Date Reviewed: 3/17/2020          Codes Class Noted POA    Alcohol withdrawal ([facility] [address] ICD-10-CM: Z02.368  ICD-9-CM: 291.81  3/16/2020 Unknown                Review of Systems:   A comprehensive review of systems was negative except for that written in the HPI. Vital Signs:    Last 24hrs VS reviewed since prior progress note. Most recent are:  Visit Vitals  /86 (BP 1 Location: Right upper arm, BP Patient Position: At rest)   Pulse 79   Temp 97.7 °F (36.5 °C)   Resp 17   SpO2 94%         Intake/Output Summary (Last 24 hours) at 4/24/2021 1405  Last data filed at 4/23/2021 2204  Gross per 24 hour   Intake 240 ml   Output 600 ml   Net -360 ml        Physical Examination:     I had a face to face encounter with this patient and independently examined them on4/24/2021 as outlined below:        Constitutional:  Sleeping, not in distress. HEENT:  Nonicteric sclera. Resp:  CTA bilaterally. No wheezing/rhonchi/rales. No accessory muscle use   Chest Wall: No deformity   CV:  Regular rhythm, normal rate, no murmurs, gallops, rubs    GI:  Obese, soft, non distended, non tender.  normoactive bowel sounds, no hepatosplenomegaly    :  No CVA or suprapubic tenderness    Musculoskeletal:  No edema, warm, 2+ pulses throughout    Neurologic:  Mental status:AAOx3,   Cranial nerves II-XII : WNL  Motor exam:Moves all extremities symmetrically              Data Review:    Review and/or order of clinical lab test  Review and/or order of tests in the radiology section of CPT  Review and/or order of tests in the medicine section of CPT      Labs:     Recent Labs     04/24/21 0628 04/23/21 0427   WBC 3.4* 4.9   HGB 14.2 15.2   HCT 44.1 47.1   PLT 56* 66*     Recent Labs     04/24/21 0628 04/23/21 0427    138   K 4.3 4.5    107   CO2 24 25   BUN 21* 20   CREA 0.88 1.03   * 170*   CA 8.1* 8.6     Recent Labs     04/23/21  0427   ALT 80*   *   TBILI 0.5   TP 7.8   ALB 3.2*   GLOB 4.6*     Recent Labs     04/23/21  0810   INR 1.1   PTP 11.4*      No results for input(s): FE, TIBC, PSAT, FERR in the last 72 hours. No results found for: FOL, RBCF   No results for input(s): PH, PCO2, PO2 in the last 72 hours. No results for input(s): CPK, CKNDX, TROIQ in the last 72 hours.     No lab exists for component: CPKMB  Lab Results   Component Value Date/Time    Cholesterol, total 141 03/22/2020 04:24 AM    HDL Cholesterol 73 03/22/2020 04:24 AM    LDL, calculated 53.8 03/22/2020 04:24 AM    Triglyceride 71 03/22/2020 04:24 AM    CHOL/HDL Ratio 1.9 03/22/2020 04:24 AM     Lab Results   Component Value Date/Time    Glucose (POC) 106 (H) 03/21/2020 01:33 PM    Glucose (POC) 111 (H) 01/22/2013 08:09 AM    Glucose (POC) 95 01/21/2013 08:54 PM    Glucose (POC) 245 (H) 01/21/2013 04:23 PM    Glucose (POC) 84 01/21/2013 08:16 AM     Lab Results   Component Value Date/Time    Color YELLOW/STRAW 12/23/2016 12:47 PM    Appearance CLEAR 12/23/2016 12:47 PM    Specific gravity 1.026 12/23/2016 12:47 PM    Specific gravity 1.010 08/27/2011 10:15 AM    pH (UA) 5.5 12/23/2016 12:47 PM    Protein NEGATIVE  12/23/2016 12:47 PM    Glucose NEGATIVE  12/23/2016 12:47 PM    Ketone TRACE (A) 12/23/2016 12:47 PM    Bilirubin NEGATIVE  12/23/2016 12:47 PM    Urobilinogen 0.2 12/23/2016 12:47 PM    Nitrites NEGATIVE  12/23/2016 12:47 PM    Leukocyte Esterase NEGATIVE  12/23/2016 12:47 PM    Epithelial cells FEW 12/23/2016 12:47 PM    Bacteria 1+ (A) 12/23/2016 12:47 PM    WBC 0-4 12/23/2016 12:47 PM    RBC 0-5 12/23/2016 12:47 PM         Medications Reviewed:     Current Facility-Administered Medications   Medication Dose Route Frequency    sodium chloride (NS) flush 5-40 mL  5-40 mL IntraVENous Q8H    sodium chloride (NS) flush 5-40 mL  5-40 mL IntraVENous PRN    acetaminophen (TYLENOL) tablet 650 mg  650 mg Oral Q6H PRN    Or    acetaminophen (TYLENOL) suppository 650 mg  650 mg Rectal Q6H PRN    polyethylene glycol (MIRALAX) packet 17 g  17 g Oral DAILY PRN    promethazine (PHENERGAN) tablet 12.5 mg  12.5 mg Oral Q6H PRN    Or    ondansetron (ZOFRAN) injection 4 mg  4 mg IntraVENous Q6H PRN    enoxaparin (LOVENOX) injection 40 mg  40 mg SubCUTAneous DAILY    LORazepam (ATIVAN) injection 2 mg  2 mg IntraVENous Q1H PRN    LORazepam (ATIVAN) injection 4 mg  4 mg IntraVENous Q1H PRN    0.9% sodium chloride 6,663 mL with folic acid 1 mg, thiamine 100 mg, mvi, adult no. 4 with vit K 10 mL infusion   IntraVENous DAILY    gabapentin (NEURONTIN) tablet 600 mg  600 mg Oral TID    QUEtiapine (SEROquel) tablet 200 mg  200 mg Oral QHS    traZODone (DESYREL) tablet 100 mg  100 mg Oral QHS    chlordiazePOXIDE (LIBRIUM) capsule 25 mg  25 mg Oral TID     ______________________________________________________________________  EXPECTED LENGTH OF STAY: - - -  ACTUAL LENGTH OF STAY:          0                 Tim Prescott MD

## 2021-04-24 NOTE — PROGRESS NOTES
Bedside and Verbal shift change report given to [address] [place] [address] (oncoming nurse) by Riaz Mondragon and Steve Morrow (offgoing nurse). Report included the following information SBAR, Kardex, Intake/Output and MAR.

## 2021-04-25 VITALS
RESPIRATION RATE: 16 BRPM | OXYGEN SATURATION: 97 % | HEART RATE: 75 BPM | SYSTOLIC BLOOD PRESSURE: 164 MMHG | DIASTOLIC BLOOD PRESSURE: 85 MMHG | TEMPERATURE: 98.3 F

## 2021-04-25 LAB
ALBUMIN SERPL-MCNC: 3.1 G/DL (ref 3.5–5)
ALBUMIN/GLOB SERPL: 0.7 {RATIO} (ref 1.1–2.2)
ALP SERPL-CCNC: 208 U/L (ref 45–117)
ALT SERPL-CCNC: 76 U/L (ref 12–78)
ANION GAP SERPL CALC-SCNC: 4 MMOL/L (ref 5–15)
AST SERPL-CCNC: 82 U/L (ref 15–37)
BILIRUB SERPL-MCNC: 0.5 MG/DL (ref 0.2–1)
BUN SERPL-MCNC: 21 MG/DL (ref 6–20)
BUN/CREAT SERPL: 22 (ref 12–20)
CALCIUM SERPL-MCNC: 8.4 MG/DL (ref 8.5–10.1)
CHLORIDE SERPL-SCNC: 109 MMOL/L (ref 97–108)
CO2 SERPL-SCNC: 24 MMOL/L (ref 21–32)
CREAT SERPL-MCNC: 0.95 MG/DL (ref 0.7–1.3)
EST. AVERAGE GLUCOSE BLD GHB EST-MCNC: 120 MG/DL
GLOBULIN SER CALC-MCNC: 4.5 G/DL (ref 2–4)
GLUCOSE BLD STRIP.AUTO-MCNC: 147 MG/DL (ref 65–100)
GLUCOSE BLD STRIP.AUTO-MCNC: 153 MG/DL (ref 65–100)
GLUCOSE BLD STRIP.AUTO-MCNC: 93 MG/DL (ref 65–100)
GLUCOSE SERPL-MCNC: 139 MG/DL (ref 65–100)
HBA1C MFR BLD: 5.8 % (ref 4–5.6)
POTASSIUM SERPL-SCNC: 4.3 MMOL/L (ref 3.5–5.1)
PROT SERPL-MCNC: 7.6 G/DL (ref 6.4–8.2)
SERVICE CMNT-IMP: ABNORMAL
SERVICE CMNT-IMP: ABNORMAL
SERVICE CMNT-IMP: NORMAL
SODIUM SERPL-SCNC: 137 MMOL/L (ref 136–145)

## 2021-04-25 PROCEDURE — 82962 GLUCOSE BLOOD TEST: CPT

## 2021-04-25 PROCEDURE — 74011250636 HC RX REV CODE- 250/636: Performed by: INTERNAL MEDICINE

## 2021-04-25 PROCEDURE — 74011000250 HC RX REV CODE- 250: Performed by: INTERNAL MEDICINE

## 2021-04-25 PROCEDURE — 74011250637 HC RX REV CODE- 250/637: Performed by: HOSPITALIST

## 2021-04-25 PROCEDURE — 74011250637 HC RX REV CODE- 250/637: Performed by: INTERNAL MEDICINE

## 2021-04-25 PROCEDURE — 36415 COLL VENOUS BLD VENIPUNCTURE: CPT

## 2021-04-25 PROCEDURE — 99218 HC RM OBSERVATION: CPT

## 2021-04-25 PROCEDURE — 83036 HEMOGLOBIN GLYCOSYLATED A1C: CPT

## 2021-04-25 PROCEDURE — 94760 N-INVAS EAR/PLS OXIMETRY 1: CPT

## 2021-04-25 PROCEDURE — 80053 COMPREHEN METABOLIC PANEL: CPT

## 2021-04-25 PROCEDURE — 96376 TX/PRO/DX INJ SAME DRUG ADON: CPT

## 2021-04-25 RX ORDER — CHLORDIAZEPOXIDE HYDROCHLORIDE 25 MG/1
25 CAPSULE, GELATIN COATED ORAL 2 TIMES DAILY
Status: DISCONTINUED | OUTPATIENT
Start: 2021-04-25 | End: 2021-04-26 | Stop reason: HOSPADM

## 2021-04-25 RX ADMIN — GABAPENTIN 600 MG: 600 TABLET, FILM COATED ORAL at 08:04

## 2021-04-25 RX ADMIN — CHLORDIAZEPOXIDE HYDROCHLORIDE 25 MG: 25 CAPSULE ORAL at 08:04

## 2021-04-25 RX ADMIN — GABAPENTIN 600 MG: 600 TABLET, FILM COATED ORAL at 17:01

## 2021-04-25 RX ADMIN — LORAZEPAM 2 MG: 2 INJECTION INTRAMUSCULAR; INTRAVENOUS at 19:45

## 2021-04-25 RX ADMIN — LORAZEPAM 2 MG: 2 INJECTION INTRAMUSCULAR; INTRAVENOUS at 05:01

## 2021-04-25 RX ADMIN — LORAZEPAM 2 MG: 2 INJECTION INTRAMUSCULAR; INTRAVENOUS at 13:02

## 2021-04-25 RX ADMIN — LORAZEPAM 2 MG: 2 INJECTION INTRAMUSCULAR; INTRAVENOUS at 08:26

## 2021-04-25 RX ADMIN — CHLORDIAZEPOXIDE HYDROCHLORIDE 25 MG: 25 CAPSULE ORAL at 17:01

## 2021-04-25 RX ADMIN — FOLIC ACID: 5 INJECTION, SOLUTION INTRAMUSCULAR; INTRAVENOUS; SUBCUTANEOUS at 08:03

## 2021-04-25 RX ADMIN — Medication 10 ML: at 13:38

## 2021-04-25 NOTE — PROGRESS NOTES
[facility] [facility] Adult  Hospitalist Group                                                                                          Hospitalist Progress Note  Ivy Jimenez MD  Answering service: [phone] from in house phone        Date of Service:  2021  NAME:  Bimal Martinez  :  1955  MRN:  [id number]    This documentation was facilitated by a Voice Recognition software and may contain inadvertent typographical errors. Admission Summary:   From H&P  Curry Ocasio is a 74 y.o. male with MHx significant for depression, alcohol hx who presents to the hospital with symptoms of alcohol withdrawal.  Patient presented to the hospital after he started to have some nausea, vomiting, insomnia, restlessness and tremors of the extremities. Patient has been binge drinking for the last 2 weeks about 2 pints of hard liquor every day. He stated that he has been sober for 4 to 5 months until last 2 weeks ago where he started back again after the loss of his mother. He has history of alcohol withdrawal seizure about 2 years ago. Last drink on Wednesday. He now denies hallucination, SI/HI. \"      Interval history / Subjective: Follow-up for alcohol withdrawal   Patient sleepy but arousable. He is calm, no shakiness. He asked when he could go home. I told him that he needs to wean down on the Ativan and make sure he is out of the withdrawal before discharge. Assessment & Plan:   # Alcohol intake w/ withdrawal symptoms  - on CIWA protocol, symptom triggered. CIWA score ranging from 012?  -Start tapering of Librium.  - on banana bag   - seizure and fall precaution      # Thrombocytopenia without bleeding complications  Possible underlying cirrhosis vs bone marrow suppression from alcohol   -Liver ultrasound was normal.  INR normal.     #Type II DM   -Patient reports renal taking Metformin. Order Accu-Cheks along with high-sensitivity Humalog sliding scale.  A1c pending      # Depression: resume home medication      DVT ppx; SCD, discontinue [place] apparent due to dropping platelet count  Code: Full     Hospital Problems  Date Reviewed: 3/17/2020          Codes Class Noted POA    Alcohol withdrawal ([address] ICD-10-CM: S16.908  ICD-9-CM: 291.81  3/16/2020 Unknown                Review of Systems:   A comprehensive review of systems was negative except for that written in the HPI. Vital Signs:    Last 24hrs VS reviewed since prior progress note. Most recent are:  Visit Vitals  BP (!) 150/81 (BP 1 Location: Right upper arm, BP Patient Position: Semi fowlers)   Pulse 70   Temp 97.6 °F (36.4 °C)   Resp 18   SpO2 95%         Intake/Output Summary (Last 24 hours) at 4/25/2021 1730  Last data filed at 4/25/2021 1435  Gross per 24 hour   Intake    Output 350 ml   Net -350 ml        Physical Examination:     I had a face to face encounter with this patient and independently examined them on4/25/2021 as outlined below:        Constitutional:  Sleeping, not in distress. HEENT:  Nonicteric sclera. Resp:  CTA bilaterally. No wheezing/rhonchi/rales. No accessory muscle use   Chest Wall: No deformity   CV:  Regular rhythm, normal rate, no murmurs, gallops, rubs    GI:  Obese, soft, non distended, non tender.  normoactive bowel sounds, no hepatosplenomegaly    :  No CVA or suprapubic tenderness    Musculoskeletal:  No edema, warm, 2+ pulses throughout    Neurologic:  Mental status:AAOx3,   Cranial nerves II-XII : WNL  Motor exam:Moves all extremities symmetrically              Data Review:    Review and/or order of clinical lab test  Review and/or order of tests in the radiology section of CPT  Review and/or order of tests in the medicine section of CPT      Labs:     Recent Labs     04/24/21 0628 04/23/21 0427   WBC 3.4* 4.9   HGB 14.2 15.2   HCT 44.1 47.1   PLT 56* 66*     Recent Labs     04/25/21  0344 04/24/21 0628 04/23/21 0427    137 138   K 4.3 4.3 4.5   * 108 107   CO2 24 24 25   BUN 21* 21* 20   CREA 0.95 0.88 1.03   * 106* 170*   CA 8.4* 8.1* 8.6     Recent Labs     04/25/21  0344 04/23/21  0427   ALT 76 80*   * 218*   TBILI 0.5 0.5   TP 7.6 7.8   ALB 3.1* 3.2*   GLOB 4.5* 4.6*     Recent Labs     04/23/21  0810   INR 1.1   PTP 11.4*      No results for input(s): FE, TIBC, PSAT, FERR in the last 72 hours. No results found for: FOL, RBCF   No results for input(s): PH, PCO2, PO2 in the last 72 hours. No results for input(s): CPK, CKNDX, TROIQ in the last 72 hours.     No lab exists for component: CPKMB  Lab Results   Component Value Date/Time    Cholesterol, total 141 03/22/2020 04:24 AM    HDL Cholesterol 73 03/22/2020 04:24 AM    LDL, calculated 53.8 03/22/2020 04:24 AM    Triglyceride 71 03/22/2020 04:24 AM    CHOL/HDL Ratio 1.9 03/22/2020 04:24 AM     Lab Results   Component Value Date/Time    Glucose (POC) 147 (H) 04/25/2021 04:16 PM    Glucose (POC) 153 (H) 04/25/2021 11:33 AM    Glucose (POC) 93 04/25/2021 06:55 AM    Glucose (POC) 421 (H) 04/24/2021 08:56 PM    Glucose (POC) 127 (H) 04/24/2021 04:13 PM     Lab Results   Component Value Date/Time    Color YELLOW/STRAW 12/23/2016 12:47 PM    Appearance CLEAR 12/23/2016 12:47 PM    Specific gravity 1.026 12/23/2016 12:47 PM    Specific gravity 1.010 08/27/2011 10:15 AM    pH (UA) 5.5 12/23/2016 12:47 PM    Protein NEGATIVE  12/23/2016 12:47 PM    Glucose NEGATIVE  12/23/2016 12:47 PM    Ketone TRACE (A) 12/23/2016 12:47 PM    Bilirubin NEGATIVE  12/23/2016 12:47 PM    Urobilinogen 0.2 12/23/2016 12:47 PM    Nitrites NEGATIVE  12/23/2016 12:47 PM    Leukocyte Esterase NEGATIVE  12/23/2016 12:47 PM    Epithelial cells FEW 12/23/2016 12:47 PM    Bacteria 1+ (A) 12/23/2016 12:47 PM    WBC 0-4 12/23/2016 12:47 PM    RBC 0-5 12/23/2016 12:47 PM         Medications Reviewed:     Current Facility-Administered Medications   Medication Dose Route Frequency    chlordiazePOXIDE (LIBRIUM) capsule 25 mg  25 mg Oral BID    insulin lispro (HUMALOG) injection   SubCUTAneous AC&HS    glucose chewable tablet 16 g  4 Tab Oral PRN    dextrose (D50W) injection syrg 12.5-25 g  12.5-25 g IntraVENous PRN    glucagon (GLUCAGEN) injection 1 mg  1 mg IntraMUSCular PRN    sodium chloride (NS) flush 5-40 mL  5-40 mL IntraVENous Q8H    sodium chloride (NS) flush 5-40 mL  5-40 mL IntraVENous PRN    acetaminophen (TYLENOL) tablet 650 mg  650 mg Oral Q6H PRN    Or    acetaminophen (TYLENOL) suppository 650 mg  650 mg Rectal Q6H PRN    polyethylene glycol (MIRALAX) packet 17 g  17 g Oral DAILY PRN    promethazine (PHENERGAN) tablet 12.5 mg  12.5 mg Oral Q6H PRN    Or    ondansetron (ZOFRAN) injection 4 mg  4 mg IntraVENous Q6H PRN    LORazepam (ATIVAN) injection 2 mg  2 mg IntraVENous Q1H PRN    LORazepam (ATIVAN) injection 4 mg  4 mg IntraVENous Q1H PRN    0.9% sodium chloride 5,540 mL with folic acid 1 mg, thiamine 100 mg, mvi, adult no. 4 with vit K 10 mL infusion   IntraVENous DAILY    gabapentin (NEURONTIN) tablet 600 mg  600 mg Oral TID    QUEtiapine (SEROquel) tablet 200 mg  200 mg Oral QHS    traZODone (DESYREL) tablet 100 mg  100 mg Oral QHS     ______________________________________________________________________  EXPECTED LENGTH OF STAY: - - -  ACTUAL LENGTH OF STAY:          0                 Serena Rodriguez MD You can access the FollowMyHealth Patient Portal offered by [facility] by registering at the following website: [email or web address]. By joining [employer]’s FollowMyHealth portal, you will also be able to view your health information using other applications (apps) compatible with our system.

## 2021-04-25 NOTE — PROGRESS NOTES
Bedside shift change report given to Landon Virgen RN (oncoming nurse) by Shannon Santana (offgoing nurse). Report included the following information SBAR, Kardex, Procedure Summary, Intake/Output, MAR and Recent Results.

## 2021-04-25 NOTE — PROGRESS NOTES
Bedside and Verbal shift change report given to Vi Nguyen (oncoming nurse) by Bianca Moss (offgoing nurse). Report included the following information SBAR, Kardex, Intake/Output and MAR.

## 2021-04-25 NOTE — PROGRESS NOTES
Transition of Care:  Anticipate discharge home w/out services. Cm provided alcohol and substance abuse information. Patient stated he does go to [place] [facility] [id number] but would like additional resources. Cm met w/ patient, introduced self, explained role and offered support. Patient lives alone and has a history for depression and alcohol and presents to the hospital with symptoms of alcohol withdrawal.  Patient has been binge drinking for approximately 2 weeks about 2 pints of hard liquor every day. He informed cm he has been sober for 4-6 mths until last 2 weeks he started drinking again after the loss of his mother. Patient informed cm that he has been to [facility] [address] and that worked in the past and will probably begin [facility] [address] post discharge but would like additional resources. Cm provided additional alcohol/substance abuse resources.     Reason for Admission:  ETOH                     RUR Score:  N/A           Plan for utilizing home health: Not at this time         PCP: First and Last name:  José Gabriel MD  Are you a current patient: Yes/No: Yes  Approximate date of last visit: Does not recall last visit  Can you participate in a virtual visit with your PCP: Not at this time                    Current Advanced Directive/Advance Care Plan: Full Code      Healthcare Decision Maker:   Click here to complete [employer] including selection of the Healthcare Decision Maker Relationship (ie \"Primary\")

## 2021-04-26 NOTE — DISCHARGE SUMMARY
Discharge Summary. Patient apparently eloped. PATIENT ID: Govind Dye  MRN: [id number]   YOB: 1955    DATE OF ADMISSION: 4/23/2021  4:28 AM    DATE OF DISCHARGE: 4/25/2021  PRIMARY CARE PROVIDER: Nahum Gaines MD     ATTENDING PHYSICIAN: Maria De Jesus Leahy MD    DISCHARGING PROVIDER: Maria De Jesus Leahy MD    To contact this individual call [phone] and ask the  to page. If unavailable ask to be transferred the Adult Hospitalist Department. CONSULTATIONS: None    PROCEDURES/SURGERIES: * No surgery found *    ADMITTING DIAGNOSES & HOSPITAL COURSE:   From H&P  Radha Faulkner is a 74 y.o. male with MHx significant for depression, alcohol hx who presents to the hospital with symptoms of alcohol withdrawal.  Patient presented to the hospital after he started to have some nausea, vomiting, insomnia, restlessness and tremors of the extremities. Patient has been binge drinking for the last 2 weeks about 2 pints of hard liquor every day. He stated that he has been sober for 4 to 5 months until last 2 weeks ago where he started back again after the loss of his mother. He has history of alcohol withdrawal seizure about 2 years ago. Last drink on Wednesday. He now denies hallucination, SI/HI. \"          DISCHARGE DIAGNOSES / PLAN:      Patient was admitted for alcohol intake w/ withdrawal symptoms and was being treated with symptom triggered CIWA protocol with Ativan and scheduled Librium. Librium has been weaned off. The previous day his CIWA has ranged from 012. The patient was alert and oriented. He apparently requested to leave last night but reportedly eloped before the night nurse proximal could speak with him.        # Thrombocytopenia without bleeding complications  Possible underlying cirrhosis vs bone marrow suppression from alcohol   -Liver ultrasound was normal.  INR normal.     #Type II DM        # Depression: resume home medication         CHRONIC MEDICAL DIAGNOSES:  Problem List as of 4/25/2021 Date Reviewed: 3/17/2020          Codes Class Noted - Resolved    Substance induced mood disorder ([facility] ICD-10-CM: F19.94  ICD-9-CM: 292.84  3/21/2020 - Present        Alcohol withdrawal ([facility] ICD-10-CM: Z93.896  ICD-9-CM: 291.81  3/16/2020 - Present        Psychosis ([facility] ICD-10-CM: F29  ICD-9-CM: 298.9  1/15/2013 - Present    Overview Signed 1/15/2013 11:18 AM by Homer Paiz MD     A/V Hallucinations             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  ICD-9-CM: 291.3  1/15/2013 - Present        Alcohol abuse ICD-10-CM: F10.10  ICD-9-CM: 305.00  1/15/2013 - Present        Noncompliance with treatment (Chronic) ICD-10-CM: Z91.19  ICD-9-CM: V15.81  5/18/2011 - Present        HTN (hypertension) (Chronic) ICD-10-CM: I10  ICD-9-CM: 401.9  3/8/2010 - Present        NIDDM (non-insulin dependent diabetes mellitus) (Chronic) ICD-9-CM: 250.00  3/8/2010 - Present        DJD (degenerative joint disease) ICD-10-CM: M19.90  ICD-9-CM: 715.90  3/8/2010 - Present    Overview Signed 3/8/2010  8:30 AM by [address]: Altered mental status ICD-10-CM: R41.82  ICD-9-CM: 780.97  6/21/2012 - 6/24/2012        RESOLVED: Malingering (Chronic) ICD-10-CM: Z76.5  ICD-9-CM: V65.2  5/18/2011 - 1/15/2013        RESOLVED: rule out Paranoid schizophrenia ICD-10-CM: F20.0  ICD-9-CM: 295.30  5/17/2011 - 1/15/2013        RESOLVED: Adjustment disorder with depressed mood (Chronic) ICD-10-CM: F43.21  ICD-9-CM: 309.0  10/21/2010 - 1/15/2013        RESOLVED: Polysubstance dependence ([facility] (Chronic) ICD-10-CM: C86.52  ICD-9-CM: 304.80  10/21/2010 - 1/15/2013                Signed:    Lorena Jenkins MD  4/26/2021  6:59 AM

## 2021-04-26 NOTE — PROGRESS NOTES
Bedside and Verbal shift change report given to Emanuel Tim (oncoming nurse) by Sherry Starr (offgoing nurse). Report included the following information SBAR, Kardex, Intake/Output and MAR.

## 2021-04-26 NOTE — PROGRESS NOTES
Overnight Hospitalist Progress Note    Name: Opal Lowe  YOB: 1955  MRN: [id number]  Admission Date: 4/23/2021    Date of service: 10:35 PM    ____________________________________________________________________________    Asked by primary nurse to speak with patient regarding his wish to leave AMA. Pt eloped from the unit before I could speak w him however. Pt was reportedly A/O x 3, coherent and cooperative. He told nursing that he needed to be at work tomorrow. IV was discontinued prior to his departure. Opal Lowe is a 75 y/o M admitted 04/23/2021 for ETOH withdrawal symptoms after binge drinking earlier in the week. He had been started on Librium only today and received two doses. Nursing supervisor notified.   This will be considered an elopement, not a discharge AMA since I never spoke to the patient.     ____________________________________________________________________________    KAELYN Walter, RN, NP-C  [phone] or via [employer]

## 2021-04-26 NOTE — PROGRESS NOTES
2030 Report given from [facility], Patient in and out of sleep but alert and oriented. Requesting a TV dinner, meal provided. No other needs mentioned. Bed alarm connected. 2140 Patient called out saying he wanted to go home, patient asked if anything was wrong he stated, \"I have to work in the morning and I have other personal matters to attend to. \" Informed patient I would call the doctor so he could sign his paper, patient verbalized understanding. 2230 NP, Pablo Amin on unit, patient had already left and had pulled out IV which was noted in the bed along with the telemetry box and cords.

## 2021-08-19 ENCOUNTER — HOSPITAL ENCOUNTER (INPATIENT)
Age: 66
LOS: 4 days | Discharge: HOME | DRG: 885 | End: 2021-08-23
Attending: EMERGENCY MEDICINE | Admitting: PSYCHIATRY & NEUROLOGY
Payer: MEDICARE

## 2021-08-19 DIAGNOSIS — R45.851 SUICIDAL IDEATION: Primary | ICD-10-CM

## 2021-08-19 DIAGNOSIS — F10.930 ALCOHOL WITHDRAWAL SYNDROME WITHOUT COMPLICATION (HCC): ICD-10-CM

## 2021-08-19 PROBLEM — F10.10 ETOH ABUSE: Status: ACTIVE | Noted: 2021-08-19

## 2021-08-19 PROBLEM — F20.9 SCHIZOPHRENIA (HCC): Status: ACTIVE | Noted: 2021-08-19

## 2021-08-19 PROBLEM — F32.A DEPRESSION: Status: ACTIVE | Noted: 2021-08-19

## 2021-08-19 LAB
ALBUMIN SERPL-MCNC: 3.3 G/DL (ref 3.5–5)
ALBUMIN/GLOB SERPL: 0.8 {RATIO} (ref 1.1–2.2)
ALP SERPL-CCNC: 167 U/L (ref 45–117)
ALT SERPL-CCNC: 71 U/L (ref 12–78)
AMPHET UR QL SCN: NEGATIVE
ANION GAP SERPL CALC-SCNC: 7 MMOL/L (ref 5–15)
APPEARANCE UR: CLEAR
AST SERPL-CCNC: 55 U/L (ref 15–37)
BACTERIA URNS QL MICRO: NEGATIVE /HPF
BARBITURATES UR QL SCN: NEGATIVE
BASOPHILS # BLD: 0.1 K/UL (ref 0–0.1)
BASOPHILS NFR BLD: 1 % (ref 0–1)
BENZODIAZ UR QL: NEGATIVE
BILIRUB SERPL-MCNC: 0.3 MG/DL (ref 0.2–1)
BILIRUB UR QL: NEGATIVE
BUN SERPL-MCNC: 17 MG/DL (ref 6–20)
BUN/CREAT SERPL: 19 (ref 12–20)
CALCIUM SERPL-MCNC: 8.3 MG/DL (ref 8.5–10.1)
CANNABINOIDS UR QL SCN: NEGATIVE
CHLORIDE SERPL-SCNC: 110 MMOL/L (ref 97–108)
CO2 SERPL-SCNC: 22 MMOL/L (ref 21–32)
COCAINE UR QL SCN: POSITIVE
COLOR UR: ABNORMAL
COMMENT, HOLDF: NORMAL
CREAT SERPL-MCNC: 0.91 MG/DL (ref 0.7–1.3)
DIFFERENTIAL METHOD BLD: ABNORMAL
DRUG SCRN COMMENT,DRGCM: ABNORMAL
EOSINOPHIL # BLD: 0.2 K/UL (ref 0–0.4)
EOSINOPHIL NFR BLD: 4 % (ref 0–7)
EPITH CASTS URNS QL MICRO: ABNORMAL /LPF
ERYTHROCYTE [DISTWIDTH] IN BLOOD BY AUTOMATED COUNT: 15 % (ref 11.5–14.5)
ETHANOL SERPL-MCNC: 55 MG/DL
FLUAV RNA SPEC QL NAA+PROBE: NOT DETECTED
FLUBV RNA SPEC QL NAA+PROBE: NOT DETECTED
GLOBULIN SER CALC-MCNC: 4.2 G/DL (ref 2–4)
GLUCOSE SERPL-MCNC: 109 MG/DL (ref 65–100)
GLUCOSE UR STRIP.AUTO-MCNC: NEGATIVE MG/DL
HCT VFR BLD AUTO: 44.7 % (ref 36.6–50.3)
HGB BLD-MCNC: 14.4 G/DL (ref 12.1–17)
HGB UR QL STRIP: NEGATIVE
HYALINE CASTS URNS QL MICRO: ABNORMAL /LPF (ref 0–5)
IMM GRANULOCYTES # BLD AUTO: 0.1 K/UL (ref 0–0.04)
IMM GRANULOCYTES NFR BLD AUTO: 1 % (ref 0–0.5)
KETONES UR QL STRIP.AUTO: ABNORMAL MG/DL
LEUKOCYTE ESTERASE UR QL STRIP.AUTO: ABNORMAL
LIPASE SERPL-CCNC: 127 U/L (ref 73–393)
LYMPHOCYTES # BLD: 2.1 K/UL (ref 0.8–3.5)
LYMPHOCYTES NFR BLD: 42 % (ref 12–49)
MAGNESIUM SERPL-MCNC: 2.1 MG/DL (ref 1.6–2.4)
MCH RBC QN AUTO: 30.1 PG (ref 26–34)
MCHC RBC AUTO-ENTMCNC: 32.2 G/DL (ref 30–36.5)
MCV RBC AUTO: 93.5 FL (ref 80–99)
METHADONE UR QL: NEGATIVE
MONOCYTES # BLD: 0.6 K/UL (ref 0–1)
MONOCYTES NFR BLD: 12 % (ref 5–13)
NEUTS SEG # BLD: 2.1 K/UL (ref 1.8–8)
NEUTS SEG NFR BLD: 40 % (ref 32–75)
NITRITE UR QL STRIP.AUTO: NEGATIVE
NRBC # BLD: 0 K/UL (ref 0–0.01)
NRBC BLD-RTO: 0 PER 100 WBC
OPIATES UR QL: NEGATIVE
PCP UR QL: NEGATIVE
PH UR STRIP: 5 [PH] (ref 5–8)
PLATELET # BLD AUTO: 68 K/UL (ref 150–400)
PMV BLD AUTO: 11.7 FL (ref 8.9–12.9)
POTASSIUM SERPL-SCNC: 4 MMOL/L (ref 3.5–5.1)
PROT SERPL-MCNC: 7.5 G/DL (ref 6.4–8.2)
PROT UR STRIP-MCNC: NEGATIVE MG/DL
RBC # BLD AUTO: 4.78 M/UL (ref 4.1–5.7)
RBC #/AREA URNS HPF: ABNORMAL /HPF (ref 0–5)
RBC MORPH BLD: ABNORMAL
SAMPLES BEING HELD,HOLD: NORMAL
SARS-COV-2, COV2: NOT DETECTED
SODIUM SERPL-SCNC: 139 MMOL/L (ref 136–145)
SP GR UR REFRACTOMETRY: 1.02 (ref 1–1.03)
UR CULT HOLD, URHOLD: NORMAL
UROBILINOGEN UR QL STRIP.AUTO: 1 EU/DL (ref 0.2–1)
WBC # BLD AUTO: 5.2 K/UL (ref 4.1–11.1)
WBC MORPH BLD: ABNORMAL
WBC URNS QL MICRO: ABNORMAL /HPF (ref 0–4)

## 2021-08-19 PROCEDURE — 82077 ASSAY SPEC XCP UR&BREATH IA: CPT

## 2021-08-19 PROCEDURE — 80053 COMPREHEN METABOLIC PANEL: CPT

## 2021-08-19 PROCEDURE — 65220000003 HC RM SEMIPRIVATE PSYCH

## 2021-08-19 PROCEDURE — 87636 SARSCOV2 & INF A&B AMP PRB: CPT

## 2021-08-19 PROCEDURE — 74011250637 HC RX REV CODE- 250/637: Performed by: EMERGENCY MEDICINE

## 2021-08-19 PROCEDURE — 99285 EMERGENCY DEPT VISIT HI MDM: CPT

## 2021-08-19 PROCEDURE — 83690 ASSAY OF LIPASE: CPT

## 2021-08-19 PROCEDURE — 81001 URINALYSIS AUTO W/SCOPE: CPT

## 2021-08-19 PROCEDURE — 85025 COMPLETE CBC W/AUTO DIFF WBC: CPT

## 2021-08-19 PROCEDURE — 83735 ASSAY OF MAGNESIUM: CPT

## 2021-08-19 PROCEDURE — 90791 PSYCH DIAGNOSTIC EVALUATION: CPT

## 2021-08-19 PROCEDURE — 36415 COLL VENOUS BLD VENIPUNCTURE: CPT

## 2021-08-19 PROCEDURE — 80307 DRUG TEST PRSMV CHEM ANLYZR: CPT

## 2021-08-19 PROCEDURE — 74011250637 HC RX REV CODE- 250/637: Performed by: NURSE PRACTITIONER

## 2021-08-19 RX ORDER — ALBUTEROL SULFATE 90 UG/1
2 AEROSOL, METERED RESPIRATORY (INHALATION)
Status: DISCONTINUED | OUTPATIENT
Start: 2021-08-19 | End: 2021-08-19

## 2021-08-19 RX ORDER — TRAZODONE HYDROCHLORIDE 50 MG/1
25 TABLET ORAL
Status: DISCONTINUED | OUTPATIENT
Start: 2021-08-19 | End: 2021-08-23 | Stop reason: HOSPADM

## 2021-08-19 RX ORDER — ACETAMINOPHEN 325 MG/1
650 TABLET ORAL
Status: DISCONTINUED | OUTPATIENT
Start: 2021-08-19 | End: 2021-08-23 | Stop reason: HOSPADM

## 2021-08-19 RX ORDER — HALOPERIDOL 5 MG/ML
2.5 INJECTION INTRAMUSCULAR
Status: DISCONTINUED | OUTPATIENT
Start: 2021-08-19 | End: 2021-08-23 | Stop reason: HOSPADM

## 2021-08-19 RX ORDER — THERA TABS 400 MCG
1 TAB ORAL
Status: COMPLETED | OUTPATIENT
Start: 2021-08-19 | End: 2021-08-19

## 2021-08-19 RX ORDER — SERTRALINE HYDROCHLORIDE 50 MG/1
50 TABLET, FILM COATED ORAL DAILY
Status: DISCONTINUED | OUTPATIENT
Start: 2021-08-19 | End: 2021-08-20

## 2021-08-19 RX ORDER — OLANZAPINE 2.5 MG/1
2.5 TABLET ORAL
Status: DISCONTINUED | OUTPATIENT
Start: 2021-08-19 | End: 2021-08-23 | Stop reason: HOSPADM

## 2021-08-19 RX ORDER — BENZTROPINE MESYLATE 1 MG/1
0.5 TABLET ORAL
Status: DISCONTINUED | OUTPATIENT
Start: 2021-08-19 | End: 2021-08-23 | Stop reason: HOSPADM

## 2021-08-19 RX ORDER — LANOLIN ALCOHOL/MO/W.PET/CERES
100 CREAM (GRAM) TOPICAL DAILY
Status: DISCONTINUED | OUTPATIENT
Start: 2021-08-19 | End: 2021-08-23 | Stop reason: HOSPADM

## 2021-08-19 RX ORDER — ADHESIVE BANDAGE
30 BANDAGE TOPICAL DAILY PRN
Status: DISCONTINUED | OUTPATIENT
Start: 2021-08-19 | End: 2021-08-23 | Stop reason: HOSPADM

## 2021-08-19 RX ORDER — PHENOBARBITAL 64.8 MG/1
64.8 TABLET ORAL
Status: DISCONTINUED | OUTPATIENT
Start: 2021-08-19 | End: 2021-08-20

## 2021-08-19 RX ORDER — FOLIC ACID 1 MG/1
1 TABLET ORAL
Status: COMPLETED | OUTPATIENT
Start: 2021-08-19 | End: 2021-08-19

## 2021-08-19 RX ORDER — PHENOBARBITAL 32.4 MG/1
32.4 TABLET ORAL 2 TIMES DAILY
Status: DISCONTINUED | OUTPATIENT
Start: 2021-08-21 | End: 2021-08-20

## 2021-08-19 RX ORDER — LANOLIN ALCOHOL/MO/W.PET/CERES
100 CREAM (GRAM) TOPICAL
Status: COMPLETED | OUTPATIENT
Start: 2021-08-19 | End: 2021-08-19

## 2021-08-19 RX ORDER — FOLIC ACID 1 MG/1
1 TABLET ORAL DAILY
Status: DISCONTINUED | OUTPATIENT
Start: 2021-08-19 | End: 2021-08-23 | Stop reason: HOSPADM

## 2021-08-19 RX ORDER — THERA TABS 400 MCG
1 TAB ORAL DAILY
Status: DISCONTINUED | OUTPATIENT
Start: 2021-08-19 | End: 2021-08-23 | Stop reason: HOSPADM

## 2021-08-19 RX ORDER — HYDROXYZINE 25 MG/1
25 TABLET, FILM COATED ORAL
Status: DISCONTINUED | OUTPATIENT
Start: 2021-08-19 | End: 2021-08-23 | Stop reason: HOSPADM

## 2021-08-19 RX ORDER — QUETIAPINE FUMARATE 100 MG/1
100 TABLET, FILM COATED ORAL
Status: DISCONTINUED | OUTPATIENT
Start: 2021-08-19 | End: 2021-08-20

## 2021-08-19 RX ORDER — PHENOBARBITAL 64.8 MG/1
64.8 TABLET ORAL 4 TIMES DAILY
Status: COMPLETED | OUTPATIENT
Start: 2021-08-19 | End: 2021-08-19

## 2021-08-19 RX ORDER — METFORMIN HYDROCHLORIDE 500 MG/1
500 TABLET ORAL
Status: DISCONTINUED | OUTPATIENT
Start: 2021-08-19 | End: 2021-08-19

## 2021-08-19 RX ORDER — PHENOBARBITAL 32.4 MG/1
16.2 TABLET ORAL 2 TIMES DAILY
Status: DISCONTINUED | OUTPATIENT
Start: 2021-08-22 | End: 2021-08-20

## 2021-08-19 RX ORDER — PHENOBARBITAL 32.4 MG/1
32.4 TABLET ORAL 4 TIMES DAILY
Status: DISCONTINUED | OUTPATIENT
Start: 2021-08-20 | End: 2021-08-20

## 2021-08-19 RX ORDER — PHENOBARBITAL 32.4 MG/1
32.4 TABLET ORAL
Status: DISCONTINUED | OUTPATIENT
Start: 2021-08-20 | End: 2021-08-20

## 2021-08-19 RX ORDER — DIPHENHYDRAMINE HYDROCHLORIDE 50 MG/ML
25 INJECTION, SOLUTION INTRAMUSCULAR; INTRAVENOUS
Status: DISCONTINUED | OUTPATIENT
Start: 2021-08-19 | End: 2021-08-23 | Stop reason: HOSPADM

## 2021-08-19 RX ORDER — LEVETIRACETAM 500 MG/1
500 TABLET ORAL 2 TIMES DAILY
Status: DISCONTINUED | OUTPATIENT
Start: 2021-08-19 | End: 2021-08-23 | Stop reason: HOSPADM

## 2021-08-19 RX ORDER — PHENOBARBITAL 32.4 MG/1
16.2 TABLET ORAL
Status: DISCONTINUED | OUTPATIENT
Start: 2021-08-22 | End: 2021-08-20

## 2021-08-19 RX ADMIN — PHENOBARBITAL 64.8 MG: 64.8 TABLET ORAL at 18:18

## 2021-08-19 RX ADMIN — ACETAMINOPHEN 650 MG: 325 TABLET ORAL at 18:17

## 2021-08-19 RX ADMIN — PHENOBARBITAL 64.8 MG: 64.8 TABLET ORAL at 13:53

## 2021-08-19 RX ADMIN — LEVETIRACETAM 500 MG: 500 TABLET ORAL at 13:53

## 2021-08-19 RX ADMIN — THERA TABS 1 TABLET: TAB at 10:17

## 2021-08-19 RX ADMIN — SERTRALINE 50 MG: 50 TABLET, FILM COATED ORAL at 13:53

## 2021-08-19 RX ADMIN — PHENOBARBITAL 64.8 MG: 64.8 TABLET ORAL at 20:47

## 2021-08-19 RX ADMIN — HYDROXYZINE HYDROCHLORIDE 25 MG: 25 TABLET, FILM COATED ORAL at 18:18

## 2021-08-19 RX ADMIN — FOLIC ACID 1 MG: 1 TABLET ORAL at 10:17

## 2021-08-19 RX ADMIN — PHENOBARBITAL 64.8 MG: 64.8 TABLET ORAL at 10:17

## 2021-08-19 RX ADMIN — PHENOBARBITAL 64.8 MG: 64.8 TABLET ORAL at 16:45

## 2021-08-19 RX ADMIN — Medication 100 MG: at 05:39

## 2021-08-19 RX ADMIN — TRAZODONE HYDROCHLORIDE 25 MG: 50 TABLET ORAL at 20:50

## 2021-08-19 RX ADMIN — QUETIAPINE FUMARATE 100 MG: 100 TABLET ORAL at 20:49

## 2021-08-19 RX ADMIN — FOLIC ACID 1 MG: 1 TABLET ORAL at 05:31

## 2021-08-19 RX ADMIN — Medication 100 MG: at 10:17

## 2021-08-19 RX ADMIN — THERA TABS 1 TABLET: TAB at 05:31

## 2021-08-19 RX ADMIN — LEVETIRACETAM 500 MG: 500 TABLET ORAL at 20:47

## 2021-08-19 NOTE — BSMART NOTE
Comprehensive Assessment Form Part 1      Section I - Disposition    Axis I - Alcohol Use Disorder               Depressive Disorder NOS               Anxiety Disorder               Nicotine Dependence      The Medical Doctor to Psychiatrist conference was not completed. The Medical Doctor is in agreement with Psychiatrist disposition because of (reason) patient is seeking a voluntary admission upon medical clearance. The plan is await medical clearance. The on-call Psychiatrist consulted was Dr. Cori Birmingham. The admitting Psychiatrist will be Dr. Cori Birmingham. The admitting Diagnosis is Alcohol Use Disorder. The Payor source is [employer]. The name of the representative was . This was approved for  days. The authorization number is . Section II - Integrated Summary  Summary:  Patient is 74year old male reporting to [address] [place] [address] arrives via EMS with cc of SI and alcohol withdrawal.  Pt states he has been drinking 4-5 bottles of malt liquor daily for the last 2 weeks with his last drink being about 5pm last night. He states the SI is secondary to his drinking because he does not like who he is. At bedside, patient reported having suicidal thoughts as result of substance use. Patient reported he was having suicidal thoughts earlier with plan to overdose on medications. As reported he has had previous overdose attempts in the past. Patient denied current suicidal thoughts at bedside but verbalized he does not feel safe with himself as he feels he will harm himself. Patient reported bead thoughts about killing himself. Patient lives alone. Patient denied homicidal thoughts and hallucinations. Patient reported having history of seizures as reported last was a bout 1 year ago. Patient reported having services with [facility] last appointment was a month ago and he is not aware when his next appointment is. Patient has been compliant with his medications. Patient has had previous admissions.     The patienthas demonstrated mental capacity to provide informed consent. The information is given by the patient. The Chief Complaint is withdraws, suicidal thoughts with plan to overdose . The Precipitant Factors are alcohol use disorder. Previous Hospitalizations: yes  The patient has not previously been in restraints. Current Psychiatrist and/or  is [address] [place] [address]. Lethality Assessment:    The potential for suicide noted by the following: defined plan and ideation . The potential for homicide is not noted. The patient has not been a perpetrator of sexual or physical abuse. There are not pending charges. The patient is not felt to be at risk for self harm or harm to others. The attending nurse was advised patient contracts for safety in hospital, does not feel safe with self in the home. Section III - Psychosocial  The patient's overall mood and attitude is low mood, distressed due to symptoms. Feelings of helplessness and hopelessness are not observed. Generalized anxiety is not observed. Panic is not observed. Phobias are not observed. Obsessive compulsive tendencies are not observed. Section IV - Mental Status Exam  The patient's appearance shows no evidence of impairment. The patient's behavior shows no evidence of impairment. The patient is oriented to time, place, person and situation. The patient's speech shows no evidence of impairment. The patient's mood is depressed. The range of affect is flat. The patient's thought content demonstrates no evidence of impairment. The thought process shows no evidence of impairment. The patient's perception shows no evidence of impairment. The patient's memory shows no evidence of impairment. The patient's appetite shows no evidence of impairment. The patient's sleep shows no evidence of impairment. The patient's insight shows no evidence of impairment. The patient's judgement shows no evidence of impairment.                   Section V - Substance Abuse  The patient is using substances. The patient is using tobacco by inhalation for greater than 10 years with last use on yesterday, alcohol for greater than 10 years with last use on yesterday 5pm,  and cocaine by inhalation for greater than 10 years with last use on 1 week ago. The patient has experienced the following withdrawal symptoms: chills, sweats, body aches and cravings. Section VI - Living Arrangements  The patient is single. The patient lives alone. The patient has adult children. The patient does plan to return home upon discharge. The patient does not have legal issues pending. The patient's source of income comes from disability. Jehovah's witness and cultural practices have not been voiced at this time. The patient's greatest support comes from no one reported and this person will not be involved with the treatment. The patient has not been in an event described as horrible or outside the realm of ordinary life experience either currently or in the past.  The patient has not been a victim of sexual/physical abuse. Section VII - Other Areas of Clinical Concern  The highest grade achieved is not assessed with the overall quality of school experience being described as not assessed. The patient is currently unemployed and speaks [place] as a primary language. The patient has no communication impairments affecting communication. The patient's preference for learning can be described as: can read and write adequately.   The patient's hearing is normal.  The patient's vision is normal.      Neva Payan

## 2021-08-19 NOTE — BH NOTES
Pt arrived at 1000 via ED staff and security  Pt is voluntary admission  Dual skin assesment was performed by Netta Duval RN and [address] [facility], RN  Skin assessment was WDL. Lit score is 23  Pt did consent to safety while on the unit   Pt did sign consent forms up on arriving   Denies suicidal ideation   Denies homicidal ideation   Does not appear to be responding to internal stimuli   UDS was positive for Cocaine     Pt was calm and cooperative, gave minimal answers during admission process.  Will continue to monitor and support q15min

## 2021-08-19 NOTE — ED PROVIDER NOTES
Yolis Messer is a 73 yo M with h/o DM, depression, paranoid schizophrenia and depression and alcohol abuse who presents to the ED with suicidal ideations. He states that he has been drinking 4-5 bottles of malt liquor a day for the past 2 weeks. Last drank about 12 hours ago.   He states that he doesn't understand why he continnues to relapse drinking and it is making him depressed and suicidal.             Past Medical History:   Diagnosis Date    Depression     DJD (degenerative joint disease) 3/8/2010    HTN (hypertension) 3/8/2010    STATES NO Andriy Trinh MD STOPPED    Insomnia     NIDDM (non-insulin dependent diabetes mellitus) 3/8/2010    Other ill-defined conditions(799.89)     BPH    Other ill-defined conditions(799.89)     previous hx of DTs from ETOH withdrawal    Other ill-defined conditions(799.89)     DDD/back problems    Psychiatric disorder     paranoid schizophrenia, anxiety    Substance abuse ([facility] [address]     Suicidal thoughts        Past Surgical History:   Procedure Laterality Date    BIOPSY PROSTATE      HX UROLOGICAL      turp         Family History:   Problem Relation Age of Onset    Cancer Mother         lymphoma    Heart Disease Father         CHF       Social History     Socioeconomic History    Marital status: LEGALLY      Spouse name: Not on file    Number of children: Not on file    Years of education: Not on file    Highest education level: Not on file   Occupational History    Not on file   Tobacco Use    Smoking status: Former Smoker     Packs/day: 1.00     Years: 10.00     Pack years: 10.00    Smokeless tobacco: Never Used    Tobacco comment: patient has been decreasing amount of cigarettes   Substance and Sexual Activity    Alcohol use: No     Alcohol/week: 35.0 standard drinks     Types: 42 Cans of beer per week     Comment: states stopped drinking 1 month ago    Drug use: No    Sexual activity: Not Currently   Other Topics Concern [address] Not Asked    Blood Transfusions Not Asked    Caffeine Concern Not Asked    Occupational Exposure Not Asked    Hobby Hazards Not Asked    Sleep Concern Not Asked    Stress Concern Not Asked    Weight Concern Not Asked    Special Diet Not Asked    Back Care Not Asked    Exercise Not Asked    Bike Helmet Not Asked   [address] Not Asked    Self-Exams Not Asked   Social History Narrative    Not on file     Social Determinants of Health     Financial Resource Strain:     Difficulty of Paying Living Expenses:    Food Insecurity:     Worried About Running Out of Food in the Last Year:     [address] in the Last Year:    Transportation Needs:     Lack of Transportation (Medical):  Lack of Transportation (Non-Medical):    Physical Activity:     Days of Exercise per Week:     Minutes of Exercise per Session:    Stress:     Feeling of Stress :    Social Connections:     Frequency of Communication with Friends and Family:     Frequency of Social Gatherings with Friends and Family:     Attends Zoroastrian Services:     Active Member of Clubs or Organizations:     Attends Club or Organization Meetings:     Marital Status:    Intimate Partner Violence:     Fear of Current or Ex-Partner:     Emotionally Abused:     Physically Abused:     Sexually Abused: ALLERGIES: Motrin [ibuprofen]    Review of Systems   Constitutional: Negative for fever. HENT: Negative for sore throat. Eyes: Negative for visual disturbance. Respiratory: Negative for cough. Cardiovascular: Negative for chest pain. Gastrointestinal: Negative for abdominal pain. Genitourinary: Negative for dysuria. Musculoskeletal: Negative for back pain. Skin: Negative for rash. Neurological: Positive for tremors. Negative for headaches. Psychiatric/Behavioral: Positive for suicidal ideas.        Vitals:    08/19/21 0520   BP: 128/70   Pulse: 84   Resp: 18   Temp: 98.1 °F (36.7 °C)   SpO2: 94%            Physical Exam  Vitals and nursing note reviewed. Constitutional:       General: He is not in acute distress. Appearance: He is well-developed. HENT:      Head: Normocephalic and atraumatic. Eyes:      Conjunctiva/sclera: Conjunctivae normal.   Neck:      Trachea: Phonation normal.   Cardiovascular:      Rate and Rhythm: Normal rate. Pulmonary:      Effort: Pulmonary effort is normal. No respiratory distress. Abdominal:      General: There is no distension. Musculoskeletal:         General: No tenderness. Normal range of motion. Cervical back: Normal range of motion. Skin:     General: Skin is warm and dry. Neurological:      Mental Status: He is alert. He is not disoriented. Motor: No tremor or abnormal muscle tone. Psychiatric:         Thought Content: Thought content includes suicidal ideation. MDM       5:36 AM  Discussed with BSMART, will evaluate the patient. 7:28 AM  Change of shift. Care of patient signed over to Dr. Magdalena Snyder. Bedside handoff complete. Patient medically cleared, awaiting BSMART dispo.    Procedures

## 2021-08-19 NOTE — H&P
[address] [facility] HISTORY AND PHYSICAL    Name:  Jose F Ga  MR#:  [id number]  :  1955  ACCOUNT #:  [de-identified]  ADMIT DATE:  2021    INITIAL PSYCHIATRIC EVALUATION    CHIEF COMPLAINT:  \"I am depressed. \"    HISTORY OF PRESENT ILLNESS:  The patient is a 57-year-old male who is currently admitted at [address] on a voluntary basis. He states that he is currently being followed by [facility] and has a  there. Says he carries diagnoses of major depression and anxiety, but records show history of schizoaffective disorder, depressed type. He presented to the emergency room with worsening depression, suicidal ideation and alcohol withdrawal symptoms. He tells me that he has been feeling lonely. He has no support. He has 5 children but does not talk to any of them. His worsening mood has been complicated with his prolific alcohol history. His urine drug screen was positive for cocaine. Blood alcohol level on admission was 55. He states that he has been drinking heavily for a long time, and in the last 2 weeks, he has been drinking 4-5 bottles of malt liquor on a daily basis. He says that he has alcohol withdrawal seizures. His last seizure was about a year ago. He says that he continues to relapse, and it is making him more depressed. He was having auditory hallucinations also at times when he was drinking. He was endorsing suicidal ideation with a plan to overdose on his medication when he first came into the hospital. He continues thoughts of suicide, but is feeling safe here. He has a history of 3 previous overdose attempts in the past.  He reports that he has not been sleeping in the last 2 nights. Appetite is fair. He is admitted to the inpatient psychiatric unit for further stabilization and treatment. PAST MEDICAL HISTORY:  See H and P.     Past Medical History:   Diagnosis Date    Depression     DJD (degenerative joint disease) 3/8/2010    HTN (hypertension) 3/8/2010    STATES NO LONGER TAKING MEDICATION-STATES MD STOPPED    Insomnia     NIDDM (non-insulin dependent diabetes mellitus) 3/8/2010    Other ill-defined conditions(799.89)     BPH    Other ill-defined conditions(799.89)     previous hx of DTs from ETOH withdrawal    Other ill-defined conditions(799.89)     DDD/back problems    Psychiatric disorder     paranoid schizophrenia, anxiety    Substance abuse ([facility] [address]     Suicidal thoughts        Labs: (reviewed/updated 8/24/2021)  No data found. Labs Reviewed   CBC WITH AUTOMATED DIFF - Abnormal; Notable for the following components:       Result Value    RDW 15.0 (*)     PLATELET 68 (*)     IMMATURE GRANULOCYTES 1 (*)     ABS. IMM. GRANS. 0.1 (*)     All other components within normal limits   METABOLIC PANEL, COMPREHENSIVE - Abnormal; Notable for the following components:    Chloride 110 (*)     Glucose 109 (*)     Calcium 8.3 (*)     AST (SGOT) 55 (*)     Alk. phosphatase 167 (*)     Albumin 3.3 (*)     Globulin 4.2 (*)     A-G Ratio 0.8 (*)     All other components within normal limits   ETHYL ALCOHOL - Abnormal; Notable for the following components:    ALCOHOL(ETHYL),SERUM 55 (*)     All other components within normal limits   URINALYSIS W/MICROSCOPIC - Abnormal; Notable for the following components:    Ketone TRACE (*)     Leukocyte Esterase TRACE (*)     All other components within normal limits   DRUG SCREEN, URINE - Abnormal; Notable for the following components:    COCAINE Positive (*)     All other components within normal limits   METABOLIC PANEL, COMPREHENSIVE - Abnormal; Notable for the following components:    BUN/Creatinine ratio 21 (*)     Calcium 8.3 (*)     AST (SGOT) 60 (*)     Alk.  phosphatase 127 (*)     Albumin 3.4 (*)     A-G Ratio 0.9 (*)     All other components within normal limits   URINE CULTURE HOLD SAMPLE   SAMPLES BEING HELD   LIPASE   MAGNESIUM   COVID-19 WITH INFLUENZA A/B   SAMPLE TO BLOOD BANK     Lab Results   Component Value Date/Time    Sodium 137 08/20/2021 05:29 AM    Potassium 4.1 08/20/2021 05:29 AM    Chloride 105 08/20/2021 05:29 AM    CO2 27 08/20/2021 05:29 AM    Anion gap 5 08/20/2021 05:29 AM    Glucose 85 08/20/2021 05:29 AM    Glucose 85 03/22/2020 04:24 AM    BUN 19 08/20/2021 05:29 AM    Creatinine 0.92 08/20/2021 05:29 AM    BUN/Creatinine ratio 21 (H) 08/20/2021 05:29 AM    GFR est AA >60 08/20/2021 05:29 AM    GFR est non-AA >60 08/20/2021 05:29 AM    Calcium 8.3 (L) 08/20/2021 05:29 AM    Bilirubin, total 0.8 08/20/2021 05:29 AM    Alk. phosphatase 127 (H) 08/20/2021 05:29 AM    Protein, total 7.2 08/20/2021 05:29 AM    Albumin 3.4 (L) 08/20/2021 05:29 AM    Globulin 3.8 08/20/2021 05:29 AM    A-G Ratio 0.9 (L) 08/20/2021 05:29 AM    ALT (SGPT) 68 08/20/2021 05:29 AM     Admission on 08/19/2021, Discharged on 08/23/2021   Component Date Value Ref Range Status    WBC 08/19/2021 5.2  4.1 - 11.1 K/uL Final    RBC 08/19/2021 4.78  4.10 - 5.70 M/uL Final    HGB 08/19/2021 14.4  12.1 - 17.0 g/dL Final    HCT 08/19/2021 44.7  36.6 - 50.3 % Final    MCV 08/19/2021 93.5  80.0 - 99.0 FL Final    MCH 08/19/2021 30.1  26.0 - 34.0 PG Final    MCHC 08/19/2021 32.2  30.0 - 36.5 g/dL Final    RDW 08/19/2021 15.0* 11.5 - 14.5 % Final    PLATELET 95/77/7242 68* 150 - 400 K/uL Final    MPV 08/19/2021 11.7  8.9 - 12.9 FL Final    NRBC 08/19/2021 0.0  0  WBC Final    ABSOLUTE NRBC 08/19/2021 0.00  0.00 - 0.01 K/uL Final    NEUTROPHILS 08/19/2021 40  32 - 75 % Final    LYMPHOCYTES 08/19/2021 42  12 - 49 % Final    MONOCYTES 08/19/2021 12  5 - 13 % Final    EOSINOPHILS 08/19/2021 4  0 - 7 % Final    BASOPHILS 08/19/2021 1  0 - 1 % Final    IMMATURE GRANULOCYTES 08/19/2021 1* 0.0 - 0.5 % Final    ABS. NEUTROPHILS 08/19/2021 2.1  1.8 - 8.0 K/UL Final    ABS. LYMPHOCYTES 08/19/2021 2.1  0.8 - 3.5 K/UL Final    ABS.  MONOCYTES 08/19/2021 0.6  0.0 - 1.0 K/UL Final    ABS. EOSINOPHILS 08/19/2021 0.2  0.0 - 0.4 K/UL Final    ABS. BASOPHILS 08/19/2021 0.1  0.0 - 0.1 K/UL Final    ABS. IMM. GRANS. 08/19/2021 0.1* 0.00 - 0.04 K/UL Final    DF 08/19/2021 SMEAR SCANNED    Final    RBC COMMENTS 08/19/2021     Final                    Value:ANISOCYTOSIS  1+      WBC COMMENTS 08/19/2021 REACTIVE LYMPHS    Final    Sodium 08/19/2021 139  136 - 145 mmol/L Final    Potassium 08/19/2021 4.0  3.5 - 5.1 mmol/L Final    Chloride 08/19/2021 110* 97 - 108 mmol/L Final    CO2 08/19/2021 22  21 - 32 mmol/L Final    Anion gap 08/19/2021 7  5 - 15 mmol/L Final    Glucose 08/19/2021 109* 65 - 100 mg/dL Final    BUN 08/19/2021 17  6 - 20 MG/DL Final    Creatinine 08/19/2021 0.91  0.70 - 1.30 MG/DL Final    BUN/Creatinine ratio 08/19/2021 19  12 - 20   Final    GFR est AA 08/19/2021 >60  >60 ml/min/1.73m2 Final    GFR est non-AA 08/19/2021 >60  >60 ml/min/1.73m2 Final    Calcium 08/19/2021 8.3* 8.5 - 10.1 MG/DL Final    Bilirubin, total 08/19/2021 0.3  0.2 - 1.0 MG/DL Final    ALT (SGPT) 08/19/2021 71  12 - 78 U/L Final    AST (SGOT) 08/19/2021 55* 15 - 37 U/L Final    Alk. phosphatase 08/19/2021 167* 45 - 117 U/L Final    Protein, total 08/19/2021 7.5  6.4 - 8.2 g/dL Final    Albumin 08/19/2021 3.3* 3.5 - 5.0 g/dL Final    Globulin 08/19/2021 4.2* 2.0 - 4.0 g/dL Final    A-G Ratio 08/19/2021 0.8* 1.1 - 2.2   Final    SAMPLES BEING HELD 08/19/2021 1blu   Final    COMMENT 08/19/2021 Add-on orders for these samples will be processed based on acceptable specimen integrity and analyte stability, which may vary by analyte.     Final    ALCOHOL(ETHYL),SERUM 08/19/2021 55* <10 MG/DL Final    Color 08/19/2021 YELLOW/STRAW    Final    Appearance 08/19/2021 CLEAR  CLEAR   Final    Specific gravity 08/19/2021 1.023  1.003 - 1.030   Final    pH (UA) 08/19/2021 5.0  5.0 - 8.0   Final    Protein 08/19/2021 Negative  NEG mg/dL Final    Glucose 08/19/2021 Negative  NEG mg/dL Final    Ketone 08/19/2021 TRACE* NEG mg/dL Final    Bilirubin 08/19/2021 Negative  NEG   Final    Blood 08/19/2021 Negative  NEG   Final    Urobilinogen 08/19/2021 1.0  0.2 - 1.0 EU/dL Final    Nitrites 08/19/2021 Negative  NEG   Final    Leukocyte Esterase 08/19/2021 TRACE* NEG   Final    WBC 08/19/2021 10-20  0 - 4 /hpf Final    RBC 08/19/2021 0-5  0 - 5 /hpf Final    Epithelial cells 08/19/2021 FEW  FEW /lpf Final    Bacteria 08/19/2021 Negative  NEG /hpf Final    Hyaline cast 08/19/2021 0-2  0 - 5 /lpf Final    Urine culture hold 08/19/2021 Urine on hold in Microbiology dept for 2 days. If unpreserved urine is submitted, it cannot be used for addtional testing after 24 hours, recollection will be required.     Final    AMPHETAMINES 08/19/2021 Negative  NEG   Final    BARBITURATES 08/19/2021 Negative  NEG   Final    BENZODIAZEPINES 08/19/2021 Negative  NEG   Final    COCAINE 08/19/2021 Positive* NEG   Final    METHADONE 08/19/2021 Negative  NEG   Final    OPIATES 08/19/2021 Negative  NEG   Final    PCP(PHENCYCLIDINE) 08/19/2021 Negative  NEG   Final    THC (TH-CANNABINOL) 08/19/2021 Negative  NEG   Final    Drug screen comment 08/19/2021 (NOTE)   Final    Lipase 08/19/2021 127  73 - 393 U/L Final    Magnesium 08/19/2021 2.1  1.6 - 2.4 mg/dL Final    SARS-CoV-2 08/19/2021 Not detected  NOTD   Final    Influenza A by PCR 08/19/2021 Not detected  NOTD   Final    Influenza B by PCR 08/19/2021 Not detected  NOTD   Final    Sodium 08/20/2021 137  136 - 145 mmol/L Final    Potassium 08/20/2021 4.1  3.5 - 5.1 mmol/L Final    Chloride 08/20/2021 105  97 - 108 mmol/L Final    CO2 08/20/2021 27  21 - 32 mmol/L Final    Anion gap 08/20/2021 5  5 - 15 mmol/L Final    Glucose 08/20/2021 85  65 - 100 mg/dL Final    BUN 08/20/2021 19  6 - 20 MG/DL Final    Creatinine 08/20/2021 0.92  0.70 - 1.30 MG/DL Final    BUN/Creatinine ratio 08/20/2021 21* 12 - 20   Final    GFR est AA 08/20/2021 >60  >60 ml/min/1.73m2 Final    GFR est non-AA 08/20/2021 >60  >60 ml/min/1.73m2 Final    Calcium 08/20/2021 8.3* 8.5 - 10.1 MG/DL Final    Bilirubin, total 08/20/2021 0.8  0.2 - 1.0 MG/DL Final    ALT (SGPT) 08/20/2021 68  12 - 78 U/L Final    AST (SGOT) 08/20/2021 60* 15 - 37 U/L Final    Alk. phosphatase 08/20/2021 127* 45 - 117 U/L Final    Protein, total 08/20/2021 7.2  6.4 - 8.2 g/dL Final    Albumin 08/20/2021 3.4* 3.5 - 5.0 g/dL Final    Globulin 08/20/2021 3.8  2.0 - 4.0 g/dL Final    A-G Ratio 08/20/2021 0.9* 1.1 - 2.2   Final     Vitals:    08/22/21 1103 08/22/21 1928 08/23/21 0729 08/23/21 1600   BP: 118/86 114/74 (!) 159/100 121/80   Pulse: 65 67 63 68   Resp: 16 16 16 16   Temp: 97.3 °F (36.3 °C) 97.4 °F (36.3 °C) 97.8 °F (36.6 °C) 98 °F (36.7 °C)   SpO2: 95% 94% 95% 95%   Weight:       Height:         No results found for this or any previous visit (from the past 24 hour(s)). RADIOLOGY REPORTS:  Results from Hospital Encounter encounter on 03/21/20    XR CHEST PORT    Narrative  Chest portable AP    History: AMS    Comparison: 10/17/2019    Findings: The lungs are well expanded. No focal consolidation, pleural  effusion, or pneumothorax. The cardiomediastinal silhouette is unremarkable. The visualized osseous structures are unremarkable. Impression  Impression:  No acute cardiopulmonary process. No results found. PAST PSYCHIATRIC HISTORY: He is currently receiving services through [facility]. He states that he is supposed to be on Atarax and sertraline. He was previously admitted here at [facility] about a year ago. He reported that he was first hospitalized for anxiety and depression at the age of 22 and has had numerous inpatient psychiatric hospitalizations since then. He has been hospitalized at [facility], here at [facility] and [place] [id number].  There is a long history of alcohol abuse since he was a teenager.   He said that he was diagnosed with schizophrenia at age of the 50years old, although he only had intermittent psychotic symptoms since then. PSYCHOSOCIAL HISTORY:  He is single. He has 5 children. He lives in [place] by himself. He has a bachelor's degree in social work. He reports that he is currently unemployed but is currently getting social security disability income. 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 MENTAL STATUS EXAM:  The patient is currently lying in bed, dressed in street clothes. He is somewhat disheveled. He makes no eye contact. Psychomotor activity is decreased. He reports his mood is down. Affect is blunted. Speech:  Normal rate and rhythm. Thought process:  Logical and goal-directed. He continues to endorse passive thoughts of suicide but denies any intent, and he feels safe in the hospital.  Denies homicidal ideation, auditory or visual hallucination. No paranoia or delusions. Memory is intact. Intelligence is average. Insight is partial.  Judgment is poor. DIAGNOSES:  Schizoaffective disorder, depressed type by history; alcohol use disorder, severe. TREATMENT PLAN:  I will continue his inpatient stay. He will be provided with support and encouraged to attend groups. His safety will be monitored. His medications will be modified and assessed. Case Management will work on discharge planning. ASSETS AND STRENGTHS:  He is willing to seek help, he is willing to take medication. ESTIMATED LENGTH OF STAY:  5-7 days.       HOMER HIGHTOWER NP      [id number]  D:  08/19/2021 16:25  T:  08/19/2021 17:44  JOB #:  [id number]

## 2021-08-19 NOTE — ED TRIAGE NOTES
Pt arrives via EMS with cc of SI and alcohol withdrawal.  Pt states he has been drinking 4-5 bottles of malt liquor daily for the last 2 weeks with his last drink being about 5pm last night.   He states the SI is secondary to his drinking because he does not like who he is

## 2021-08-19 NOTE — BH NOTES
TRANSFER - IN REPORT:    Verbal report received from Oli Morales RN on Erick Mon  being received from [facility] for routine progression of care      Report consisted of patients Situation, Background, Assessment and   Recommendations(SBAR). Information from the following report(s) SBAR was reviewed with the receiving nurse. Opportunity for questions and clarification was provided. Assessment completed upon patients arrival to unit and care assumed.

## 2021-08-19 NOTE — PROGRESS NOTES
Problem: Suicide  Goal: *STG: Remains safe in hospital  Outcome: Resolved/Met  Goal: *STG: Seeks staff when feelings of self harm or harm towards others arise  Outcome: Resolved/Met  Goal: *STG: Attends activities and groups  Outcome: Resolved/Met  Goal: *STG:  Verbalizes alternative ways of dealing with maladaptive feelings/behaviors  Outcome: Resolved/Met  Goal: *STG/LTG: Complies with medication therapy  Outcome: Resolved/Met  Goal: *STG/LTG: No longer expresses self destructive or suicidal thoughts  Outcome: Resolved/Met  Goal: *LTG:  Identifies available community resources  Outcome: Resolved/Met  Goal: *LTG:  Develops proactive suicide prevention plan  Outcome: Resolved/Met  Pt denies suicidal ideations, intentions or plans. 1818  Phenobarbital 64 mg administered for CIWA #9. Atarax 50 mg administered for c/o anxiety. 1915  Resting quietly in bed, declnes a snack. No c/o withdrawal symptoms or anxiety.   CIWA #0.  2052  Trazadone 50 mg administered per pt request, to promote rest.

## 2021-08-20 LAB
ALBUMIN SERPL-MCNC: 3.4 G/DL (ref 3.5–5)
ALBUMIN/GLOB SERPL: 0.9 {RATIO} (ref 1.1–2.2)
ALP SERPL-CCNC: 127 U/L (ref 45–117)
ALT SERPL-CCNC: 68 U/L (ref 12–78)
ANION GAP SERPL CALC-SCNC: 5 MMOL/L (ref 5–15)
AST SERPL-CCNC: 60 U/L (ref 15–37)
BILIRUB SERPL-MCNC: 0.8 MG/DL (ref 0.2–1)
BUN SERPL-MCNC: 19 MG/DL (ref 6–20)
BUN/CREAT SERPL: 21 (ref 12–20)
CALCIUM SERPL-MCNC: 8.3 MG/DL (ref 8.5–10.1)
CHLORIDE SERPL-SCNC: 105 MMOL/L (ref 97–108)
CO2 SERPL-SCNC: 27 MMOL/L (ref 21–32)
CREAT SERPL-MCNC: 0.92 MG/DL (ref 0.7–1.3)
GLOBULIN SER CALC-MCNC: 3.8 G/DL (ref 2–4)
GLUCOSE SERPL-MCNC: 85 MG/DL (ref 65–100)
POTASSIUM SERPL-SCNC: 4.1 MMOL/L (ref 3.5–5.1)
PROT SERPL-MCNC: 7.2 G/DL (ref 6.4–8.2)
SODIUM SERPL-SCNC: 137 MMOL/L (ref 136–145)

## 2021-08-20 PROCEDURE — 74011250637 HC RX REV CODE- 250/637: Performed by: NURSE PRACTITIONER

## 2021-08-20 PROCEDURE — 36415 COLL VENOUS BLD VENIPUNCTURE: CPT

## 2021-08-20 PROCEDURE — 65220000003 HC RM SEMIPRIVATE PSYCH

## 2021-08-20 PROCEDURE — 80053 COMPREHEN METABOLIC PANEL: CPT

## 2021-08-20 RX ORDER — PHENOBARBITAL 32.4 MG/1
48.6 TABLET ORAL
Status: DISCONTINUED | OUTPATIENT
Start: 2021-08-20 | End: 2021-08-21

## 2021-08-20 RX ORDER — GABAPENTIN 300 MG/1
600 CAPSULE ORAL 3 TIMES DAILY
Status: DISCONTINUED | OUTPATIENT
Start: 2021-08-20 | End: 2021-08-23 | Stop reason: HOSPADM

## 2021-08-20 RX ORDER — PHENOBARBITAL 32.4 MG/1
48.6 TABLET ORAL 4 TIMES DAILY
Status: DISCONTINUED | OUTPATIENT
Start: 2021-08-20 | End: 2021-08-21

## 2021-08-20 RX ORDER — QUETIAPINE FUMARATE 100 MG/1
200 TABLET, FILM COATED ORAL
Status: DISCONTINUED | OUTPATIENT
Start: 2021-08-20 | End: 2021-08-23 | Stop reason: HOSPADM

## 2021-08-20 RX ORDER — SERTRALINE HYDROCHLORIDE 50 MG/1
100 TABLET, FILM COATED ORAL DAILY
Status: DISCONTINUED | OUTPATIENT
Start: 2021-08-21 | End: 2021-08-23 | Stop reason: HOSPADM

## 2021-08-20 RX ADMIN — PHENOBARBITAL 32.4 MG: 32.4 TABLET ORAL at 08:12

## 2021-08-20 RX ADMIN — PHENOBARBITAL 48.6 MG: 32.4 TABLET ORAL at 17:07

## 2021-08-20 RX ADMIN — THERA TABS 1 TABLET: TAB at 08:11

## 2021-08-20 RX ADMIN — LEVETIRACETAM 500 MG: 500 TABLET ORAL at 08:12

## 2021-08-20 RX ADMIN — PHENOBARBITAL 64.8 MG: 64.8 TABLET ORAL at 02:40

## 2021-08-20 RX ADMIN — Medication 100 MG: at 08:13

## 2021-08-20 RX ADMIN — MAGNESIUM HYDROXIDE 30 ML: 400 SUSPENSION ORAL at 08:12

## 2021-08-20 RX ADMIN — ACETAMINOPHEN 650 MG: 325 TABLET ORAL at 02:40

## 2021-08-20 RX ADMIN — GABAPENTIN 600 MG: 300 CAPSULE ORAL at 21:06

## 2021-08-20 RX ADMIN — QUETIAPINE FUMARATE 200 MG: 100 TABLET ORAL at 21:06

## 2021-08-20 RX ADMIN — HYDROXYZINE HYDROCHLORIDE 25 MG: 25 TABLET, FILM COATED ORAL at 17:07

## 2021-08-20 RX ADMIN — PHENOBARBITAL 48.6 MG: 32.4 TABLET ORAL at 21:06

## 2021-08-20 RX ADMIN — GABAPENTIN 600 MG: 300 CAPSULE ORAL at 11:22

## 2021-08-20 RX ADMIN — SERTRALINE 50 MG: 50 TABLET, FILM COATED ORAL at 08:11

## 2021-08-20 RX ADMIN — GABAPENTIN 600 MG: 300 CAPSULE ORAL at 17:07

## 2021-08-20 RX ADMIN — PHENOBARBITAL 48.6 MG: 32.4 TABLET ORAL at 11:23

## 2021-08-20 RX ADMIN — LEVETIRACETAM 500 MG: 500 TABLET ORAL at 21:06

## 2021-08-20 RX ADMIN — FOLIC ACID 1 MG: 1 TABLET ORAL at 08:12

## 2021-08-20 NOTE — PROGRESS NOTES
Problem: Discharge Planning  Goal: *Discharge to safe environment  Outcome: Progressing Towards Goal  Note: Patient identifies home as a safe environment. Patient will return home upon discharge  Goal: *Knowledge of medication management  Outcome: Progressing Towards Goal  Note: Pt agrees to take medication as prescribed. Goal: *Knowledge of discharge instructions  Outcome: Progressing Towards Goal  Note: Pt verbalized understanding of goals for tx and discharge.

## 2021-08-20 NOTE — INTERDISCIPLINARY ROUNDS
Behavioral Health Interdisciplinary Rounds     Patient Name: Logan Vanegas  Age: 74 y.o. Room/Bed:  725/  Primary Diagnosis: <principal problem not specified>   Admission Status: Voluntary     Readmission within 30 days: no  Power of  in place: no  Patient requires a blocked bed: yes          Reason for blocked bed: Active MRSA infection with wounds on LLE    VTE Prophylaxis: No    Mobility needs/Fall risk: no  Flu Vaccine : no   Nutritional Plan: no  Consults: N/A         Labs/Testing due today?: no    Sleep hours:  4       Participation in Care/Groups:  no  Medication Compliant?: Yes  PRNS (last 24 hours): Antianxiety    Restraints (last 24 hours):  no     CIWA (range last 24 hours): CIWA-Ar Total: 0   COWS (range last 24 hours):      Alcohol screening (AUDIT) completed -   AUDIT Score: 19     If applicable, date SBIRT discussed in treatment team AND documented:   AUDIT Screen Score: AUDIT Score: 19      Document Brief Intervention (corresponds directly with the 5 A's, Ask, Advise, Assess, Assist, and Arrange): At- Risk Patients (Score 7-15 for women; 8-15 for men)  Discuss concern patient is drinking at unhealthy levels known to increase risk of alcohol-related health problems. Is Patient ready to commit to change? If No:   Encourage reflection   Discuss short term and long term health risks of consuming alcohol   Barriers to change   Reaffirm willingness to help / Educational materials provided  If Yes:   Set goal  Estech provided    Harmful use or Dependence (Score 16 or greater)   Discuss short term and long term health risks of consuming alcohol   Recommendations   Negotiate drinking goal   Recommend addiction specialist/center   Arrange follow-up appointments.     Tobacco - patient is a smoker: Have You Used Tobacco in the Past 30 Days: No  Illegal Drugs use: Have You Used Any Illegal Substances Over the Past 12 Months: No    24 hour chart check complete:      Patient goal(s) for today:   Treatment team focus/goals:   Progress note     LOS:  0  Expected LOS: TBD    Financial concerns/prescription coverage:    Family contact:       Family requesting physician contact today:    Discharge plan:   Access to weapons :         Outpatient provider(s):   Patient's preferred phone number for follow up call :   Patient's preferred e-mail address :  Participating treatment team members:  Satya Fletcher, * (assigned SW),

## 2021-08-20 NOTE — BH NOTES
GROUP THERAPY PROGRESS NOTE     Patient did not participate in self-care group.      CLARK Gay, Supervisee in Social Work

## 2021-08-20 NOTE — BH NOTES
GROUP THERAPY PROGRESS NOTE     Patient did not participate in psychotherapy group.      CLARK Sanchez, Supervisee in Social Work

## 2021-08-20 NOTE — BH NOTES
Behavioral Health Interdisciplinary Rounds     Patient Name: Magdy Rivera  Age: 74 y.o. Room/Bed:  725/  Primary Diagnosis: <principal problem not specified>   Admission Status:      Readmission within 30 days:   Power of  in place: Patient requires a blocked bed:           Reason for blocked bed:     VTE Prophylaxis:     Mobility needs/Fall risk:   Flu Vaccine :    Nutritional Plan:   Consults: **         Labs/Testing due today?:     Sleep hours:       Participation in Care/Groups:    Medication Compliant?:   PRNS (last 24 hours):     Restraints (last 24 hours):       CIWA (range last 24 hours): CIWA-Ar Total: 4   COWS (range last 24 hours):      Alcohol screening (AUDIT) completed -   AUDIT Score: 19     If applicable, date SBIRT discussed in treatment team AND documented:   AUDIT Screen Score: AUDIT Score: 19      Document Brief Intervention (corresponds directly with the 5 A's, Ask, Advise, Assess, Assist, and Arrange): At- Risk Patients (Score 7-15 for women; 8-15 for men)  Discuss concern patient is drinking at unhealthy levels known to increase risk of alcohol-related health problems. Is Patient ready to commit to change? If No:   Encourage reflection   Discuss short term and long term health risks of consuming alcohol   Barriers to change   Reaffirm willingness to help / Educational materials provided  If Yes:   Set goal  Bebitos provided    Harmful use or Dependence (Score 16 or greater)   Discuss short term and long term health risks of consuming alcohol   Recommendations   Negotiate drinking goal   Recommend addiction specialist/center   Arrange follow-up appointments.     Tobacco - patient is a smoker: Have You Used Tobacco in the Past 30 Days: No  Illegal Drugs use: Have You Used Any Illegal Substances Over the Past 12 Months: No    24 hour chart check complete:      Patient goal(s) for today: meet treatment team, acclimate to unit  Treatment team focus/goals: assess needs for treatment and safe discharge. Progress note:  Alert and oriented. Reported pain in his back. Last paranoia episode was ~1 year ago. Denied SI. Will restart patient on Gabapentin today. Mood is depressed. Pt has tremors from withdrawals. Pleasant and cooperative. Thought process is coherent. MSW called CM and left VM 8/20/21. LOS:  1  Expected LOS: TBD    Financial concerns/prescription coverage: [facility] MEDICARE/[facility]  Family contact:  No PATRICE signed     Family requesting physician contact today:  No   Discharge plan: Return home   Access to weapons: n/a     Outpatient provider(s): [facility], Dr. Ban Olvera; [facility]  Patient's preferred phone number for follow up call: unknown   Patient's preferred e-mail address: unknown     Participating treatment team members: Frankie Griffin, Supervisee in Social Work; Stephanie Dixon NP; Adali Randhawa RN.

## 2021-08-20 NOTE — PROGRESS NOTES
Problem: Altered Thought Process (Adult/Pediatric)  Goal: *STG: Seeks staff when feelings of anxiety and fear arise  Outcome: Progressing Towards Goal  Goal: *STG: Complies with medication therapy  Outcome: Progressing Towards Goal     Problem: Falls - Risk of  Goal: *Absence of Falls  Description: Document Ramon Fall Risk and appropriate interventions in the flowsheet. Outcome: Progressing Towards Goal  Note: Fall Risk Interventions:    Medication Interventions: Teach patient to arise slowly    P/t appears to be asleep in bed, no distress noted, breathing unlabored, will continue to monitor patient Q15 mins for safety rounds.

## 2021-08-20 NOTE — BH NOTES
PSYCHOSOCIAL ASSESSMENT  :Patient identifying info: Alejandra Last is a 74 y.o., male admitted 8/19/2021  5:12 AM     Presenting problem and precipitating factors: Patient was admitted to the ED for SI and alcohol withdrawal. Pt consumes 4-5 bottles of liquor. He had a plan to OD on medication; he has had a history of OD. He is connected to [facility]. Hx of seizure disorder. Compliant with his medication. Dx with Schizophrenia age 50. Hx of substance abuse. Mental status assessment: Alert and oriented. Reported pain in his back. Last paranoia episode was ~1 year ago. Denied SI. Will restart patient on Gabapentin today. Mood is depressed. Pt has tremors from withdrawals. Pleasant and cooperative. Thought process is coherent. Strengths: outpatient providers; stable housing. Collateral information: no PATRICE sigend. Current psychiatric /substance abuse providers and contact info: [facility]; Tania Bustillo     Previous psychiatric/substance abuse providers and response to treatment: yes, several; last admission at [facility] ~1 year ago and dx with Schizoaffective disorder, depressed type     Family history of mental illness or substance abuse: none noted     Substance abuse history:    Social History     Tobacco Use    Smoking status: Former Smoker     Packs/day: 1.00     Years: 10.00     Pack years: 10.00    Smokeless tobacco: Never Used    Tobacco comment: patient has been decreasing amount of cigarettes   Substance Use Topics    Alcohol use: No     Alcohol/week: 35.0 standard drinks     Types: 42 Cans of beer per week     Comment: states stopped drinking 1 month ago       History of biomedical complications associated with substance abuse: tremors, sweats     Patient's current acceptance of treatment or motivation for change: voluntary admission     Family constellation: single; 5 children     Is significant other involved?  No       Describe support system: none noted     Describe living arrangements and home environment: pt lives alone     Health issues:   Hospital Problems  Date Reviewed: 3/17/2020        Codes Class Noted POA    [facility] ICD-10-CM: F20.9  ICD-9-CM: 295.90  2021 Unknown        Depression ICD-10-CM: F32.9  ICD-9-CM: 109  2021 Unknown        ETOH abuse ICD-10-CM: F10.10  ICD-9-CM: 305.00  2021 Unknown              Trauma history: none noted     Legal issues: none noted     History of  service: none noted     Financial status: disability     Presybeterian/cultural factors: none noted     Education/work history: bachelors in social work; small for many years     Have you been licensed as a health care professional (current or ): No     Leisure and recreation preferences: none noted     Describe coping skills: limited, ineffectual     Frankie Duarte  2021

## 2021-08-20 NOTE — BH NOTES
64.8 mg phenobarbital given for a CIWA of 9. Will continue to monitor. Hour after med pt appears to be sleeping.

## 2021-08-20 NOTE — PROGRESS NOTES
Problem: Falls - Risk of  Goal: *Absence of Falls  Description: Document Greta Lynn Fall Risk and appropriate interventions in the flowsheet. Outcome: Progressing Towards Goal  Note: Fall Risk Interventions:            Medication Interventions: Teach patient to arise slowly                   Problem: Depressed Mood (Adult/Pediatric)  Goal: *STG: Participates in treatment plan  Outcome: Progressing Towards Goal  Note: Out on unit w sad and flat CIWA unremarkable, vitals stable. Sleep improved with 6 hours. Review medications and etoh detox protocol with pt verbalizing understanding.  Staff focus is on monitoring for safety and offering support  Goal: *STG: Verbalizes anger, guilt, and other feelings in a constructive manor  Outcome: Progressing Towards Goal  Goal: *STG: Attends activities and groups  Outcome: Progressing Towards Goal  Goal: *STG: Demonstrates reduction in symptoms and increase in insight into coping skills/future focused  Outcome: Progressing Towards Goal  Goal: Interventions  Outcome: Progressing Towards Goal     Problem: Alcohol Withdrawal  Goal: *STG: Seeks staff when symptoms of withdrawal increase  Outcome: Progressing Towards Goal  Goal: *STG: Will identify negative impact of chemical dependency including the use of tobacco, alcohol, and other substances  Outcome: Progressing Towards Goal  Goal: Interventions  Outcome: Progressing Towards Goal

## 2021-08-20 NOTE — BH NOTES
PSYCHIATRIC PROGRESS NOTE       Patient: Wero Rowell MRN: [id number]  SSN: [id number]    YOB: 1955  Age: 74 y.o. Sex: male      Admit Date: 8/19/2021    LOS: 1 day       Chief Complaint:  My back hurts. Interval History: Wero Rowell says he has history of psychosis. Last time he had paranoia was a year ago. Says his back hurts and is supposed to be on gabapentin. Says he's been compliant with seroquel and zoloft. He says he is going through withdrawal symptoms, moderate tremulousness, diaphoretic. He denies si hi or avh. At the present time the patient Wero Rowell remains compliant with taking medications. Denies any adverse events from taking them and feels they have been beneficial. CMP reviewed.       Past Medical History:  Past Medical History:   Diagnosis Date    Depression     DJD (degenerative joint disease) 3/8/2010    HTN (hypertension) 3/8/2010    STATES NO LONGER TAKING MEDICATION-STATES MD STOPPED    Insomnia     NIDDM (non-insulin dependent diabetes mellitus) 3/8/2010    Other ill-defined conditions(799.89)     BPH    Other ill-defined conditions(799.89)     previous hx of DTs from ETOH withdrawal    Other ill-defined conditions(799.89)     DDD/back problems    Psychiatric disorder     paranoid schizophrenia, anxiety    Substance [facility] ([facility]     Suicidal thoughts          ALLERGIES:(reviewed/updated 8/20/2021)  Allergies   Allergen Reactions    Motrin [Ibuprofen] Hives     PT STATES NOT AN ALLERGY       Laboratory report:  Lab Results   Component Value Date/Time    WBC 5.2 08/19/2021 05:28 AM    Hemoglobin (POC) 16.3 01/14/2013 05:28 PM    HGB 14.4 08/19/2021 05:28 AM    Hematocrit (POC) 48 01/14/2013 05:28 PM    HCT 44.7 08/19/2021 05:28 AM    PLATELET 68 (L) 24/92/7582 05:28 AM    MCV 93.5 08/19/2021 05:28 AM      Lab Results   Component Value Date/Time    Sodium 137 08/20/2021 05:29 AM    Potassium 4.1 08/20/2021 05:29 AM    Chloride 105 08/20/2021 05:29 AM CO2 27 08/20/2021 05:29 AM    Anion gap 5 08/20/2021 05:29 AM    Glucose 85 08/20/2021 05:29 AM    Glucose 85 03/22/2020 04:24 AM    BUN 19 08/20/2021 05:29 AM    Creatinine 0.92 08/20/2021 05:29 AM    BUN/Creatinine ratio 21 (H) 08/20/2021 05:29 AM    GFR est AA >60 08/20/2021 05:29 AM    GFR est non-AA >60 08/20/2021 05:29 AM    Calcium 8.3 (L) 08/20/2021 05:29 AM    Bilirubin, total 0.8 08/20/2021 05:29 AM    Alk. phosphatase 127 (H) 08/20/2021 05:29 AM    Protein, total 7.2 08/20/2021 05:29 AM    Albumin 3.4 (L) 08/20/2021 05:29 AM    Globulin 3.8 08/20/2021 05:29 AM    A-G Ratio 0.9 (L) 08/20/2021 05:29 AM    ALT (SGPT) 68 08/20/2021 05:29 AM      Vitals:    08/19/21 1152 08/19/21 1639 08/19/21 2100 08/20/21 0753   BP: 122/65 (!) 145/87 (!) 142/95 137/72   Pulse: 75 67 82 70   Resp: 16 16 16 16   Temp: 97.6 °F (36.4 °C) 97.7 °F (36.5 °C) 98 °F (36.7 °C) 97.3 °F (36.3 °C)   SpO2: 95% 95%     Height:           No results found for: VALF2, VALAC, VALP, VALPR, DS6, CRBAM, CRBAMP, CARB2, XCRBAM  No results found for: LITHM    Vital Signs  Patient Vitals for the past 24 hrs:   Temp Pulse Resp BP SpO2   08/20/21 0753 97.3 °F (36.3 °C) 70 16 137/72    08/19/21 2100 98 °F (36.7 °C) 82 16 (!) 142/95    08/19/21 1639 97.7 °F (36.5 °C) 67 16 (!) 145/87 95 %   08/19/21 1152 97.6 °F (36.4 °C) 75 16 122/65 95 %     Wt Readings from Last 3 Encounters:   11/14/20 117.9 kg (260 lb)   03/22/20 105.5 kg (232 lb 9.6 oz)   03/21/20 18.1 kg (40 lb)     Temp Readings from Last 3 Encounters:   08/20/21 97.3 °F (36.3 °C)   04/25/21 98.3 °F (36.8 °C)   11/14/20 97.5 °F (36.4 °C)     BP Readings from Last 3 Encounters:   08/20/21 137/72   04/25/21 (!) 164/85   11/14/20 137/81     Pulse Readings from Last 3 Encounters:   08/20/21 70   04/25/21 75   11/14/20 65       Radiology (reviewed/updated 8/20/2021)  No results found.     Current Facility-Administered Medications   Medication Dose Route Frequency Provider Last Rate Last Admin    OLANZapine (ZyPREXA) tablet 2.5 mg  2.5 mg Oral Q6H PRN Lynn Montoya NP        haloperidol lactate (HALDOL) injection 2.5 mg  2.5 mg IntraMUSCular Q6H PRN Lynn Montoya NP        benztropine (COGENTIN) tablet 0.5 mg  0.5 mg Oral BID PRN Lynn Montoya NP        diphenhydrAMINE (BENADRYL) injection 25 mg  25 mg IntraMUSCular BID PRN Lynn Montoya NP        acetaminophen (TYLENOL) tablet 650 mg  650 mg Oral Q4H PRN Lynn Montoya NP   650 mg at 08/20/21 0240    magnesium hydroxide (MILK OF MAGNESIA) 400 mg/5 mL oral suspension 30 mL  30 mL Oral DAILY PRN Nicolle COTTO NP   30 mL at 08/20/21 4239    PHENobarbitaL (LUMINAL) tablet 32.4 mg  32.4 mg Oral QID Lynn Montoya NP   32.4 mg at 08/20/21 5213    Followed by   Mallissa Going ON 8/21/2021] PHENobarbitaL (LUMINAL) tablet 32.4 mg  32.4 mg Oral BID Lynn Montoya NP        Followed by   Mallissa Going ON 8/22/2021] PHENobarbitaL (LUMINAL) tablet 16.2 mg  16.2 mg Oral BID Lynn Montoya NP        PHENobarbitaL (LUMINAL) tablet 32.4 mg  32.4 mg Oral Q6H PRN Lynn Montoya NP        Followed by   Mallissa Going ON 8/22/2021] PHENobarbitaL (LUMINAL) tablet 16.2 mg  16.2 mg Oral Q6H PRN Lynn Montoya NP        thiamine HCL (B-1) tablet 100 mg  100 mg Oral DAILY Lynn Montoya NP   100 mg at 01/03/30 8361    folic acid (FOLVITE) tablet 1 mg  1 mg Oral DAILY Lynn Montoya NP   1 mg at 08/20/21 0812    therapeutic multivitamin (THERAGRAN) tablet 1 Tablet  1 Tablet Oral DAILY Lynn Montoya NP   1 Tablet at 08/20/21 0811    traZODone (DESYREL) tablet 25 mg  25 mg Oral QHS PRN Lynn Montoya NP   25 mg at 08/19/21 2050    hydrOXYzine HCL (ATARAX) tablet 25 mg  25 mg Oral Q6H PRN Lynn Montoya NP   25 mg at 08/19/21 1818    levETIRAcetam (KEPPRA) tablet 500 mg  500 mg Oral BID Lynn Montoya NP   500 mg at 08/20/21 0812    sertraline (ZOLOFT) tablet 50 mg 50 mg Oral DAILY Lynn Montoya NP   50 mg at 08/20/21 0811    QUEtiapine (SEROquel) tablet 100 mg  100 mg Oral QHS Lynn Montoya NP   100 mg at 08/19/21 2049       Side Effects: (reviewed/updated 8/20/2021)  None reported or admitted to. Review of Systems: (reviewed/updated 8/20/2021)  Appetite: good  Sleep: good   All other Review of Systems:negative    Mental Status Exam:  Eye contact: Good eye contact  Psychomotor activity: decreased  Speech is spontaneous  Thought process: Logical and goal directed   Mood is \"ok\"  Affect:  Blunted  Perception: No avh  Suicidal ideation: No si  Homicidal ideation: No hi  Insight/judgment: *Poor  Cognition is grossly intact      Physical Exam:  Musculoskeletal system: steady gait  Tremor not present  Cog wheeling not present      Assessment and Plan:  Anais Valdez meets criteria for a diagnosis of  Schizoaffective disorder, depressed type by history; alcohol use disorder, severe. Phenobarb 48.6 mg qid and every 6 hours prn. Increase seroquel to 200 mg and increase zoloft to 100 mg. Restart neurontin 600 mg tid. Continue medications as prescribed. We will closely monitor for safety. We will encourage reality orientation. Disposition planning to continue. I certify that this patients inpatient psychiatric hospital services furnished since the previous certification were, and continue to be, required for treatment that could reasonably be expected to improve the patient's condition, or for diagnostic study, and that the patient continues to need, on a daily basis, active treatment furnished directly by or requiring the supervision of inpatient psychiatric facility personnel. In addition, the hospital records show that services furnished were intensive treatment services, admission or related services, or equivalent services.       Signed:  Cynthia Maynard NP  8/20/2021

## 2021-08-21 PROCEDURE — 74011250637 HC RX REV CODE- 250/637: Performed by: PSYCHIATRY & NEUROLOGY

## 2021-08-21 PROCEDURE — 74011250637 HC RX REV CODE- 250/637: Performed by: NURSE PRACTITIONER

## 2021-08-21 PROCEDURE — 65220000003 HC RM SEMIPRIVATE PSYCH

## 2021-08-21 RX ORDER — PHENOBARBITAL 32.4 MG/1
32.4 TABLET ORAL
Status: DISCONTINUED | OUTPATIENT
Start: 2021-08-21 | End: 2021-08-23 | Stop reason: HOSPADM

## 2021-08-21 RX ORDER — PHENOBARBITAL 32.4 MG/1
32.4 TABLET ORAL 4 TIMES DAILY
Status: DISCONTINUED | OUTPATIENT
Start: 2021-08-21 | End: 2021-08-23

## 2021-08-21 RX ADMIN — FOLIC ACID 1 MG: 1 TABLET ORAL at 08:19

## 2021-08-21 RX ADMIN — Medication 100 MG: at 08:19

## 2021-08-21 RX ADMIN — MAGNESIUM HYDROXIDE 30 ML: 400 SUSPENSION ORAL at 08:21

## 2021-08-21 RX ADMIN — GABAPENTIN 600 MG: 300 CAPSULE ORAL at 14:58

## 2021-08-21 RX ADMIN — SERTRALINE 100 MG: 50 TABLET, FILM COATED ORAL at 08:19

## 2021-08-21 RX ADMIN — GABAPENTIN 600 MG: 300 CAPSULE ORAL at 20:58

## 2021-08-21 RX ADMIN — LEVETIRACETAM 500 MG: 500 TABLET ORAL at 20:58

## 2021-08-21 RX ADMIN — THERA TABS 1 TABLET: TAB at 08:19

## 2021-08-21 RX ADMIN — PHENOBARBITAL 32.4 MG: 32.4 TABLET ORAL at 17:15

## 2021-08-21 RX ADMIN — LEVETIRACETAM 500 MG: 500 TABLET ORAL at 08:19

## 2021-08-21 RX ADMIN — PHENOBARBITAL 32.4 MG: 32.4 TABLET ORAL at 14:58

## 2021-08-21 RX ADMIN — QUETIAPINE FUMARATE 200 MG: 100 TABLET ORAL at 20:58

## 2021-08-21 RX ADMIN — GABAPENTIN 600 MG: 300 CAPSULE ORAL at 08:19

## 2021-08-21 RX ADMIN — PHENOBARBITAL 32.4 MG: 32.4 TABLET ORAL at 13:00

## 2021-08-21 RX ADMIN — PHENOBARBITAL 32.4 MG: 32.4 TABLET ORAL at 20:59

## 2021-08-21 RX ADMIN — PHENOBARBITAL 48.6 MG: 32.4 TABLET ORAL at 08:19

## 2021-08-21 NOTE — BH NOTES
GROUP THERAPY PROGRESS NOTE    Patient is participating in [employer], Discharge, and Goals Group. Group time: 50 minutes    Personal goal for participation: Process feelings related to discharge and/or feelings/goals for today. Goal orientation: Personal    Group therapy participation: active    Therapeutic interventions reviewed and discussed: Group discussion was focused on discharge plans and anxiety related to this. Group members discussed what they planned to do once discharge and discharge plans. Discussion also related to support and communication issues that arise. Group members verbalized how they are feeling today, their personal goal for today, and goals for the week. Patients were given an opportunity to share any concerns and issues they were having. Patients completed safety plans. Impression of participation: Giuseppe Parada actively participated in group. Patient expressed frustration with his medication last night, and MSW encouraged pt to focus on grounding himself and to share this with the medical provider. Pt was agreeable. He is pleasant and cooperative. Set a goal to continue taking his medication and advocate for himself. Did not express concerns about discharge. Affect is brighter.      Frankie Duarte, Supervisee in Social Work

## 2021-08-21 NOTE — BH NOTES
GROUP THERAPY PROGRESS NOTE    Patient is participating in Recreational Therapy Group. Group time: 60 minutes    Personal goal for participation: to participate in a recreational therapy activity      Goal orientation: Personal    Group therapy participation: active    Therapeutic interventions reviewed and discussed: Group members participated in recreational therapy. They were given the opportunity to color, complete puzzles, word games, play cards, and/or watch a movie. Each patient was encouraged to socialize on the unit and interact with one another. Impression of participation: Patient participated in a rec activity. Social on the milieu.       Frankie Duarte, Supervisee in Social Work

## 2021-08-21 NOTE — PROGRESS NOTES
Problem: Falls - Risk of  Goal: *Absence of Falls  Description: Document Farhana Nicky Fall Risk and appropriate interventions in the flowsheet. Outcome: Progressing Towards Goal  Note: Fall Risk Interventions:     Medication Interventions: Teach patient to arise slowly    Problem: Depressed Mood (Adult/Pediatric)  Goal: *STG: Demonstrates reduction in symptoms and increase in insight into coping skills/future focused  Outcome: Progressing Towards Goal     Problem: Discharge Planning  Goal: *Knowledge of medication management  Outcome: Progressing Towards Goal    P/t appears to be asleep in bed, no distress noted, breathing unlabored, will continue to monitor patient Q15 mins for safety rounds.

## 2021-08-21 NOTE — BH NOTES
GROUP THERAPY PROGRESS NOTE    Patient is participating in Treatment Team Group. Group time: 11am-12pm    Personal goal for participation: To participate in treatment plan and discharge     Goal orientation: Personal    Group therapy participation: active     Therapeutic interventions reviewed and discussed: Group members met with their treatment team individually and discussed their treatment plan with their provider, , nurse, and pharmacist. Each patient was given the opportunity to ask questions related to their discharge and/or medications. Impression of participation: Patient participated in treatment team. Engaged in conversation and discussion. Asked questions about treatment plan and discharge coordination.      Frankie Duarte, Supervisee in Social Work

## 2021-08-21 NOTE — PROGRESS NOTES
Problem: Falls - Risk of  Goal: *Absence of Falls  Description: Document Ji Pastrana Fall Risk and appropriate interventions in the flowsheet. Outcome: Progressing Towards Goal  Note: Fall Risk Interventions:            Medication Interventions: Teach patient to arise slowly                   Problem: Patient Education: Go to Patient Education Activity  Goal: Patient/Family Education  Outcome: Progressing Towards Goal     Problem: Depressed Mood (Adult/Pediatric)  Goal: *STG: Participates in treatment plan  Outcome: Progressing Towards Goal  Note: Pt participated in treatment team. No complaints of poor sleep last night. Continues to feel anxious during the shift. Denies any thoughts of self harm. Encouraged pt to attend groups and participate.   Goal: Interventions  Outcome: Progressing Towards Goal     Problem: Patient Education: Go to Patient Education Activity  Goal: Patient/Family Education  Outcome: Progressing Towards Goal

## 2021-08-21 NOTE — PROGRESS NOTES
Problem: Falls - Risk of  Goal: *Absence of Falls  Description: Document Caterina Sandoval Fall Risk and appropriate interventions in the flowsheet. Outcome: Progressing Towards Goal  Note: Fall Risk Interventions:       Medication Interventions: Teach patient to arise slowly         Problem: Depressed Mood (Adult/Pediatric)  Goal: *STG: Participates in treatment plan  Outcome: Progressing Towards Goal   Pt received awake in day room, no distress noted, pt calm and cooperative, pt meal and medication compliant, will continue to monitor patient q15 mins for safety.

## 2021-08-21 NOTE — BH NOTES
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    Alcohol abuse 01/15/2013    Noncompliance with treatment 05/18/2011    HTN (hypertension) 03/08/2010    NIDDM (non-insulin dependent diabetes mellitus) 03/08/2010    DJD (degenerative joint disease) 03/08/2010     Past Surgical History:   Procedure Laterality Date    BIOPSY PROSTATE      HX UROLOGICAL      turp      Allergies   Allergen Reactions    Motrin [Ibuprofen] Hives     PT STATES NOT AN ALLERGY      Social History     Tobacco Use    Smoking status: Former Smoker     Packs/day: 1.00     Years: 10.00     Pack years: 10.00    Smokeless tobacco: Never Used    Tobacco comment: patient has been decreasing amount of cigarettes   Substance Use Topics    Alcohol use: No     Alcohol/week: 35.0 standard drinks     Types: 42 Cans of beer per week     Comment: states stopped drinking 1 month ago      Family History   Problem Relation Age of Onset    Cancer Mother         lymphoma    Heart Disease Father         CHF        Review of Systems  Pertinent items are noted in HPI. Objective:         Patient Vitals for the past 8 hrs:   BP Temp Pulse Resp SpO2   08/21/21 0758 (!) 147/97 98 °F (36.7 °C) 65 16 96 %       Lab/Data Review: All lab results for the last 24 hours reviewed.     Mental Status exam: WNL     Assessment/Plan:   Active Problems:    Schizophrenia ([facility] [facility] (8/19/2021)      Depression (8/19/2021)      ETOH abuse (8/19/2021)          Medications:    Current Facility-Administered Medications   Medication Dose Route Frequency    gabapentin (NEURONTIN) capsule 600 mg  600 mg Oral TID    QUEtiapine (SEROquel) tablet 200 mg  200 mg Oral QHS    sertraline (ZOLOFT) tablet 100 mg  100 mg Oral DAILY    PHENobarbitaL (LUMINAL) tablet 48.6 mg  48.6 mg Oral QID    PHENobarbitaL (LUMINAL) tablet 48.6 mg  48.6 mg Oral Q6H PRN    OLANZapine (ZyPREXA) tablet 2.5 mg  2.5 mg Oral Q6H PRN    haloperidol lactate (HALDOL) injection 2.5 mg  2.5 mg IntraMUSCular Q6H PRN    benztropine (COGENTIN) tablet 0.5 mg  0.5 mg Oral BID PRN    diphenhydrAMINE (BENADRYL) injection 25 mg  25 mg IntraMUSCular BID PRN    acetaminophen (TYLENOL) tablet 650 mg  650 mg Oral Q4H PRN    magnesium hydroxide (MILK OF MAGNESIA) 400 mg/5 mL oral suspension 30 mL  30 mL Oral DAILY PRN    thiamine HCL (B-1) tablet 100 mg  100 mg Oral DAILY    folic acid (FOLVITE) tablet 1 mg  1 mg Oral DAILY    therapeutic multivitamin (THERAGRAN) tablet 1 Tablet  1 Tablet Oral DAILY    traZODone (DESYREL) tablet 25 mg  25 mg Oral QHS PRN    hydrOXYzine HCL (ATARAX) tablet 25 mg  25 mg Oral Q6H PRN    levETIRAcetam (KEPPRA) tablet 500 mg  500 mg Oral BID       Side Effects:  none    The following information was reviewed and discussed:  patient given opportunity to ask questions

## 2021-08-22 PROCEDURE — 65220000003 HC RM SEMIPRIVATE PSYCH

## 2021-08-22 PROCEDURE — 74011250637 HC RX REV CODE- 250/637: Performed by: NURSE PRACTITIONER

## 2021-08-22 PROCEDURE — 74011250637 HC RX REV CODE- 250/637: Performed by: PSYCHIATRY & NEUROLOGY

## 2021-08-22 RX ADMIN — THERA TABS 1 TABLET: TAB at 08:35

## 2021-08-22 RX ADMIN — LEVETIRACETAM 500 MG: 500 TABLET ORAL at 08:35

## 2021-08-22 RX ADMIN — GABAPENTIN 600 MG: 300 CAPSULE ORAL at 08:35

## 2021-08-22 RX ADMIN — GABAPENTIN 600 MG: 300 CAPSULE ORAL at 16:50

## 2021-08-22 RX ADMIN — SERTRALINE 100 MG: 50 TABLET, FILM COATED ORAL at 08:35

## 2021-08-22 RX ADMIN — PHENOBARBITAL 32.4 MG: 32.4 TABLET ORAL at 12:49

## 2021-08-22 RX ADMIN — GABAPENTIN 600 MG: 300 CAPSULE ORAL at 20:51

## 2021-08-22 RX ADMIN — QUETIAPINE FUMARATE 200 MG: 100 TABLET ORAL at 20:51

## 2021-08-22 RX ADMIN — FOLIC ACID 1 MG: 1 TABLET ORAL at 08:35

## 2021-08-22 RX ADMIN — PHENOBARBITAL 32.4 MG: 32.4 TABLET ORAL at 16:50

## 2021-08-22 RX ADMIN — Medication 100 MG: at 08:35

## 2021-08-22 RX ADMIN — PHENOBARBITAL 32.4 MG: 32.4 TABLET ORAL at 20:51

## 2021-08-22 RX ADMIN — PHENOBARBITAL 32.4 MG: 32.4 TABLET ORAL at 02:37

## 2021-08-22 RX ADMIN — PHENOBARBITAL 32.4 MG: 32.4 TABLET ORAL at 08:35

## 2021-08-22 RX ADMIN — LEVETIRACETAM 500 MG: 500 TABLET ORAL at 20:51

## 2021-08-22 NOTE — BH NOTES
GROUP THERAPY PROGRESS NOTE    Patient is participating in Treatment Team Group. Group time: 10:30AM-11:30AM    Personal goal for participation: To participate in treatment plan and discharge     Goal orientation: Personal    Group therapy participation: active     Therapeutic interventions reviewed and discussed: Group members met with their treatment team individually and discussed their treatment plan with their provider, , nurse, and pharmacist. Each patient was given the opportunity to ask questions related to their discharge and/or medications. Impression of participation: Patient participated in treatment team. Engaged in conversation and discussion. Asked questions about treatment plan and discharge coordination.      Frankie Duarte, Supervisee in Social Work

## 2021-08-22 NOTE — PROGRESS NOTES
Problem: Falls - Risk of  Goal: *Absence of Falls  Description: Document Lubna Navas Fall Risk and appropriate interventions in the flowsheet. Outcome: Progressing Towards Goal  Note: Fall Risk Interventions:       Medication Interventions: Teach patient to arise slowly  Problem: Patient Education: Go to Patient Education Activity  Goal: Patient/Family Education  Outcome: Progressing Towards Goal     Problem: Depressed Mood (Adult/Pediatric)  Goal: *STG: Participates in treatment plan  Outcome: Progressing Towards Goal  Note: Pt participated in treatment team. Out on the unit and interacting with select peers. CIWA was 4. Complaining of poor sleep last night. Denies any thoughts of self harm. Encouraged pt to attend groups and participate.     Goal: Interventions  Outcome: Progressing Towards Goal     Problem: Patient Education: Go to Patient Education Activity  Goal: Patient/Family Education  Outcome: Progressing Towards Goal

## 2021-08-22 NOTE — BH NOTES
Adult Progress Note    Date: 8/22/2021  Account Number:  [de-identified]  Name: Cosme Judge  Diagnosis: schizophrenia, alcohol use disorder  Length of session: 10 minutes    Subjective:     Patient Active Problem List    Diagnosis Date Noted    [facility] 08/19/2021    Depression 08/19/2021    ETOH abuse 08/19/2021    Substance induced mood disorder (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    Alcohol abuse 01/15/2013    Noncompliance with treatment 05/18/2011    HTN (hypertension) 03/08/2010    NIDDM (non-insulin dependent diabetes mellitus) 03/08/2010    DJD (degenerative joint disease) 03/08/2010     Past Surgical History:   Procedure Laterality Date    BIOPSY PROSTATE      HX UROLOGICAL      turp      Allergies   Allergen Reactions    Motrin [Ibuprofen] Hives     PT STATES NOT AN ALLERGY      Social History     Tobacco Use    Smoking status: Former Smoker     Packs/day: 1.00     Years: 10.00     Pack years: 10.00    Smokeless tobacco: Never Used    Tobacco comment: patient has been decreasing amount of cigarettes   Substance Use Topics    Alcohol use: No     Alcohol/week: 35.0 standard drinks     Types: 42 Cans of beer per week     Comment: states stopped drinking 1 month ago      Family History   Problem Relation Age of Onset    Cancer Mother         lymphoma    Heart Disease Father         CHF        Review of Systems  Pertinent items are noted in HPI. Objective:         Patient Vitals for the past 8 hrs:   BP Temp Pulse Resp SpO2 Weight   08/22/21 1103 118/86 97.3 °F (36.3 °C) 65 16 95 %    08/22/21 0743 (!) 126/90 97.5 °F (36.4 °C) 75 16 95 % 133.9 kg (295 lb 1.6 oz)       Lab/Data Review: All lab results for the last 24 hours reviewed.     Mental Status exam: WNL     Assessment/Plan:   Active Problems:    Schizophrenia ([facility] (8/19/2021)      Depression (8/19/2021)      ETOH abuse (8/19/2021)        Medications:    Current Facility-Administered Medications   Medication Dose Route Frequency    PHENobarbitaL (LUMINAL) tablet 32.4 mg  32.4 mg Oral QID    PHENobarbitaL (LUMINAL) tablet 32.4 mg  32.4 mg Oral Q6H PRN    gabapentin (NEURONTIN) capsule 600 mg  600 mg Oral TID    QUEtiapine (SEROquel) tablet 200 mg  200 mg Oral QHS    sertraline (ZOLOFT) tablet 100 mg  100 mg Oral DAILY    OLANZapine (ZyPREXA) tablet 2.5 mg  2.5 mg Oral Q6H PRN    haloperidol lactate (HALDOL) injection 2.5 mg  2.5 mg IntraMUSCular Q6H PRN    benztropine (COGENTIN) tablet 0.5 mg  0.5 mg Oral BID PRN    diphenhydrAMINE (BENADRYL) injection 25 mg  25 mg IntraMUSCular BID PRN    acetaminophen (TYLENOL) tablet 650 mg  650 mg Oral Q4H PRN    magnesium hydroxide (MILK OF MAGNESIA) 400 mg/5 mL oral suspension 30 mL  30 mL Oral DAILY PRN    thiamine HCL (B-1) tablet 100 mg  100 mg Oral DAILY    folic acid (FOLVITE) tablet 1 mg  1 mg Oral DAILY    therapeutic multivitamin (THERAGRAN) tablet 1 Tablet  1 Tablet Oral DAILY    traZODone (DESYREL) tablet 25 mg  25 mg Oral QHS PRN    hydrOXYzine HCL (ATARAX) tablet 25 mg  25 mg Oral Q6H PRN    levETIRAcetam (KEPPRA) tablet 500 mg  500 mg Oral BID       Side Effects:  none    The following information was reviewed and discussed:  patient given opportunity to ask questions

## 2021-08-22 NOTE — BH NOTES
GROUP THERAPY PROGRESS NOTE    Patient is participating in Leisure Education Group. Group time: 50 minutes    Personal goal for participation: Creating a safety plan for patients in crisis or having suicidal ideation      Goal orientation: Personal    Group therapy participation: active    Therapeutic interventions reviewed and discussed: Group discussion was focused answering the safety plan that [facility] is creating for patients when they discharge. Each question was explained to patients, and they had to fill out the answers individually. At the end, group members were encouraged to share parts of their safety plan or identify one takeaway from completing this. Group discussion included different ways patient could utilize this plan when they leave. Impression of participation: Trisha Traylor actively participated in group. Pt completed his safety plan. Identified his triggers, coping skills and support system. Pleasant and cooperative.      Frankie Duarte, Supervisee in Social Work

## 2021-08-22 NOTE — BH NOTES
GROUP THERAPY PROGRESS NOTE    Patient did not participate in Recreational Therapy Group      Frankie Duarte, Supervisee in Social Work

## 2021-08-22 NOTE — PROGRESS NOTES
Problem: Falls - Risk of  Goal: *Absence of Falls  Description: Document Sandie Nataly Fall Risk and appropriate interventions in the flowsheet. Outcome: Progressing Towards Goal  Note: Fall Risk Interventions:  Medication Interventions: Teach patient to arise slowly    Received patient asleep. Breathing even and unlabored. D23dilj checks for safety.

## 2021-08-22 NOTE — SUICIDE SAFETY PLAN
SAFETY PLAN    A suicide Safety Plan is a document that supports someone when they are having thoughts of suicide. Warning Signs that indicate a suicidal crisis may be developing: What (situations, thoughts, feelings, body sensations, behaviors, etc.) do you experience that lets you know you are beginning to think about suicide? 1. Drinking   2. Loneliness       Internal Coping Strategies:  What things can I do (relaxation techniques, hobbies, physical activities, etc.) to take my mind off my problems without contacting another person? 1. Watching T.V.  2. Composing music     People and social settings that provide distraction: Who can I call or where can I go to distract me? 1. Name: Vielka/wife  Phone: In my phone   2. Name: Carline/daughter Phone: [phone]  3. Place: [facility]            4. Place: [address] [place] [address] whom I can ask for help: Who can I call when I need help - for example, friends, family, clergy, someone else? 1. Name: Vielka/wife  Phone: In my phone   2. Name: Carline/daughter Phone: [phone]    Professionals or [address] [place] [address] I can contact during a crisis: Who can I call for help - for example, my doctor, my psychiatrist, my psychologist, a mental health provider, a suicide hotline? 1. Clinician Name:Daisy Singletary CM    Phone: [phone]      Clinician Pager or Emergency Contact #: [phone]    0. Clinician Name: Dr. Denny Zamora  Phone: [phone]    3. Suicide Prevention Lifeline: 4-920-629-TALK (8050)    4. [address] [place] [address] Emergency Services -  for example, [facility] suicide hotline, [facility] Hotline:       Emergency Services Address: [address] [facility], [facility]      Emergency Services Phone: [phone]    Making the environment safe: How can I make my environment (house/apartment/living space) safer? For example, can I remove guns, medications, and other items? 1.  Removing alcohol and stop consuming it

## 2021-08-22 NOTE — PROGRESS NOTES
Problem: Falls - Risk of  Goal: *Absence of Falls  Description: Document Lubna Navas Fall Risk and appropriate interventions in the flowsheet. Outcome: Progressing Towards Goal  Note: Fall Risk Interventions:    Medication Interventions: Teach patient to arise slowly    Problem: Patient Education: Go to Patient Education Activity  Goal: Patient/Family Education  Outcome: Progressing Towards Goal     Problem: Depressed Mood (Adult/Pediatric)  Goal: Interventions  Outcome: Progressing Towards Goal    Pt cooperative, no current acute or distress. Will continue Q15 minute safety checks.

## 2021-08-23 VITALS
SYSTOLIC BLOOD PRESSURE: 121 MMHG | WEIGHT: 295.1 LBS | RESPIRATION RATE: 16 BRPM | TEMPERATURE: 98 F | DIASTOLIC BLOOD PRESSURE: 80 MMHG | HEIGHT: 74 IN | BODY MASS INDEX: 37.87 KG/M2 | OXYGEN SATURATION: 95 % | HEART RATE: 68 BPM

## 2021-08-23 PROCEDURE — 74011250637 HC RX REV CODE- 250/637: Performed by: NURSE PRACTITIONER

## 2021-08-23 PROCEDURE — 74011250637 HC RX REV CODE- 250/637: Performed by: PSYCHIATRY & NEUROLOGY

## 2021-08-23 RX ORDER — TRAZODONE HYDROCHLORIDE 50 MG/1
50 TABLET ORAL
Qty: 30 TABLET | Refills: 0 | Status: SHIPPED | OUTPATIENT
Start: 2021-08-23

## 2021-08-23 RX ORDER — HYDROXYZINE 50 MG/1
50 TABLET, FILM COATED ORAL
Qty: 30 TABLET | Refills: 0 | Status: SHIPPED | OUTPATIENT
Start: 2021-08-23

## 2021-08-23 RX ORDER — GABAPENTIN 600 MG/1
600 TABLET ORAL 3 TIMES DAILY
Qty: 45 TABLET | Refills: 0 | Status: SHIPPED | OUTPATIENT
Start: 2021-08-23

## 2021-08-23 RX ORDER — PHENOBARBITAL 32.4 MG/1
32.4 TABLET ORAL 2 TIMES DAILY
Status: DISCONTINUED | OUTPATIENT
Start: 2021-08-23 | End: 2021-08-23 | Stop reason: HOSPADM

## 2021-08-23 RX ORDER — QUETIAPINE FUMARATE 200 MG/1
200 TABLET, FILM COATED ORAL
Qty: 30 TABLET | Refills: 0 | Status: SHIPPED | OUTPATIENT
Start: 2021-08-23

## 2021-08-23 RX ORDER — SERTRALINE HYDROCHLORIDE 100 MG/1
100 TABLET, FILM COATED ORAL DAILY
Qty: 30 TABLET | Refills: 0 | Status: SHIPPED | OUTPATIENT
Start: 2021-08-23

## 2021-08-23 RX ADMIN — FOLIC ACID 1 MG: 1 TABLET ORAL at 08:12

## 2021-08-23 RX ADMIN — PHENOBARBITAL 32.4 MG: 32.4 TABLET ORAL at 12:39

## 2021-08-23 RX ADMIN — SERTRALINE 100 MG: 50 TABLET, FILM COATED ORAL at 08:13

## 2021-08-23 RX ADMIN — PHENOBARBITAL 32.4 MG: 32.4 TABLET ORAL at 08:13

## 2021-08-23 RX ADMIN — LEVETIRACETAM 500 MG: 500 TABLET ORAL at 08:13

## 2021-08-23 RX ADMIN — GABAPENTIN 600 MG: 300 CAPSULE ORAL at 08:12

## 2021-08-23 RX ADMIN — Medication 100 MG: at 08:13

## 2021-08-23 RX ADMIN — THERA TABS 1 TABLET: TAB at 08:13

## 2021-08-23 RX ADMIN — GABAPENTIN 600 MG: 300 CAPSULE ORAL at 15:04

## 2021-08-23 RX ADMIN — PHENOBARBITAL 32.4 MG: 32.4 TABLET ORAL at 03:12

## 2021-08-23 NOTE — PROGRESS NOTES
Pharmacist Discharge Medication Reconciliation    Discharging Provider: James Oliva NP    Significant PMH:   Past Medical History:   Diagnosis Date    Depression     DJD (degenerative joint disease) 3/8/2010    HTN (hypertension) 3/8/2010    STATES NO LONGER TAKING MEDICATION-STATES MD STOPPED    Insomnia     NIDDM (non-insulin dependent diabetes mellitus) 3/8/2010    Other ill-defined conditions(799.89)     BPH    Other ill-defined conditions(799.89)     previous hx of DTs from ETOH withdrawal    Other ill-defined conditions(799.89)     DDD/back problems    Psychiatric disorder     paranoid schizophrenia, anxiety    Substance abuse ([facility]     Suicidal thoughts      Chief Complaint for this Admission:   Chief Complaint   Patient presents with    Alcohol Problem    Mental Health Problem     Allergies: Motrin [ibuprofen]    Discharge Medications:   Current Discharge Medication List        CONTINUE these medications which have CHANGED    Details   QUEtiapine (SEROquel) 200 mg tablet Take 1 Tablet by mouth nightly. Indications: mood  Qty: 30 Tablet, Refills: 0  Start date: 8/23/2021      sertraline (ZOLOFT) 100 mg tablet Take 1 Tablet by mouth daily. Indications: major depressive disorder  Qty: 30 Tablet, Refills: 0  Start date: 8/23/2021      gabapentin (NEURONTIN) 600 mg tablet Take 1 Tablet by mouth three (3) times daily. Max Daily Amount: 1,800 mg. Indications: neuropathic pain  Qty: 45 Tablet, Refills: 0  Start date: 8/23/2021    Associated Diagnoses: Alcohol withdrawal syndrome without complication (HCC)      hydrOXYzine HCL (ATARAX) 50 mg tablet Take 1 Tablet by mouth every six (6) hours as needed for Anxiety. Indications: anxious  Qty: 30 Tablet, Refills: 0  Start date: 8/23/2021      traZODone (DESYREL) 50 mg tablet Take 1 Tablet by mouth nightly.  Indications: insomnia associated with depression  Qty: 30 Tablet, Refills: 0  Start date: 8/23/2021           STOP taking these medications       penicillin v potassium (VEETID) 500 mg tablet Comments:   Reason for Stopping:         penicillin v potassium (VEETID) 500 mg tablet Comments:   Reason for Stopping:         albuterol sulfate 90 mcg/actuation aepb Comments:   Reason for Stopping:         metformin (GLUCOPHAGE) 500 mg tablet Comments:   Reason for Stopping:             The patient's chart, MAR and AVS were reviewed by MARLENY LeesD.

## 2021-08-23 NOTE — BH NOTES
GROUP THERAPY PROGRESS NOTE     Patient did not participate in Psychotherapy Group.      CLARK Burgess, Supervisee in Social Work

## 2021-08-23 NOTE — BH NOTES
1600 Pt discharged per MD order. All belongings returned, upon discharge. Pt verbally contracts for safety. Copy of discharge instructions given to patient. Pt verbalizes understanding.

## 2021-08-23 NOTE — BH NOTES
GROUP THERAPY PROGRESS NOTE    Patient is participating in healthy living and wellness education group. Group time: 50 minutes    Personal goal for participation: identify signs and symptoms of stress and appropriate coping skills to use to minimize stress effect     Goal orientation: Personal    Group therapy participation: active     Therapeutic interventions reviewed and discussed:  Group members were given opportunity to engaged in conversation about they mental health challenges, strengths and recovery plan. Members were guided through assessing triggers, warning signs (specific thoughts, feelings, behaviors, body sensations, etc.) that a crisis may be developing, using their current hospitalization and recent crisis event as the case study. Members were supported to assess their current self-care practices as well as generate a list of internal coping strategies they can do independently to redirect their thinking and deescalate impending crisis. Members were provided with resources for self-care and coping to continue development in these areas. Impression of participation: He was alert, oriented, calm and engaged. He demonstrated insight and readily shared triggers, warning signs, coping skills and self care strategies with the team. He demonstrated motivation to continue his Tx and asked clarifying questions to gain understanding around some of his behaviors and symptoms.        CLARK Fierro, Supervisee in Social Work

## 2021-08-23 NOTE — INTERDISCIPLINARY ROUNDS
Behavioral Health Interdisciplinary Rounds     Patient Name: Lori Huynh  Age: 74 y.o. Room/Bed:  725/  Primary Diagnosis: <principal problem not specified>   Admission Status: Voluntary     Readmission within 30 days: no  Power of  in place: no  Patient requires a blocked bed: yes          Reason for blocked bed: MRSA    VTE Prophylaxis: No    Mobility needs/Fall risk: no  Flu Vaccine : no   Nutritional Plan: no  Consults:          Labs/Testing due today?: no    Sleep hours:        Participation in Care/Groups:  yes  Medication Compliant?: Yes  PRNS (last 24 hours):phenobarbitol   Restraints (last 24 hours):  no     CIWA (range last 24 hours): CIWA-Ar Total: 3   COWS (range last 24 hours):      Alcohol screening (AUDIT) completed -   AUDIT Score: 19     If applicable, date SBIRT discussed in treatment team AND documented:   AUDIT Screen Score: AUDIT Score: 19      Is Patient ready to commit to change? no    If No:   Encourage reflection   Discuss short term and long term health risks of consuming alcohol   Barriers to change   Reaffirm willingness to help / Educational materials provided  Harmful use or Dependence (Score 16 or greater)   Discuss short term and long term health risks of consuming alcohol   Recommendations   Negotiate drinking goal   Recommend addiction specialist/center   Arrange follow-up appointments. Tobacco - patient is a smoker: Have You Used Tobacco in the Past 30 Days: No  Illegal Drugs use: Have You Used Any Illegal Substances Over the Past 12 Months: No    24 hour chart check complete: yes     Patient goal(s) for today:   Treatment team focus/goals: Plan or discharge today. Progress note: he has had a safe detox. He has been pleasant and compliant with his treatment. He denies SI, denies HI and denies AVH.      LOS:  4  Expected LOS: today     Financial concerns/prescription coverage: medicaid    Family contact:       Family requesting physician contact today: Discharge plan: he will return home   Access to weapons : no      Outpatient provider(s): [facility]   Patient's preferred phone number for follow up call :   Patient's preferred e-mail address :  Participating treatment team members: Darlene Osman [address] [place] [address].   Kurtis Rodríguez RN

## 2021-08-23 NOTE — DISCHARGE INSTRUCTIONS
DISCHARGE SUMMARY    NAME:Ilya Patrick  : 1955  MRN: [id number]    The patient Brayan Daniels exhibits the ability to control behavior in a less restrictive environment. Patient's level of functioning is improving. No assaultive/destructive behavior has been observed for the past 24 hours. No suicidal/homicidal threat or behavior has been observed for the past 24 hours. There is no evidence of serious medication side effects. Patient has not been in physical or protective restraints for at least the past 24 hours. If weapons involved, how are they secured? n/a    Is patient aware of and in agreement with discharge plan? Yes    Arrangements for medication:  Prescriptions sent to your pharmacy. Copy of discharge instructions to provider?:  Kandi Schumacher fax to [phone]     Arrangements for transportation home:  Lyft to  at 3:30   Keep all follow up appointments as scheduled, continue to take prescribed medications per physician instructions. Mental health crisis number:  132 or your local mental health crisis line number at [address] [facility] [address] [facility] WARM LINE      7-310-896-MHAV ([phone])      M-F: 9am to 9pm      Sat & Sun: 5pm - 9pm  National suicide prevention lines:                             1-845-VLFTEAU (5-944-283-738-837-0595)       2-778-381-TALK (3-974.652.5311)    Crisis Text Line:  Text HOME to [address] from Nurse    PATIENT INSTRUCTIONS:      What to do at Home:  Recommended activity: Activity as tolerated,       *  Please give a list of your current medications to your Primary Care Provider. *  Please update this list whenever your medications are discontinued, doses are      changed, or new medications (including over-the-counter products) are added. *  Please carry medication information at all times in case of emergency situations.     These are general instructions for a healthy lifestyle:    No smoking/ No tobacco products/ Avoid exposure to second hand smoke  Surgeon General's Warning:  Quitting smoking now greatly reduces serious risk to your health. Obesity, smoking, and sedentary lifestyle greatly increases your risk for illness    A healthy diet, regular physical exercise & weight monitoring are important for maintaining a healthy lifestyle    You may be retaining fluid if you have a history of heart failure or if you experience any of the following symptoms:  Weight gain of 3 pounds or more overnight or 5 pounds in a week, increased swelling in our hands or feet or shortness of breath while lying flat in bed. Please call your doctor as soon as you notice any of these symptoms; do not wait until your next office visit. The discharge information has been reviewed with the patient. The patient verbalized understanding. Discharge medications reviewed with the patient and appropriate educational materials and side effects teaching were provided.   ___________________________________________________________________________________________________________________________________

## 2021-08-23 NOTE — BH NOTES
GROUP THERAPY PROGRESS NOTE     Patient did not participate in Leisure Education Group.      CLARK Burgess, Supervisee in Social Work

## 2021-08-23 NOTE — BH NOTES
PRN Medication Documentation    Specific patient behavior that led to need for PRN medication: tremors, sweats  Staff interventions attempted prior to PRN being given:med given  PRN medication given: phenobarbitol 32.4  Patient response/effectiveness of PRN medication: back to bed

## 2021-08-23 NOTE — PROGRESS NOTES
Problem: Depressed Mood (Adult/Pediatric)  Goal: *STG: Participates in treatment plan  Outcome: Progressing Towards Goal  Goal: *STG: Verbalizes anger, guilt, and other feelings in a constructive manor  Outcome: Progressing Towards Goal  Goal: *STG: Attends activities and groups  Outcome: Progressing Towards Goal  Goal: *STG: Demonstrates reduction in symptoms and increase in insight into coping skills/future focused  Outcome: Progressing Towards Goal     Problem: Alcohol Withdrawal  Goal: *STG: Seeks staff when symptoms of withdrawal increase  Outcome: Progressing Towards Goal       Visible on the unit. Cooperative, dull, depressed. Pt is medication and meal compliant. CIWA: 5   Pt declined to attend groups this morning. Will continue to monitor q15 minutes for safety. Pt attends treatment team.  Denies SI/HI. Voices no complaints. Pt's goal is to discharge today.   Staff focus is on offering support to reach this goal.

## 2021-08-23 NOTE — BH NOTES
PRN Medication Documentation    Specific patient behavior that led to need for PRN medication: ciwa 8  Staff interventions attempted prior to PRN being given: encourage postive coping skills   PRN medication given: 32.4 phenobarbital  Patient response/effectiveness of PRN medication: effective

## 2021-08-23 NOTE — BH NOTES
Behavioral Health Transition Record to Provider    Patient Name: Radhika Ritter  YOB: 1955  Medical Record Number: [id number]  Date of Admission: 8/19/2021  Date of Discharge: 8/23/2021    Attending Provider: No att. providers found  Discharging Provider: Faisal Henriquez NP  To contact this individual call [phone] and ask the  to page. If unavailable, ask to be transferred to [address] [place] [address] Provider on call. [facility] Provider will be available on call 24/7 and during holidays. Primary Care Provider: Keri Waddell MD    Allergies   Allergen Reactions    Motrin [Ibuprofen] Hives     PT STATES NOT AN ALLERGY       Reason for Admission: CHIEF COMPLAINT:  \"I am depressed. \"     HISTORY OF PRESENT ILLNESS:  The patient is a 59-year-old male who is currently admitted at [address] on a voluntary basis. He states that he is currently being followed by [facility] and has a  there. Says he carries diagnoses of major depression and anxiety, but records show history of schizoaffective disorder, depressed type. He presented to the emergency room with worsening depression, suicidal ideation and alcohol withdrawal symptoms. He tells me that he has been feeling lonely. He has no support. He has 5 children but does not talk to any of them. His worsening mood has been complicated with his prolific alcohol history. His urine drug screen was positive for cocaine. Blood alcohol level on admission was 55. He states that he has been drinking heavily for a long time, and in the last 2 weeks, he has been drinking 4-5 bottles of malt liquor on a daily basis. He says that he has alcohol withdrawal seizures. His last seizure was about a year ago. He says that he continues to relapse, and it is making him more depressed. He was having auditory hallucinations also at times when he was drinking.  He was endorsing suicidal ideation with a plan to overdose on his medication when he first came into the hospital. He continues thoughts of suicide, but is feeling safe here. He has a history of 3 previous overdose attempts in the past.  He reports that he has not been sleeping in the last 2 nights. Appetite is fair. He is admitted to the inpatient psychiatric unit for further stabilization and treatment. Admission Diagnosis: Schizophrenia ([facility] [address] [F20.9]  ETOH abuse [F10.10]  Depression [F32.9]    * No surgery found *    Results for orders placed or performed during the hospital encounter of 08/19/21   URINE CULTURE HOLD SAMPLE    Specimen: Serum; Urine   Result Value Ref Range    Urine culture hold        Urine on hold in Microbiology dept for 2 days. If unpreserved urine is submitted, it cannot be used for addtional testing after 24 hours, recollection will be required. CBC WITH AUTOMATED DIFF   Result Value Ref Range    WBC 5.2 4.1 - 11.1 K/uL    RBC 4.78 4.10 - 5.70 M/uL    HGB 14.4 12.1 - 17.0 g/dL    HCT 44.7 36.6 - 50.3 %    MCV 93.5 80.0 - 99.0 FL    MCH 30.1 26.0 - 34.0 PG    MCHC 32.2 30.0 - 36.5 g/dL    RDW 15.0 (H) 11.5 - 14.5 %    PLATELET 68 (L) 883 - 400 K/uL    MPV 11.7 8.9 - 12.9 FL    NRBC 0.0 0  WBC    ABSOLUTE NRBC 0.00 0.00 - 0.01 K/uL    NEUTROPHILS 40 32 - 75 %    LYMPHOCYTES 42 12 - 49 %    MONOCYTES 12 5 - 13 %    EOSINOPHILS 4 0 - 7 %    BASOPHILS 1 0 - 1 %    IMMATURE GRANULOCYTES 1 (H) 0.0 - 0.5 %    ABS. NEUTROPHILS 2.1 1.8 - 8.0 K/UL    ABS. LYMPHOCYTES 2.1 0.8 - 3.5 K/UL    ABS. MONOCYTES 0.6 0.0 - 1.0 K/UL    ABS. EOSINOPHILS 0.2 0.0 - 0.4 K/UL    ABS. BASOPHILS 0.1 0.0 - 0.1 K/UL    ABS. IMM.  GRANS. 0.1 (H) 0.00 - 0.04 K/UL    DF SMEAR SCANNED      RBC COMMENTS ANISOCYTOSIS  1+        WBC COMMENTS REACTIVE LYMPHS     METABOLIC PANEL, COMPREHENSIVE   Result Value Ref Range    Sodium 139 136 - 145 mmol/L    Potassium 4.0 3.5 - 5.1 mmol/L    Chloride 110 (H) 97 - 108 mmol/L    CO2 22 21 - 32 mmol/L    Anion gap 7 5 - 15 mmol/L    Glucose 109 (H) 65 - 100 mg/dL    BUN 17 6 - 20 MG/DL    Creatinine 0.91 0.70 - 1.30 MG/DL    BUN/Creatinine ratio 19 12 - 20      GFR est AA >60 >60 ml/min/1.73m2    GFR est non-AA >60 >60 ml/min/1.73m2    Calcium 8.3 (L) 8.5 - 10.1 MG/DL    Bilirubin, total 0.3 0.2 - 1.0 MG/DL    ALT (SGPT) 71 12 - 78 U/L    AST (SGOT) 55 (H) 15 - 37 U/L    Alk. phosphatase 167 (H) 45 - 117 U/L    Protein, total 7.5 6.4 - 8.2 g/dL    Albumin 3.3 (L) 3.5 - 5.0 g/dL    Globulin 4.2 (H) 2.0 - 4.0 g/dL    A-G Ratio 0.8 (L) 1.1 - 2.2     SAMPLES BEING HELD   Result Value Ref Range    SAMPLES BEING HELD 1blu     COMMENT        Add-on orders for these samples will be processed based on acceptable specimen integrity and analyte stability, which may vary by analyte.    ETHYL ALCOHOL   Result Value Ref Range    ALCOHOL(ETHYL),SERUM 55 (H) <10 MG/DL   URINALYSIS W/MICROSCOPIC   Result Value Ref Range    Color YELLOW/STRAW      Appearance CLEAR CLEAR      Specific gravity 1.023 1.003 - 1.030      pH (UA) 5.0 5.0 - 8.0      Protein Negative NEG mg/dL    Glucose Negative NEG mg/dL    Ketone TRACE (A) NEG mg/dL    Bilirubin Negative NEG      Blood Negative NEG      Urobilinogen 1.0 0.2 - 1.0 EU/dL    Nitrites Negative NEG      Leukocyte Esterase TRACE (A) NEG      WBC 10-20 0 - 4 /hpf    RBC 0-5 0 - 5 /hpf    Epithelial cells FEW FEW /lpf    Bacteria Negative NEG /hpf    Hyaline cast 0-2 0 - 5 /lpf   DRUG SCREEN, URINE   Result Value Ref Range    AMPHETAMINES Negative NEG      BARBITURATES Negative NEG      BENZODIAZEPINES Negative NEG      COCAINE Positive (A) NEG      METHADONE Negative NEG      OPIATES Negative NEG      PCP(PHENCYCLIDINE) Negative NEG      THC (TH-CANNABINOL) Negative NEG      Drug screen comment (NOTE)    LIPASE   Result Value Ref Range    Lipase 127 73 - 393 U/L   MAGNESIUM   Result Value Ref Range    Magnesium 2.1 1.6 - 2.4 mg/dL   COVID-19 WITH INFLUENZA A/B   Result Value Ref Range    SARS-CoV-2 Not detected NOTD      Influenza A by PCR Not detected NOTD      Influenza B by PCR Not detected NOTD     METABOLIC PANEL, COMPREHENSIVE   Result Value Ref Range    Sodium 137 136 - 145 mmol/L    Potassium 4.1 3.5 - 5.1 mmol/L    Chloride 105 97 - 108 mmol/L    CO2 27 21 - 32 mmol/L    Anion gap 5 5 - 15 mmol/L    Glucose 85 65 - 100 mg/dL    BUN 19 6 - 20 MG/DL    Creatinine 0.92 0.70 - 1.30 MG/DL    BUN/Creatinine ratio 21 (H) 12 - 20      GFR est AA >60 >60 ml/min/1.73m2    GFR est non-AA >60 >60 ml/min/1.73m2    Calcium 8.3 (L) 8.5 - 10.1 MG/DL    Bilirubin, total 0.8 0.2 - 1.0 MG/DL    ALT (SGPT) 68 12 - 78 U/L    AST (SGOT) 60 (H) 15 - 37 U/L    Alk. phosphatase 127 (H) 45 - 117 U/L    Protein, total 7.2 6.4 - 8.2 g/dL    Albumin 3.4 (L) 3.5 - 5.0 g/dL    Globulin 3.8 2.0 - 4.0 g/dL    A-G Ratio 0.9 (L) 1.1 - 2.2         Immunizations administered during this encounter:   Immunization History   Administered Date(s) Administered    (RETIRED) Pneumococcal Vaccine (Unspecified Type) 10/21/2010    Influenza Vaccine (Quad) PF (>6 Mo Flulaval, Fluarix, and >3 Yrs Afluria, Fluzone [id number]) 03/26/2020    Influenza Vaccine Split 10/21/2010       Screening for Metabolic Disorders for Patients on Antipsychotic Medications  (Data obtained from the EMR)    Estimated Body Mass Index  Estimated body mass index is 37.89 kg/m² as calculated from the following:    Height as of this encounter: 6' 2\" (1.88 m). Weight as of this encounter: 133.9 kg (295 lb 1.6 oz).      Vital Signs/Blood Pressure  Visit Vitals  BP (!) 159/100   Pulse 63   Temp 97.8 °F (36.6 °C)   Resp 16   Ht 6' 2\" (1.88 m)   Wt 133.9 kg (295 lb 1.6 oz)   SpO2 95%   BMI 37.89 kg/m²       Blood Glucose/Hemoglobin A1c  Lab Results   Component Value Date/Time    Glucose 85 08/20/2021 05:29 AM    Glucose 85 03/22/2020 04:24 AM    Glucose (POC) 147 (H) 04/25/2021 04:16 PM       Lab Results   Component Value Date/Time    Hemoglobin A1c 5.8 (H) 04/25/2021 03:39 AM        Lipid Panel  Lab Results   Component Value Date/Time    Cholesterol, total 141 03/22/2020 04:24 AM    HDL Cholesterol 73 03/22/2020 04:24 AM    LDL, calculated 53.8 03/22/2020 04:24 AM    Triglyceride 71 03/22/2020 04:24 AM    CHOL/HDL Ratio 1.9 03/22/2020 04:24 AM        Discharge Diagnosis: Schizoaffective disorder, depressed type by history; alcohol use disorder, severe. Discharge Plan: Patient discharged home via Lyft with follow up through [address].        The patient Paul Lewis exhibits the ability to control behavior in a less restrictive environment. Patient's level of functioning is improving. No assaultive/destructive behavior has been observed for the past 24 hours. No suicidal/homicidal threat or behavior has been observed for the past 24 hours. There is no evidence of serious medication side effects. Patient has not been in physical or protective restraints for at least the past 24 hours.     If weapons involved, how are they secured? n/a     Is patient aware of and in agreement with discharge plan? Yes     Arrangements for medication:  Prescriptions sent to your pharmacy.       Copy of discharge instructions to provider?:  Wero Sesay fax to [phone]      Arrangements for transportation home:  Lyft to  at 3:30   Keep all follow up appointments as scheduled, continue to take prescribed medications per physician instructions. Mental health crisis number:  308 or your local mental health crisis line number at [phone]       Discharge Medication List and Instructions:   Discharge Medication List as of 8/23/2021  4:00 PM      CONTINUE these medications which have CHANGED    Details   QUEtiapine (SEROquel) 200 mg tablet Take 1 Tablet by mouth nightly. Indications: mood, Normal, Disp-30 Tablet, R-0      sertraline (ZOLOFT) 100 mg tablet Take 1 Tablet by mouth daily.  Indications: major depressive disorder, Normal, Disp-30 Tablet, R-0      gabapentin (NEURONTIN) 600 mg tablet Take 1 Tablet by mouth three (3) times daily. Max Daily Amount: 1,800 mg. Indications: neuropathic pain, Normal, Disp-45 Tablet, R-0      hydrOXYzine HCL (ATARAX) 50 mg tablet Take 1 Tablet by mouth every six (6) hours as needed for Anxiety. Indications: anxious, Normal, Disp-30 Tablet, R-0      traZODone (DESYREL) 50 mg tablet Take 1 Tablet by mouth nightly. Indications: insomnia associated with depression, Normal, Disp-30 Tablet, R-0         STOP taking these medications       penicillin v potassium (VEETID) 500 mg tablet Comments:   Reason for Stopping:         penicillin v potassium (VEETID) 500 mg tablet Comments:   Reason for Stopping:         albuterol sulfate 90 mcg/actuation aepb Comments:   Reason for Stopping:         metformin (GLUCOPHAGE) 500 mg tablet Comments:   Reason for Stopping:               Unresulted Labs (24h ago, onward)    None        To obtain results of studies pending at discharge, please contact [phone]    Follow-up Information     Follow up With Specialties Details Why Contact Info    Ana Brown CM   Call  [address] [phone]    [facility]  [facility]   Call on 8/25/2021 you have an 11:30 appointment  [address] [place] [address],[address] [place], [address]   [phone]          Advanced Directive:   Does the patient have an appointed surrogate decision maker? No  Does the patient have a Medical Advance Directive? No  Does the patient have a Psychiatric Advance Directive? No  If the patient does not have a surrogate or Medical Advance Directive AND Psychiatric Advance Directive, the patient was offered information on these advance directives Patient declined to complete    Patient Instructions: Please continue all medications until otherwise directed by physician. Tobacco Cessation Discharge Plan:   Is the patient a smoker and needs referral for smoking cessation? No  Patient referred to the following for smoking cessation with an appointment?  Not applicable     Patient was offered medication to assist with smoking cessation at discharge? Not applicable  Was education for smoking cessation added to the discharge instructions? Yes    Alcohol/Substance Abuse Discharge Plan:   Does the patient have a history of substance/alcohol abuse and requires a referral for treatment? Yes  Patient referred to the following for substance/alcohol abuse treatment with an appointment? Refused  Patient was offered medication to assist with alcohol cessation at discharge? Refused  Was education for substance/alcohol abuse added to discharge instructions? No    Patient discharged to Home; discussed with patient/caregiver and provided to the patient/caregiver either in hard copy or electronically.

## 2021-08-23 NOTE — BH NOTES
GROUP THERAPY PROGRESS NOTE    Patient did not participate in community meeting/discharge and goals group.     CLARK Kimball, Supervisee in Social Work

## 2021-08-23 NOTE — BH NOTES
GROUP THERAPY PROGRESS NOTE    Patient is participating in Treatment Team Group. Group time: 12:00 to 1:00     Personal goal for participation: To participate in treatment plan and discharge     Goal orientation: Personal    Group therapy participation: active     Therapeutic interventions reviewed and discussed: Group members met with their treatment team individually and discussed their treatment plan with their provider, , nurse, and pharmacist. Each patient was given the opportunity to ask questions related to their discharge and/or medications. Impression of participation: Patient participated in treatment team. Engaged in conversation and discussion. Asked questions about treatment plan and discharge.

## 2021-08-24 NOTE — DISCHARGE SUMMARY
PSYCHIATRIC DISCHARGE SUMMARY    Patient: Cosme Judge MRN: [id number]  SSN: [id number]    YOB: 1955  Age: 74 y.o. Sex: male        Date of Admission: 8/19/2021  Date of Discharge: 8/23/2021      Type of Discharge:  REGULAR     Admission data:  CHIEF COMPLAINT:  \"I am depressed. \"    HISTORY OF PRESENT ILLNESS:  The patient is a 67-year-old male who is currently admitted at [address] on a voluntary basis. He states that he is currently being followed by [facility] and has a  there. Says he carries diagnoses of major depression and anxiety, but records show history of schizoaffective disorder, depressed type. He presented to the emergency room with worsening depression, suicidal ideation and alcohol withdrawal symptoms. He tells me that he has been feeling lonely. He has no support. He has 5 children but does not talk to any of them. His worsening mood has been complicated with his prolific alcohol history. His urine drug screen was positive for cocaine. Blood alcohol level on admission was 55. He states that he has been drinking heavily for a long time, and in the last 2 weeks, he has been drinking 4-5 bottles of malt liquor on a daily basis. He says that he has alcohol withdrawal seizures. His last seizure was about a year ago. He says that he continues to relapse, and it is making him more depressed. He was having auditory hallucinations also at times when he was drinking. He was endorsing suicidal ideation with a plan to overdose on his medication when he first came into the hospital. He continues thoughts of suicide, but is feeling safe here. He has a history of 3 previous overdose attempts in the past.  He reports that he has not been sleeping in the last 2 nights. Appetite is fair. He is admitted to the inpatient psychiatric unit for further stabilization and treatment. PAST MEDICAL HISTORY:  See H and P.     Past Medical History: Diagnosis Date    Depression     DJD (degenerative joint disease) 3/8/2010    HTN (hypertension) 3/8/2010    STATES NO LONGER TAKING MEDICATION-STATES MD STOPPED    Insomnia     NIDDM (non-insulin dependent diabetes mellitus) 3/8/2010    Other ill-defined conditions(799.89)     BPH    Other ill-defined conditions(799.89)     previous hx of DTs from ETOH withdrawal    Other ill-defined conditions(799.89)     DDD/back problems    Psychiatric disorder     paranoid schizophrenia, anxiety    Substance abuse ([facility] [address]     Suicidal thoughts        Labs: (reviewed/updated 8/24/2021)  No data found. Labs Reviewed   CBC WITH AUTOMATED DIFF - Abnormal; Notable for the following components:       Result Value    RDW 15.0 (*)     PLATELET 68 (*)     IMMATURE GRANULOCYTES 1 (*)     ABS. IMM. GRANS. 0.1 (*)     All other components within normal limits   METABOLIC PANEL, COMPREHENSIVE - Abnormal; Notable for the following components:    Chloride 110 (*)     Glucose 109 (*)     Calcium 8.3 (*)     AST (SGOT) 55 (*)     Alk. phosphatase 167 (*)     Albumin 3.3 (*)     Globulin 4.2 (*)     A-G Ratio 0.8 (*)     All other components within normal limits   ETHYL ALCOHOL - Abnormal; Notable for the following components:    ALCOHOL(ETHYL),SERUM 55 (*)     All other components within normal limits   URINALYSIS W/MICROSCOPIC - Abnormal; Notable for the following components:    Ketone TRACE (*)     Leukocyte Esterase TRACE (*)     All other components within normal limits   DRUG SCREEN, URINE - Abnormal; Notable for the following components:    COCAINE Positive (*)     All other components within normal limits   METABOLIC PANEL, COMPREHENSIVE - Abnormal; Notable for the following components:    BUN/Creatinine ratio 21 (*)     Calcium 8.3 (*)     AST (SGOT) 60 (*)     Alk.  phosphatase 127 (*)     Albumin 3.4 (*)     A-G Ratio 0.9 (*)     All other components within normal limits   URINE CULTURE HOLD SAMPLE   SAMPLES BEING HELD LIPASE   MAGNESIUM   COVID-19 WITH INFLUENZA A/B   SAMPLE TO BLOOD BANK     Lab Results   Component Value Date/Time    Sodium 137 08/20/2021 05:29 AM    Potassium 4.1 08/20/2021 05:29 AM    Chloride 105 08/20/2021 05:29 AM    CO2 27 08/20/2021 05:29 AM    Anion gap 5 08/20/2021 05:29 AM    Glucose 85 08/20/2021 05:29 AM    Glucose 85 03/22/2020 04:24 AM    BUN 19 08/20/2021 05:29 AM    Creatinine 0.92 08/20/2021 05:29 AM    BUN/Creatinine ratio 21 (H) 08/20/2021 05:29 AM    GFR est AA >60 08/20/2021 05:29 AM    GFR est non-AA >60 08/20/2021 05:29 AM    Calcium 8.3 (L) 08/20/2021 05:29 AM    Bilirubin, total 0.8 08/20/2021 05:29 AM    Alk. phosphatase 127 (H) 08/20/2021 05:29 AM    Protein, total 7.2 08/20/2021 05:29 AM    Albumin 3.4 (L) 08/20/2021 05:29 AM    Globulin 3.8 08/20/2021 05:29 AM    A-G Ratio 0.9 (L) 08/20/2021 05:29 AM    ALT (SGPT) 68 08/20/2021 05:29 AM     Admission on 08/19/2021, Discharged on 08/23/2021   Component Date Value Ref Range Status    WBC 08/19/2021 5.2  4.1 - 11.1 K/uL Final    RBC 08/19/2021 4.78  4.10 - 5.70 M/uL Final    HGB 08/19/2021 14.4  12.1 - 17.0 g/dL Final    HCT 08/19/2021 44.7  36.6 - 50.3 % Final    MCV 08/19/2021 93.5  80.0 - 99.0 FL Final    MCH 08/19/2021 30.1  26.0 - 34.0 PG Final    MCHC 08/19/2021 32.2  30.0 - 36.5 g/dL Final    RDW 08/19/2021 15.0* 11.5 - 14.5 % Final    PLATELET 54/75/2499 68* 150 - 400 K/uL Final    MPV 08/19/2021 11.7  8.9 - 12.9 FL Final    NRBC 08/19/2021 0.0  0  WBC Final    ABSOLUTE NRBC 08/19/2021 0.00  0.00 - 0.01 K/uL Final    NEUTROPHILS 08/19/2021 40  32 - 75 % Final    LYMPHOCYTES 08/19/2021 42  12 - 49 % Final    MONOCYTES 08/19/2021 12  5 - 13 % Final    EOSINOPHILS 08/19/2021 4  0 - 7 % Final    BASOPHILS 08/19/2021 1  0 - 1 % Final    IMMATURE GRANULOCYTES 08/19/2021 1* 0.0 - 0.5 % Final    ABS. NEUTROPHILS 08/19/2021 2.1  1.8 - 8.0 K/UL Final    ABS.  LYMPHOCYTES 08/19/2021 2.1  0.8 - 3.5 K/UL Final    ABS. MONOCYTES 08/19/2021 0.6  0.0 - 1.0 K/UL Final    ABS. EOSINOPHILS 08/19/2021 0.2  0.0 - 0.4 K/UL Final    ABS. BASOPHILS 08/19/2021 0.1  0.0 - 0.1 K/UL Final    ABS. IMM. GRANS. 08/19/2021 0.1* 0.00 - 0.04 K/UL Final    DF 08/19/2021 SMEAR SCANNED    Final    RBC COMMENTS 08/19/2021     Final                    Value:ANISOCYTOSIS  1+      WBC COMMENTS 08/19/2021 REACTIVE LYMPHS    Final    Sodium 08/19/2021 139  136 - 145 mmol/L Final    Potassium 08/19/2021 4.0  3.5 - 5.1 mmol/L Final    Chloride 08/19/2021 110* 97 - 108 mmol/L Final    CO2 08/19/2021 22  21 - 32 mmol/L Final    Anion gap 08/19/2021 7  5 - 15 mmol/L Final    Glucose 08/19/2021 109* 65 - 100 mg/dL Final    BUN 08/19/2021 17  6 - 20 MG/DL Final    Creatinine 08/19/2021 0.91  0.70 - 1.30 MG/DL Final    BUN/Creatinine ratio 08/19/2021 19  12 - 20   Final    GFR est AA 08/19/2021 >60  >60 ml/min/1.73m2 Final    GFR est non-AA 08/19/2021 >60  >60 ml/min/1.73m2 Final    Calcium 08/19/2021 8.3* 8.5 - 10.1 MG/DL Final    Bilirubin, total 08/19/2021 0.3  0.2 - 1.0 MG/DL Final    ALT (SGPT) 08/19/2021 71  12 - 78 U/L Final    AST (SGOT) 08/19/2021 55* 15 - 37 U/L Final    Alk. phosphatase 08/19/2021 167* 45 - 117 U/L Final    Protein, total 08/19/2021 7.5  6.4 - 8.2 g/dL Final    Albumin 08/19/2021 3.3* 3.5 - 5.0 g/dL Final    Globulin 08/19/2021 4.2* 2.0 - 4.0 g/dL Final    A-G Ratio 08/19/2021 0.8* 1.1 - 2.2   Final    SAMPLES BEING HELD 08/19/2021 1blu   Final    COMMENT 08/19/2021 Add-on orders for these samples will be processed based on acceptable specimen integrity and analyte stability, which may vary by analyte.     Final    ALCOHOL(ETHYL),SERUM 08/19/2021 55* <10 MG/DL Final    Color 08/19/2021 YELLOW/STRAW    Final    Appearance 08/19/2021 CLEAR  CLEAR   Final    Specific gravity 08/19/2021 1.023  1.003 - 1.030   Final    pH (UA) 08/19/2021 5.0  5.0 - 8.0   Final    Protein 08/19/2021 Negative  NEG mg/dL Final  Glucose 08/19/2021 Negative  NEG mg/dL Final    Ketone 08/19/2021 TRACE* NEG mg/dL Final    Bilirubin 08/19/2021 Negative  NEG   Final    Blood 08/19/2021 Negative  NEG   Final    Urobilinogen 08/19/2021 1.0  0.2 - 1.0 EU/dL Final    Nitrites 08/19/2021 Negative  NEG   Final    Leukocyte Esterase 08/19/2021 TRACE* NEG   Final    WBC 08/19/2021 10-20  0 - 4 /hpf Final    RBC 08/19/2021 0-5  0 - 5 /hpf Final    Epithelial cells 08/19/2021 FEW  FEW /lpf Final    Bacteria 08/19/2021 Negative  NEG /hpf Final    Hyaline cast 08/19/2021 0-2  0 - 5 /lpf Final    Urine culture hold 08/19/2021 Urine on hold in Microbiology dept for 2 days. If unpreserved urine is submitted, it cannot be used for addtional testing after 24 hours, recollection will be required.     Final    AMPHETAMINES 08/19/2021 Negative  NEG   Final    BARBITURATES 08/19/2021 Negative  NEG   Final    BENZODIAZEPINES 08/19/2021 Negative  NEG   Final    COCAINE 08/19/2021 Positive* NEG   Final    METHADONE 08/19/2021 Negative  NEG   Final    OPIATES 08/19/2021 Negative  NEG   Final    PCP(PHENCYCLIDINE) 08/19/2021 Negative  NEG   Final    THC (TH-CANNABINOL) 08/19/2021 Negative  NEG   Final    Drug screen comment 08/19/2021 (NOTE)   Final    Lipase 08/19/2021 127  73 - 393 U/L Final    Magnesium 08/19/2021 2.1  1.6 - 2.4 mg/dL Final    SARS-CoV-2 08/19/2021 Not detected  NOTD   Final    Influenza A by PCR 08/19/2021 Not detected  NOTD   Final    Influenza B by PCR 08/19/2021 Not detected  NOTD   Final    Sodium 08/20/2021 137  136 - 145 mmol/L Final    Potassium 08/20/2021 4.1  3.5 - 5.1 mmol/L Final    Chloride 08/20/2021 105  97 - 108 mmol/L Final    CO2 08/20/2021 27  21 - 32 mmol/L Final    Anion gap 08/20/2021 5  5 - 15 mmol/L Final    Glucose 08/20/2021 85  65 - 100 mg/dL Final    BUN 08/20/2021 19  6 - 20 MG/DL Final    Creatinine 08/20/2021 0.92  0.70 - 1.30 MG/DL Final    BUN/Creatinine ratio 08/20/2021 21* 12 - 20   Final    GFR est AA 08/20/2021 >60  >60 ml/min/1.73m2 Final    GFR est non-AA 08/20/2021 >60  >60 ml/min/1.73m2 Final    Calcium 08/20/2021 8.3* 8.5 - 10.1 MG/DL Final    Bilirubin, total 08/20/2021 0.8  0.2 - 1.0 MG/DL Final    ALT (SGPT) 08/20/2021 68  12 - 78 U/L Final    AST (SGOT) 08/20/2021 60* 15 - 37 U/L Final    Alk. phosphatase 08/20/2021 127* 45 - 117 U/L Final    Protein, total 08/20/2021 7.2  6.4 - 8.2 g/dL Final    Albumin 08/20/2021 3.4* 3.5 - 5.0 g/dL Final    Globulin 08/20/2021 3.8  2.0 - 4.0 g/dL Final    A-G Ratio 08/20/2021 0.9* 1.1 - 2.2   Final     Vitals:    08/22/21 1103 08/22/21 1928 08/23/21 0729 08/23/21 1600   BP: 118/86 114/74 (!) 159/100 121/80   Pulse: 65 67 63 68   Resp: 16 16 16 16   Temp: 97.3 °F (36.3 °C) 97.4 °F (36.3 °C) 97.8 °F (36.6 °C) 98 °F (36.7 °C)   SpO2: 95% 94% 95% 95%   Weight:       Height:         No results found for this or any previous visit (from the past 24 hour(s)). RADIOLOGY REPORTS:  Results from Hospital Encounter encounter on 03/21/20    XR CHEST PORT    Narrative  Chest portable AP    History: AMS    Comparison: 10/17/2019    Findings: The lungs are well expanded. No focal consolidation, pleural  effusion, or pneumothorax. The cardiomediastinal silhouette is unremarkable. The visualized osseous structures are unremarkable. Impression  Impression:  No acute cardiopulmonary process. No results found. PAST PSYCHIATRIC HISTORY: He is currently receiving services through [facility]. He states that he is supposed to be on Atarax and sertraline. He was previously admitted here at [facility] about a year ago. He reported that he was first hospitalized for anxiety and depression at the age of 22 and has had numerous inpatient psychiatric hospitalizations since then.   He has been hospitalized at [facility], here at [facility] and [place] [address].  There is a long history of alcohol abuse since he was a teenager. He said that he was diagnosed with schizophrenia at age of the 50years old, although he only had intermittent psychotic symptoms since then. PSYCHOSOCIAL HISTORY:  He is single. He has 5 children. He lives in [place] by himself. He has a bachelor's degree in social work. He reports that he is currently unemployed but is currently getting social security disability income. 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 MENTAL STATUS EXAM:  The patient is currently lying in bed, dressed in street clothes. He is somewhat disheveled. He makes no eye contact. Psychomotor activity is decreased. He reports his mood is down. Affect is blunted. Speech:  Normal rate and rhythm. Thought process:  Logical and goal-directed. He continues to endorse passive thoughts of suicide but denies any intent, and he feels safe in the hospital.  Denies homicidal ideation, auditory or visual hallucination. No paranoia or delusions. Memory is intact. Intelligence is average. Insight is partial.  Judgment is poor. DIAGNOSES:  Schizoaffective disorder, depressed type by history; alcohol use disorder, severe. Hospital Course:    Patient was admitted to the Psychiatric services for acute psychiatric stabilization in regards to symptomatology as described in the HPI above and placed on Q15 minute checks and suicide and withdrawal precautions. He was started back on his usual medication regimen as well as PRN medications including seroquel, neurontin, zoloft, trazodone prn, atarax prn. He was placed on detox per protocol. While on the unit Radhika Ritter was involved in individual, group, occupational and milieu therapy. He improved gradually and was able to integrate into the milieu with help from the nursing staff.  Patients symptoms improved gradually including worsening mood, depression, si, anxiety, poor sleep, withdrawal symptomd.  He was appropriate in his interactions, and cooperative with medications and the unit routine. Please see individual progress notes for more specific details regarding patient's hospitalization course. Patient was discharged as per the plan. He had been doing well on the unit as per the report of the nursing staff and my observations. No PRN medication for agitation, seclusion or restraints were required during the last 48 hours of his stay. Lori Huynh had improved progressively to the point of being stable for discharge and outpatient FU. At this time he did not offer any complaints. Patient denied any SI or HI. Denied any AH or VH. He denied any delusions. Was not considered a danger to self or to others and is safe for discharge. Will FU with his appointments and remains motivated to be in treatment. The patient verbalized understanding of his discharge instructions. Allergies:(reviewed/updated 8/24/2021)  Allergies   Allergen Reactions    Motrin [Ibuprofen] Hives     PT STATES NOT AN ALLERGY       Side Effects: (reviewed/updated 8/24/2021)  None reported or admitted to. Vital Signs:  Patient Vitals for the past 24 hrs:   Temp Pulse Resp BP SpO2   08/23/21 1600 98 °F (36.7 °C) 68 16 121/80 95 %     Wt Readings from Last 3 Encounters:   08/22/21 133.9 kg (295 lb 1.6 oz)   11/14/20 117.9 kg (260 lb)   03/22/20 105.5 kg (232 lb 9.6 oz)     Temp Readings from Last 3 Encounters:   08/23/21 98 °F (36.7 °C)   04/25/21 98.3 °F (36.8 °C)   11/14/20 97.5 °F (36.4 °C)     BP Readings from Last 3 Encounters:   08/23/21 121/80   04/25/21 (!) 164/85   11/14/20 137/81     Pulse Readings from Last 3 Encounters:   08/23/21 68   04/25/21 75   11/14/20 65       Labs: (reviewed/updated 8/24/2021)  No results found for this or any previous visit (from the past 24 hour(s)).   No results found for: VALF2, VALAC, VALP, VALPR, DS6, CRBAM, CRBAMP, CARB2, XCRBAM  No results found for: [facility]    Radiology (reviewed/updated 8/24/2021)  No results found.      Mental Status Exam on Discharge:  General appearance: Paul Lewis is a 74 y.o. BLACK/ male who is well groomed, psychomotor activity is WNL  Eye contact: makes good eye contact  Speech: Spontaneous and coherent  Affect : Euthymic  Mood: \"OK\"  Thought Process: Logical, goal directed  Perception: Denies any AH or VH. Thought Content: Denies any SI or Plan  Insight: Partial  Judgement: Fair  Cognition: Intact grossly. Discharge Diagnosis:   Schizoaffective disorder, depressed type by history; alcohol use disorder, severe. Discharge Medication List as of 8/23/2021  4:00 PM      CONTINUE these medications which have CHANGED    Details   QUEtiapine (SEROquel) 200 mg tablet Take 1 Tablet by mouth nightly. Indications: mood, Normal, Disp-30 Tablet, R-0      sertraline (ZOLOFT) 100 mg tablet Take 1 Tablet by mouth daily. Indications: major depressive disorder, Normal, Disp-30 Tablet, R-0      gabapentin (NEURONTIN) 600 mg tablet Take 1 Tablet by mouth three (3) times daily. Max Daily Amount: 1,800 mg. Indications: neuropathic pain, Normal, Disp-45 Tablet, R-0      hydrOXYzine HCL (ATARAX) 50 mg tablet Take 1 Tablet by mouth every six (6) hours as needed for Anxiety. Indications: anxious, Normal, Disp-30 Tablet, R-0      traZODone (DESYREL) 50 mg tablet Take 1 Tablet by mouth nightly. Indications: insomnia associated with depression, Normal, Disp-30 Tablet, R-0         STOP taking these medications       penicillin v potassium (VEETID) 500 mg tablet Comments:   Reason for Stopping:         penicillin v potassium (VEETID) 500 mg tablet Comments:   Reason for Stopping:         albuterol sulfate 90 mcg/actuation aepb Comments:   Reason for Stopping:         metformin (GLUCOPHAGE) 500 mg tablet Comments:   Reason for Stopping:                     Follow-up Information     Follow up With Specialties Details Why Contact Info    Ana Brown CM   Call  [address] [facility] [address]  [phone]    PCP: Dr. Filippo Greenfield   Call on 8/25/2021 you have an 11:30 appointment  [address] [place], [address]   [phone]        WOUND CARE: none needed. Prognosis:   Good / Arthor Stack based on nature of patient's pathology/ies and treatment compliance issues. Prognosis is greatly dependent upon patient's ability to  follow up on psychiatric/psychotherapy appointments as well as to comply with psychiatric medications as prescribed. I certify that this patients inpatient psychiatric hospital services furnished since the previous certification were, and continue to be, required for treatment that could reasonably be expected to improve the patient's condition, or for diagnostic study, and that the patient continues to need, on a daily basis, active treatment furnished directly by or requiring the supervision of inpatient psychiatric facility personnel. In addition, the hospital records show that services furnished were intensive treatment services, admission or related services, or equivalent services.      Signed:  Sabrina Barraza NP  8/24/2021

## 2021-08-25 NOTE — PROGRESS NOTES
Reached out to patient at [phone] for follow up. Voicemail came on and no message left due to confidentiality.

## 2023-01-01 ENCOUNTER — APPOINTMENT (OUTPATIENT)
Dept: CT IMAGING | Age: 68
DRG: 871 | End: 2023-01-01
Payer: MEDICARE

## 2023-01-01 ENCOUNTER — APPOINTMENT (OUTPATIENT)
Dept: GENERAL RADIOLOGY | Age: 68
DRG: 871 | End: 2023-01-01
Attending: STUDENT IN AN ORGANIZED HEALTH CARE EDUCATION/TRAINING PROGRAM
Payer: MEDICARE

## 2023-01-01 ENCOUNTER — HOSPICE ADMISSION (OUTPATIENT)
Dept: HOSPICE | Facility: HOSPICE | Age: 68
End: 2023-01-01

## 2023-01-01 ENCOUNTER — TELEPHONE (OUTPATIENT)
Dept: HEMATOLOGY | Age: 68
End: 2023-01-01

## 2023-01-01 ENCOUNTER — HOSPITAL ENCOUNTER (INPATIENT)
Age: 68
LOS: 5 days | DRG: 871 | End: 2023-03-05
Attending: STUDENT IN AN ORGANIZED HEALTH CARE EDUCATION/TRAINING PROGRAM | Admitting: INTERNAL MEDICINE
Payer: MEDICARE

## 2023-01-01 VITALS
WEIGHT: 281.31 LBS | DIASTOLIC BLOOD PRESSURE: 68 MMHG | HEIGHT: 74 IN | TEMPERATURE: 99.4 F | HEART RATE: 117 BPM | RESPIRATION RATE: 24 BRPM | BODY MASS INDEX: 36.1 KG/M2 | OXYGEN SATURATION: 92 % | SYSTOLIC BLOOD PRESSURE: 115 MMHG

## 2023-01-01 DIAGNOSIS — C79.51 MALIGNANT NEOPLASM METASTATIC TO BONE (HCC): ICD-10-CM

## 2023-01-01 DIAGNOSIS — M54.12 CERVICAL MYELOPATHY WITH CERVICAL RADICULOPATHY (HCC): Primary | ICD-10-CM

## 2023-01-01 DIAGNOSIS — M54.2 NECK PAIN: ICD-10-CM

## 2023-01-01 DIAGNOSIS — R41.0 DELIRIUM: ICD-10-CM

## 2023-01-01 DIAGNOSIS — R65.20 SEVERE SEPSIS (HCC): ICD-10-CM

## 2023-01-01 DIAGNOSIS — Z71.89 GOALS OF CARE, COUNSELING/DISCUSSION: ICD-10-CM

## 2023-01-01 DIAGNOSIS — A41.9 SEVERE SEPSIS (HCC): ICD-10-CM

## 2023-01-01 DIAGNOSIS — G95.9 CERVICAL MYELOPATHY WITH CERVICAL RADICULOPATHY (HCC): Primary | ICD-10-CM

## 2023-01-01 LAB
ACC. NO. FROM MICRO ORDER, ACCP: ABNORMAL
ACINETOBACTER CALCOACETICUS-BAUMANII COMPLEX, ACBCX: NOT DETECTED
ALBUMIN SERPL-MCNC: 2.3 G/DL (ref 3.5–5)
ALBUMIN/GLOB SERPL: 0.4 (ref 1.1–2.2)
ALP SERPL-CCNC: 194 U/L (ref 45–117)
ALT SERPL-CCNC: 81 U/L (ref 12–78)
ANION GAP BLD CALC-SCNC: 11 (ref 10–20)
ANION GAP SERPL CALC-SCNC: 10 MMOL/L (ref 5–15)
ANION GAP SERPL CALC-SCNC: 12 MMOL/L (ref 5–15)
ANION GAP SERPL CALC-SCNC: 4 MMOL/L (ref 5–15)
ANION GAP SERPL CALC-SCNC: 5 MMOL/L (ref 5–15)
ANION GAP SERPL CALC-SCNC: 7 MMOL/L (ref 5–15)
APPEARANCE UR: ABNORMAL
AST SERPL-CCNC: 377 U/L (ref 15–37)
ATRIAL RATE: 120 BPM
B FRAGILIS DNA BLD POS QL NAA+NON-PROBE: NOT DETECTED
BACTERIA SPEC CULT: ABNORMAL
BACTERIA SPEC CULT: NORMAL
BACTERIA URNS QL MICRO: ABNORMAL /HPF
BASE EXCESS BLD CALC-SCNC: 3.8 MMOL/L
BASOPHILS # BLD: 0 K/UL (ref 0–0.1)
BASOPHILS # BLD: 0.1 K/UL (ref 0–0.1)
BASOPHILS NFR BLD: 0 % (ref 0–1)
BILIRUB SERPL-MCNC: 3.1 MG/DL (ref 0.2–1)
BILIRUB UR QL: NEGATIVE
BIOFIRE COMMENT, BCIDPF: ABNORMAL
BLACTX-M ISLT/SPM QL: NOT DETECTED
BLAIMP ISLT/SPM QL: NOT DETECTED
BLAKPC BLD POS QL NAA+NON-PROBE: NOT DETECTED
BLAOXA-48-LIKE ISLT/SPM QL: NOT DETECTED
BLAVIM ISLT/SPM QL: NOT DETECTED
BNP SERPL-MCNC: 502 PG/ML
BUN SERPL-MCNC: 40 MG/DL (ref 6–20)
BUN SERPL-MCNC: 43 MG/DL (ref 6–20)
BUN SERPL-MCNC: 52 MG/DL (ref 6–20)
BUN SERPL-MCNC: 62 MG/DL (ref 6–20)
BUN SERPL-MCNC: 78 MG/DL (ref 6–20)
BUN/CREAT SERPL: 14 (ref 12–20)
BUN/CREAT SERPL: 21 (ref 12–20)
BUN/CREAT SERPL: 30 (ref 12–20)
BUN/CREAT SERPL: 36 (ref 12–20)
BUN/CREAT SERPL: 36 (ref 12–20)
C ALBICANS DNA BLD POS QL NAA+NON-PROBE: NOT DETECTED
C AURIS DNA BLD POS QL NAA+NON-PROBE: NOT DETECTED
C GATTII+NEOFOR DNA BLD POS QL NAA+N-PRB: NOT DETECTED
C GLABRATA DNA BLD POS QL NAA+NON-PROBE: NOT DETECTED
C KRUSEI DNA BLD POS QL NAA+NON-PROBE: NOT DETECTED
C PARAP DNA BLD POS QL NAA+NON-PROBE: NOT DETECTED
C TROPICLS DNA BLD POS QL NAA+NON-PROBE: NOT DETECTED
CA-I BLD-MCNC: 1.04 MMOL/L (ref 1.12–1.32)
CALCIUM SERPL-MCNC: 8.4 MG/DL (ref 8.5–10.1)
CALCIUM SERPL-MCNC: 8.9 MG/DL (ref 8.5–10.1)
CALCIUM SERPL-MCNC: 9.2 MG/DL (ref 8.5–10.1)
CALCIUM SERPL-MCNC: 9.5 MG/DL (ref 8.5–10.1)
CALCIUM SERPL-MCNC: 9.6 MG/DL (ref 8.5–10.1)
CALCULATED P AXIS, ECG09: 47 DEGREES
CALCULATED R AXIS, ECG10: 15 DEGREES
CALCULATED T AXIS, ECG11: 123 DEGREES
CC UR VC: ABNORMAL
CHLORIDE BLD-SCNC: 101 MMOL/L (ref 100–108)
CHLORIDE SERPL-SCNC: 101 MMOL/L (ref 97–108)
CHLORIDE SERPL-SCNC: 105 MMOL/L (ref 97–108)
CHLORIDE SERPL-SCNC: 107 MMOL/L (ref 97–108)
CHLORIDE SERPL-SCNC: 112 MMOL/L (ref 97–108)
CHLORIDE SERPL-SCNC: 117 MMOL/L (ref 97–108)
CO2 BLD-SCNC: 25 MMOL/L (ref 19–24)
CO2 SERPL-SCNC: 23 MMOL/L (ref 21–32)
CO2 SERPL-SCNC: 25 MMOL/L (ref 21–32)
CO2 SERPL-SCNC: 26 MMOL/L (ref 21–32)
CO2 SERPL-SCNC: 30 MMOL/L (ref 21–32)
CO2 SERPL-SCNC: 30 MMOL/L (ref 21–32)
COLOR UR: ABNORMAL
COMMENT, HOLDF: NORMAL
CREAT SERPL-MCNC: 1.12 MG/DL (ref 0.7–1.3)
CREAT SERPL-MCNC: 1.18 MG/DL (ref 0.7–1.3)
CREAT SERPL-MCNC: 1.71 MG/DL (ref 0.7–1.3)
CREAT SERPL-MCNC: 3.02 MG/DL (ref 0.7–1.3)
CREAT SERPL-MCNC: 5.6 MG/DL (ref 0.7–1.3)
CREAT UR-MCNC: 5.7 MG/DL (ref 0.6–1.3)
DIAGNOSIS, 93000: NORMAL
DIFFERENTIAL METHOD BLD: ABNORMAL
E CLOAC COMP DNA BLD POS NAA+NON-PROBE: NOT DETECTED
E COLI DNA BLD POS QL NAA+NON-PROBE: NOT DETECTED
E FAECALIS DNA BLD POS QL NAA+NON-PROBE: NOT DETECTED
E FAECIUM DNA BLD POS QL NAA+NON-PROBE: NOT DETECTED
ENTEROBACTERALES DNA BLD POS NAA+N-PRB: DETECTED
EOSINOPHIL # BLD: 0 K/UL (ref 0–0.4)
EOSINOPHIL # BLD: 0.1 K/UL (ref 0–0.4)
EOSINOPHIL NFR BLD: 0 % (ref 0–7)
EOSINOPHIL NFR BLD: 1 % (ref 0–7)
EPITH CASTS URNS QL MICRO: ABNORMAL /LPF
ERYTHROCYTE [DISTWIDTH] IN BLOOD BY AUTOMATED COUNT: 17.5 % (ref 11.5–14.5)
ERYTHROCYTE [DISTWIDTH] IN BLOOD BY AUTOMATED COUNT: 17.5 % (ref 11.5–14.5)
ERYTHROCYTE [DISTWIDTH] IN BLOOD BY AUTOMATED COUNT: 17.9 % (ref 11.5–14.5)
ERYTHROCYTE [DISTWIDTH] IN BLOOD BY AUTOMATED COUNT: 18.2 % (ref 11.5–14.5)
FLUAV AG NPH QL IA: NEGATIVE
FLUBV AG NOSE QL IA: NEGATIVE
GLOBULIN SER CALC-MCNC: 6.2 G/DL (ref 2–4)
GLUCOSE BLD STRIP.AUTO-MCNC: 109 MG/DL (ref 65–117)
GLUCOSE BLD STRIP.AUTO-MCNC: 119 MG/DL (ref 65–117)
GLUCOSE BLD STRIP.AUTO-MCNC: 121 MG/DL (ref 65–117)
GLUCOSE BLD STRIP.AUTO-MCNC: 125 MG/DL (ref 65–117)
GLUCOSE BLD STRIP.AUTO-MCNC: 127 MG/DL (ref 65–117)
GLUCOSE BLD STRIP.AUTO-MCNC: 130 MG/DL (ref 65–117)
GLUCOSE BLD STRIP.AUTO-MCNC: 130 MG/DL (ref 65–117)
GLUCOSE BLD STRIP.AUTO-MCNC: 147 MG/DL (ref 65–117)
GLUCOSE BLD STRIP.AUTO-MCNC: 156 MG/DL (ref 65–117)
GLUCOSE BLD STRIP.AUTO-MCNC: 178 MG/DL (ref 74–106)
GLUCOSE SERPL-MCNC: 148 MG/DL (ref 65–100)
GLUCOSE SERPL-MCNC: 155 MG/DL (ref 65–100)
GLUCOSE SERPL-MCNC: 171 MG/DL (ref 65–100)
GLUCOSE SERPL-MCNC: 177 MG/DL (ref 65–100)
GLUCOSE SERPL-MCNC: 179 MG/DL (ref 65–100)
GLUCOSE UR STRIP.AUTO-MCNC: NEGATIVE MG/DL
GP B STREP DNA BLD POS QL NAA+NON-PROBE: NOT DETECTED
HAEM INFLU DNA BLD POS QL NAA+NON-PROBE: NOT DETECTED
HCO3 BLDA-SCNC: 26 MMOL/L
HCT VFR BLD AUTO: 43.6 % (ref 36.6–50.3)
HCT VFR BLD AUTO: 46.2 % (ref 36.6–50.3)
HCT VFR BLD AUTO: 46.3 % (ref 36.6–50.3)
HCT VFR BLD AUTO: 48.1 % (ref 36.6–50.3)
HGB BLD-MCNC: 14.4 G/DL (ref 12.1–17)
HGB BLD-MCNC: 14.7 G/DL (ref 12.1–17)
HGB BLD-MCNC: 15.1 G/DL (ref 12.1–17)
HGB BLD-MCNC: 15.5 G/DL (ref 12.1–17)
HGB UR QL STRIP: ABNORMAL
IMM GRANULOCYTES # BLD AUTO: 0 K/UL (ref 0–0.04)
IMM GRANULOCYTES # BLD AUTO: 0 K/UL (ref 0–0.04)
IMM GRANULOCYTES # BLD AUTO: 0.2 K/UL (ref 0–0.04)
IMM GRANULOCYTES # BLD AUTO: 0.2 K/UL (ref 0–0.04)
IMM GRANULOCYTES NFR BLD AUTO: 0 % (ref 0–0.5)
IMM GRANULOCYTES NFR BLD AUTO: 0 % (ref 0–0.5)
IMM GRANULOCYTES NFR BLD AUTO: 2 % (ref 0–0.5)
IMM GRANULOCYTES NFR BLD AUTO: 2 % (ref 0–0.5)
K OXYTOCA DNA BLD POS QL NAA+NON-PROBE: NOT DETECTED
KETONES UR QL STRIP.AUTO: NEGATIVE MG/DL
KLEBSIELLA SP DNA BLD POS QL NAA+NON-PRB: NOT DETECTED
KLEBSIELLA SP DNA BLD POS QL NAA+NON-PRB: NOT DETECTED
L MONOCYTOG DNA BLD POS QL NAA+NON-PROBE: NOT DETECTED
LACTATE BLD-SCNC: 2.23 MMOL/L (ref 0.4–2)
LACTATE BLD-SCNC: 2.68 MMOL/L (ref 0.4–2)
LEUKOCYTE ESTERASE UR QL STRIP.AUTO: ABNORMAL
LYMPHOCYTES # BLD: 1.1 K/UL (ref 0.8–3.5)
LYMPHOCYTES # BLD: 1.2 K/UL (ref 0.8–3.5)
LYMPHOCYTES # BLD: 1.4 K/UL (ref 0.8–3.5)
LYMPHOCYTES # BLD: 1.7 K/UL (ref 0.8–3.5)
LYMPHOCYTES NFR BLD: 11 % (ref 12–49)
LYMPHOCYTES NFR BLD: 13 % (ref 12–49)
LYMPHOCYTES NFR BLD: 13 % (ref 12–49)
LYMPHOCYTES NFR BLD: 9 % (ref 12–49)
MAGNESIUM SERPL-MCNC: 2.3 MG/DL (ref 1.6–2.4)
MAGNESIUM SERPL-MCNC: 2.4 MG/DL (ref 1.6–2.4)
MAGNESIUM SERPL-MCNC: 2.7 MG/DL (ref 1.6–2.4)
MAGNESIUM SERPL-MCNC: 2.8 MG/DL (ref 1.6–2.4)
MCH RBC QN AUTO: 29.3 PG (ref 26–34)
MCH RBC QN AUTO: 29.6 PG (ref 26–34)
MCH RBC QN AUTO: 29.6 PG (ref 26–34)
MCH RBC QN AUTO: 29.7 PG (ref 26–34)
MCHC RBC AUTO-ENTMCNC: 31.4 G/DL (ref 30–36.5)
MCHC RBC AUTO-ENTMCNC: 31.7 G/DL (ref 30–36.5)
MCHC RBC AUTO-ENTMCNC: 33 G/DL (ref 30–36.5)
MCHC RBC AUTO-ENTMCNC: 33.5 G/DL (ref 30–36.5)
MCV RBC AUTO: 88.5 FL (ref 80–99)
MCV RBC AUTO: 89.5 FL (ref 80–99)
MCV RBC AUTO: 92.2 FL (ref 80–99)
MCV RBC AUTO: 94.3 FL (ref 80–99)
MONOCYTES # BLD: 0.9 K/UL (ref 0–1)
MONOCYTES # BLD: 1.1 K/UL (ref 0–1)
MONOCYTES # BLD: 1.2 K/UL (ref 0–1)
MONOCYTES # BLD: 1.7 K/UL (ref 0–1)
MONOCYTES NFR BLD: 10 % (ref 5–13)
MONOCYTES NFR BLD: 11 % (ref 5–13)
MONOCYTES NFR BLD: 13 % (ref 5–13)
MONOCYTES NFR BLD: 8 % (ref 5–13)
N MEN DNA BLD POS QL NAA+NON-PROBE: NOT DETECTED
NDM (CARBAPENEMASE RESISTANT GENE), NDM: NOT DETECTED
NEUTS BAND NFR BLD MANUAL: 1 %
NEUTS BAND NFR BLD MANUAL: 1 %
NEUTS SEG # BLD: 10 K/UL (ref 1.8–8)
NEUTS SEG # BLD: 8.4 K/UL (ref 1.8–8)
NEUTS SEG # BLD: 8.8 K/UL (ref 1.8–8)
NEUTS SEG # BLD: 9.9 K/UL (ref 1.8–8)
NEUTS SEG NFR BLD: 73 % (ref 32–75)
NEUTS SEG NFR BLD: 74 % (ref 32–75)
NEUTS SEG NFR BLD: 78 % (ref 32–75)
NEUTS SEG NFR BLD: 80 % (ref 32–75)
NITRITE UR QL STRIP.AUTO: POSITIVE
NRBC # BLD: 0.02 K/UL (ref 0–0.01)
NRBC # BLD: 0.03 K/UL (ref 0–0.01)
NRBC # BLD: 0.04 K/UL (ref 0–0.01)
NRBC # BLD: 0.05 K/UL (ref 0–0.01)
NRBC BLD-RTO: 0.2 PER 100 WBC
NRBC BLD-RTO: 0.2 PER 100 WBC
NRBC BLD-RTO: 0.3 PER 100 WBC
NRBC BLD-RTO: 0.4 PER 100 WBC
P AERUGINOSA DNA BLD POS NAA+NON-PROBE: NOT DETECTED
P-R INTERVAL, ECG05: 160 MS
PCO2 BLDV: 31.9 MMHG (ref 41–51)
PH BLDV: 7.52 (ref 7.32–7.42)
PH UR STRIP: 7.5 (ref 5–8)
PHOSPHATE SERPL-MCNC: 2.1 MG/DL (ref 2.6–4.7)
PHOSPHATE SERPL-MCNC: 2.4 MG/DL (ref 2.6–4.7)
PHOSPHATE SERPL-MCNC: 3.7 MG/DL (ref 2.6–4.7)
PLATELET # BLD AUTO: 125 K/UL (ref 150–400)
PLATELET # BLD AUTO: 131 K/UL (ref 150–400)
PLATELET # BLD AUTO: 136 K/UL (ref 150–400)
PLATELET # BLD AUTO: 150 K/UL (ref 150–400)
PMV BLD AUTO: 11.6 FL (ref 8.9–12.9)
PMV BLD AUTO: 11.6 FL (ref 8.9–12.9)
PMV BLD AUTO: 11.7 FL (ref 8.9–12.9)
PMV BLD AUTO: 12 FL (ref 8.9–12.9)
PO2 BLDV: 50 MMHG (ref 25–40)
POTASSIUM BLD-SCNC: 4.9 MMOL/L (ref 3.5–5.5)
POTASSIUM SERPL-SCNC: 2.9 MMOL/L (ref 3.5–5.1)
POTASSIUM SERPL-SCNC: 3.3 MMOL/L (ref 3.5–5.1)
POTASSIUM SERPL-SCNC: 3.3 MMOL/L (ref 3.5–5.1)
POTASSIUM SERPL-SCNC: 3.4 MMOL/L (ref 3.5–5.1)
POTASSIUM SERPL-SCNC: 4.4 MMOL/L (ref 3.5–5.1)
PROT SERPL-MCNC: 8.5 G/DL (ref 6.4–8.2)
PROT UR STRIP-MCNC: ABNORMAL MG/DL
PROTEUS SP DNA BLD POS QL NAA+NON-PROBE: NOT DETECTED
Q-T INTERVAL, ECG07: 298 MS
QRS DURATION, ECG06: 84 MS
QTC CALCULATION (BEZET), ECG08: 421 MS
RBC # BLD AUTO: 4.87 M/UL (ref 4.1–5.7)
RBC # BLD AUTO: 5.02 M/UL (ref 4.1–5.7)
RBC # BLD AUTO: 5.1 M/UL (ref 4.1–5.7)
RBC # BLD AUTO: 5.22 M/UL (ref 4.1–5.7)
RBC #/AREA URNS HPF: ABNORMAL /HPF (ref 0–5)
RBC MORPH BLD: ABNORMAL
RBC MORPH BLD: ABNORMAL
RESISTANT GENE SPACE, REGENE: ABNORMAL
S AUREUS DNA BLD POS QL NAA+NON-PROBE: NOT DETECTED
S AUREUS+CONS DNA BLD POS NAA+NON-PROBE: NOT DETECTED
S EPIDERMIDIS DNA BLD POS QL NAA+NON-PRB: NOT DETECTED
S LUGDUNENSIS DNA BLD POS QL NAA+NON-PRB: NOT DETECTED
S MALTOPHILIA DNA BLD POS QL NAA+NON-PRB: NOT DETECTED
S MARCESCENS DNA BLD POS NAA+NON-PROBE: NOT DETECTED
S PNEUM DNA BLD POS QL NAA+NON-PROBE: NOT DETECTED
S PYO DNA BLD POS QL NAA+NON-PROBE: NOT DETECTED
SALMONELLA DNA BLD POS QL NAA+NON-PROBE: NOT DETECTED
SAMPLES BEING HELD,HOLD: NORMAL
SAO2 % BLD: 89 %
SARS-COV-2 RDRP RESP QL NAA+PROBE: NOT DETECTED
SERVICE CMNT-IMP: ABNORMAL
SERVICE CMNT-IMP: NORMAL
SERVICE CMNT-IMP: NORMAL
SODIUM BLD-SCNC: 137 MMOL/L (ref 136–145)
SODIUM SERPL-SCNC: 136 MMOL/L (ref 136–145)
SODIUM SERPL-SCNC: 140 MMOL/L (ref 136–145)
SODIUM SERPL-SCNC: 140 MMOL/L (ref 136–145)
SODIUM SERPL-SCNC: 147 MMOL/L (ref 136–145)
SODIUM SERPL-SCNC: 151 MMOL/L (ref 136–145)
SOURCE, COVRS: NORMAL
SP GR UR REFRACTOMETRY: 1.01
SPECIMEN SITE: ABNORMAL
STREPTOCOCCUS DNA BLD POS NAA+NON-PROBE: NOT DETECTED
UA: UC IF INDICATED,UAUC: ABNORMAL
UROBILINOGEN UR QL STRIP.AUTO: 2 EU/DL (ref 0.2–1)
VENTRICULAR RATE, ECG03: 120 BPM
WBC # BLD AUTO: 11.1 K/UL (ref 4.1–11.1)
WBC # BLD AUTO: 11.3 K/UL (ref 4.1–11.1)
WBC # BLD AUTO: 12.3 K/UL (ref 4.1–11.1)
WBC # BLD AUTO: 13.4 K/UL (ref 4.1–11.1)
WBC URNS QL MICRO: ABNORMAL /HPF (ref 0–4)

## 2023-01-01 PROCEDURE — 81001 URINALYSIS AUTO W/SCOPE: CPT

## 2023-01-01 PROCEDURE — C9113 INJ PANTOPRAZOLE SODIUM, VIA: HCPCS | Performed by: INTERNAL MEDICINE

## 2023-01-01 PROCEDURE — 74011000250 HC RX REV CODE- 250: Performed by: NURSE PRACTITIONER

## 2023-01-01 PROCEDURE — 74011000258 HC RX REV CODE- 258: Performed by: NURSE PRACTITIONER

## 2023-01-01 PROCEDURE — 74011000250 HC RX REV CODE- 250: Performed by: INTERNAL MEDICINE

## 2023-01-01 PROCEDURE — 99223 1ST HOSP IP/OBS HIGH 75: CPT | Performed by: NURSE PRACTITIONER

## 2023-01-01 PROCEDURE — 94760 N-INVAS EAR/PLS OXIMETRY 1: CPT

## 2023-01-01 PROCEDURE — 77010033711 HC HIGH FLOW OXYGEN

## 2023-01-01 PROCEDURE — 83735 ASSAY OF MAGNESIUM: CPT

## 2023-01-01 PROCEDURE — 36415 COLL VENOUS BLD VENIPUNCTURE: CPT

## 2023-01-01 PROCEDURE — 85025 COMPLETE CBC W/AUTO DIFF WBC: CPT

## 2023-01-01 PROCEDURE — 65270000015 HC RM PRIVATE ONCOLOGY

## 2023-01-01 PROCEDURE — 82962 GLUCOSE BLOOD TEST: CPT

## 2023-01-01 PROCEDURE — 74011250636 HC RX REV CODE- 250/636: Performed by: INTERNAL MEDICINE

## 2023-01-01 PROCEDURE — 87077 CULTURE AEROBIC IDENTIFY: CPT

## 2023-01-01 PROCEDURE — 83605 ASSAY OF LACTIC ACID: CPT

## 2023-01-01 PROCEDURE — 74011250636 HC RX REV CODE- 250/636: Performed by: STUDENT IN AN ORGANIZED HEALTH CARE EDUCATION/TRAINING PROGRAM

## 2023-01-01 PROCEDURE — 77010033678 HC OXYGEN DAILY

## 2023-01-01 PROCEDURE — 74011250637 HC RX REV CODE- 250/637: Performed by: NURSE PRACTITIONER

## 2023-01-01 PROCEDURE — 74011250637 HC RX REV CODE- 250/637: Performed by: INTERNAL MEDICINE

## 2023-01-01 PROCEDURE — 74011000258 HC RX REV CODE- 258: Performed by: INTERNAL MEDICINE

## 2023-01-01 PROCEDURE — 84100 ASSAY OF PHOSPHORUS: CPT

## 2023-01-01 PROCEDURE — 82947 ASSAY GLUCOSE BLOOD QUANT: CPT

## 2023-01-01 PROCEDURE — 99223 1ST HOSP IP/OBS HIGH 75: CPT | Performed by: STUDENT IN AN ORGANIZED HEALTH CARE EDUCATION/TRAINING PROGRAM

## 2023-01-01 PROCEDURE — 93005 ELECTROCARDIOGRAM TRACING: CPT

## 2023-01-01 PROCEDURE — 80048 BASIC METABOLIC PNL TOTAL CA: CPT

## 2023-01-01 PROCEDURE — 99285 EMERGENCY DEPT VISIT HI MDM: CPT

## 2023-01-01 PROCEDURE — 74011636637 HC RX REV CODE- 636/637: Performed by: NURSE PRACTITIONER

## 2023-01-01 PROCEDURE — 80053 COMPREHEN METABOLIC PANEL: CPT

## 2023-01-01 PROCEDURE — 99232 SBSQ HOSP IP/OBS MODERATE 35: CPT | Performed by: NURSE PRACTITIONER

## 2023-01-01 PROCEDURE — 74011250636 HC RX REV CODE- 250/636: Performed by: NURSE PRACTITIONER

## 2023-01-01 PROCEDURE — 65610000006 HC RM INTENSIVE CARE

## 2023-01-01 PROCEDURE — 87186 SC STD MICRODIL/AGAR DIL: CPT

## 2023-01-01 PROCEDURE — 92610 EVALUATE SWALLOWING FUNCTION: CPT

## 2023-01-01 PROCEDURE — 99233 SBSQ HOSP IP/OBS HIGH 50: CPT | Performed by: INTERNAL MEDICINE

## 2023-01-01 PROCEDURE — 51702 INSERT TEMP BLADDER CATH: CPT

## 2023-01-01 PROCEDURE — 96365 THER/PROPH/DIAG IV INF INIT: CPT

## 2023-01-01 PROCEDURE — 74011250637 HC RX REV CODE- 250/637: Performed by: STUDENT IN AN ORGANIZED HEALTH CARE EDUCATION/TRAINING PROGRAM

## 2023-01-01 PROCEDURE — 74176 CT ABD & PELVIS W/O CONTRAST: CPT

## 2023-01-01 PROCEDURE — 87804 INFLUENZA ASSAY W/OPTIC: CPT

## 2023-01-01 PROCEDURE — 83880 ASSAY OF NATRIURETIC PEPTIDE: CPT

## 2023-01-01 PROCEDURE — 96366 THER/PROPH/DIAG IV INF ADDON: CPT

## 2023-01-01 PROCEDURE — 99231 SBSQ HOSP IP/OBS SF/LOW 25: CPT | Performed by: INTERNAL MEDICINE

## 2023-01-01 PROCEDURE — 87635 SARS-COV-2 COVID-19 AMP PRB: CPT

## 2023-01-01 PROCEDURE — 74011000258 HC RX REV CODE- 258: Performed by: STUDENT IN AN ORGANIZED HEALTH CARE EDUCATION/TRAINING PROGRAM

## 2023-01-01 PROCEDURE — 71045 X-RAY EXAM CHEST 1 VIEW: CPT

## 2023-01-01 PROCEDURE — 96361 HYDRATE IV INFUSION ADD-ON: CPT

## 2023-01-01 PROCEDURE — 87040 BLOOD CULTURE FOR BACTERIA: CPT

## 2023-01-01 PROCEDURE — 87086 URINE CULTURE/COLONY COUNT: CPT

## 2023-01-01 PROCEDURE — 87150 DNA/RNA AMPLIFIED PROBE: CPT

## 2023-01-01 RX ORDER — SODIUM CHLORIDE 0.9 % (FLUSH) 0.9 %
5-40 SYRINGE (ML) INJECTION AS NEEDED
Status: DISCONTINUED | OUTPATIENT
Start: 2023-01-01 | End: 2023-01-01

## 2023-01-01 RX ORDER — TIZANIDINE 4 MG/1
1 TABLET ORAL 2 TIMES DAILY
COMMUNITY
Start: 2023-01-01 | End: 2023-01-01

## 2023-01-01 RX ORDER — FOLIC ACID 1 MG/1
1 TABLET ORAL DAILY
Status: DISCONTINUED | OUTPATIENT
Start: 2023-01-01 | End: 2023-01-01 | Stop reason: HOSPADM

## 2023-01-01 RX ORDER — TRAMADOL HYDROCHLORIDE 50 MG/1
50 TABLET ORAL
Status: DISCONTINUED | OUTPATIENT
Start: 2023-01-01 | End: 2023-01-01 | Stop reason: HOSPADM

## 2023-01-01 RX ORDER — HYDROXYZINE 25 MG/1
25 TABLET, FILM COATED ORAL
Status: DISCONTINUED | OUTPATIENT
Start: 2023-01-01 | End: 2023-01-01 | Stop reason: HOSPADM

## 2023-01-01 RX ORDER — POTASSIUM CHLORIDE 7.45 MG/ML
10 INJECTION INTRAVENOUS
Status: COMPLETED | OUTPATIENT
Start: 2023-01-01 | End: 2023-01-01

## 2023-01-01 RX ORDER — PANTOPRAZOLE SODIUM 40 MG/1
40 TABLET, DELAYED RELEASE ORAL
Status: DISCONTINUED | OUTPATIENT
Start: 2023-01-01 | End: 2023-01-01

## 2023-01-01 RX ORDER — FENTANYL CITRATE 50 UG/ML
50 INJECTION, SOLUTION INTRAMUSCULAR; INTRAVENOUS
Status: DISCONTINUED | OUTPATIENT
Start: 2023-01-01 | End: 2023-01-01

## 2023-01-01 RX ORDER — BENZONATATE 100 MG/1
100 CAPSULE ORAL
Status: DISCONTINUED | OUTPATIENT
Start: 2023-01-01 | End: 2023-01-01 | Stop reason: HOSPADM

## 2023-01-01 RX ORDER — TAMSULOSIN HYDROCHLORIDE 0.4 MG/1
0.4 CAPSULE ORAL
Status: DISCONTINUED | OUTPATIENT
Start: 2023-01-01 | End: 2023-01-01 | Stop reason: HOSPADM

## 2023-01-01 RX ORDER — BALSAM PERU/CASTOR OIL
OINTMENT (GRAM) TOPICAL 2 TIMES DAILY
Status: DISCONTINUED | OUTPATIENT
Start: 2023-01-01 | End: 2023-01-01

## 2023-01-01 RX ORDER — ENOXAPARIN SODIUM 100 MG/ML
40 INJECTION SUBCUTANEOUS DAILY
Status: DISCONTINUED | OUTPATIENT
Start: 2023-01-01 | End: 2023-01-01

## 2023-01-01 RX ORDER — DEXTROSE MONOHYDRATE 100 MG/ML
0-250 INJECTION, SOLUTION INTRAVENOUS AS NEEDED
Status: DISCONTINUED | OUTPATIENT
Start: 2023-01-01 | End: 2023-01-01 | Stop reason: HOSPADM

## 2023-01-01 RX ORDER — LIDOCAINE 4 G/100G
3 PATCH TOPICAL EVERY 24 HOURS
Status: DISCONTINUED | OUTPATIENT
Start: 2023-01-01 | End: 2023-01-01 | Stop reason: HOSPADM

## 2023-01-01 RX ORDER — ACETAMINOPHEN 325 MG/1
325 TABLET ORAL ONCE
Status: COMPLETED | OUTPATIENT
Start: 2023-01-01 | End: 2023-01-01

## 2023-01-01 RX ORDER — TRAMADOL HYDROCHLORIDE 50 MG/1
1 TABLET ORAL
COMMUNITY
Start: 2023-01-01 | End: 2023-01-01

## 2023-01-01 RX ORDER — METFORMIN HYDROCHLORIDE 500 MG/1
1 TABLET ORAL DAILY
COMMUNITY
End: 2023-01-01

## 2023-01-01 RX ORDER — HYDROMORPHONE HYDROCHLORIDE 5 MG/5ML
1 SOLUTION ORAL
Status: DISCONTINUED | OUTPATIENT
Start: 2023-01-01 | End: 2023-01-01

## 2023-01-01 RX ORDER — LANOLIN ALCOHOL/MO/W.PET/CERES
1.5 CREAM (GRAM) TOPICAL
Status: DISCONTINUED | OUTPATIENT
Start: 2023-01-01 | End: 2023-01-01 | Stop reason: HOSPADM

## 2023-01-01 RX ORDER — HYDRALAZINE HYDROCHLORIDE 20 MG/ML
10 INJECTION INTRAMUSCULAR; INTRAVENOUS
Status: DISCONTINUED | OUTPATIENT
Start: 2023-01-01 | End: 2023-01-01 | Stop reason: HOSPADM

## 2023-01-01 RX ORDER — BALSAM PERU/CASTOR OIL
OINTMENT (GRAM) TOPICAL 2 TIMES DAILY
Status: DISCONTINUED | OUTPATIENT
Start: 2023-01-01 | End: 2023-01-01 | Stop reason: HOSPADM

## 2023-01-01 RX ORDER — SODIUM CHLORIDE 0.9 % (FLUSH) 0.9 %
5-40 SYRINGE (ML) INJECTION AS NEEDED
Status: DISCONTINUED | OUTPATIENT
Start: 2023-01-01 | End: 2023-01-01 | Stop reason: HOSPADM

## 2023-01-01 RX ORDER — HYDROXYZINE PAMOATE 25 MG/1
25 CAPSULE ORAL
Status: DISCONTINUED | OUTPATIENT
Start: 2023-01-01 | End: 2023-01-01

## 2023-01-01 RX ORDER — KETOROLAC TROMETHAMINE 30 MG/ML
30 INJECTION, SOLUTION INTRAMUSCULAR; INTRAVENOUS
Status: COMPLETED | OUTPATIENT
Start: 2023-01-01 | End: 2023-01-01

## 2023-01-01 RX ORDER — TRAZODONE HYDROCHLORIDE 100 MG/1
2 TABLET ORAL
COMMUNITY
Start: 2022-01-01 | End: 2023-01-01

## 2023-01-01 RX ORDER — IBUPROFEN 200 MG
4 TABLET ORAL AS NEEDED
Status: DISCONTINUED | OUTPATIENT
Start: 2023-01-01 | End: 2023-01-01 | Stop reason: HOSPADM

## 2023-01-01 RX ORDER — INSULIN LISPRO 100 [IU]/ML
INJECTION, SOLUTION INTRAVENOUS; SUBCUTANEOUS EVERY 6 HOURS
Status: DISCONTINUED | OUTPATIENT
Start: 2023-01-01 | End: 2023-01-01

## 2023-01-01 RX ORDER — TRAZODONE HYDROCHLORIDE 100 MG/1
200 TABLET ORAL
Status: DISCONTINUED | OUTPATIENT
Start: 2023-01-01 | End: 2023-01-01 | Stop reason: HOSPADM

## 2023-01-01 RX ORDER — ACETAMINOPHEN 325 MG/1
650 TABLET ORAL
Status: DISCONTINUED | OUTPATIENT
Start: 2023-01-01 | End: 2023-01-01 | Stop reason: HOSPADM

## 2023-01-01 RX ORDER — NAPROXEN 500 MG/1
1 TABLET ORAL
COMMUNITY
Start: 2023-01-01 | End: 2023-01-01

## 2023-01-01 RX ORDER — ACETAMINOPHEN 650 MG/1
650 SUPPOSITORY RECTAL
Status: DISCONTINUED | OUTPATIENT
Start: 2023-01-01 | End: 2023-01-01 | Stop reason: HOSPADM

## 2023-01-01 RX ORDER — HYDROMORPHONE HYDROCHLORIDE 1 MG/ML
0.2 INJECTION, SOLUTION INTRAMUSCULAR; INTRAVENOUS; SUBCUTANEOUS
Status: DISCONTINUED | OUTPATIENT
Start: 2023-01-01 | End: 2023-01-01

## 2023-01-01 RX ORDER — ENOXAPARIN SODIUM 100 MG/ML
30 INJECTION SUBCUTANEOUS EVERY 12 HOURS
Status: DISCONTINUED | OUTPATIENT
Start: 2023-01-01 | End: 2023-01-01 | Stop reason: HOSPADM

## 2023-01-01 RX ORDER — SODIUM CHLORIDE 0.9 % (FLUSH) 0.9 %
5-40 SYRINGE (ML) INJECTION EVERY 8 HOURS
Status: DISCONTINUED | OUTPATIENT
Start: 2023-01-01 | End: 2023-01-01 | Stop reason: HOSPADM

## 2023-01-01 RX ORDER — OMEPRAZOLE 20 MG/1
1 TABLET, DELAYED RELEASE ORAL DAILY
Status: ON HOLD | COMMUNITY
End: 2023-01-01 | Stop reason: SINTOL

## 2023-01-01 RX ORDER — ENTECAVIR 0.5 MG/1
0.5 TABLET, FILM COATED ORAL
Status: DISCONTINUED | OUTPATIENT
Start: 2023-01-01 | End: 2023-01-01 | Stop reason: HOSPADM

## 2023-01-01 RX ORDER — LANOLIN ALCOHOL/MO/W.PET/CERES
1000 CREAM (GRAM) TOPICAL DAILY
Status: DISCONTINUED | OUTPATIENT
Start: 2023-01-01 | End: 2023-01-01 | Stop reason: HOSPADM

## 2023-01-01 RX ORDER — POLYETHYLENE GLYCOL 3350 17 G/17G
17 POWDER, FOR SOLUTION ORAL DAILY PRN
Status: DISCONTINUED | OUTPATIENT
Start: 2023-01-01 | End: 2023-01-01 | Stop reason: HOSPADM

## 2023-01-01 RX ORDER — GABAPENTIN 300 MG/1
300 CAPSULE ORAL EVERY 8 HOURS
Status: DISCONTINUED | OUTPATIENT
Start: 2023-01-01 | End: 2023-01-01 | Stop reason: HOSPADM

## 2023-01-01 RX ORDER — POTASSIUM CHLORIDE, DEXTROSE MONOHYDRATE 150; 5 MG/100ML; G/100ML
INJECTION, SOLUTION INTRAVENOUS CONTINUOUS
Status: DISCONTINUED | OUTPATIENT
Start: 2023-01-01 | End: 2023-01-01 | Stop reason: HOSPADM

## 2023-01-01 RX ORDER — SODIUM CHLORIDE 0.9 % (FLUSH) 0.9 %
5-10 SYRINGE (ML) INJECTION AS NEEDED
Status: DISCONTINUED | OUTPATIENT
Start: 2023-01-01 | End: 2023-01-01

## 2023-01-01 RX ORDER — HYDROMORPHONE HYDROCHLORIDE 5 MG/5ML
2 SOLUTION ORAL
Status: DISCONTINUED | OUTPATIENT
Start: 2023-01-01 | End: 2023-01-01 | Stop reason: HOSPADM

## 2023-01-01 RX ORDER — ENOXAPARIN SODIUM 100 MG/ML
30 INJECTION SUBCUTANEOUS DAILY
Status: DISCONTINUED | OUTPATIENT
Start: 2023-01-01 | End: 2023-01-01

## 2023-01-01 RX ORDER — METHOCARBAMOL 750 MG/1
1 TABLET, FILM COATED ORAL
COMMUNITY
Start: 2023-01-01 | End: 2023-01-01

## 2023-01-01 RX ORDER — FOLIC ACID 1 MG/1
1 TABLET ORAL DAILY
COMMUNITY
End: 2023-01-01

## 2023-01-01 RX ORDER — HYDROMORPHONE HYDROCHLORIDE 1 MG/ML
0.5 INJECTION, SOLUTION INTRAMUSCULAR; INTRAVENOUS; SUBCUTANEOUS
Status: DISCONTINUED | OUTPATIENT
Start: 2023-01-01 | End: 2023-01-01

## 2023-01-01 RX ORDER — HYDROMORPHONE HYDROCHLORIDE 1 MG/ML
1 INJECTION, SOLUTION INTRAMUSCULAR; INTRAVENOUS; SUBCUTANEOUS
Status: DISCONTINUED | OUTPATIENT
Start: 2023-01-01 | End: 2023-01-01 | Stop reason: HOSPADM

## 2023-01-01 RX ORDER — LANOLIN ALCOHOL/MO/W.PET/CERES
1000 CREAM (GRAM) TOPICAL DAILY
COMMUNITY
End: 2023-01-01

## 2023-01-01 RX ORDER — FINASTERIDE 5 MG/1
5 TABLET, FILM COATED ORAL DAILY
Status: DISCONTINUED | OUTPATIENT
Start: 2023-01-01 | End: 2023-01-01 | Stop reason: HOSPADM

## 2023-01-01 RX ADMIN — SODIUM CHLORIDE, PRESERVATIVE FREE 10 ML: 5 INJECTION INTRAVENOUS at 21:58

## 2023-01-01 RX ADMIN — SODIUM CHLORIDE, PRESERVATIVE FREE 40 MG: 5 INJECTION INTRAVENOUS at 09:19

## 2023-01-01 RX ADMIN — ENOXAPARIN SODIUM 30 MG: 100 INJECTION SUBCUTANEOUS at 10:00

## 2023-01-01 RX ADMIN — CASTOR OIL AND BALSAM, PERU: 788; 87 OINTMENT TOPICAL at 08:56

## 2023-01-01 RX ADMIN — KETOROLAC TROMETHAMINE 30 MG: 30 INJECTION, SOLUTION INTRAMUSCULAR; INTRAVENOUS at 18:10

## 2023-01-01 RX ADMIN — Medication 1 AMPULE: at 08:49

## 2023-01-01 RX ADMIN — Medication 2 UNITS: at 17:16

## 2023-01-01 RX ADMIN — PIPERACILLIN AND TAZOBACTAM 3.38 G: 3; .375 INJECTION, POWDER, LYOPHILIZED, FOR SOLUTION INTRAVENOUS at 21:12

## 2023-01-01 RX ADMIN — HYDROMORPHONE HYDROCHLORIDE 1 MG: 1 INJECTION, SOLUTION INTRAMUSCULAR; INTRAVENOUS; SUBCUTANEOUS at 05:18

## 2023-01-01 RX ADMIN — GABAPENTIN 300 MG: 300 CAPSULE ORAL at 18:10

## 2023-01-01 RX ADMIN — PIPERACILLIN AND TAZOBACTAM 3.38 G: 3; .375 INJECTION, POWDER, LYOPHILIZED, FOR SOLUTION INTRAVENOUS at 14:37

## 2023-01-01 RX ADMIN — SODIUM CHLORIDE, PRESERVATIVE FREE 10 ML: 5 INJECTION INTRAVENOUS at 13:19

## 2023-01-01 RX ADMIN — POTASSIUM CHLORIDE 10 MEQ: 7.46 INJECTION, SOLUTION INTRAVENOUS at 08:39

## 2023-01-01 RX ADMIN — POTASSIUM CHLORIDE 10 MEQ: 7.46 INJECTION, SOLUTION INTRAVENOUS at 20:52

## 2023-01-01 RX ADMIN — ENOXAPARIN SODIUM 30 MG: 100 INJECTION SUBCUTANEOUS at 08:39

## 2023-01-01 RX ADMIN — CASTOR OIL AND BALSAM, PERU: 788; 87 OINTMENT TOPICAL at 20:05

## 2023-01-01 RX ADMIN — CASTOR OIL AND BALSAM, PERU: 788; 87 OINTMENT TOPICAL at 20:30

## 2023-01-01 RX ADMIN — CASTOR OIL AND BALSAM, PERU: 788; 87 OINTMENT TOPICAL at 08:48

## 2023-01-01 RX ADMIN — HYDROMORPHONE HYDROCHLORIDE 2 MG: 5 SOLUTION ORAL at 20:46

## 2023-01-01 RX ADMIN — HYDROMORPHONE HYDROCHLORIDE 1 MG: 1 INJECTION, SOLUTION INTRAMUSCULAR; INTRAVENOUS; SUBCUTANEOUS at 18:04

## 2023-01-01 RX ADMIN — HYDROMORPHONE HYDROCHLORIDE 1 MG: 1 INJECTION, SOLUTION INTRAMUSCULAR; INTRAVENOUS; SUBCUTANEOUS at 17:52

## 2023-01-01 RX ADMIN — HYDROMORPHONE HYDROCHLORIDE 1 MG: 1 INJECTION, SOLUTION INTRAMUSCULAR; INTRAVENOUS; SUBCUTANEOUS at 10:00

## 2023-01-01 RX ADMIN — FENTANYL CITRATE 50 MCG: 50 INJECTION INTRAMUSCULAR; INTRAVENOUS at 12:08

## 2023-01-01 RX ADMIN — POTASSIUM CHLORIDE 10 MEQ: 7.46 INJECTION, SOLUTION INTRAVENOUS at 10:39

## 2023-01-01 RX ADMIN — HYDROMORPHONE HYDROCHLORIDE 1 MG: 1 INJECTION, SOLUTION INTRAMUSCULAR; INTRAVENOUS; SUBCUTANEOUS at 00:31

## 2023-01-01 RX ADMIN — ACETAMINOPHEN 650 MG: 650 SUPPOSITORY RECTAL at 02:01

## 2023-01-01 RX ADMIN — POTASSIUM CHLORIDE AND DEXTROSE MONOHYDRATE: 150; 5 INJECTION, SOLUTION INTRAVENOUS at 11:46

## 2023-01-01 RX ADMIN — AZITHROMYCIN MONOHYDRATE 500 MG: 500 INJECTION, POWDER, LYOPHILIZED, FOR SOLUTION INTRAVENOUS at 05:43

## 2023-01-01 RX ADMIN — HYDROMORPHONE HYDROCHLORIDE 0.5 MG: 1 INJECTION, SOLUTION INTRAMUSCULAR; INTRAVENOUS; SUBCUTANEOUS at 21:54

## 2023-01-01 RX ADMIN — FENTANYL CITRATE 50 MCG: 50 INJECTION INTRAMUSCULAR; INTRAVENOUS at 14:51

## 2023-01-01 RX ADMIN — CASTOR OIL AND BALSAM, PERU: 788; 87 OINTMENT TOPICAL at 09:17

## 2023-01-01 RX ADMIN — PIPERACILLIN AND TAZOBACTAM 3.38 G: 3; .375 INJECTION, POWDER, LYOPHILIZED, FOR SOLUTION INTRAVENOUS at 15:04

## 2023-01-01 RX ADMIN — GABAPENTIN 300 MG: 300 CAPSULE ORAL at 16:38

## 2023-01-01 RX ADMIN — POTASSIUM CHLORIDE AND DEXTROSE MONOHYDRATE: 150; 5 INJECTION, SOLUTION INTRAVENOUS at 01:08

## 2023-01-01 RX ADMIN — ACETAMINOPHEN 325MG 325 MG: 325 TABLET ORAL at 15:04

## 2023-01-01 RX ADMIN — HYDROMORPHONE HYDROCHLORIDE 0.5 MG: 1 INJECTION, SOLUTION INTRAMUSCULAR; INTRAVENOUS; SUBCUTANEOUS at 18:00

## 2023-01-01 RX ADMIN — Medication 2 UNITS: at 12:56

## 2023-01-01 RX ADMIN — Medication 1 AMPULE: at 09:03

## 2023-01-01 RX ADMIN — ACETAMINOPHEN 650 MG: 650 SUPPOSITORY RECTAL at 13:24

## 2023-01-01 RX ADMIN — AZITHROMYCIN 500 MG: 500 INJECTION, POWDER, LYOPHILIZED, FOR SOLUTION INTRAVENOUS at 05:12

## 2023-01-01 RX ADMIN — SODIUM CHLORIDE, PRESERVATIVE FREE 10 ML: 5 INJECTION INTRAVENOUS at 21:22

## 2023-01-01 RX ADMIN — ACETAMINOPHEN 650 MG: 650 SUPPOSITORY RECTAL at 05:36

## 2023-01-01 RX ADMIN — HYDROMORPHONE HYDROCHLORIDE 2 MG: 5 SOLUTION ORAL at 16:42

## 2023-01-01 RX ADMIN — PIPERACILLIN AND TAZOBACTAM 3.38 G: 3; .375 INJECTION, POWDER, LYOPHILIZED, FOR SOLUTION INTRAVENOUS at 22:49

## 2023-01-01 RX ADMIN — PIPERACILLIN AND TAZOBACTAM 3.38 G: 3; .375 INJECTION, POWDER, FOR SOLUTION INTRAVENOUS at 21:39

## 2023-01-01 RX ADMIN — SODIUM CHLORIDE, PRESERVATIVE FREE 10 ML: 5 INJECTION INTRAVENOUS at 17:22

## 2023-01-01 RX ADMIN — HYDROMORPHONE HYDROCHLORIDE 1 MG: 5 SOLUTION ORAL at 17:00

## 2023-01-01 RX ADMIN — Medication 1 AMPULE: at 20:31

## 2023-01-01 RX ADMIN — SODIUM CHLORIDE, PRESERVATIVE FREE 10 ML: 5 INJECTION INTRAVENOUS at 06:52

## 2023-01-01 RX ADMIN — HYDROMORPHONE HYDROCHLORIDE 2 MG: 5 SOLUTION ORAL at 13:20

## 2023-01-01 RX ADMIN — CASTOR OIL AND BALSAM, PERU: 788; 87 OINTMENT TOPICAL at 10:05

## 2023-01-01 RX ADMIN — POTASSIUM CHLORIDE AND DEXTROSE MONOHYDRATE: 150; 5 INJECTION, SOLUTION INTRAVENOUS at 21:11

## 2023-01-01 RX ADMIN — SODIUM CHLORIDE, PRESERVATIVE FREE 10 ML: 5 INJECTION INTRAVENOUS at 13:51

## 2023-01-01 RX ADMIN — HYDROMORPHONE HYDROCHLORIDE 1 MG: 1 INJECTION, SOLUTION INTRAMUSCULAR; INTRAVENOUS; SUBCUTANEOUS at 23:49

## 2023-01-01 RX ADMIN — ACETAMINOPHEN 650 MG: 650 SUPPOSITORY RECTAL at 13:03

## 2023-01-01 RX ADMIN — ACETAMINOPHEN 650 MG: 650 SUPPOSITORY RECTAL at 20:11

## 2023-01-01 RX ADMIN — PIPERACILLIN AND TAZOBACTAM 3.38 G: 3; .375 INJECTION, POWDER, LYOPHILIZED, FOR SOLUTION INTRAVENOUS at 05:19

## 2023-01-01 RX ADMIN — SODIUM CHLORIDE, PRESERVATIVE FREE 10 ML: 5 INJECTION INTRAVENOUS at 05:20

## 2023-01-01 RX ADMIN — POTASSIUM CHLORIDE AND DEXTROSE MONOHYDRATE: 150; 5 INJECTION, SOLUTION INTRAVENOUS at 05:31

## 2023-01-01 RX ADMIN — POTASSIUM CHLORIDE 10 MEQ: 7.46 INJECTION, SOLUTION INTRAVENOUS at 17:50

## 2023-01-01 RX ADMIN — Medication 1 AMPULE: at 20:04

## 2023-01-01 RX ADMIN — Medication 1 AMPULE: at 10:39

## 2023-01-01 RX ADMIN — Medication 2 UNITS: at 05:30

## 2023-01-01 RX ADMIN — SODIUM CHLORIDE, PRESERVATIVE FREE 10 ML: 5 INJECTION INTRAVENOUS at 06:18

## 2023-01-01 RX ADMIN — SODIUM CHLORIDE, PRESERVATIVE FREE 10 ML: 5 INJECTION INTRAVENOUS at 14:51

## 2023-01-01 RX ADMIN — POTASSIUM CHLORIDE 10 MEQ: 7.46 INJECTION, SOLUTION INTRAVENOUS at 06:58

## 2023-01-01 RX ADMIN — ACETAMINOPHEN 650 MG: 650 SUPPOSITORY RECTAL at 20:32

## 2023-01-01 RX ADMIN — HYDROMORPHONE HYDROCHLORIDE 1 MG: 1 INJECTION, SOLUTION INTRAMUSCULAR; INTRAVENOUS; SUBCUTANEOUS at 13:18

## 2023-01-01 RX ADMIN — HYDROMORPHONE HYDROCHLORIDE 0.2 MG: 1 INJECTION, SOLUTION INTRAMUSCULAR; INTRAVENOUS; SUBCUTANEOUS at 10:32

## 2023-01-01 RX ADMIN — ACETAMINOPHEN 325MG 650 MG: 325 TABLET ORAL at 12:10

## 2023-01-01 RX ADMIN — CASTOR OIL AND BALSAM, PERU: 788; 87 OINTMENT TOPICAL at 21:00

## 2023-01-01 RX ADMIN — HYDROMORPHONE HYDROCHLORIDE 1 MG: 1 INJECTION, SOLUTION INTRAMUSCULAR; INTRAVENOUS; SUBCUTANEOUS at 21:52

## 2023-01-01 RX ADMIN — SODIUM CHLORIDE, PRESERVATIVE FREE 10 ML: 5 INJECTION INTRAVENOUS at 06:03

## 2023-01-01 RX ADMIN — Medication 1 AMPULE: at 10:00

## 2023-01-01 RX ADMIN — ACETAMINOPHEN 650 MG: 650 SUPPOSITORY RECTAL at 02:33

## 2023-01-01 RX ADMIN — HYDROMORPHONE HYDROCHLORIDE 2 MG: 5 SOLUTION ORAL at 09:05

## 2023-01-01 RX ADMIN — PIPERACILLIN AND TAZOBACTAM 3.38 G: 3; .375 INJECTION, POWDER, LYOPHILIZED, FOR SOLUTION INTRAVENOUS at 14:51

## 2023-01-01 RX ADMIN — HYDROMORPHONE HYDROCHLORIDE 1 MG: 1 INJECTION, SOLUTION INTRAMUSCULAR; INTRAVENOUS; SUBCUTANEOUS at 18:17

## 2023-01-01 RX ADMIN — CASTOR OIL AND BALSAM, PERU: 788; 87 OINTMENT TOPICAL at 22:16

## 2023-01-01 RX ADMIN — HYDROMORPHONE HYDROCHLORIDE 0.5 MG: 1 INJECTION, SOLUTION INTRAMUSCULAR; INTRAVENOUS; SUBCUTANEOUS at 01:11

## 2023-01-01 RX ADMIN — HYDROMORPHONE HYDROCHLORIDE 1 MG: 1 INJECTION, SOLUTION INTRAMUSCULAR; INTRAVENOUS; SUBCUTANEOUS at 14:51

## 2023-01-01 RX ADMIN — HYDROMORPHONE HYDROCHLORIDE 1 MG: 1 INJECTION, SOLUTION INTRAMUSCULAR; INTRAVENOUS; SUBCUTANEOUS at 06:50

## 2023-01-01 RX ADMIN — ENOXAPARIN SODIUM 30 MG: 100 INJECTION SUBCUTANEOUS at 09:18

## 2023-01-01 RX ADMIN — PIPERACILLIN AND TAZOBACTAM 3.38 G: 3; .375 INJECTION, POWDER, LYOPHILIZED, FOR SOLUTION INTRAVENOUS at 13:18

## 2023-01-01 RX ADMIN — SODIUM CHLORIDE, PRESERVATIVE FREE 10 ML: 5 INJECTION INTRAVENOUS at 05:27

## 2023-01-01 RX ADMIN — SODIUM CHLORIDE 1000 ML: 9 INJECTION, SOLUTION INTRAVENOUS at 23:26

## 2023-01-01 RX ADMIN — PIPERACILLIN AND TAZOBACTAM 3.38 G: 3; .375 INJECTION, POWDER, LYOPHILIZED, FOR SOLUTION INTRAVENOUS at 05:11

## 2023-01-01 RX ADMIN — PIPERACILLIN AND TAZOBACTAM 3.38 G: 3; .375 INJECTION, POWDER, LYOPHILIZED, FOR SOLUTION INTRAVENOUS at 13:14

## 2023-01-01 RX ADMIN — ACETAMINOPHEN 650 MG: 650 SUPPOSITORY RECTAL at 19:47

## 2023-01-01 RX ADMIN — SODIUM CHLORIDE, PRESERVATIVE FREE 10 ML: 5 INJECTION INTRAVENOUS at 22:50

## 2023-01-01 RX ADMIN — ENOXAPARIN SODIUM 30 MG: 100 INJECTION SUBCUTANEOUS at 21:53

## 2023-01-01 RX ADMIN — HYDROMORPHONE HYDROCHLORIDE 1 MG: 1 INJECTION, SOLUTION INTRAMUSCULAR; INTRAVENOUS; SUBCUTANEOUS at 03:46

## 2023-01-01 RX ADMIN — SODIUM CHLORIDE, PRESERVATIVE FREE 10 ML: 5 INJECTION INTRAVENOUS at 21:54

## 2023-01-01 RX ADMIN — CASTOR OIL AND BALSAM, PERU: 788; 87 OINTMENT TOPICAL at 11:32

## 2023-01-01 RX ADMIN — TAMSULOSIN HYDROCHLORIDE 0.4 MG: 0.4 CAPSULE ORAL at 21:12

## 2023-01-01 RX ADMIN — ENOXAPARIN SODIUM 30 MG: 100 INJECTION SUBCUTANEOUS at 11:36

## 2023-01-01 RX ADMIN — Medication 1 AMPULE: at 09:17

## 2023-01-01 RX ADMIN — ENOXAPARIN SODIUM 30 MG: 100 INJECTION SUBCUTANEOUS at 22:19

## 2023-01-01 RX ADMIN — HYDROMORPHONE HYDROCHLORIDE 2 MG: 5 SOLUTION ORAL at 11:01

## 2023-01-01 RX ADMIN — ENOXAPARIN SODIUM 30 MG: 100 INJECTION SUBCUTANEOUS at 09:30

## 2023-01-01 RX ADMIN — ENOXAPARIN SODIUM 30 MG: 100 INJECTION SUBCUTANEOUS at 09:06

## 2023-01-01 RX ADMIN — HYDROMORPHONE HYDROCHLORIDE 0.5 MG: 1 INJECTION, SOLUTION INTRAMUSCULAR; INTRAVENOUS; SUBCUTANEOUS at 04:22

## 2023-01-01 RX ADMIN — PIPERACILLIN AND TAZOBACTAM 3.38 G: 3; .375 INJECTION, POWDER, LYOPHILIZED, FOR SOLUTION INTRAVENOUS at 05:18

## 2023-01-01 RX ADMIN — ACETAMINOPHEN 325MG 650 MG: 325 TABLET ORAL at 05:23

## 2023-01-01 RX ADMIN — PIPERACILLIN AND TAZOBACTAM 3.38 G: 3; .375 INJECTION, POWDER, LYOPHILIZED, FOR SOLUTION INTRAVENOUS at 06:18

## 2023-01-01 RX ADMIN — HYDROMORPHONE HYDROCHLORIDE 2 MG: 5 SOLUTION ORAL at 03:47

## 2023-01-01 RX ADMIN — ENOXAPARIN SODIUM 30 MG: 100 INJECTION SUBCUTANEOUS at 21:12

## 2023-01-01 RX ADMIN — SODIUM CHLORIDE, PRESERVATIVE FREE 40 MG: 5 INJECTION INTRAVENOUS at 09:06

## 2023-01-01 RX ADMIN — PIPERACILLIN AND TAZOBACTAM 3.38 G: 3; .375 INJECTION, POWDER, LYOPHILIZED, FOR SOLUTION INTRAVENOUS at 21:54

## 2023-01-01 RX ADMIN — HYDROMORPHONE HYDROCHLORIDE 0.5 MG: 1 INJECTION, SOLUTION INTRAMUSCULAR; INTRAVENOUS; SUBCUTANEOUS at 07:53

## 2023-01-01 RX ADMIN — HYDROMORPHONE HYDROCHLORIDE 1 MG: 1 INJECTION, SOLUTION INTRAMUSCULAR; INTRAVENOUS; SUBCUTANEOUS at 20:44

## 2023-01-01 RX ADMIN — SODIUM CHLORIDE, PRESERVATIVE FREE 40 MG: 5 INJECTION INTRAVENOUS at 10:00

## 2023-01-01 RX ADMIN — SODIUM CHLORIDE, PRESERVATIVE FREE 40 MG: 5 INJECTION INTRAVENOUS at 11:32

## 2023-01-01 RX ADMIN — SODIUM CHLORIDE, PRESERVATIVE FREE 10 ML: 5 INJECTION INTRAVENOUS at 13:28

## 2023-01-01 RX ADMIN — POTASSIUM PHOSPHATE, MONOBASIC AND POTASSIUM PHOSPHATE, DIBASIC: 224; 236 INJECTION, SOLUTION, CONCENTRATE INTRAVENOUS at 05:15

## 2023-01-01 RX ADMIN — SODIUM CHLORIDE, PRESERVATIVE FREE 40 MG: 5 INJECTION INTRAVENOUS at 08:54

## 2023-01-01 RX ADMIN — AZITHROMYCIN 500 MG: 500 INJECTION, POWDER, LYOPHILIZED, FOR SOLUTION INTRAVENOUS at 04:26

## 2023-01-01 RX ADMIN — ACETAMINOPHEN 650 MG: 650 SUPPOSITORY RECTAL at 00:31

## 2023-01-01 RX ADMIN — SODIUM CHLORIDE, PRESERVATIVE FREE 10 ML: 5 INJECTION INTRAVENOUS at 22:21

## 2023-01-01 RX ADMIN — PIPERACILLIN AND TAZOBACTAM 3.38 G: 3; .375 INJECTION, POWDER, LYOPHILIZED, FOR SOLUTION INTRAVENOUS at 22:19

## 2023-01-01 RX ADMIN — PIPERACILLIN AND TAZOBACTAM 3.38 G: 3; .375 INJECTION, POWDER, LYOPHILIZED, FOR SOLUTION INTRAVENOUS at 21:52

## 2023-01-01 RX ADMIN — HYDROMORPHONE HYDROCHLORIDE 1 MG: 1 INJECTION, SOLUTION INTRAMUSCULAR; INTRAVENOUS; SUBCUTANEOUS at 06:34

## 2023-01-01 RX ADMIN — HYDROMORPHONE HYDROCHLORIDE 1 MG: 1 INJECTION, SOLUTION INTRAMUSCULAR; INTRAVENOUS; SUBCUTANEOUS at 11:48

## 2023-01-01 RX ADMIN — PIPERACILLIN AND TAZOBACTAM 3.38 G: 3; .375 INJECTION, POWDER, LYOPHILIZED, FOR SOLUTION INTRAVENOUS at 05:57

## 2023-01-26 ENCOUNTER — TRANSCRIBE ORDER (OUTPATIENT)
Dept: SCHEDULING | Age: 68
End: 2023-01-26

## 2023-01-26 DIAGNOSIS — M50.30 DISC DISEASE, DEGENERATIVE, CERVICAL: ICD-10-CM

## 2023-01-26 DIAGNOSIS — M50.020 CERVICAL DISC DISORDER WITH MYELOPATHY OF MID-CERVICAL REGION: Primary | ICD-10-CM

## 2023-01-28 ENCOUNTER — APPOINTMENT (OUTPATIENT)
Dept: CT IMAGING | Age: 68
End: 2023-01-28
Attending: EMERGENCY MEDICINE
Payer: MEDICARE

## 2023-01-28 ENCOUNTER — HOSPITAL ENCOUNTER (INPATIENT)
Age: 68
LOS: 13 days | Discharge: NURSING, REHAB OR LONG-TERM CARE | End: 2023-02-10
Attending: INTERNAL MEDICINE | Admitting: INTERNAL MEDICINE
Payer: MEDICARE

## 2023-01-28 ENCOUNTER — HOSPITAL ENCOUNTER (EMERGENCY)
Age: 68
Discharge: OTHER INSTITUTION | End: 2023-01-28
Attending: EMERGENCY MEDICINE
Payer: MEDICARE

## 2023-01-28 VITALS
RESPIRATION RATE: 16 BRPM | DIASTOLIC BLOOD PRESSURE: 89 MMHG | OXYGEN SATURATION: 98 % | TEMPERATURE: 98.2 F | HEART RATE: 65 BPM | SYSTOLIC BLOOD PRESSURE: 152 MMHG | WEIGHT: 280 LBS | BODY MASS INDEX: 35.94 KG/M2 | HEIGHT: 74 IN

## 2023-01-28 DIAGNOSIS — K70.30 ALCOHOLIC CIRRHOSIS OF LIVER WITHOUT ASCITES (HCC): ICD-10-CM

## 2023-01-28 DIAGNOSIS — G95.9 ACUTE MYELOPATHY (HCC): ICD-10-CM

## 2023-01-28 DIAGNOSIS — F10.91 ALCOHOL USE DISORDER IN REMISSION: ICD-10-CM

## 2023-01-28 DIAGNOSIS — M54.2 NECK PAIN: Primary | ICD-10-CM

## 2023-01-28 DIAGNOSIS — G95.9 CERVICAL MYELOPATHY WITH CERVICAL RADICULOPATHY (HCC): ICD-10-CM

## 2023-01-28 DIAGNOSIS — B18.1 CHRONIC HEPATITIS B (HCC): Primary | ICD-10-CM

## 2023-01-28 DIAGNOSIS — M54.12 CERVICAL MYELOPATHY WITH CERVICAL RADICULOPATHY (HCC): ICD-10-CM

## 2023-01-28 DIAGNOSIS — K26.9 DUODENAL ULCER: ICD-10-CM

## 2023-01-28 DIAGNOSIS — D69.6 THROMBOCYTOPENIA (HCC): ICD-10-CM

## 2023-01-28 DIAGNOSIS — K25.9 GASTRIC ULCER WITHOUT HEMORRHAGE OR PERFORATION, UNSPECIFIED CHRONICITY: ICD-10-CM

## 2023-01-28 DIAGNOSIS — C22.0 HEPATOCELLULAR CARCINOMA (HCC): ICD-10-CM

## 2023-01-28 LAB
ALBUMIN SERPL-MCNC: 3.1 G/DL (ref 3.5–5)
ALBUMIN/GLOB SERPL: 0.6 (ref 1.1–2.2)
ALP SERPL-CCNC: 162 U/L (ref 45–117)
ALT SERPL-CCNC: 49 U/L (ref 12–78)
ANION GAP BLD CALC-SCNC: 11 MMOL/L (ref 10–20)
ANION GAP SERPL CALC-SCNC: 7 MMOL/L (ref 5–15)
AST SERPL-CCNC: 128 U/L (ref 15–37)
BASOPHILS # BLD: 0 K/UL (ref 0–0.1)
BASOPHILS NFR BLD: 0 % (ref 0–1)
BILIRUB SERPL-MCNC: 0.3 MG/DL (ref 0.2–1)
BUN SERPL-MCNC: 31 MG/DL (ref 6–20)
BUN/CREAT SERPL: 34 (ref 12–20)
CA-I BLD-MCNC: 1.18 MMOL/L (ref 1.12–1.32)
CALCIUM SERPL-MCNC: 9 MG/DL (ref 8.5–10.1)
CHLORIDE BLD-SCNC: 104 MMOL/L (ref 98–107)
CHLORIDE SERPL-SCNC: 100 MMOL/L (ref 97–108)
CO2 BLD-SCNC: 22.9 MMOL/L (ref 21–32)
CO2 SERPL-SCNC: 25 MMOL/L (ref 21–32)
COMMENT, HOLDF: NORMAL
CREAT BLD-MCNC: 0.86 MG/DL (ref 0.6–1.3)
CREAT SERPL-MCNC: 0.92 MG/DL (ref 0.7–1.3)
DIFFERENTIAL METHOD BLD: ABNORMAL
EOSINOPHIL # BLD: 0 K/UL (ref 0–0.4)
EOSINOPHIL NFR BLD: 0 % (ref 0–7)
ERYTHROCYTE [DISTWIDTH] IN BLOOD BY AUTOMATED COUNT: 15 % (ref 11.5–14.5)
GLOBULIN SER CALC-MCNC: 5.6 G/DL (ref 2–4)
GLUCOSE BLD STRIP.AUTO-MCNC: 148 MG/DL (ref 65–117)
GLUCOSE BLD-MCNC: 135 MG/DL (ref 65–100)
GLUCOSE SERPL-MCNC: 123 MG/DL (ref 65–100)
HCT VFR BLD AUTO: 45.7 % (ref 36.6–50.3)
HGB BLD-MCNC: 15.3 G/DL (ref 12.1–17)
IMM GRANULOCYTES # BLD AUTO: 0.1 K/UL (ref 0–0.04)
IMM GRANULOCYTES NFR BLD AUTO: 1 % (ref 0–0.5)
LYMPHOCYTES # BLD: 1.1 K/UL (ref 0.8–3.5)
LYMPHOCYTES NFR BLD: 11 % (ref 12–49)
MCH RBC QN AUTO: 30.1 PG (ref 26–34)
MCHC RBC AUTO-ENTMCNC: 33.5 G/DL (ref 30–36.5)
MCV RBC AUTO: 89.8 FL (ref 80–99)
MONOCYTES # BLD: 0.6 K/UL (ref 0–1)
MONOCYTES NFR BLD: 5 % (ref 5–13)
NEUTS SEG # BLD: 8.6 K/UL (ref 1.8–8)
NEUTS SEG NFR BLD: 83 % (ref 32–75)
NRBC # BLD: 0 K/UL (ref 0–0.01)
NRBC BLD-RTO: 0 PER 100 WBC
PLATELET # BLD AUTO: 128 K/UL (ref 150–400)
PMV BLD AUTO: 11.5 FL (ref 8.9–12.9)
POTASSIUM BLD-SCNC: 4.8 MMOL/L (ref 3.5–5.1)
POTASSIUM SERPL-SCNC: 4.7 MMOL/L (ref 3.5–5.1)
PROT SERPL-MCNC: 8.7 G/DL (ref 6.4–8.2)
RBC # BLD AUTO: 5.09 M/UL (ref 4.1–5.7)
SAMPLES BEING HELD,HOLD: NORMAL
SERVICE CMNT-IMP: ABNORMAL
SERVICE CMNT-IMP: ABNORMAL
SODIUM BLD-SCNC: 137 MMOL/L (ref 136–145)
SODIUM SERPL-SCNC: 132 MMOL/L (ref 136–145)
WBC # BLD AUTO: 10.3 K/UL (ref 4.1–11.1)

## 2023-01-28 PROCEDURE — 85025 COMPLETE CBC W/AUTO DIFF WBC: CPT

## 2023-01-28 PROCEDURE — 36415 COLL VENOUS BLD VENIPUNCTURE: CPT

## 2023-01-28 PROCEDURE — 96375 TX/PRO/DX INJ NEW DRUG ADDON: CPT

## 2023-01-28 PROCEDURE — 96374 THER/PROPH/DIAG INJ IV PUSH: CPT

## 2023-01-28 PROCEDURE — 80053 COMPREHEN METABOLIC PANEL: CPT

## 2023-01-28 PROCEDURE — 99285 EMERGENCY DEPT VISIT HI MDM: CPT

## 2023-01-28 PROCEDURE — 74011250636 HC RX REV CODE- 250/636: Performed by: EMERGENCY MEDICINE

## 2023-01-28 PROCEDURE — 82962 GLUCOSE BLOOD TEST: CPT

## 2023-01-28 PROCEDURE — 65270000046 HC RM TELEMETRY

## 2023-01-28 PROCEDURE — 70450 CT HEAD/BRAIN W/O DYE: CPT

## 2023-01-28 PROCEDURE — 80047 BASIC METABLC PNL IONIZED CA: CPT

## 2023-01-28 RX ORDER — SODIUM CHLORIDE 0.9 % (FLUSH) 0.9 %
5-40 SYRINGE (ML) INJECTION AS NEEDED
Status: DISCONTINUED | OUTPATIENT
Start: 2023-01-28 | End: 2023-01-28 | Stop reason: HOSPADM

## 2023-01-28 RX ORDER — SODIUM CHLORIDE 0.9 % (FLUSH) 0.9 %
5-40 SYRINGE (ML) INJECTION EVERY 8 HOURS
Status: DISCONTINUED | OUTPATIENT
Start: 2023-01-28 | End: 2023-01-28 | Stop reason: HOSPADM

## 2023-01-28 RX ORDER — MORPHINE SULFATE 4 MG/ML
4 INJECTION INTRAVENOUS
Status: COMPLETED | OUTPATIENT
Start: 2023-01-28 | End: 2023-01-28

## 2023-01-28 RX ORDER — ONDANSETRON 2 MG/ML
4 INJECTION INTRAMUSCULAR; INTRAVENOUS
Status: COMPLETED | OUTPATIENT
Start: 2023-01-28 | End: 2023-01-28

## 2023-01-28 RX ORDER — DEXAMETHASONE SODIUM PHOSPHATE 10 MG/ML
10 INJECTION INTRAMUSCULAR; INTRAVENOUS
Status: COMPLETED | OUTPATIENT
Start: 2023-01-28 | End: 2023-01-28

## 2023-01-28 RX ADMIN — DEXAMETHASONE SODIUM PHOSPHATE 10 MG: 10 INJECTION INTRAMUSCULAR; INTRAVENOUS at 17:49

## 2023-01-28 RX ADMIN — ONDANSETRON 4 MG: 2 INJECTION INTRAMUSCULAR; INTRAVENOUS at 17:49

## 2023-01-28 RX ADMIN — MORPHINE SULFATE 4 MG: 4 INJECTION INTRAVENOUS at 17:49

## 2023-01-28 NOTE — ED PROVIDER NOTES
[facility] EMERGENCY DEPT  EMERGENCY DEPARTMENT ENCOUNTER       Pt Name: Antonia Bird  MRN: [id number]  [place] 1955  Date of evaluation: 1/28/2023  Provider: Nahid Garcia MD   PCP: Gael Vick MD  Note Started: 6:34 PM 1/28/23     CHIEF COMPLAINT       Chief Complaint   Patient presents with    Neck Pain     Patient presents to ED with c/o neck and shoulder pain for 1 week. Denies any injury        HISTORY OF PRESENT ILLNESS: 1 or more elements      History From: Patient  HPI Limitations : None     Antonia Bird is a 69 y.o. male who presents neck pain and left arm weakness. Patient presents complaining of 2 weeks of progressive severe neck pain. Over the last week he has developed progressive weakness to his left arm where he now can not lift his arm off the bed. He was seen by an orthopedist this past week and told he probably had\" a pinched nerve\" and was scheduled for an outpatient MRI on Monday. Patient is here now with worsening pain and weakness. He denies any other weakness including lower extremity weakness, bowel or bladder incontinence or loss of sensation when wiping. He denies any trauma. He denies associated fever, chills, headache, nausea or vomiting. He is not on blood thinners. He has had a prior CT of his neck that did show significant degenerative changes. He is right-hand dominant. His pain is rated at 8/10. Nursing Notes were all reviewed and agreed with or any disagreements were addressed in the HPI. REVIEW OF SYSTEMS      Review of Systems   Constitutional: Negative. Negative for chills and fever. HENT: Negative. Negative for congestion, rhinorrhea and sinus pressure. Eyes: Negative. Negative for discharge and redness. Respiratory: Negative. Negative for chest tightness and shortness of breath. Cardiovascular: Negative. Negative for chest pain. Gastrointestinal: Negative. Negative for abdominal pain and blood in stool. Endocrine: Negative. Genitourinary: Negative. Negative for flank pain and hematuria. Musculoskeletal:  Positive for neck pain. Negative for back pain. Skin: Negative. Negative for rash. Neurological:  Positive for weakness. Negative for dizziness, seizures, numbness and headaches. Hematological: Negative. All other systems reviewed and are negative. Positives and Pertinent negatives as per HPI. PAST HISTORY     Past Medical History:  Past Medical History:   Diagnosis Date    Depression     DJD (degenerative joint disease) 3/8/2010    HTN (hypertension) 3/8/2010    STATES NO Leonardo Ojeda MD STOPPED    Insomnia     NIDDM (non-insulin dependent diabetes mellitus) 3/8/2010    Other ill-defined conditions(799.89)     BPH    Other ill-defined conditions(799.89)     previous hx of DTs from ETOH withdrawal    Other ill-defined conditions(799.89)     DDD/back problems    Psychiatric disorder     paranoid schizophrenia, anxiety    Substance abuse ([facility] [address]     Suicidal thoughts        Past Surgical History:  Past Surgical History:   Procedure Laterality Date    BIOPSY PROSTATE      HX UROLOGICAL      turp       Family History:  Family History   Problem Relation Age of Onset    Cancer Mother         lymphoma    Heart Disease Father         CHF       Social History:  Social History     Tobacco Use    Smoking status: Former     Packs/day: 1.00     Years: 10.00     Pack years: 10.00     Types: Cigarettes    Smokeless tobacco: Never    Tobacco comments:     patient has been decreasing amount of cigarettes   Substance Use Topics    Alcohol use: No     Alcohol/week: 35.0 standard drinks     Types: 42 Cans of beer per week     Comment: states stopped drinking 1 month ago    Drug use: No       Allergies:   Allergies   Allergen Reactions    Motrin [Ibuprofen] Hives     PT STATES NOT AN ALLERGY       CURRENT MEDICATIONS      Previous Medications    GABAPENTIN (NEURONTIN) 600 MG TABLET    Take 1 Tablet by mouth three (3) times daily. Max Daily Amount: 1,800 mg. Indications: neuropathic pain    HYDROXYZINE HCL (ATARAX) 50 MG TABLET    Take 1 Tablet by mouth every six (6) hours as needed for Anxiety. Indications: anxious    QUETIAPINE (SEROQUEL) 200 MG TABLET    Take 1 Tablet by mouth nightly. Indications: mood    SERTRALINE (ZOLOFT) 100 MG TABLET    Take 1 Tablet by mouth daily. Indications: major depressive disorder    TRAZODONE (DESYREL) 50 MG TABLET    Take 1 Tablet by mouth nightly. Indications: insomnia associated with depression       PHYSICAL EXAM      ED Triage Vitals [01/28/23 1533]   ED Encounter Vitals Group      /80      Pulse (Heart Rate) 87      Resp Rate 18      Temp 98.2 °F (36.8 °C)      Temp src       O2 Sat (%) 99 %      Weight 280 lb      Height 6' 2\"        Physical Exam  Constitutional:       Appearance: Normal appearance. He is not ill-appearing. HENT:      Right Ear: Tympanic membrane normal.      Left Ear: Tympanic membrane normal.      Nose: Nose normal.      Mouth/Throat:      Mouth: Mucous membranes are moist.   Eyes:      Pupils: Pupils are equal, round, and reactive to light. Cardiovascular:      Rate and Rhythm: Normal rate and regular rhythm. Pulses: Normal pulses. Heart sounds: Normal heart sounds. Pulmonary:      Effort: Pulmonary effort is normal. No respiratory distress. Breath sounds: Normal breath sounds. No stridor. Abdominal:      General: Abdomen is flat. Tenderness: There is no right CVA tenderness or left CVA tenderness. Musculoskeletal:      Cervical back: Normal range of motion and neck supple. Neurological:      Mental Status: He is alert. 6:05PM Dr HOUSE [facility] surgeon on-call made aware of the patient's current clinical presentation and clinical findings. He agrees with patient getting an MRI. He recommends transfer to [facility] where if the patient has a MRI requiring a possible surgical intervention to assist in management.   We will transfer there with plans of the hospitalist doing the admission. 7:11 PM Dr Adela Vieyra hospitalist made aware of patient's current clinical presentation and findings and has agreed to set the patient to [facility] for further work-up for possible cervical myelopathy. Patient agrees with this transfer to [facility] for higher level of care     DIAGNOSTIC RESULTS   LABS:     Recent Results (from the past 12 hour(s))   POC CHEM8    Collection Time: 01/28/23  5:12 PM   Result Value Ref Range    Calcium, ionized (POC) 1.18 1.12 - 1.32 mmol/L    Sodium (POC) 137 136 - 145 mmol/L    Potassium (POC) 4.8 3.5 - 5.1 mmol/L    Chloride (POC) 104 98 - 107 mmol/L    CO2 (POC) 22.9 21 - 32 mmol/L    Anion gap (POC) 11 10 - 20 mmol/L    Glucose (POC) 135 (H) 65 - 100 mg/dL    Creatinine (POC) 0.86 0.6 - 1.3 mg/dL    eGFR (POC) >60 >60 ml/min/1.73m2    Comment Comment Not Indicated. SAMPLES BEING HELD    Collection Time: 01/28/23  5:14 PM   Result Value Ref Range    SAMPLES BEING HELD 2PST,1BLU,1LAV     COMMENT        Add-on orders for these samples will be processed based on acceptable specimen integrity and analyte stability, which may vary by analyte. CBC WITH AUTOMATED DIFF    Collection Time: 01/28/23  5:14 PM   Result Value Ref Range    WBC 10.3 4.1 - 11.1 K/uL    RBC 5.09 4. 10 - 5.70 M/uL    HGB 15.3 12.1 - 17.0 g/dL    HCT 45.7 36.6 - 50.3 %    MCV 89.8 80.0 - 99.0 FL    MCH 30.1 26.0 - 34.0 PG    MCHC 33.5 30.0 - 36.5 g/dL    RDW 15.0 (H) 11.5 - 14.5 %    PLATELET 306 (L) 515 - 400 K/uL    MPV 11.5 8.9 - 12.9 FL    NRBC 0.0 0  WBC    ABSOLUTE NRBC 0.00 0.00 - 0.01 K/uL    NEUTROPHILS 83 (H) 32 - 75 %    LYMPHOCYTES 11 (L) 12 - 49 %    MONOCYTES 5 5 - 13 %    EOSINOPHILS 0 0 - 7 %    BASOPHILS 0 0 - 1 %    IMMATURE GRANULOCYTES 1 (H) 0.0 - 0.5 %    ABS. NEUTROPHILS 8.6 (H) 1.8 - 8.0 K/UL    ABS. LYMPHOCYTES 1.1 0.8 - 3.5 K/UL    ABS. MONOCYTES 0.6 0.0 - 1.0 K/UL    ABS. EOSINOPHILS 0.0 0.0 - 0.4 K/UL    ABS.  BASOPHILS 0.0 0.0 - 0.1 K/UL    ABS. IMM. GRANS. 0.1 (H) 0.00 - 0.04 K/UL    DF AUTOMATED     METABOLIC PANEL, COMPREHENSIVE    Collection Time: 01/28/23  5:14 PM   Result Value Ref Range    Sodium 132 (L) 136 - 145 mmol/L    Potassium 4.7 3.5 - 5.1 mmol/L    Chloride 100 97 - 108 mmol/L    CO2 25 21 - 32 mmol/L    Anion gap 7 5 - 15 mmol/L    Glucose 123 (H) 65 - 100 mg/dL    BUN 31 (H) 6 - 20 MG/DL    Creatinine 0.92 0.70 - 1.30 MG/DL    BUN/Creatinine ratio 34 (H) 12 - 20      eGFR >60 >60 ml/min/1.73m2    Calcium 9.0 8.5 - 10.1 MG/DL    Bilirubin, total 0.3 0.2 - 1.0 MG/DL    ALT (SGPT) 49 12 - 78 U/L    AST (SGOT) 128 (H) 15 - 37 U/L    Alk. phosphatase 162 (H) 45 - 117 U/L    Protein, total 8.7 (H) 6.4 - 8.2 g/dL    Albumin 3.1 (L) 3.5 - 5.0 g/dL    Globulin 5.6 (H) 2.0 - 4.0 g/dL    A-G Ratio 0.6 (L) 1.1 - 2.2          EKG: ***     RADIOLOGY:  Non-plain film images such as CT, Ultrasound and MRI are read by the radiologist. Plain radiographic images are visualized and preliminarily interpreted by the ED Provider with the below findings:          Interpretation per the Radiologist below, if available at the time of this note:     CT HEAD WO CONT    Result Date: 1/28/2023  HEAD CT WITHOUT CONTRAST: 1/28/2023 5:33 PM INDICATION: left arm weakness COMPARISON: 3/21/2020. PROCEDURE: Axial images of the head were obtained without contrast. Coronal and sagittal reformats were performed. CT dose reduction was achieved through use of a standardized protocol tailored for this examination and automatic exposure control for dose modulation. FINDINGS: The ventricles and sulci are appropriate in size and configuration for age. No loss of gray-white differentiation to suggest late acute or early subacute infarction. No mass effect or intracranial hemorrhage. No acute intracranial abnormality.        PROCEDURES   Unless otherwise noted below, none  Procedures     CRITICAL CARE TIME         EMERGENCY DEPARTMENT COURSE and DIFFERENTIAL DIAGNOSIS/MDM   Vitals:    Vitals:    01/28/23 1533   BP: 138/80   Pulse: 87   Resp: 18   Temp: 98.2 °F (36.8 °C)   SpO2: 99%   Weight: 127 kg (280 lb)   Height: 6' 2\" (1.88 m)        Patient was given the following medications:  Medications   sodium chloride (NS) flush 5-40 mL (has no administration in time range)   sodium chloride (NS) flush 5-40 mL (has no administration in time range)   dexamethasone (PF) (DECADRON) 10 mg/mL injection 10 mg (10 mg IntraVENous Given 1/28/23 1749)   morphine injection 4 mg (4 mg IntraVENous Given 1/28/23 1749)   ondansetron (ZOFRAN) injection 4 mg (4 mg IntraVENous Given 1/28/23 1749)       CONSULTS: (Who and What was discussed)  None    Chronic Conditions: HTN    Social Determinants affecting Dx or Tx: None    Records Reviewed (source and summary of external notes): Previous Radiology studies and Nursing notes    CC/HPI Summary, DDx, ED Course, and Reassessment: Differential diagnosis-cervical myelopathy, herniated disc, malignancy, aneurysm, CVA, peripheral neuropathy    Impression/plan-69year-old male right-hand-dominant to the ER with progressive neck pain along with weakness in his left arm that is accelerated over the last 24 hours. Plan will be to initiate work-up and transfer to [facility] for MRI and neurosurgical consultation. Disposition Considerations (Tests not done, Shared Decision Making, Pt Expectation of Test or Tx.): Patient agrees that his best care would be rendered at [facility] where he would have access to a neurosurgeon and more emergent MRI. FINAL IMPRESSION     1. Neck pain    2.  Cervical myelopathy with cervical radiculopathy (HCC)          DISPOSITION/PLAN   Transferred to Another Facility    {Disposition:61048}     PATIENT REFERRED TO:  Follow-up Information    None           DISCHARGE MEDICATIONS:  Current Discharge Medication List            DISCONTINUED MEDICATIONS:  Current Discharge Medication List          I am the Primary Clinician of Record. Bailee Hunt MD (electronically signed)    (Please note that parts of this dictation were completed with voice recognition software. Quite often unanticipated grammatical, syntax, homophones, and other interpretive errors are inadvertently transcribed by the computer software. Please disregards these errors.  Please excuse any errors that have escaped final proofreading.) AORTA: Atherosclerosis without aneurysm. MAIN PULMONARY ARTERY: Normal in caliber. HEART: Normal in size. Coronary artery calcium: absent ESOPHAGUS: No wall thickening or dilatation. TRACHEA/BRONCHI: Patent. PLEURA: No effusion or pneumothorax. LUNGS: Bilateral lower lobe airspace opacities are predominantly subpleural. Nodule in the posterior basilar left lower lobe measures 1 cm. Left upper lobe nodule measures 0.6 cm. Other left upper lobe nodule is smaller and ill-defined. Right upper lobe is clear. No emphysema. Liver: Heterogeneous. Possible hypoattenuating masses. Cirrhotic surface. Limited by streak artifact on the patient's left arm. Biliary tree: 1 cm lamellated gallstone is in the gallbladder neck. Mural thickening of the gallbladder is diffuse. Stranding in the adjacent fat. The CBD is not dilated. Spleen: Splenomegaly measures 16 cm in length. Pancreas: Mild atrophy. No mass or inflammation. Adrenals: Unremarkable. Kidneys: No mass or hydronephrosis. Stomach: Unremarkable. Small bowel: No dilatation or wall thickening. Colon: No inflammation or obstruction. Stool is present. Limited enteric contrast in the colon. No measurable mass. Appendix: Normal. Peritoneum: No ascites or pneumoperitoneum. Retroperitoneum: Aortic atherosclerosis without aneurysm. Lymphadenopathy between the aorta and IVC measures 2.2 x 1.8 cm. Reproductive organs: Asymmetric enhancement in the right aspect of the normal sized prostate gland is nonspecific. Urinary bladder: No mass or calculus. Bones: 5 cm enhancing mass replaces the L5 spinous process. Soft tissue partially replaces the L5 vertebral body. Lytic soft tissue is in the L4 vertebral body and right posterior elements of T10. Soft tissue 3.6 cm mass replaces the T11 spinous process. 3.2 cm soft tissue mass arises from the medial margin of the left scapula. Abdominal wall: Bilateral fat-containing inguinal hernias. Additional comments: N/A     1.  Osseous metastatic disease. The L5 spinous process mass is amenable to nonemergent CT-guided percutaneous biopsy if clinically indicated. 2. Pulmonary nodules. Largest nodule is 1 cm in the posterior basilar left lower lobe. Metastatic disease is most likely. 3. Retroperitoneal lymphadenopathy is likely due to metastatic disease. 4. Possible hepatic metastatic disease is obscured by streak artifact. Liver is cirrhotic. Recommend dedicated hepatic CT or MRI when the patient can tolerate. 5. Bibasilar pneumonia. Consider aspiration. 6. No definable primary malignancy. See details above. PROCEDURES   Unless otherwise noted below, none  Procedures     CRITICAL CARE TIME         EMERGENCY DEPARTMENT COURSE and DIFFERENTIAL DIAGNOSIS/MDM   Vitals:    Vitals:    01/28/23 1830 01/28/23 1900 01/28/23 1930 01/28/23 2000   BP: (!) 148/83 (!) 160/84 (!) 156/84 (!) 152/89   Pulse: (!) 53 60 60 65   Resp: 16 21 18 16   Temp:       SpO2: 96% 97% 97% 98%   Weight:       Height:            Patient was given the following medications:  Medications   dexamethasone (PF) (DECADRON) 10 mg/mL injection 10 mg (10 mg IntraVENous Given 1/28/23 1749)   morphine injection 4 mg (4 mg IntraVENous Given 1/28/23 1749)   ondansetron (ZOFRAN) injection 4 mg (4 mg IntraVENous Given 1/28/23 1749)       CONSULTS: (Who and What was discussed)  None    Chronic Conditions: HTN    Social Determinants affecting Dx or Tx: None    Records Reviewed (source and summary of external notes): Previous Radiology studies and Nursing notes    CC/HPI Summary, DDx, ED Course, and Reassessment: Differential diagnosis-cervical myelopathy, herniated disc, malignancy, aneurysm, CVA, peripheral neuropathy    Impression/plan-69year-old male right-hand-dominant to the ER with progressive neck pain along with weakness in his left arm that is accelerated over the last 24 hours. Plan will be to initiate work-up and transfer to [facility] for MRI and neurosurgical consultation. Disposition Considerations (Tests not done, Shared Decision Making, Pt Expectation of Test or Tx.): Patient agrees that his best care would be rendered at [facility] where he would have access to a neurosurgeon and more emergent MRI. FINAL IMPRESSION     1. Neck pain    2. Cervical myelopathy with cervical radiculopathy [facility])          DISPOSITION/PLAN   Transferred to Another Facility    Transfer: The patient is being transferred to [facility] for neurosurgery consult. The results of their tests and reasons for their transfer have been discussed with the patient and/or available family. The patient/family has conveyed agreement and understanding for the need to be admitted and for their admission diagnosis. Consultation has been made with Dr Alejandro Norris, who agrees to accept the transfer. PATIENT REFERRED TO:  Follow-up Information    None           DISCHARGE MEDICATIONS:  Discharge Medication List as of 1/28/2023  9:31 PM            DISCONTINUED MEDICATIONS:  Discharge Medication List as of 1/28/2023  9:31 PM          I am the Primary Clinician of Record. Lynnann Cushing, MD (electronically signed)    (Please note that parts of this dictation were completed with voice recognition software. Quite often unanticipated grammatical, syntax, homophones, and other interpretive errors are inadvertently transcribed by the computer software. Please disregards these errors.  Please excuse any errors that have escaped final proofreading.)

## 2023-01-29 ENCOUNTER — HOSPITAL ENCOUNTER (INPATIENT)
Dept: CT IMAGING | Age: 68
Discharge: HOME | End: 2023-01-29
Attending: INTERNAL MEDICINE
Payer: MEDICARE

## 2023-01-29 ENCOUNTER — APPOINTMENT (OUTPATIENT)
Dept: MRI IMAGING | Age: 68
End: 2023-01-29
Attending: INTERNAL MEDICINE
Payer: MEDICARE

## 2023-01-29 ENCOUNTER — APPOINTMENT (OUTPATIENT)
Dept: GENERAL RADIOLOGY | Age: 68
End: 2023-01-29
Attending: INTERNAL MEDICINE
Payer: MEDICARE

## 2023-01-29 PROBLEM — G95.9 CERVICAL MYELOPATHY WITH CERVICAL RADICULOPATHY (HCC): Status: ACTIVE | Noted: 2023-01-29

## 2023-01-29 PROBLEM — G95.9 ACUTE MYELOPATHY (HCC): Status: ACTIVE | Noted: 2023-01-29

## 2023-01-29 PROBLEM — M54.12 CERVICAL MYELOPATHY WITH CERVICAL RADICULOPATHY (HCC): Status: ACTIVE | Noted: 2023-01-29

## 2023-01-29 LAB
ALBUMIN SERPL-MCNC: 2.9 G/DL (ref 3.5–5)
ALBUMIN/GLOB SERPL: 0.5 (ref 1.1–2.2)
ALP SERPL-CCNC: 146 U/L (ref 45–117)
ALT SERPL-CCNC: 49 U/L (ref 12–78)
AMPHET UR QL SCN: NEGATIVE
ANION GAP SERPL CALC-SCNC: 4 MMOL/L (ref 5–15)
APPEARANCE UR: ABNORMAL
AST SERPL-CCNC: 142 U/L (ref 15–37)
BACTERIA URNS QL MICRO: ABNORMAL /HPF
BARBITURATES UR QL SCN: NEGATIVE
BASOPHILS # BLD: 0 K/UL (ref 0–0.1)
BASOPHILS NFR BLD: 0 % (ref 0–1)
BENZODIAZ UR QL: NEGATIVE
BILIRUB SERPL-MCNC: 0.4 MG/DL (ref 0.2–1)
BILIRUB UR QL: NEGATIVE
BUN SERPL-MCNC: 28 MG/DL (ref 6–20)
BUN/CREAT SERPL: 34 (ref 12–20)
CALCIUM SERPL-MCNC: 9 MG/DL (ref 8.5–10.1)
CANNABINOIDS UR QL SCN: NEGATIVE
CHLORIDE SERPL-SCNC: 103 MMOL/L (ref 97–108)
CO2 SERPL-SCNC: 24 MMOL/L (ref 21–32)
COCAINE UR QL SCN: NEGATIVE
COLOR UR: ABNORMAL
CREAT SERPL-MCNC: 0.83 MG/DL (ref 0.7–1.3)
DIFFERENTIAL METHOD BLD: ABNORMAL
DRUG SCRN COMMENT,DRGCM: ABNORMAL
EOSINOPHIL # BLD: 0 K/UL (ref 0–0.4)
EOSINOPHIL NFR BLD: 0 % (ref 0–7)
EPITH CASTS URNS QL MICRO: ABNORMAL /LPF
ERYTHROCYTE [DISTWIDTH] IN BLOOD BY AUTOMATED COUNT: 15.3 % (ref 11.5–14.5)
GLOBULIN SER CALC-MCNC: 5.5 G/DL (ref 2–4)
GLUCOSE BLD STRIP.AUTO-MCNC: 108 MG/DL (ref 65–117)
GLUCOSE BLD STRIP.AUTO-MCNC: 112 MG/DL (ref 65–117)
GLUCOSE BLD STRIP.AUTO-MCNC: 118 MG/DL (ref 65–117)
GLUCOSE BLD STRIP.AUTO-MCNC: 121 MG/DL (ref 65–117)
GLUCOSE SERPL-MCNC: 139 MG/DL (ref 65–100)
GLUCOSE UR STRIP.AUTO-MCNC: NEGATIVE MG/DL
HCT VFR BLD AUTO: 45.8 % (ref 36.6–50.3)
HGB BLD-MCNC: 14.7 G/DL (ref 12.1–17)
HGB UR QL STRIP: NEGATIVE
IMM GRANULOCYTES # BLD AUTO: 0.1 K/UL (ref 0–0.04)
IMM GRANULOCYTES NFR BLD AUTO: 1 % (ref 0–0.5)
KETONES UR QL STRIP.AUTO: NEGATIVE MG/DL
LEUKOCYTE ESTERASE UR QL STRIP.AUTO: NEGATIVE
LYMPHOCYTES # BLD: 1.1 K/UL (ref 0.8–3.5)
LYMPHOCYTES NFR BLD: 12 % (ref 12–49)
MAGNESIUM SERPL-MCNC: 2.3 MG/DL (ref 1.6–2.4)
MCH RBC QN AUTO: 29.2 PG (ref 26–34)
MCHC RBC AUTO-ENTMCNC: 32.1 G/DL (ref 30–36.5)
MCV RBC AUTO: 90.9 FL (ref 80–99)
METHADONE UR QL: NEGATIVE
MONOCYTES # BLD: 0.4 K/UL (ref 0–1)
MONOCYTES NFR BLD: 4 % (ref 5–13)
NEUTS SEG # BLD: 8.1 K/UL (ref 1.8–8)
NEUTS SEG NFR BLD: 84 % (ref 32–75)
NITRITE UR QL STRIP.AUTO: POSITIVE
NRBC # BLD: 0 K/UL (ref 0–0.01)
NRBC BLD-RTO: 0 PER 100 WBC
OPIATES UR QL: POSITIVE
PCP UR QL: NEGATIVE
PH UR STRIP: 6.5 (ref 5–8)
PHOSPHATE SERPL-MCNC: 3.6 MG/DL (ref 2.6–4.7)
PLATELET # BLD AUTO: 135 K/UL (ref 150–400)
PMV BLD AUTO: 11.8 FL (ref 8.9–12.9)
POTASSIUM SERPL-SCNC: 4.5 MMOL/L (ref 3.5–5.1)
PROT SERPL-MCNC: 8.4 G/DL (ref 6.4–8.2)
PROT UR STRIP-MCNC: NEGATIVE MG/DL
RBC # BLD AUTO: 5.04 M/UL (ref 4.1–5.7)
RBC #/AREA URNS HPF: ABNORMAL /HPF (ref 0–5)
SERVICE CMNT-IMP: ABNORMAL
SERVICE CMNT-IMP: ABNORMAL
SERVICE CMNT-IMP: NORMAL
SERVICE CMNT-IMP: NORMAL
SODIUM SERPL-SCNC: 131 MMOL/L (ref 136–145)
SP GR UR REFRACTOMETRY: 1.03
TSH SERPL DL<=0.05 MIU/L-ACNC: 0.35 UIU/ML (ref 0.36–3.74)
UROBILINOGEN UR QL STRIP.AUTO: 1 EU/DL (ref 0.2–1)
WBC # BLD AUTO: 9.7 K/UL (ref 4.1–11.1)
WBC URNS QL MICRO: ABNORMAL /HPF (ref 0–4)

## 2023-01-29 PROCEDURE — A9576 INJ PROHANCE MULTIPACK: HCPCS

## 2023-01-29 PROCEDURE — 84100 ASSAY OF PHOSPHORUS: CPT

## 2023-01-29 PROCEDURE — 74011250636 HC RX REV CODE- 250/636: Performed by: INTERNAL MEDICINE

## 2023-01-29 PROCEDURE — 84443 ASSAY THYROID STIM HORMONE: CPT

## 2023-01-29 PROCEDURE — 71260 CT THORAX DX C+: CPT

## 2023-01-29 PROCEDURE — 74011000250 HC RX REV CODE- 250: Performed by: INTERNAL MEDICINE

## 2023-01-29 PROCEDURE — 74011250637 HC RX REV CODE- 250/637: Performed by: INTERNAL MEDICINE

## 2023-01-29 PROCEDURE — 72141 MRI NECK SPINE W/O DYE: CPT

## 2023-01-29 PROCEDURE — 71045 X-RAY EXAM CHEST 1 VIEW: CPT

## 2023-01-29 PROCEDURE — 74177 CT ABD & PELVIS W/CONTRAST: CPT

## 2023-01-29 PROCEDURE — 72156 MRI NECK SPINE W/O & W/DYE: CPT

## 2023-01-29 PROCEDURE — 36415 COLL VENOUS BLD VENIPUNCTURE: CPT

## 2023-01-29 PROCEDURE — 74011250636 HC RX REV CODE- 250/636

## 2023-01-29 PROCEDURE — 85025 COMPLETE CBC W/AUTO DIFF WBC: CPT

## 2023-01-29 PROCEDURE — 72125 CT NECK SPINE W/O DYE: CPT

## 2023-01-29 PROCEDURE — 65270000046 HC RM TELEMETRY

## 2023-01-29 PROCEDURE — 80053 COMPREHEN METABOLIC PANEL: CPT

## 2023-01-29 PROCEDURE — 74011000636 HC RX REV CODE- 636: Performed by: RADIOLOGY

## 2023-01-29 PROCEDURE — 81001 URINALYSIS AUTO W/SCOPE: CPT

## 2023-01-29 PROCEDURE — 82962 GLUCOSE BLOOD TEST: CPT

## 2023-01-29 PROCEDURE — 80307 DRUG TEST PRSMV CHEM ANLYZR: CPT

## 2023-01-29 PROCEDURE — 83735 ASSAY OF MAGNESIUM: CPT

## 2023-01-29 RX ORDER — ENOXAPARIN SODIUM 100 MG/ML
40 INJECTION SUBCUTANEOUS DAILY
Status: DISCONTINUED | OUTPATIENT
Start: 2023-01-29 | End: 2023-01-29

## 2023-01-29 RX ORDER — SODIUM CHLORIDE 0.9 % (FLUSH) 0.9 %
10 SYRINGE (ML) INJECTION ONCE
Status: COMPLETED | OUTPATIENT
Start: 2023-01-29 | End: 2023-01-29

## 2023-01-29 RX ORDER — ACETAMINOPHEN 650 MG/1
650 SUPPOSITORY RECTAL
Status: DISCONTINUED | OUTPATIENT
Start: 2023-01-29 | End: 2023-02-10 | Stop reason: HOSPADM

## 2023-01-29 RX ORDER — HYDROMORPHONE HYDROCHLORIDE 1 MG/ML
1 INJECTION, SOLUTION INTRAMUSCULAR; INTRAVENOUS; SUBCUTANEOUS
Status: DISCONTINUED | OUTPATIENT
Start: 2023-01-29 | End: 2023-01-30

## 2023-01-29 RX ORDER — ONDANSETRON 2 MG/ML
4 INJECTION INTRAMUSCULAR; INTRAVENOUS
Status: DISCONTINUED | OUTPATIENT
Start: 2023-01-29 | End: 2023-02-10 | Stop reason: HOSPADM

## 2023-01-29 RX ORDER — HYDROXYZINE 50 MG/1
50 TABLET, FILM COATED ORAL
Status: DISCONTINUED | OUTPATIENT
Start: 2023-01-29 | End: 2023-02-10 | Stop reason: HOSPADM

## 2023-01-29 RX ORDER — ONDANSETRON 4 MG/1
4 TABLET, ORALLY DISINTEGRATING ORAL
Status: DISCONTINUED | OUTPATIENT
Start: 2023-01-29 | End: 2023-02-10 | Stop reason: HOSPADM

## 2023-01-29 RX ORDER — SODIUM CHLORIDE 0.9 % (FLUSH) 0.9 %
5-40 SYRINGE (ML) INJECTION EVERY 8 HOURS
Status: DISCONTINUED | OUTPATIENT
Start: 2023-01-29 | End: 2023-02-10 | Stop reason: HOSPADM

## 2023-01-29 RX ORDER — ACETAMINOPHEN 325 MG/1
650 TABLET ORAL
Status: DISCONTINUED | OUTPATIENT
Start: 2023-01-29 | End: 2023-02-10 | Stop reason: HOSPADM

## 2023-01-29 RX ORDER — ENOXAPARIN SODIUM 100 MG/ML
30 INJECTION SUBCUTANEOUS EVERY 12 HOURS
Status: DISCONTINUED | OUTPATIENT
Start: 2023-01-29 | End: 2023-02-08

## 2023-01-29 RX ORDER — OXYCODONE AND ACETAMINOPHEN 5; 325 MG/1; MG/1
1 TABLET ORAL
Status: DISCONTINUED | OUTPATIENT
Start: 2023-01-29 | End: 2023-02-01 | Stop reason: SDUPTHER

## 2023-01-29 RX ORDER — SERTRALINE HYDROCHLORIDE 50 MG/1
100 TABLET, FILM COATED ORAL DAILY
Status: DISCONTINUED | OUTPATIENT
Start: 2023-01-29 | End: 2023-02-10 | Stop reason: HOSPADM

## 2023-01-29 RX ORDER — INSULIN LISPRO 100 [IU]/ML
INJECTION, SOLUTION INTRAVENOUS; SUBCUTANEOUS
Status: DISCONTINUED | OUTPATIENT
Start: 2023-01-29 | End: 2023-02-10 | Stop reason: HOSPADM

## 2023-01-29 RX ORDER — POLYETHYLENE GLYCOL 3350 17 G/17G
17 POWDER, FOR SOLUTION ORAL DAILY PRN
Status: DISCONTINUED | OUTPATIENT
Start: 2023-01-29 | End: 2023-02-03

## 2023-01-29 RX ORDER — IBUPROFEN 200 MG
4 TABLET ORAL AS NEEDED
Status: DISCONTINUED | OUTPATIENT
Start: 2023-01-29 | End: 2023-02-10 | Stop reason: HOSPADM

## 2023-01-29 RX ORDER — NALOXONE HYDROCHLORIDE 0.4 MG/ML
0.4 INJECTION, SOLUTION INTRAMUSCULAR; INTRAVENOUS; SUBCUTANEOUS AS NEEDED
Status: DISCONTINUED | OUTPATIENT
Start: 2023-01-29 | End: 2023-02-10 | Stop reason: HOSPADM

## 2023-01-29 RX ORDER — TRAZODONE HYDROCHLORIDE 50 MG/1
50 TABLET ORAL
Status: DISCONTINUED | OUTPATIENT
Start: 2023-01-29 | End: 2023-02-08

## 2023-01-29 RX ORDER — HYDROMORPHONE HYDROCHLORIDE 1 MG/ML
1 INJECTION, SOLUTION INTRAMUSCULAR; INTRAVENOUS; SUBCUTANEOUS ONCE
Status: COMPLETED | OUTPATIENT
Start: 2023-01-29 | End: 2023-01-29

## 2023-01-29 RX ORDER — SODIUM CHLORIDE 0.9 % (FLUSH) 0.9 %
5-40 SYRINGE (ML) INJECTION AS NEEDED
Status: DISCONTINUED | OUTPATIENT
Start: 2023-01-29 | End: 2023-02-10 | Stop reason: HOSPADM

## 2023-01-29 RX ORDER — QUETIAPINE FUMARATE 100 MG/1
200 TABLET, FILM COATED ORAL
Status: DISCONTINUED | OUTPATIENT
Start: 2023-01-29 | End: 2023-02-10 | Stop reason: HOSPADM

## 2023-01-29 RX ORDER — SODIUM CHLORIDE, SODIUM LACTATE, POTASSIUM CHLORIDE, CALCIUM CHLORIDE 600; 310; 30; 20 MG/100ML; MG/100ML; MG/100ML; MG/100ML
75 INJECTION, SOLUTION INTRAVENOUS CONTINUOUS
Status: DISCONTINUED | OUTPATIENT
Start: 2023-01-29 | End: 2023-01-30

## 2023-01-29 RX ORDER — GABAPENTIN 600 MG/1
600 TABLET ORAL 3 TIMES DAILY
Status: DISCONTINUED | OUTPATIENT
Start: 2023-01-29 | End: 2023-02-08

## 2023-01-29 RX ADMIN — HYDROMORPHONE HYDROCHLORIDE 1 MG: 1 INJECTION, SOLUTION INTRAMUSCULAR; INTRAVENOUS; SUBCUTANEOUS at 23:14

## 2023-01-29 RX ADMIN — GADOTERIDOL 20 ML: 279.3 INJECTION, SOLUTION INTRAVENOUS at 05:45

## 2023-01-29 RX ADMIN — SODIUM CHLORIDE, PRESERVATIVE FREE 10 ML: 5 INJECTION INTRAVENOUS at 22:33

## 2023-01-29 RX ADMIN — IOHEXOL 50 ML: 240 INJECTION, SOLUTION INTRATHECAL; INTRAVASCULAR; INTRAVENOUS; ORAL at 13:54

## 2023-01-29 RX ADMIN — HYDROMORPHONE HYDROCHLORIDE 1 MG: 1 INJECTION, SOLUTION INTRAMUSCULAR; INTRAVENOUS; SUBCUTANEOUS at 06:00

## 2023-01-29 RX ADMIN — HYDROMORPHONE HYDROCHLORIDE 1 MG: 1 INJECTION, SOLUTION INTRAMUSCULAR; INTRAVENOUS; SUBCUTANEOUS at 09:27

## 2023-01-29 RX ADMIN — SODIUM CHLORIDE, PRESERVATIVE FREE 10 ML: 5 INJECTION INTRAVENOUS at 15:33

## 2023-01-29 RX ADMIN — HYDROMORPHONE HYDROCHLORIDE 1 MG: 1 INJECTION, SOLUTION INTRAMUSCULAR; INTRAVENOUS; SUBCUTANEOUS at 18:51

## 2023-01-29 RX ADMIN — GABAPENTIN 600 MG: 600 TABLET, FILM COATED ORAL at 09:21

## 2023-01-29 RX ADMIN — GABAPENTIN 600 MG: 600 TABLET, FILM COATED ORAL at 15:32

## 2023-01-29 RX ADMIN — SODIUM CHLORIDE, PRESERVATIVE FREE 10 ML: 5 INJECTION INTRAVENOUS at 05:45

## 2023-01-29 RX ADMIN — SODIUM CHLORIDE, POTASSIUM CHLORIDE, SODIUM LACTATE AND CALCIUM CHLORIDE 75 ML/HR: 600; 310; 30; 20 INJECTION, SOLUTION INTRAVENOUS at 06:01

## 2023-01-29 RX ADMIN — QUETIAPINE FUMARATE 200 MG: 100 TABLET ORAL at 03:42

## 2023-01-29 RX ADMIN — IOPAMIDOL 100 ML: 755 INJECTION, SOLUTION INTRAVENOUS at 13:54

## 2023-01-29 RX ADMIN — TRAZODONE HYDROCHLORIDE 50 MG: 50 TABLET ORAL at 22:33

## 2023-01-29 RX ADMIN — HYDROMORPHONE HYDROCHLORIDE 1 MG: 1 INJECTION, SOLUTION INTRAMUSCULAR; INTRAVENOUS; SUBCUTANEOUS at 01:14

## 2023-01-29 RX ADMIN — OXYCODONE AND ACETAMINOPHEN 1 TABLET: 5; 325 TABLET ORAL at 16:39

## 2023-01-29 RX ADMIN — SERTRALINE HYDROCHLORIDE 100 MG: 50 TABLET ORAL at 09:21

## 2023-01-29 RX ADMIN — GABAPENTIN 600 MG: 600 TABLET, FILM COATED ORAL at 22:32

## 2023-01-29 RX ADMIN — ENOXAPARIN SODIUM 30 MG: 100 INJECTION SUBCUTANEOUS at 11:08

## 2023-01-29 RX ADMIN — HYDROMORPHONE HYDROCHLORIDE 1 MG: 1 INJECTION, SOLUTION INTRAMUSCULAR; INTRAVENOUS; SUBCUTANEOUS at 15:32

## 2023-01-29 RX ADMIN — QUETIAPINE FUMARATE 200 MG: 100 TABLET ORAL at 22:33

## 2023-01-29 RX ADMIN — SODIUM CHLORIDE, PRESERVATIVE FREE 10 ML: 5 INJECTION INTRAVENOUS at 03:43

## 2023-01-29 RX ADMIN — OXYCODONE AND ACETAMINOPHEN 1 TABLET: 5; 325 TABLET ORAL at 03:42

## 2023-01-29 RX ADMIN — OXYCODONE AND ACETAMINOPHEN 1 TABLET: 5; 325 TABLET ORAL at 22:33

## 2023-01-29 RX ADMIN — TRAZODONE HYDROCHLORIDE 50 MG: 50 TABLET ORAL at 03:42

## 2023-01-29 NOTE — ED NOTES
TRANSFER - OUT REPORT:    Verbal report given to Rosa Isela Le RN (name) on Hannah Ill  being transferred to [facility] (unit) for routine progression of care       Report consisted of patients Situation, Background, Assessment and   Recommendations(SBAR). Information from the following report(s) SBAR, Kardex, ED Summary, Procedure Summary, MAR and Recent Results was reviewed with the receiving nurse. Lines:   Peripheral IV 01/28/23 Right Antecubital (Active)   Site Assessment Clean, dry, & intact 01/28/23 1711   Phlebitis Assessment 0 01/28/23 1711   Infiltration Assessment 0 01/28/23 1711   Dressing Status Clean, dry, & intact 01/28/23 1711   Dressing Type Transparent 01/28/23 1711   Hub Color/Line Status Pink;Patent; Flushed 01/28/23 1711   Action Taken Blood drawn 01/28/23 1711        Opportunity for questions and clarification was provided.       Patient transported with:   EMS

## 2023-01-29 NOTE — PROGRESS NOTES
[address] [facility] Adult  Hospitalist Group                                                                                          Hospitalist Progress Note  Magdalena Shook MD  Answering service: [phone] from in house phone        Date of Service:  2023  NAME:  Amiel Essex  :  1955  MRN:  [id number]       Admission Summary: This is a 33-year-old man with past medical history significant for type 2 diabetes, anxiety/depression, and hypertension; presented at [facility] emergency room with neck pain. This has been going on for a couple of weeks. The pain is located at the back of the neck. The patient described the pain as severe 10/10 in severity with no history of trauma to the neck. No known aggravating or relieving factors. The pain is dull ache. He was seen by the orthopedic surgeon. The patient was told that he probably has a pinched nerve and outpatient MRI was ordered. The appointment for the outpatient MRI is not until this coming Monday. Because of persistent pain, the patient came to the emergency room. The pain has now become associated with inability to lift the left arm off the bed without difficulties, but the patient denies pain in the left arm. Just some weakness. The patient has had a CT scan of the neck done which shows significant degenerative changes. When the patient arrived at the emergency room, CT scan of the head was obtained. This was negative for acute pathology. The neurosurgeon on-call at [facility] was consulted by the emergency room physician and decision was made to transfer the patient to [facility] for MRI of the neck and also for further evaluation by the neurosurgeon. The hospitalist service was asked to directly admit the patient from [facility] emergency room to [facility] for that purpose.       Interval history / Subjective:   Patient is seen and examined at bedside this AM. He still has shoulder and arm pain. Explained MRI findings of cervical tumor. Will do CT chest and abd today to look for primary source  Discussed with nursing     Spoke with daughter Sabra Suarez on phone and updated patient's status - cervical cord tumor compression, planning  for surgery this week. Multiple bone lesions an pulm nodules - unknown primary, oncology consult and bx for tomorrow      Assessment & Plan:     Cervical cord tumor with cord compression  - MRI c spine: Metastatic disease to the cervical spine at C5 and C7 with extraosseous  spread of tumor. At C5 there is collapse of the vertebral body and epidural spread of tumor resulting in significant canal compression. The epidural spread of tumor extends cranially on the left to C4 and fills both C4-5 and C5-6 foramen. The epidural tumor at both C5 and C7 distorts the adjacent vertebral arteries  -  appreciate NSGY input   - plan for OR this week     Metastatic disease with unknown primary   - CT: Osseous metastatic disease. The L5 spinous process mass is amenable to  nonemergent CT-guided percutaneous biopsy if clinically indicated. Pulmonary nodules. Largest nodule is 1 cm in the posterior basilar left lower lobe. Metastatic disease is most likely. Retroperitoneal lymphadenopathy is likely due to metastatic disease. Possible hepatic metastatic disease  - oncology consult placed   - plan for CT guided L5 biopsy tomorrow     Hyponatremia:  mild  - most likely due to volume depletion. - c/w IVF  - will monitor     Type 2 diabetes: A1c ordered. C/w iSS    Thrombocytopenia: mild. -  asymptomatic.  will monitor     Hx alcohol abuse   - liver cirrhosis on imaging     Anxiety/depression:  c/w seroquel, zoloft, trazodone   Obesity BMI of 37.84. Diet and exercise advised.   .     Code status: Full  Prophylaxis: Lovenox  Care Plan discussed with: pt, RN  Anticipated Disposition:  TBD  Inpatient  Cardiac monitoring: Telemetry,     Hospital Problems  Date Reviewed: 1/29/2023            Codes Class Noted POA    * (Principal) Cervical myelopathy with cervical radiculopathy (HCC) ICD-10-CM: G95.9, M54.12  ICD-9-CM: 721.1  1/29/2023 Yes        Acute myelopathy ([facility] [address] ICD-10-CM: G95.9  ICD-9-CM: 336.9  1/29/2023 Unknown           Social Determinants of Health     Tobacco Use: Medium Risk    Smoking Tobacco Use: Former    Smokeless Tobacco Use: Never    Passive Exposure: Not on file   Alcohol Use: Not on file   Financial Resource Strain: Not on file   Food Insecurity: Not on file   Transportation Needs: Not on file   Physical Activity: Not on file   Stress: Not on file   Social Connections: Not on file   Intimate Partner Violence: Not on file   Depression: Not on file   Housing Stability: Not on file       Review of Systems:   A comprehensive review of systems was negative except for that written in the HPI. Vital Signs:    Last 24hrs VS reviewed since prior progress note. Most recent are:  Visit Vitals  BP (!) 154/88 (BP 1 Location: Left upper arm, BP Patient Position: At rest)   Pulse 63   Temp 98.1 °F (36.7 °C)   Resp 21   Wt 133.7 kg (294 lb 12.1 oz)   SpO2 93%   BMI 37.84 kg/m²         Intake/Output Summary (Last 24 hours) at 1/29/2023 1534  Last data filed at 1/29/2023 0215  Gross per 24 hour   Intake --   Output 600 ml   Net -600 ml        Physical Examination:     I had a face to face encounter with this patient and independently examined them on 1/29/2023 as outlined below:          Constitutional:  No acute distress, cooperative, pleasant    ENT:  Oral mucosa moist, oropharynx benign. Resp:  CTA bilaterally. No wheezing/rhonchi/rales. No accessory muscle use. CV:  Regular rhythm, normal rate, no murmurs, gallops, rubs    GI:  Soft, non distended, non tender.  normoactive bowel sounds, no hepatosplenomegaly     Musculoskeletal:  No edema, warm, 2+ pulses throughout    Neurologic:   AAOx3, Right arm 3/5 strength             Data Review:    I personally reviewed  Image    I have independently reviewed and interpreted patient's lab and all other diagnostic data    Labs:     Recent Labs     01/29/23  0340 01/28/23  1714   WBC 9.7 10.3   HGB 14.7 15.3   HCT 45.8 45.7   * 128*     Recent Labs     01/29/23  0340 01/28/23  1714   * 132*   K 4.5 4.7    100   CO2 24 25   BUN 28* 31*   CREA 0.83 0.92   * 123*   CA 9.0 9.0   MG 2.3  --    PHOS 3.6  --      Recent Labs     01/29/23  0340 01/28/23  1714   ALT 49 49   * 162*   TBILI 0.4 0.3   TP 8.4* 8.7*   ALB 2.9* 3.1*   GLOB 5.5* 5.6*     No results for input(s): INR, PTP, APTT, INREXT in the last 72 hours. No results for input(s): FE, TIBC, PSAT, FERR in the last 72 hours. No results found for: FOL, RBCF   No results for input(s): PH, PCO2, PO2 in the last 72 hours. No results for input(s): CPK, CKNDX, TROIQ in the last 72 hours.     No lab exists for component: CPKMB  Lab Results   Component Value Date/Time    Cholesterol, total 141 03/22/2020 04:24 AM    HDL Cholesterol 73 03/22/2020 04:24 AM    LDL, calculated 53.8 03/22/2020 04:24 AM    Triglyceride 71 03/22/2020 04:24 AM    CHOL/HDL Ratio 1.9 03/22/2020 04:24 AM     Lab Results   Component Value Date/Time    Glucose (POC) 118 (H) 01/29/2023 11:40 AM    Glucose (POC) 112 01/29/2023 07:50 AM    Glucose (POC) 148 (H) 01/28/2023 11:04 PM    Glucose (POC) 135 (H) 01/28/2023 05:12 PM    Glucose (POC) 147 (H) 04/25/2021 04:16 PM    Glucose (POC) 153 (H) 04/25/2021 11:33 AM     Lab Results   Component Value Date/Time    Color YELLOW/STRAW 01/29/2023 11:11 AM    Appearance CLOUDY (A) 01/29/2023 11:11 AM    Specific gravity 1.028 01/29/2023 11:11 AM    Specific gravity 1.023 08/19/2021 06:39 AM    pH (UA) 6.5 01/29/2023 11:11 AM    Protein Negative 01/29/2023 11:11 AM    Glucose Negative 01/29/2023 11:11 AM    Ketone Negative 01/29/2023 11:11 AM    Bilirubin Negative 01/29/2023 11:11 AM    Urobilinogen 1.0 01/29/2023 11:11 AM    Nitrites Positive (A) 01/29/2023 11:11 AM    Leukocyte Esterase Negative 01/29/2023 11:11 AM    Epithelial cells FEW 01/29/2023 11:11 AM    Bacteria 3+ (A) 01/29/2023 11:11 AM    WBC 0-4 01/29/2023 11:11 AM    RBC 0-5 01/29/2023 11:11 AM       Notes reviewed from all clinical/nonclinical/nursing services involved in patient's clinical care. Care coordination discussions were held with appropriate clinical/nonclinical/ nursing providers based on care coordination needs. Patients current active Medications were reviewed, considered, added and adjusted based on the clinical condition today. Home Medications were reconciled to the best of my ability given all available resources at the time of admission. Route is PO if not otherwise noted.       Medications Reviewed:     Current Facility-Administered Medications   Medication Dose Route Frequency    gabapentin (NEURONTIN) tablet 600 mg  600 mg Oral TID    hydrOXYzine HCL (ATARAX) tablet 50 mg  50 mg Oral Q6H PRN    QUEtiapine (SEROquel) tablet 200 mg  200 mg Oral QHS    sertraline (ZOLOFT) tablet 100 mg  100 mg Oral DAILY    traZODone (DESYREL) tablet 50 mg  50 mg Oral QHS    sodium chloride (NS) flush 5-40 mL  5-40 mL IntraVENous Q8H    sodium chloride (NS) flush 5-40 mL  5-40 mL IntraVENous PRN    acetaminophen (TYLENOL) tablet 650 mg  650 mg Oral Q6H PRN    Or    acetaminophen (TYLENOL) suppository 650 mg  650 mg Rectal Q6H PRN    polyethylene glycol (MIRALAX) packet 17 g  17 g Oral DAILY PRN    ondansetron (ZOFRAN ODT) tablet 4 mg  4 mg Oral Q8H PRN    Or    ondansetron (ZOFRAN) injection 4 mg  4 mg IntraVENous Q6H PRN    insulin lispro (HUMALOG) injection   SubCUTAneous AC&HS    glucose chewable tablet 16 g  4 Tablet Oral PRN    glucagon (GLUCAGEN) injection 1 mg  1 mg IntraMUSCular PRN    dextrose 10 % infusion 0-250 mL  0-250 mL IntraVENous PRN    lactated Ringers infusion  75 mL/hr IntraVENous CONTINUOUS    oxyCODONE-acetaminophen (PERCOCET) 5-325 mg per tablet 1 Tablet  1 Tablet Oral Q4H PRN    HYDROmorphone (DILAUDID) injection 1 mg  1 mg IntraVENous Q3H PRN    naloxone (NARCAN) injection 0.4 mg  0.4 mg IntraVENous PRN    enoxaparin (LOVENOX) injection 30 mg  30 mg SubCUTAneous Q12H     ______________________________________________________________________  EXPECTED LENGTH OF STAY: - - -  ACTUAL LENGTH OF STAY:          1                 Fahad Hall MD

## 2023-01-29 NOTE — PROGRESS NOTES
Full note to follow  he needs a CT of the C spine and despite H and P, I do not see that a recent one has been done  I ordered one for today  will see pt later today

## 2023-01-29 NOTE — PROGRESS NOTES
Full note dictated  will likely need surgery for what looks like a tumor at C5 and cord compression and weakness in the triceps on the left  will look to OR for surgery scheduling this week

## 2023-01-29 NOTE — ED NOTES
Pt presents to ED ambulatory complaining of neck pain x 2 weeks. Pt reports having posterior neck pain that radiates to his shoulders. Pt reports the pain has worsened and now he is having left arm weakness x 1 week. Pt states he saw his orthopedist this week who sent him from an MRI. Pt's MRI was scheduled for 1/30. No other weakness noted. Pt denies facial droop or slurred speech. Pt is alert and oriented x 4, RR even and unlabored, skin is warm and dry. Assessment completed and pt updated on plan of care. Call bell in reach. Emergency Department Nursing Plan of Care       The Nursing Plan of Care is developed from the Nursing assessment and Emergency Department Attending provider initial evaluation. The plan of care may be reviewed in the ED Provider note.     The Plan of Care was developed with the following considerations:   Patient / Family readiness to learn indicated by:verbalized understanding  Persons(s) to be included in education: patient  Barriers to Learning/Limitations:No    Signed     Alejandro Yanez RN    1/28/2023   8:10 PM

## 2023-01-29 NOTE — PROGRESS NOTES
2155 Patient arrived to unit via stretcher. Patient placed in bed by PCAs. Patient given callbell, VS obtained, oriented to room. No acute distress noted. 2215 Patient c/o pain to neck and states pain level is a 7/10. Patient has no orders. Will elaina roche MD.    0487 Hospitalist perfect served.

## 2023-01-29 NOTE — PROGRESS NOTES
Lovenox Monitoring  Indication: DVT Prophylaxis  Recent Labs     01/28/23  1714   HGB 15.3   *   CREA 0.92     Current Weight: 133.7 kg  Est. CrCl = 110.2 ml/min  Current Dose: 40 mg subcutaneously every 24 hours.   Plan: Change to 30 mg every 12 hours per protocol for patients 101-150.9 kg

## 2023-01-29 NOTE — H&P
[address] [facility]  HISTORY AND PHYSICAL    Name:  Kenya Campos  MR#:  [id number]  :  1955  ACCOUNT #:  [de-identified]  ADMIT DATE:  2023      The patient was seen, evaluated, and admitted by me on 2023. PRIMARY CARE PHYSICIAN:  Manav Rodriges MD    SOURCE OF INFORMATION:  The patient and review of ED and old electronic medical records. CHIEF COMPLAINT:  Neck pain. HISTORY OF PRESENT ILLNESS:  This is a 38-year-old man with past medical history significant for type 2 diabetes, anxiety/depression, and hypertension; presented at [facility] emergency room with neck pain. This has been going on for a couple of weeks. The pain is located at the back of the neck. The patient described the pain as severe 10/10 in severity with no history of trauma to the neck. No known aggravating or relieving factors. The pain is dull ache. He was seen by the orthopedic surgeon. The patient was told that he probably has a pinched nerve and outpatient MRI was ordered. The appointment for the outpatient MRI is not until this coming Monday. Because of persistent pain, the patient came to the emergency room. The pain has now become associated with inability to lift the left arm off the bed without difficulties, but the patient denies pain in the left arm. Just some weakness. The patient has had a CT scan of the neck done which shows significant degenerative changes. When the patient arrived at the emergency room, CT scan of the head was obtained. This was negative for acute pathology. The neurosurgeon on-call at [facility] was consulted by the emergency room physician and decision was made to transfer the patient to [facility] for MRI of the neck and also for further evaluation by the neurosurgeon. The hospitalist service was asked to directly admit the patient from [facility] emergency room to [facility] for that purpose.   The patient was last admitted to this hospital from 04/23/2021 to 04/25/2021. The patient was admitted and treated for alcohol withdrawal syndrome. The patient stated that he no longer abuse alcohol. PAST MEDICAL HISTORY:  1. Type 2 diabetes. 2.  Anxiety/depression. 3.  Hypertension. ALLERGIES:  THE PATIENT IS ALLERGIC TO MOTRIN. MEDICATIONS:  1.  Neurontin 600 mg 3 times daily. 2.  Atarax 50 mg every 6 hours as needed for anxiety. 3.  Seroquel 200 mg daily at bedtime. 4.  Zoloft 100 mg daily. 5.  Trazodone 50 mg daily at bedtime. FAMILY HISTORY:  This was reviewed. His mother had lymphoma. His father had congestive heart failure. PAST SURGICAL HISTORY:  This is significant for prostate biopsy and transurethral resection of the prostate (TURP). SOCIAL HISTORY:  The patient is a former smoker. The patient no longer abuse alcohol. REVIEW OF SYSTEMS:  HEAD, EYES, EARS, NOSE, AND THROAT:  This is positive for neck pain. No headache, no blurring of vision, no photophobia. RESPIRATORY SYSTEM:  No cough, no shortness of breath, no hemoptysis. CARDIOVASCULAR SYSTEM:  No chest pain, no orthopnea, no palpitations. GASTROINTESTINAL SYSTEM:  No nausea or vomiting. No diarrhea, no constipation. GENITOURINARY SYSTEM:  No dysuria, no urgency, no frequency. All other systems are reviewed and they are negative. PHYSICAL EXAMINATION:  GENERAL APPEARANCE:  The patient appeared ill, in moderate distress. VITAL SIGNS:  On arrival at the emergency room; blood pressure 138/80, pulse 87, respiratory rate 18, temperature 98.2, oxygen saturation 99%. HEENT:  Head:  Normocephalic, atraumatic. Eyes:  Normal eye movement. No redness, no drainage, no discharge. Ears:  Normal external ears with no evidence of drainage. Nose:  No deformity, no drainage. Mouth and Throat:  No visible oral lesion. Dry oral mucosa. NECK:  Mild tenderness in back of neck. No obvious deformity and swelling.   No JVD, no thyromegaly. CHEST:  Clear breath sounds. No wheezing, no crackles. HEART:  Normal S1 and S2, regular. No clinically appreciable murmur. ABDOMEN:  Soft, nontender, obese. Normal bowel sounds. CNS:  Alert and oriented x3. No gross focal neurological deficit. EXTREMITIES:  No edema. Pulses 2+ bilaterally. MUSCULOSKELETAL SYSTEM:  No obvious joint deformity and swelling. SKIN:  No active skin lesions seen in the exposed part of the body. PSYCHIATRY:  Normal mood and affect. LYMPHATIC SYSTEM:  No cervical lymphadenopathy. DIAGNOSTIC DATA:  CT scan of the head without contrast, negative. LABORATORY DATA:  Hematology:  WBC 10.3, hemoglobin 15.3, hematocrit 45.7, platelets 377. Chemistry:  Sodium 132, potassium 4.7, chloride 100, CO2 of 25, glucose 123, BUN 31, creatinine 0.92, calcium 9.0, total bilirubin 0.3, ALT 49, , alkaline phosphatase 162, total protein 8.7, albumin level 3.1, globulin 5.6. ASSESSMENT:  1. Cervical myelopathy with cervical radiculopathy. 2.  Hyponatremia. 3.  Type 2 diabetes. 4.  Thrombocytopenia. 5.  Anxiety/depression. 6.  Hypertension. 7.  Obesity. PLAN:  1. Cervical myelopathy with cervical radiculopathy:  This is the most likely cause of the patient's neck pain. We will carry out pain control. We will obtain MRI of the cervical spine. Neurosurgery consult will be requested to assist in further evaluation and treatment. 2.  Hyponatremia: This is mild and most likely due to volume depletion. We will carry out fluid therapy and repeat the patient's sodium level. 3.  Type 2 diabetes: We will place the patient on sliding scale with insulin coverage. We will check hemoglobin A1c level. The patient is not on any medication at home for diabetes. 4.  Thrombocytopenia: This is mild. The patient is asymptomatic. We will monitor the patient's platelet count. 5.  Anxiety/depression: We will resume preadmission medication. 6.  Hypertension:   The patient is not on any medication at home for hypertension. We will monitor the patient's blood pressure closely. If average blood pressure reading remains elevated, the patient will require antihypertensive medication at the time of discharge to home. 7.  Obesity, unspecified:  The patient presented with BMI of 37.84. Diet and exercise advised. Consider inpatient dietary consult if indicated. OTHER ISSUES:  Code status: The patient is a full code. We will place the patient on Lovenox for DVT prophylaxis. If there is a further significant drop in the patient's platelet count, we will discontinue Lovenox and request SCD for DVT prophylaxis. FUNCTIONAL STATUS PRIOR TO ADMISSION:  The patient came from home. The patient is ambulatory with no assistive device. COVID PRECAUTION:  The patient was wearing a face mask. I was wearing a face mask and gloves for this patient's encounter.         MD ALONDRA [id number]  D:  01/29/2023 3:34  T:  01/29/2023 4:33  JOB #:  [id number]  CC:  Cyrus Nichole MD

## 2023-01-29 NOTE — PROGRESS NOTES
Problem: Falls - Risk of  Goal: *Absence of Falls  Description: Document Richard Boothe Fall Risk and appropriate interventions in the flowsheet. Outcome: Progressing Towards Goal  Note: Fall Risk Interventions:                                Problem: Patient Education: Go to Patient Education Activity  Goal: Patient/Family Education  Outcome: Progressing Towards Goal     Problem: Pressure Injury - Risk of  Goal: *Prevention of pressure injury  Description: Document Lit Scale and appropriate interventions in the flowsheet.   Outcome: Progressing Towards Goal  Note: Pressure Injury Interventions:                                            Problem: Patient Education: Go to Patient Education Activity  Goal: Patient/Family Education  Outcome: Progressing Towards Goal

## 2023-01-29 NOTE — CONSULTS
[address]    Name:  Karen Barrett  MR#:  [id number]  :  1955  ACCOUNT #:  [de-identified]  DATE OF SERVICE:  2023      CHIEF COMPLAINT:  New diagnosed cervical spinal tumor with cord compression and weak left arm. HISTORY OF PRESENT ILLNESS:  A 58-year-old man who presents with a couple of week history of progressive weakness of the left arm, minimal neck pain. No history of cancer or other tumors, but a newly diagnosed lesion in the cervical spine that appears to invade the cervical C5 vertebrae and causing cord compression. He presented to the emergency room with a weak left arm yesterday. He is transferred from [facility] to [facility].  He denies any bowel or bladder deficits or weakness of the lower extremities. He has no history of cancer. PAST MEDICAL HISTORY:  1. Type 2 diabetes. 2.  Anxiety and depression. 3.  Hypertension. PAST SURGICAL HISTORY:  He has had TURP. MEDICATIONS:  Include:  1. Neurontin. 2.  Atarax. 3.  Seroquel. 4.  Zoloft. 4.  Trazodone. ALLERGIES:  HE IS ALLERGIC TO MOTRIN. SOCIAL HISTORY:  He has a history of alcohol abuse but none recently. Former smoker. REVIEW OF SYSTEMS:  Otherwise noncontributory. PHYSICAL EXAMINATION:  He is awake, alert, oriented to person, place, and time. He is weak in the biceps with about 0/5 strength on the left. He is able to flex and extend at the wrist and extend at the fingers on the left hand. Triceps is about 4-5/5 strength. Toes downgoing. No clonus at the ankles. No Miguel sign. Limited range of motion of cervical spine. Cranial nerve function II-XII normal.    He tells me that he was due to have an MRI tomorrow and was being followed by an orthopedic surgeon, I think Dr. Soham Lobato, but am not sure which one as there are two Dr. Soham Lobato in Orthopedics, and he was not really given a diagnosis.   The MRI shows involvement of the C5 vertebrae with possible tumor and some tumor in the lateral areas of the cervical spine, particularly from C5-C7. He likely will require surgery. My initial thought is a corpectomy at C5 with vertebral body replacement and anterior plate stabilization; however, I will have the hospitalist continue working him up for other evidence of metastatic disease and look to the operating room for surgery scheduling. Thank you for asking me to see the patient.       Bridget Madera MD      [id number]  D:  01/29/2023 15:14  T:  01/29/2023 16:25  JOB #:  [id number]  CC:  Shiva Machado MD

## 2023-01-29 NOTE — PROGRESS NOTES
0025 Report given to Nurse Sedonia Nyhan who will assume care of patient for the rest of the night. Report included SBAR, MAR, KARDEX, INTAKE/OUTPUT, CARDIAC RYHTHM and all care given. Nurse Sedonia Nyhan also informed that admission documentation had not bed done.

## 2023-01-29 NOTE — ED NOTES
Pt left ED via EMS en route to [facility] rm 677. Pt in no acute distress and verbalizes understanding of plan of care.

## 2023-01-30 ENCOUNTER — ANESTHESIA EVENT (OUTPATIENT)
Dept: SURGERY | Age: 68
DRG: 471 | End: 2023-01-30
Payer: MEDICARE

## 2023-01-30 ENCOUNTER — APPOINTMENT (OUTPATIENT)
Dept: CT IMAGING | Age: 68
End: 2023-01-30
Attending: INTERNAL MEDICINE
Payer: MEDICARE

## 2023-01-30 ENCOUNTER — APPOINTMENT (OUTPATIENT)
Dept: MRI IMAGING | Age: 68
End: 2023-01-30
Attending: INTERNAL MEDICINE
Payer: MEDICARE

## 2023-01-30 LAB
ABO + RH BLD: NORMAL
ANION GAP SERPL CALC-SCNC: 5 MMOL/L (ref 5–15)
APTT PPP: 29.1 SEC (ref 22.1–31)
BLOOD GROUP ANTIBODIES SERPL: NORMAL
BUN SERPL-MCNC: 32 MG/DL (ref 6–20)
BUN/CREAT SERPL: 34 (ref 12–20)
CALCIUM SERPL-MCNC: 8.7 MG/DL (ref 8.5–10.1)
CEA SERPL-MCNC: 1.2 NG/ML
CHLORIDE SERPL-SCNC: 102 MMOL/L (ref 97–108)
CO2 SERPL-SCNC: 26 MMOL/L (ref 21–32)
CREAT SERPL-MCNC: 0.94 MG/DL (ref 0.7–1.3)
EST. AVERAGE GLUCOSE BLD GHB EST-MCNC: 123 MG/DL
GLUCOSE BLD STRIP.AUTO-MCNC: 118 MG/DL (ref 65–117)
GLUCOSE BLD STRIP.AUTO-MCNC: 130 MG/DL (ref 65–117)
GLUCOSE BLD STRIP.AUTO-MCNC: 148 MG/DL (ref 65–117)
GLUCOSE BLD STRIP.AUTO-MCNC: 91 MG/DL (ref 65–117)
GLUCOSE SERPL-MCNC: 96 MG/DL (ref 65–100)
HBA1C MFR BLD: 5.9 % (ref 4–5.6)
HISTORY CHECKED?,CKHIST: NORMAL
INR PPP: 1.2 (ref 0.9–1.1)
POTASSIUM SERPL-SCNC: 4.3 MMOL/L (ref 3.5–5.1)
PROTHROMBIN TIME: 12.3 SEC (ref 9–11.1)
PSA SERPL-MCNC: 4.9 NG/ML (ref 0.01–4)
SERVICE CMNT-IMP: ABNORMAL
SERVICE CMNT-IMP: NORMAL
SODIUM SERPL-SCNC: 133 MMOL/L (ref 136–145)
SPECIMEN EXP DATE BLD: NORMAL
THERAPEUTIC RANGE,PTTT: NORMAL SECS (ref 58–77)

## 2023-01-30 PROCEDURE — 77030003666 HC NDL SPINAL BD -A

## 2023-01-30 PROCEDURE — 74011000250 HC RX REV CODE- 250: Performed by: INTERNAL MEDICINE

## 2023-01-30 PROCEDURE — 86900 BLOOD TYPING SEROLOGIC ABO: CPT

## 2023-01-30 PROCEDURE — 88333 PATH CONSLTJ SURG CYTO XM 1: CPT

## 2023-01-30 PROCEDURE — A9576 INJ PROHANCE MULTIPACK: HCPCS

## 2023-01-30 PROCEDURE — 77030020268 HC MISC GENERAL SUPPLY

## 2023-01-30 PROCEDURE — 88307 TISSUE EXAM BY PATHOLOGIST: CPT

## 2023-01-30 PROCEDURE — 88341 IMHCHEM/IMCYTCHM EA ADD ANTB: CPT

## 2023-01-30 PROCEDURE — 88365 INSITU HYBRIDIZATION (FISH): CPT

## 2023-01-30 PROCEDURE — 65270000046 HC RM TELEMETRY

## 2023-01-30 PROCEDURE — 85730 THROMBOPLASTIN TIME PARTIAL: CPT

## 2023-01-30 PROCEDURE — 99152 MOD SED SAME PHYS/QHP 5/>YRS: CPT

## 2023-01-30 PROCEDURE — 82962 GLUCOSE BLOOD TEST: CPT

## 2023-01-30 PROCEDURE — 77030014115

## 2023-01-30 PROCEDURE — 88342 IMHCHEM/IMCYTCHM 1ST ANTB: CPT

## 2023-01-30 PROCEDURE — 85610 PROTHROMBIN TIME: CPT

## 2023-01-30 PROCEDURE — 82105 ALPHA-FETOPROTEIN SERUM: CPT

## 2023-01-30 PROCEDURE — 88364 INSITU HYBRIDIZATION (FISH): CPT

## 2023-01-30 PROCEDURE — 0QB03ZX EXCISION OF LUMBAR VERTEBRA, PERCUTANEOUS APPROACH, DIAGNOSTIC: ICD-10-PCS | Performed by: INTERNAL MEDICINE

## 2023-01-30 PROCEDURE — 74011250636 HC RX REV CODE- 250/636: Performed by: RADIOLOGY

## 2023-01-30 PROCEDURE — 74011250637 HC RX REV CODE- 250/637: Performed by: INTERNAL MEDICINE

## 2023-01-30 PROCEDURE — 82378 CARCINOEMBRYONIC ANTIGEN: CPT

## 2023-01-30 PROCEDURE — 74011250636 HC RX REV CODE- 250/636

## 2023-01-30 PROCEDURE — 70553 MRI BRAIN STEM W/O & W/DYE: CPT

## 2023-01-30 PROCEDURE — 74011000250 HC RX REV CODE- 250

## 2023-01-30 PROCEDURE — 74011250636 HC RX REV CODE- 250/636: Performed by: INTERNAL MEDICINE

## 2023-01-30 PROCEDURE — 36415 COLL VENOUS BLD VENIPUNCTURE: CPT

## 2023-01-30 PROCEDURE — 83036 HEMOGLOBIN GLYCOSYLATED A1C: CPT

## 2023-01-30 PROCEDURE — 80048 BASIC METABOLIC PNL TOTAL CA: CPT

## 2023-01-30 PROCEDURE — 84153 ASSAY OF PSA TOTAL: CPT

## 2023-01-30 RX ORDER — LIDOCAINE 4 G/100G
1 PATCH TOPICAL EVERY 24 HOURS
Status: DISCONTINUED | OUTPATIENT
Start: 2023-01-30 | End: 2023-02-10 | Stop reason: HOSPADM

## 2023-01-30 RX ORDER — SODIUM BICARBONATE 42 MG/ML
1 INJECTION, SOLUTION INTRAVENOUS
Status: COMPLETED | OUTPATIENT
Start: 2023-01-30 | End: 2023-01-30

## 2023-01-30 RX ORDER — FENTANYL CITRATE 50 UG/ML
200 INJECTION, SOLUTION INTRAMUSCULAR; INTRAVENOUS
Status: DISCONTINUED | OUTPATIENT
Start: 2023-01-30 | End: 2023-01-30 | Stop reason: ALTCHOICE

## 2023-01-30 RX ORDER — MIDAZOLAM HYDROCHLORIDE 1 MG/ML
5 INJECTION, SOLUTION INTRAMUSCULAR; INTRAVENOUS
Status: DISCONTINUED | OUTPATIENT
Start: 2023-01-30 | End: 2023-01-30 | Stop reason: ALTCHOICE

## 2023-01-30 RX ORDER — HYDROMORPHONE HYDROCHLORIDE 2 MG/ML
1.5 INJECTION, SOLUTION INTRAMUSCULAR; INTRAVENOUS; SUBCUTANEOUS
Status: DISCONTINUED | OUTPATIENT
Start: 2023-01-30 | End: 2023-02-03

## 2023-01-30 RX ORDER — LIDOCAINE HYDROCHLORIDE 10 MG/ML
10 INJECTION INFILTRATION; PERINEURAL
Status: COMPLETED | OUTPATIENT
Start: 2023-01-30 | End: 2023-01-30

## 2023-01-30 RX ORDER — SODIUM CHLORIDE 9 MG/ML
25 INJECTION, SOLUTION INTRAVENOUS CONTINUOUS
Status: DISCONTINUED | OUTPATIENT
Start: 2023-01-30 | End: 2023-01-30 | Stop reason: ALTCHOICE

## 2023-01-30 RX ORDER — SODIUM CHLORIDE 0.9 % (FLUSH) 0.9 %
10 SYRINGE (ML) INJECTION
Status: COMPLETED | OUTPATIENT
Start: 2023-01-30 | End: 2023-01-30

## 2023-01-30 RX ADMIN — SODIUM CHLORIDE, PRESERVATIVE FREE 10 ML: 5 INJECTION INTRAVENOUS at 23:09

## 2023-01-30 RX ADMIN — FENTANYL CITRATE 50 MCG: 50 INJECTION, SOLUTION INTRAMUSCULAR; INTRAVENOUS at 12:39

## 2023-01-30 RX ADMIN — LIDOCAINE HYDROCHLORIDE 10 ML: 10 INJECTION, SOLUTION INFILTRATION; PERINEURAL at 12:50

## 2023-01-30 RX ADMIN — HYDROMORPHONE HYDROCHLORIDE 1.5 MG: 2 INJECTION, SOLUTION INTRAMUSCULAR; INTRAVENOUS; SUBCUTANEOUS at 20:25

## 2023-01-30 RX ADMIN — GABAPENTIN 600 MG: 600 TABLET, FILM COATED ORAL at 09:11

## 2023-01-30 RX ADMIN — GABAPENTIN 600 MG: 600 TABLET, FILM COATED ORAL at 23:08

## 2023-01-30 RX ADMIN — HYDROMORPHONE HYDROCHLORIDE 1.5 MG: 2 INJECTION, SOLUTION INTRAMUSCULAR; INTRAVENOUS; SUBCUTANEOUS at 11:27

## 2023-01-30 RX ADMIN — HYDROMORPHONE HYDROCHLORIDE 1.5 MG: 2 INJECTION, SOLUTION INTRAMUSCULAR; INTRAVENOUS; SUBCUTANEOUS at 23:17

## 2023-01-30 RX ADMIN — HYDROMORPHONE HYDROCHLORIDE 1 MG: 1 INJECTION, SOLUTION INTRAMUSCULAR; INTRAVENOUS; SUBCUTANEOUS at 06:17

## 2023-01-30 RX ADMIN — SODIUM CHLORIDE, PRESERVATIVE FREE 10 ML: 5 INJECTION INTRAVENOUS at 22:40

## 2023-01-30 RX ADMIN — SODIUM CHLORIDE 25 ML/HR: 9 INJECTION, SOLUTION INTRAVENOUS at 12:00

## 2023-01-30 RX ADMIN — SODIUM CHLORIDE, POTASSIUM CHLORIDE, SODIUM LACTATE AND CALCIUM CHLORIDE 75 ML/HR: 600; 310; 30; 20 INJECTION, SOLUTION INTRAVENOUS at 06:25

## 2023-01-30 RX ADMIN — TRAZODONE HYDROCHLORIDE 50 MG: 50 TABLET ORAL at 23:08

## 2023-01-30 RX ADMIN — GADOTERIDOL 20 ML: 279.3 INJECTION, SOLUTION INTRAVENOUS at 22:39

## 2023-01-30 RX ADMIN — SODIUM BICARBONATE 210 MG: 42 INJECTION, SOLUTION INTRAVENOUS at 12:50

## 2023-01-30 RX ADMIN — GABAPENTIN 600 MG: 600 TABLET, FILM COATED ORAL at 17:10

## 2023-01-30 RX ADMIN — SODIUM CHLORIDE, PRESERVATIVE FREE 10 ML: 5 INJECTION INTRAVENOUS at 06:17

## 2023-01-30 RX ADMIN — HYDROMORPHONE HYDROCHLORIDE 1 MG: 1 INJECTION, SOLUTION INTRAMUSCULAR; INTRAVENOUS; SUBCUTANEOUS at 02:28

## 2023-01-30 RX ADMIN — HYDROMORPHONE HYDROCHLORIDE 1.5 MG: 2 INJECTION, SOLUTION INTRAMUSCULAR; INTRAVENOUS; SUBCUTANEOUS at 17:37

## 2023-01-30 RX ADMIN — SERTRALINE HYDROCHLORIDE 100 MG: 50 TABLET ORAL at 09:11

## 2023-01-30 RX ADMIN — HYDROMORPHONE HYDROCHLORIDE 1 MG: 1 INJECTION, SOLUTION INTRAMUSCULAR; INTRAVENOUS; SUBCUTANEOUS at 09:11

## 2023-01-30 RX ADMIN — HYDROMORPHONE HYDROCHLORIDE 1.5 MG: 2 INJECTION, SOLUTION INTRAMUSCULAR; INTRAVENOUS; SUBCUTANEOUS at 14:38

## 2023-01-30 RX ADMIN — SODIUM CHLORIDE, PRESERVATIVE FREE 10 ML: 5 INJECTION INTRAVENOUS at 14:02

## 2023-01-30 RX ADMIN — OXYCODONE AND ACETAMINOPHEN 1 TABLET: 5; 325 TABLET ORAL at 17:11

## 2023-01-30 RX ADMIN — FENTANYL CITRATE 50 MCG: 50 INJECTION, SOLUTION INTRAMUSCULAR; INTRAVENOUS at 12:42

## 2023-01-30 RX ADMIN — QUETIAPINE FUMARATE 200 MG: 100 TABLET ORAL at 23:08

## 2023-01-30 NOTE — PROGRESS NOTES
[facility]                                                                                          Hospitalist Progress Note  Raymundo Arias MD  Answering service: [phone] from in house phone        Date of Service:  2023  NAME:  Thuan Ritter  :  1955  MRN:  [id number]       Admission Summary: This is a 69-year-old man with past medical history significant for type 2 diabetes, anxiety/depression, and hypertension; presented at [facility] emergency room with neck pain. This has been going on for a couple of weeks. The pain is located at the back of the neck. The patient described the pain as severe 10/10 in severity with no history of trauma to the neck. No known aggravating or relieving factors. The pain is dull ache. He was seen by the orthopedic surgeon. The patient was told that he probably has a pinched nerve and outpatient MRI was ordered. The appointment for the outpatient MRI is not until this coming Monday. Because of persistent pain, the patient came to the emergency room. The pain has now become associated with inability to lift the left arm off the bed without difficulties, but the patient denies pain in the left arm. Just some weakness. The patient has had a CT scan of the neck done which shows significant degenerative changes. When the patient arrived at the emergency room, CT scan of the head was obtained. This was negative for acute pathology. The neurosurgeon on-call at [facility] was consulted by the emergency room physician and decision was made to transfer the patient to [facility] for MRI of the neck and also for further evaluation by the neurosurgeon. The hospitalist service was asked to directly admit the patient from [facility] emergency room to [facility] for that purpose.       Interval history / Subjective:   Patient is seen and examined at bedside this AM. He still has shoulder and arm pain - pain meds adjusted. Plan for bone bx today   Discussed with nursing, cm     Spoke with daughter Juan Michel on phone and updated patient's status - cervical cord tumor, CT showed bone mets and lung nodules, unknown primary, bone bx done today. Planning for cervical cord resection by NSGY this week. Oncology on board. He does have stage 4 cancr. Wife Joseph Lizarraga  and has 5 children      Assessment & Plan:     Cervical cord tumor with cord compression  - MRI c spine: Metastatic disease to the cervical spine at C5 and C7 with extraosseous  spread of tumor. At C5 there is collapse of the vertebral body and epidural spread of tumor resulting in significant canal compression. The epidural spread of tumor extends cranially on the left to C4 and fills both C4-5 and C5-6 foramen. The epidural tumor at both C5 and C7 distorts the adjacent vertebral arteries  -  appreciate NSGY input   - plan for OR this week     Metastatic disease with unknown primary   - CT: Osseous metastatic disease. The L5 spinous process mass is amenable to  nonemergent CT-guided percutaneous biopsy if clinically indicated. Pulmonary nodules. Largest nodule is 1 cm in the posterior basilar left lower lobe. Metastatic disease is most likely. Retroperitoneal lymphadenopathy is likely due to metastatic disease. Possible hepatic metastatic disease  - oncology on board   - CT guided L5 biopsy done on 1/30      Hyponatremia:  mild  - most likely due to volume depletion vs SIADH  - s/p IVF  - will monitor     Type 2 diabetes: A1c 5.9. C/w iSS    Thrombocytopenia: mild. -  asymptomatic.  will monitor     Hx alcohol abuse   - liver cirrhosis on imaging     Anxiety/depression:  c/w seroquel, zoloft, trazodone   Obesity BMI of 37.84. Diet and exercise advised. .     Code status: Full.  Tg Lizarraga 279 [id number] is NOK  Prophylaxis: Lovenox  Care Plan discussed with: pt, RN  Anticipated Disposition:  TBD  Inpatient  Cardiac monitoring: Telemetry,     Hospital Problems  Date Reviewed: 1/29/2023            Codes Class Noted POA    * (Principal) Cervical myelopathy with cervical radiculopathy (HCC) ICD-10-CM: G95.9, M54.12  ICD-9-CM: 721.1  1/29/2023 Yes        Acute myelopathy (HCC) ICD-10-CM: G95.9  ICD-9-CM: 336.9  1/29/2023 Unknown         Social Determinants of Health     Tobacco Use: Medium Risk    Smoking Tobacco Use: Former    Smokeless Tobacco Use: Never    Passive Exposure: Not on file   Alcohol Use: Not on file   Financial Resource Strain: Not on file   Food Insecurity: Not on file   Transportation Needs: Not on file   Physical Activity: Not on file   Stress: Not on file   Social Connections: Not on file   Intimate Partner Violence: Not on file   Depression: Not on file   Housing Stability: Not on file       Review of Systems:   A comprehensive review of systems was negative except for that written in the HPI. Vital Signs:    Last 24hrs VS reviewed since prior progress note. Most recent are:  Visit Vitals  /80 (BP 1 Location: Right upper arm, BP Patient Position: At rest;Reclining)   Pulse 70   Temp 98.4 °F (36.9 °C)   Resp 15   Wt 133.7 kg (294 lb 12.1 oz)   SpO2 96%   BMI 37.84 kg/m²         Intake/Output Summary (Last 24 hours) at 1/30/2023 1501  Last data filed at 1/30/2023 0902  Gross per 24 hour   Intake --   Output 720 ml   Net -720 ml          Physical Examination:     I had a face to face encounter with this patient and independently examined them on 1/30/2023 as outlined below:          Constitutional:  No acute distress, cooperative, pleasant    ENT:  Oral mucosa moist, oropharynx benign. Resp:  CTA bilaterally. No wheezing/rhonchi/rales. No accessory muscle use. CV:  Regular rhythm, normal rate, no murmurs, gallops, rubs    GI:  Soft, non distended, non tender.  normoactive bowel sounds, no hepatosplenomegaly     Musculoskeletal:  No edema, warm, 2+ pulses throughout    Neurologic:   AAOx2, Right arm 3/5 strength             Data Review:    I personally reviewed  Image    I have independently reviewed and interpreted patient's lab and all other diagnostic data    Labs:     Recent Labs     01/29/23  0340 01/28/23  1714   WBC 9.7 10.3   HGB 14.7 15.3   HCT 45.8 45.7   * 128*       Recent Labs     01/30/23  0556 01/29/23  0340 01/28/23  1714   * 131* 132*   K 4.3 4.5 4.7    103 100   CO2 26 24 25   BUN 32* 28* 31*   CREA 0.94 0.83 0.92   GLU 96 139* 123*   CA 8.7 9.0 9.0   MG  --  2.3  --    PHOS  --  3.6  --        Recent Labs     01/29/23  0340 01/28/23  1714   ALT 49 49   * 162*   TBILI 0.4 0.3   TP 8.4* 8.7*   ALB 2.9* 3.1*   GLOB 5.5* 5.6*       Recent Labs     01/30/23  0828   INR 1.2*   PTP 12.3*   APTT 29.1        No results for input(s): FE, TIBC, PSAT, FERR in the last 72 hours. No results found for: FOL, RBCF   No results for input(s): PH, PCO2, PO2 in the last 72 hours. No results for input(s): CPK, CKNDX, TROIQ in the last 72 hours.     No lab exists for component: CPKMB  Lab Results   Component Value Date/Time    Cholesterol, total 141 03/22/2020 04:24 AM    HDL Cholesterol 73 03/22/2020 04:24 AM    LDL, calculated 53.8 03/22/2020 04:24 AM    Triglyceride 71 03/22/2020 04:24 AM    CHOL/HDL Ratio 1.9 03/22/2020 04:24 AM     Lab Results   Component Value Date/Time    Glucose (POC) 91 01/30/2023 11:21 AM    Glucose (POC) 130 (H) 01/30/2023 09:15 AM    Glucose (POC) 121 (H) 01/29/2023 11:24 PM    Glucose (POC) 108 01/29/2023 04:40 PM    Glucose (POC) 118 (H) 01/29/2023 11:40 AM     Lab Results   Component Value Date/Time    Color YELLOW/STRAW 01/29/2023 11:11 AM    Appearance CLOUDY (A) 01/29/2023 11:11 AM    Specific gravity 1.028 01/29/2023 11:11 AM    Specific gravity 1.023 08/19/2021 06:39 AM    pH (UA) 6.5 01/29/2023 11:11 AM    Protein Negative 01/29/2023 11:11 AM    Glucose Negative 01/29/2023 11:11 AM    Ketone Negative 01/29/2023 11:11 AM    Bilirubin Negative 01/29/2023 11:11 AM    Urobilinogen 1.0 01/29/2023 11:11 AM    Nitrites Positive (A) 01/29/2023 11:11 AM    Leukocyte Esterase Negative 01/29/2023 11:11 AM    Epithelial cells FEW 01/29/2023 11:11 AM    Bacteria 3+ (A) 01/29/2023 11:11 AM    WBC 0-4 01/29/2023 11:11 AM    RBC 0-5 01/29/2023 11:11 AM       Notes reviewed from all clinical/nonclinical/nursing services involved in patient's clinical care. Care coordination discussions were held with appropriate clinical/nonclinical/ nursing providers based on care coordination needs. Patients current active Medications were reviewed, considered, added and adjusted based on the clinical condition today. Home Medications were reconciled to the best of my ability given all available resources at the time of admission. Route is PO if not otherwise noted.       Medications Reviewed:     Current Facility-Administered Medications   Medication Dose Route Frequency    lidocaine 4 % patch 1 Patch  1 Patch TransDERmal Q24H    HYDROmorphone (DILAUDID) injection 1.5 mg  1.5 mg IntraVENous Q3H PRN    gabapentin (NEURONTIN) tablet 600 mg  600 mg Oral TID    hydrOXYzine HCL (ATARAX) tablet 50 mg  50 mg Oral Q6H PRN    QUEtiapine (SEROquel) tablet 200 mg  200 mg Oral QHS    sertraline (ZOLOFT) tablet 100 mg  100 mg Oral DAILY    traZODone (DESYREL) tablet 50 mg  50 mg Oral QHS    sodium chloride (NS) flush 5-40 mL  5-40 mL IntraVENous Q8H    sodium chloride (NS) flush 5-40 mL  5-40 mL IntraVENous PRN    acetaminophen (TYLENOL) tablet 650 mg  650 mg Oral Q6H PRN    Or    acetaminophen (TYLENOL) suppository 650 mg  650 mg Rectal Q6H PRN    polyethylene glycol (MIRALAX) packet 17 g  17 g Oral DAILY PRN    ondansetron (ZOFRAN ODT) tablet 4 mg  4 mg Oral Q8H PRN    Or    ondansetron (ZOFRAN) injection 4 mg  4 mg IntraVENous Q6H PRN    insulin lispro (HUMALOG) injection   SubCUTAneous AC&HS    glucose chewable tablet 16 g  4 Tablet Oral PRN    glucagon (GLUCAGEN) injection 1 mg 1 mg IntraMUSCular PRN    dextrose 10 % infusion 0-250 mL  0-250 mL IntraVENous PRN    lactated Ringers infusion  75 mL/hr IntraVENous CONTINUOUS    oxyCODONE-acetaminophen (PERCOCET) 5-325 mg per tablet 1 Tablet  1 Tablet Oral Q4H PRN    naloxone (NARCAN) injection 0.4 mg  0.4 mg IntraVENous PRN    [Held by provider] enoxaparin (LOVENOX) injection 30 mg  30 mg SubCUTAneous Q12H     ______________________________________________________________________  EXPECTED LENGTH OF STAY: 2d 21h  ACTUAL LENGTH OF STAY:          2                 Coco Munroe MD

## 2023-01-30 NOTE — CONSULTS
Oncology consult for suspected metastatic disease. Patient presented with cervical cord tumor with cord compression and diffuse osseous mets, RP LAD, and possible hepatic mets. Full note to follow.

## 2023-01-30 NOTE — PROGRESS NOTES
Transition of Care Plan: TBD pending medical progression  *may need therapy evaluation prior to discharge     RUR: 12% low    PCP F/U: Dr. Estelle Fletcher    Disposition: TBD pending medical progression    Transportation: family    Main Contact: Daughter: Wero Aldair: [phone]    9775: Met with patient at the bedside. Introduced self and role of CM. Patient A&Ox4. Confirmed demographics. Patient states he thinks he may need some sort of assistance when he goes home. Would like to get out of bed, but patient is on bed rest. RN notified. May benefit from therapy evaluation prior to discharge. Will continue to follow    Carmen Enriquez RN, CRM    Residency:  apartment with one flight of stairs to enter  Lives With: alone  Prior functioning:  independent   Prior DME used: none  Rehab history: None  Pharmacy: [facility]     Reason for Admission:  Cervical myelopathy with cervical radiculopathy                   RUR Score: 12% low                    Plan for utilizing home health:   therapy to evaluate when appropriate        PCP: First and Last name:  Georges Dominguez MD     Name of Practice:    Are you a current patient: Yes/No: yes   Approximate date of last visit: 1 week ago   Can you participate in a virtual visit with your PCP:                     Current Advanced Directive/Advance Care Plan: Full Code    Healthcare Decision Maker:   Click here to complete [address] including selection of the Healthcare Decision Maker Relationship (ie \"Primary\")                  Transition of Care Plan:    TBD pending medical progression                 Care Management Interventions  PCP Verified by CM: Yes  Mode of Transport at Discharge:  Other (see comment) (family)  Support Systems: Child(reanna)  Confirm Follow Up Transport: Family  Discharge Location  Patient Expects to be Discharged to[de-identified] Home

## 2023-01-30 NOTE — PROGRESS NOTES
Spine Surgery Progress Note  Ortiz Faulkner PA-C    Admit Date: 2023   LOS: 2 days        Daily Progress Note: 2023    HPI: Mr. Candace Queen is a pleasant 67yo gentleman with a history of T2DM, anxiety/depression, and hypertension who presented to [address] [place] [address] with neck pain for 2 weeks that radiates into his shoulders and is associated with BUE weakness, LUE>RUE. Imaging revealed \"Metastatic disease to the cervical spine at C5 and C7 with extraosseous spread of tumor. At C5 there is collapse of the vertebral body and epidural spread of tumor resulting in significant canal compression. The epidural spread of tumor extends cranially on the left to C4 and fills both C4-5 and C5-6 foramen. The epidural tumor at both C5 and C7 distorts the adjacent vertebral arteries. \" Patient was transferred to [facility]. He has no known cancer diagnosis. Pt denies bowel and bladder incontinence. Subjective:     Pt resting comfortably in bed. Pt went to IR for bx of L5 spinous process today. Objective:     Vital signs  Temp (24hrs), Av °F (36.7 °C), Min:97.5 °F (36.4 °C), Max:98.4 °F (36.9 °C)    07 - 1900  In: -   Out: 994 [IXVUE:664]  1901 -  0700  In: -   Out: 950 [Urine:950]    Visit Vitals  /77 (BP 1 Location: Left upper arm, BP Patient Position: At rest;Sitting)   Pulse 67   Temp 98 °F (36.7 °C)   Resp 11   Wt 133.7 kg (294 lb 12.1 oz)   SpO2 96%   BMI 37.84 kg/m²    O2 Flow Rate (L/min): 2 l/min O2 Device: Nasal cannula     Output (mL)  Urine Voided: 370 ml (23 0902)     Pain control  Pain Assessment  Pain Scale 1: Numeric (0 - 10)  Pain Intensity 1: 10  Pain Location 1:  (left shoulder, pt is restless)  Pain Orientation 1: Posterior  Pain Description 1: Aching, Sharp  Pain Intervention(s) 1: MD notified (comment), Repositioned (Patient has no orderes. Will perfect serve MD for PRN Pain medication)    PT/OT  Gait              Physical Exam:  Gen: No acute distress. Neuro: A&Ox3.  Follows commands. Speech clear. Affect normal. Intermittently confused; pt states he thinks it is due to the pain medicine. PERRL. EOMI. Face symmetric. Strength 4/5 in RUE. LUE bicep/deltoid strength 0/5. Strength 4/5 triceps, wrist, intrinsics. Sensation intact. No clonus. Gait deferred.     24 hour results:    Recent Results (from the past 24 hour(s))   GLUCOSE, POC    Collection Time: 01/29/23  4:40 PM   Result Value Ref Range    Glucose (POC) 108 65 - 117 mg/dL    Performed by [employer] [address], POC    Collection Time: 01/29/23 11:24 PM   Result Value Ref Range    Glucose (POC) 121 (H) 65 - 117 mg/dL    Performed by [employer]    CEA    Collection Time: 01/30/23  5:56 AM   Result Value Ref Range    CEA 1.2 ng/mL   HEMOGLOBIN A1C WITH EAG    Collection Time: 01/30/23  5:56 AM   Result Value Ref Range    Hemoglobin A1c 5.9 (H) 4.0 - 5.6 %    Est. average glucose 821 mg/dL   METABOLIC PANEL, BASIC    Collection Time: 01/30/23  5:56 AM   Result Value Ref Range    Sodium 133 (L) 136 - 145 mmol/L    Potassium 4.3 3.5 - 5.1 mmol/L    Chloride 102 97 - 108 mmol/L    CO2 26 21 - 32 mmol/L    Anion gap 5 5 - 15 mmol/L    Glucose 96 65 - 100 mg/dL    BUN 32 (H) 6 - 20 MG/DL    Creatinine 0.94 0.70 - 1.30 MG/DL    BUN/Creatinine ratio 34 (H) 12 - 20      eGFR >60 >60 ml/min/1.73m2    Calcium 8.7 8.5 - 10.1 MG/DL   PTT    Collection Time: 01/30/23  8:28 AM   Result Value Ref Range    aPTT 29.1 22.1 - 31.0 sec    aPTT, therapeutic range     58.0 - 77.0 SECS   PROTHROMBIN TIME + INR    Collection Time: 01/30/23  8:28 AM   Result Value Ref Range    INR 1.2 (H) 0.9 - 1.1      Prothrombin time 12.3 (H) 9.0 - 11.1 sec   GLUCOSE, POC    Collection Time: 01/30/23  9:15 AM   Result Value Ref Range    Glucose (POC) 130 (H) 65 - 117 mg/dL    Performed by Arvind KUMAR    GLUCOSE, POC    Collection Time: 01/30/23 11:21 AM   Result Value Ref Range    Glucose (POC) 91 65 - 117 mg/dL    Performed by Arvind Hutchison CON    RBC, ALLOCATE    Collection Time: 01/30/23  2:45 PM   Result Value Ref Range    HISTORY CHECKED? Historical check performed         Assessment:     Principal Problem:    Cervical myelopathy with cervical radiculopathy ([facility] (1/29/2023)    Active Problems:    Acute myelopathy ([facility] (1/29/2023)      Plan:     Patient with metastatic disease to spine. Mets to lungs, lumbar spine, and liver seen on additional imaging. L5 spinous process bx today. Head CT neg. Oncology following. Plans for cervical spine surgery tomorrow with Dr. Ken Chavez. NPO at midnight. Obtain consent. Type and screen and allocate 1 unit PRBC.     Plan d/w Dr. Felton Baeza, PA

## 2023-01-30 NOTE — ROUTINE PROCESS
Pt states he ate breakfast, but is not positive. Per unit RN, pt was not given a breakfast tray. IR NP verbally notified.

## 2023-01-30 NOTE — PROGRESS NOTES
1930 Bedside and Verbal shift change report given to Parmjit (oncoming nurse) by David Chacko (offgoing nurse). Report included the following information SBAR, Kardex, ED Summary, Intake/Output, and MAR.     0730 Bedside and Verbal shift change report given to [address] (oncoming nurse) by Mar Arias (offgoing nurse). Report included the following information SBAR, Kardex, ED Summary, and MAR. Problem: Pressure Injury - Risk of  Goal: *Prevention of pressure injury  Description: Document Lit Scale and appropriate interventions in the flowsheet. Outcome: Progressing Towards Goal  Note: Pressure Injury Interventions:        Activity Interventions: Increase time out of bed, Pressure redistribution bed/mattress(bed type), PT/OT evaluation    Mobility Interventions: Pressure redistribution bed/mattress (bed type), HOB 30 degrees or less    Nutrition Interventions: Document food/fluid/supplement intake

## 2023-01-30 NOTE — PROGRESS NOTES
TRANSFER - OUT REPORT:    Verbal report given to Sangita MONTERO(name) on Jewel Martinez  being transferred to [facility](unit) for routine post - op       Report consisted of patients Situation, Background, Assessment and   Recommendations(SBAR). Information from the following report(s) SBAR was reviewed with the receiving nurse. Lines:   Peripheral IV 01/28/23 Right Antecubital (Active)   Site Assessment Clean, dry, & intact 01/30/23 0400   Phlebitis Assessment 0 01/30/23 0400   Infiltration Assessment 0 01/30/23 0400   Dressing Status Clean, dry, & intact 01/30/23 0400   Dressing Type Tape;Transparent 01/30/23 0400   Hub Color/Line Status Pink;Patent; Flushed 01/30/23 0400   Action Taken Open ports on tubing capped 01/30/23 0400   Alcohol Cap Used Yes 01/30/23 0400        Opportunity for questions and clarification was provided.       Patient transported with:   O2 @ 2 liters

## 2023-01-30 NOTE — PROGRESS NOTES
[facility] at [address] Ayden Amin  W: [phone]  F: [phone]    Reason for Evaluation:   Estefani Martinez is a 69 y.o. male with PMHx of T2DM, HTN, and history of heavy EtOH use, now sober, who is seen in consultation at the request of Dr. Brian Fitzgerald for evaluation of suspected metastatic disease    Hematology/Oncology Treatment History:   None yet    History of Present Illness: Estefani Martinez is a pleasant 69 y.o. male who presents today for evaluation of suspected metastatic cancer. Patient presented with a  1-2 week history of neck pain and 1 day history LUE weakness. States he could  his hand but was unable to lift it above his shoulder. 23 MRI Cervical spine with osseous mets at C5 and C7 with epidural spread with canal compression. 23 CT CAP: multiple osseous mets, including L5 enhancing mass, T11 soft tissue mass, and left scapula soft tissue mass; scattered pulmonary nodules (largest 1 cm in posterior LLL), RP LAD, and possible hepatic metastases. 23: CT-guided bone biopsy of L5    Reports ongoing LUE>RUE weakness. No bowel or bladder incontinence  He has never had screening colonoscopy. Denies melena, hematochezia, dysphagia, night sweats, fevers, chills, recurrent infections. CEA 1.2  AFP pending    Social History:   Lives alone.  from wife Eneida. Has 5 children. Retired small. Former smoker (1 ppd x 40 years), quite summer . Former history of heavy alcohol use. Has had admission for alcohol withdrawal syndrome. Sober since early . Family History: Mother - , lymphoma  Sister - , cancer, unknown details  5 children    Review of systems was obtained and pertinent findings reviewed above. Past medical history, social history, family history, medications, and allergies are located in the electronic medical record.     Physical Exam:   Visit Vitals  BP (!) 145/91 (BP 1 Location: Left lower arm, BP Patient Position: At rest;Sitting)   Pulse 71   Temp 98.4 °F (36.9 °C)   Resp 16   Wt 294 lb 12.1 oz (133.7 kg)   SpO2 94%   BMI 37.84 kg/m²     ECOG PS: 2  General: no distress  Eyes: anicteric sclerae  HENT: oropharynx clear  Neck: supple  Lymphatic: no cervical supraclavicular adenopathy  Respiratory: CTAB, normal respiratory effort  CV: RRR, no peripheral edema  Ext: warm, well perfused, no edema  GI: soft, nontender, nondistended, no masses  Skin: no rashes, no ecchymoses, no petechiae  Neuro: grossly intact    Results:     Lab Results   Component Value Date/Time    WBC 9.7 01/29/2023 03:40 AM    HGB 14.7 01/29/2023 03:40 AM    HCT 45.8 01/29/2023 03:40 AM    PLATELET 495 (L) 92/50/6574 03:40 AM    MCV 90.9 01/29/2023 03:40 AM    ABS. NEUTROPHILS 8.1 (H) 01/29/2023 03:40 AM    Hemoglobin (POC) 16.3 01/14/2013 05:28 PM    Hematocrit (POC) 48 01/14/2013 05:28 PM     Lab Results   Component Value Date/Time    Sodium 133 (L) 01/30/2023 05:56 AM    Potassium 4.3 01/30/2023 05:56 AM    Chloride 102 01/30/2023 05:56 AM    CO2 26 01/30/2023 05:56 AM    Glucose 96 01/30/2023 05:56 AM    Glucose 85 03/22/2020 04:24 AM    BUN 32 (H) 01/30/2023 05:56 AM    Creatinine 0.94 01/30/2023 05:56 AM    GFR est AA >60 08/20/2021 05:29 AM    GFR est non-AA >60 08/20/2021 05:29 AM    Calcium 8.7 01/30/2023 05:56 AM    Sodium (POC) 137 01/28/2023 05:12 PM    Potassium (POC) 4.8 01/28/2023 05:12 PM    Chloride (POC) 104 01/28/2023 05:12 PM    Glucose (POC) 91 01/30/2023 11:21 AM    BUN (POC) 19 01/14/2013 05:28 PM    Creatinine (POC) 0.86 01/28/2023 05:12 PM    Calcium, ionized (POC) 1.18 01/28/2023 05:12 PM     Lab Results   Component Value Date/Time    Bilirubin, total 0.4 01/29/2023 03:40 AM    ALT (SGPT) 49 01/29/2023 03:40 AM    Alk.  phosphatase 146 (H) 01/29/2023 03:40 AM    Protein, total 8.4 (H) 01/29/2023 03:40 AM    Albumin 2.9 (L) 01/29/2023 03:40 AM    Globulin 5.5 (H) 01/29/2023 03:40 AM     Records from Epic reviewed and summarized above. Test results above have been reviewed. Imaging has been personally reviewed and discussed with patient. Assessment:     #) Suspected metastatic cancer:  Presented with LUE > RUE weakness  MRI C-spike with osseous lesion at C5 and C7 with epidural spread of tumor and canal compression  Staging CT CAP with scattered lung nodules (largest 1 cm), possible liver metastases, retroperitoneal adenopathy, multiple osseous lesions (C5 - C7 epidural mass, L5 soft tissue enhancing mass, T10/T11 soft tissue mass, left scapula soft tissue masS)    There is nonspecific asymmetric enhancement in prostate gland  Biopsy from L5 bone lesion pending     Discussed with patient presumed diagnosis of metastatic cancer. Unclear primary site. Path pending. Patient has significant smoking history. Unlikely to represent multiple myeloma/plasma cell dyscrasia given visceral disease, but would still obtain SPEP/DEBBY and [facility]  Has never had screening colonoscopy. CEA wnl. AFP pending. #) C5 and C7 Epidural Tumor with cervical myelopathy/radiculopathy:     #) Possible cirrhosis: noted on CT CAP. Possibly 2/2 EtOH use +/- fatty liver disease. #) History of tobacco use: 25+ pack year history, quit ~6 months ago    #) History of alcohol abuse: currently sober    Plan:     AFP pending  Check gammopathy eval, PSA  Case discussed with Dr. Sushma Denton in Neurosurgery - plan for OR tomorrow  Oncology will continue to follow along    Thank you for involving me in the care of this patient.     Signed By: Alex Craig MD

## 2023-01-30 NOTE — PROGRESS NOTES
Pt arrived to department for scheduled procedure with transport. Pt oriented to department. BR offered. Medical order reviewed and verified. All assessments completed. All questions discussed and reviewed with understanding. Discharge instruction review initiated.

## 2023-01-30 NOTE — PROGRESS NOTES
Bedside shift change report given to Tiarra Bonilla (oncoming nurse) by Durga Leonard (offgoing nurse). Report included the following information SBAR, Accordion, Recent Results, and Med Rec Status.

## 2023-01-31 ENCOUNTER — ANESTHESIA (OUTPATIENT)
Dept: SURGERY | Age: 68
DRG: 471 | End: 2023-01-31
Payer: MEDICARE

## 2023-01-31 ENCOUNTER — APPOINTMENT (OUTPATIENT)
Dept: GENERAL RADIOLOGY | Age: 68
End: 2023-01-31
Attending: NEUROLOGICAL SURGERY
Payer: MEDICARE

## 2023-01-31 LAB
AFP-TM SERPL-MCNC: ABNORMAL NG/ML (ref 0–8.4)
ANION GAP SERPL CALC-SCNC: 4 MMOL/L (ref 5–15)
BUN SERPL-MCNC: 27 MG/DL (ref 6–20)
BUN/CREAT SERPL: 36 (ref 12–20)
CALCIUM SERPL-MCNC: 8.6 MG/DL (ref 8.5–10.1)
CHLORIDE SERPL-SCNC: 100 MMOL/L (ref 97–108)
CO2 SERPL-SCNC: 28 MMOL/L (ref 21–32)
CREAT SERPL-MCNC: 0.76 MG/DL (ref 0.7–1.3)
ERYTHROCYTE [DISTWIDTH] IN BLOOD BY AUTOMATED COUNT: 15.3 % (ref 11.5–14.5)
GLUCOSE BLD STRIP.AUTO-MCNC: 103 MG/DL (ref 65–117)
GLUCOSE BLD STRIP.AUTO-MCNC: 131 MG/DL (ref 65–117)
GLUCOSE BLD STRIP.AUTO-MCNC: 140 MG/DL (ref 65–117)
GLUCOSE SERPL-MCNC: 112 MG/DL (ref 65–100)
HCT VFR BLD AUTO: 44.6 % (ref 36.6–50.3)
HGB BLD-MCNC: 14.7 G/DL (ref 12.1–17)
MCH RBC QN AUTO: 30.1 PG (ref 26–34)
MCHC RBC AUTO-ENTMCNC: 33 G/DL (ref 30–36.5)
MCV RBC AUTO: 91.2 FL (ref 80–99)
NRBC # BLD: 0 K/UL (ref 0–0.01)
NRBC BLD-RTO: 0 PER 100 WBC
PLATELET # BLD AUTO: 111 K/UL (ref 150–400)
PMV BLD AUTO: 11.2 FL (ref 8.9–12.9)
POTASSIUM SERPL-SCNC: 4.2 MMOL/L (ref 3.5–5.1)
RBC # BLD AUTO: 4.89 M/UL (ref 4.1–5.7)
SERVICE CMNT-IMP: ABNORMAL
SERVICE CMNT-IMP: ABNORMAL
SERVICE CMNT-IMP: NORMAL
SODIUM SERPL-SCNC: 132 MMOL/L (ref 136–145)
WBC # BLD AUTO: 6.2 K/UL (ref 4.1–11.1)

## 2023-01-31 PROCEDURE — C1821 INTERSPINOUS IMPLANT: HCPCS | Performed by: NEUROLOGICAL SURGERY

## 2023-01-31 PROCEDURE — 74011000258 HC RX REV CODE- 258: Performed by: NEUROLOGICAL SURGERY

## 2023-01-31 PROCEDURE — 87205 SMEAR GRAM STAIN: CPT

## 2023-01-31 PROCEDURE — 74011250637 HC RX REV CODE- 250/637: Performed by: NEUROLOGICAL SURGERY

## 2023-01-31 PROCEDURE — 74011636637 HC RX REV CODE- 636/637: Performed by: INTERNAL MEDICINE

## 2023-01-31 PROCEDURE — 76210000016 HC OR PH I REC 1 TO 1.5 HR: Performed by: NEUROLOGICAL SURGERY

## 2023-01-31 PROCEDURE — 77030026438 HC STYL ET INTUB CARD -A: Performed by: ANESTHESIOLOGY

## 2023-01-31 PROCEDURE — 77030003666 HC NDL SPINAL BD -A: Performed by: NEUROLOGICAL SURGERY

## 2023-01-31 PROCEDURE — 77030012406 HC DRN WND PENRS BARD -A: Performed by: NEUROLOGICAL SURGERY

## 2023-01-31 PROCEDURE — 74011000250 HC RX REV CODE- 250: Performed by: INTERNAL MEDICINE

## 2023-01-31 PROCEDURE — 74011250637 HC RX REV CODE- 250/637: Performed by: ANESTHESIOLOGY

## 2023-01-31 PROCEDURE — 0RB30ZZ EXCISION OF CERVICAL VERTEBRAL DISC, OPEN APPROACH: ICD-10-PCS | Performed by: NEUROLOGICAL SURGERY

## 2023-01-31 PROCEDURE — 88311 DECALCIFY TISSUE: CPT

## 2023-01-31 PROCEDURE — 77030008684 HC TU ET CUF COVD -B: Performed by: ANESTHESIOLOGY

## 2023-01-31 PROCEDURE — 76010000175 HC OR TIME 4 TO 4.5 HR INTENSV-TIER 1: Performed by: NEUROLOGICAL SURGERY

## 2023-01-31 PROCEDURE — 87116 MYCOBACTERIA CULTURE: CPT

## 2023-01-31 PROCEDURE — 74011000250 HC RX REV CODE- 250: Performed by: NEUROLOGICAL SURGERY

## 2023-01-31 PROCEDURE — 77030029099 HC BN WAX SSPC -A: Performed by: NEUROLOGICAL SURGERY

## 2023-01-31 PROCEDURE — 77030014650 HC SEAL MTRX FLOSEL BAXT -C: Performed by: NEUROLOGICAL SURGERY

## 2023-01-31 PROCEDURE — 36415 COLL VENOUS BLD VENIPUNCTURE: CPT

## 2023-01-31 PROCEDURE — 74011250636 HC RX REV CODE- 250/636: Performed by: INTERNAL MEDICINE

## 2023-01-31 PROCEDURE — 72020 X-RAY EXAM OF SPINE 1 VIEW: CPT

## 2023-01-31 PROCEDURE — 87176 TISSUE HOMOGENIZATION CULTR: CPT

## 2023-01-31 PROCEDURE — 65270000046 HC RM TELEMETRY

## 2023-01-31 PROCEDURE — 77030002933 HC SUT MCRYL J&J -A: Performed by: NEUROLOGICAL SURGERY

## 2023-01-31 PROCEDURE — 85027 COMPLETE CBC AUTOMATED: CPT

## 2023-01-31 PROCEDURE — 77030014006 HC SPNG HEMSTAT J&J -A: Performed by: NEUROLOGICAL SURGERY

## 2023-01-31 PROCEDURE — 77030003832 HC BIT DRL ATLNTS MEDT -B: Performed by: NEUROLOGICAL SURGERY

## 2023-01-31 PROCEDURE — 77030011267 HC ELECTRD BLD COVD -A: Performed by: NEUROLOGICAL SURGERY

## 2023-01-31 PROCEDURE — C1713 ANCHOR/SCREW BN/BN,TIS/BN: HCPCS | Performed by: NEUROLOGICAL SURGERY

## 2023-01-31 PROCEDURE — 74011250636 HC RX REV CODE- 250/636: Performed by: NEUROLOGICAL SURGERY

## 2023-01-31 PROCEDURE — 01N10ZZ RELEASE CERVICAL NERVE, OPEN APPROACH: ICD-10-PCS | Performed by: NEUROLOGICAL SURGERY

## 2023-01-31 PROCEDURE — 77030010507 HC ADH SKN DERMBND J&J -B: Performed by: NEUROLOGICAL SURGERY

## 2023-01-31 PROCEDURE — C1769 GUIDE WIRE: HCPCS | Performed by: NEUROLOGICAL SURGERY

## 2023-01-31 PROCEDURE — 88307 TISSUE EXAM BY PATHOLOGIST: CPT

## 2023-01-31 PROCEDURE — 74011250636 HC RX REV CODE- 250/636: Performed by: NURSE ANESTHETIST, CERTIFIED REGISTERED

## 2023-01-31 PROCEDURE — 80048 BASIC METABOLIC PNL TOTAL CA: CPT

## 2023-01-31 PROCEDURE — 82962 GLUCOSE BLOOD TEST: CPT

## 2023-01-31 PROCEDURE — 87075 CULTR BACTERIA EXCEPT BLOOD: CPT

## 2023-01-31 PROCEDURE — 74011250636 HC RX REV CODE- 250/636: Performed by: ANESTHESIOLOGY

## 2023-01-31 PROCEDURE — 77030040361 HC SLV COMPR DVT MDII -B: Performed by: NEUROLOGICAL SURGERY

## 2023-01-31 PROCEDURE — 88331 PATH CONSLTJ SURG 1 BLK 1SPC: CPT

## 2023-01-31 PROCEDURE — 74011250637 HC RX REV CODE- 250/637: Performed by: INTERNAL MEDICINE

## 2023-01-31 PROCEDURE — 77030018673: Performed by: NEUROLOGICAL SURGERY

## 2023-01-31 PROCEDURE — 77030005518 HC CATH URETH FOL 2W BARD -B: Performed by: NEUROLOGICAL SURGERY

## 2023-01-31 PROCEDURE — 74011000250 HC RX REV CODE- 250: Performed by: NURSE ANESTHETIST, CERTIFIED REGISTERED

## 2023-01-31 PROCEDURE — 77030040922 HC BLNKT HYPOTHRM STRY -A

## 2023-01-31 PROCEDURE — 87102 FUNGUS ISOLATION CULTURE: CPT

## 2023-01-31 PROCEDURE — 77030013079 HC BLNKT BAIR HGGR 3M -A: Performed by: ANESTHESIOLOGY

## 2023-01-31 PROCEDURE — 0RG20A0 FUSION OF 2 OR MORE CERVICAL VERTEBRAL JOINTS WITH INTERBODY FUSION DEVICE, ANTERIOR APPROACH, ANTERIOR COLUMN, OPEN APPROACH: ICD-10-PCS | Performed by: NEUROLOGICAL SURGERY

## 2023-01-31 PROCEDURE — 77030018796 HC PIN PLT HLD MEDT -B: Performed by: NEUROLOGICAL SURGERY

## 2023-01-31 PROCEDURE — 77030019927 HC TBNG IRR CYSTO BAXT -A: Performed by: NEUROLOGICAL SURGERY

## 2023-01-31 PROCEDURE — 76060000039 HC ANESTHESIA 4 TO 4.5 HR: Performed by: NEUROLOGICAL SURGERY

## 2023-01-31 PROCEDURE — 2709999900 HC NON-CHARGEABLE SUPPLY: Performed by: NEUROLOGICAL SURGERY

## 2023-01-31 PROCEDURE — 77030004391 HC BUR FLUT MEDT -C: Performed by: NEUROLOGICAL SURGERY

## 2023-01-31 PROCEDURE — 77030040830 HC CATH URETH FOL MDII -A: Performed by: NEUROLOGICAL SURGERY

## 2023-01-31 PROCEDURE — 77030005513 HC CATH URETH FOL11 MDII -B: Performed by: NEUROLOGICAL SURGERY

## 2023-01-31 DEVICE — SPACER 6276006 PSR STRUT 6 X 11 X 11MM
Type: IMPLANTABLE DEVICE | Site: SPINE CERVICAL | Status: FUNCTIONAL
Brand: VERTE-STACK® SPINAL SYSTEM

## 2023-01-31 DEVICE — SCREW 3120515 4.0 X 15 SELF DRILL VAR
Type: IMPLANTABLE DEVICE | Site: SPINE CERVICAL | Status: FUNCTIONAL
Brand: ATLANTIS® ANTERIOR CERVICAL PLATE SYSTEM

## 2023-01-31 DEVICE — DBM 006005 PROGENIX PLUS 5CC
Type: IMPLANTABLE DEVICE | Site: SPINE CERVICAL | Status: FUNCTIONAL
Brand: PROGENIX® PUTTY AND PROGENIX® PLUS

## 2023-01-31 RX ORDER — LIDOCAINE HYDROCHLORIDE 10 MG/ML
0.5 INJECTION, SOLUTION EPIDURAL; INFILTRATION; INTRACAUDAL; PERINEURAL AS NEEDED
Status: DISCONTINUED | OUTPATIENT
Start: 2023-01-31 | End: 2023-01-31 | Stop reason: HOSPADM

## 2023-01-31 RX ORDER — SODIUM CHLORIDE 0.9 % (FLUSH) 0.9 %
5-40 SYRINGE (ML) INJECTION AS NEEDED
Status: DISCONTINUED | OUTPATIENT
Start: 2023-01-31 | End: 2023-01-31 | Stop reason: HOSPADM

## 2023-01-31 RX ORDER — SODIUM CHLORIDE 0.9 % (FLUSH) 0.9 %
5-40 SYRINGE (ML) INJECTION EVERY 8 HOURS
Status: DISCONTINUED | OUTPATIENT
Start: 2023-01-31 | End: 2023-02-06

## 2023-01-31 RX ORDER — ROPIVACAINE HYDROCHLORIDE 5 MG/ML
30 INJECTION, SOLUTION EPIDURAL; INFILTRATION; PERINEURAL AS NEEDED
Status: DISCONTINUED | OUTPATIENT
Start: 2023-01-31 | End: 2023-01-31 | Stop reason: HOSPADM

## 2023-01-31 RX ORDER — FENTANYL CITRATE 50 UG/ML
INJECTION, SOLUTION INTRAMUSCULAR; INTRAVENOUS AS NEEDED
Status: DISCONTINUED | OUTPATIENT
Start: 2023-01-31 | End: 2023-01-31 | Stop reason: HOSPADM

## 2023-01-31 RX ORDER — ACETAMINOPHEN 325 MG/1
650 TABLET ORAL ONCE
Status: COMPLETED | OUTPATIENT
Start: 2023-01-31 | End: 2023-01-31

## 2023-01-31 RX ORDER — FENTANYL CITRATE 50 UG/ML
25 INJECTION, SOLUTION INTRAMUSCULAR; INTRAVENOUS
Status: COMPLETED | OUTPATIENT
Start: 2023-01-31 | End: 2023-01-31

## 2023-01-31 RX ORDER — ROCURONIUM BROMIDE 10 MG/ML
INJECTION, SOLUTION INTRAVENOUS AS NEEDED
Status: DISCONTINUED | OUTPATIENT
Start: 2023-01-31 | End: 2023-01-31 | Stop reason: HOSPADM

## 2023-01-31 RX ORDER — ONDANSETRON 2 MG/ML
4 INJECTION INTRAMUSCULAR; INTRAVENOUS AS NEEDED
Status: DISCONTINUED | OUTPATIENT
Start: 2023-01-31 | End: 2023-01-31 | Stop reason: HOSPADM

## 2023-01-31 RX ORDER — OXYCODONE HYDROCHLORIDE 5 MG/1
5 TABLET ORAL AS NEEDED
Status: DISCONTINUED | OUTPATIENT
Start: 2023-01-31 | End: 2023-01-31 | Stop reason: HOSPADM

## 2023-01-31 RX ORDER — ONDANSETRON 4 MG/1
4 TABLET, ORALLY DISINTEGRATING ORAL
Status: DISCONTINUED | OUTPATIENT
Start: 2023-01-31 | End: 2023-02-10 | Stop reason: HOSPADM

## 2023-01-31 RX ORDER — SODIUM CHLORIDE, SODIUM LACTATE, POTASSIUM CHLORIDE, CALCIUM CHLORIDE 600; 310; 30; 20 MG/100ML; MG/100ML; MG/100ML; MG/100ML
125 INJECTION, SOLUTION INTRAVENOUS CONTINUOUS
Status: DISCONTINUED | OUTPATIENT
Start: 2023-01-31 | End: 2023-01-31 | Stop reason: HOSPADM

## 2023-01-31 RX ORDER — ONDANSETRON 2 MG/ML
INJECTION INTRAMUSCULAR; INTRAVENOUS AS NEEDED
Status: DISCONTINUED | OUTPATIENT
Start: 2023-01-31 | End: 2023-01-31 | Stop reason: HOSPADM

## 2023-01-31 RX ORDER — AMOXICILLIN 250 MG
1 CAPSULE ORAL 2 TIMES DAILY
Status: DISCONTINUED | OUTPATIENT
Start: 2023-01-31 | End: 2023-02-10 | Stop reason: HOSPADM

## 2023-01-31 RX ORDER — LIDOCAINE HYDROCHLORIDE 20 MG/ML
INJECTION, SOLUTION EPIDURAL; INFILTRATION; INTRACAUDAL; PERINEURAL AS NEEDED
Status: DISCONTINUED | OUTPATIENT
Start: 2023-01-31 | End: 2023-01-31 | Stop reason: HOSPADM

## 2023-01-31 RX ORDER — OXYCODONE HYDROCHLORIDE 5 MG/1
5 TABLET ORAL
Status: DISCONTINUED | OUTPATIENT
Start: 2023-01-31 | End: 2023-02-09

## 2023-01-31 RX ORDER — OXYCODONE HYDROCHLORIDE 5 MG/1
10 TABLET ORAL
Status: DISCONTINUED | OUTPATIENT
Start: 2023-01-31 | End: 2023-02-09

## 2023-01-31 RX ORDER — MORPHINE SULFATE 2 MG/ML
2 INJECTION, SOLUTION INTRAMUSCULAR; INTRAVENOUS
Status: DISCONTINUED | OUTPATIENT
Start: 2023-01-31 | End: 2023-01-31 | Stop reason: HOSPADM

## 2023-01-31 RX ORDER — MIDAZOLAM HYDROCHLORIDE 1 MG/ML
1 INJECTION, SOLUTION INTRAMUSCULAR; INTRAVENOUS
Status: DISCONTINUED | OUTPATIENT
Start: 2023-01-31 | End: 2023-01-31 | Stop reason: HOSPADM

## 2023-01-31 RX ORDER — MIDAZOLAM HYDROCHLORIDE 1 MG/ML
1 INJECTION, SOLUTION INTRAMUSCULAR; INTRAVENOUS AS NEEDED
Status: DISCONTINUED | OUTPATIENT
Start: 2023-01-31 | End: 2023-01-31 | Stop reason: HOSPADM

## 2023-01-31 RX ORDER — DEXAMETHASONE SODIUM PHOSPHATE 4 MG/ML
INJECTION, SOLUTION INTRA-ARTICULAR; INTRALESIONAL; INTRAMUSCULAR; INTRAVENOUS; SOFT TISSUE AS NEEDED
Status: DISCONTINUED | OUTPATIENT
Start: 2023-01-31 | End: 2023-01-31 | Stop reason: HOSPADM

## 2023-01-31 RX ORDER — NEOSTIGMINE METHYLSULFATE 1 MG/ML
INJECTION, SOLUTION INTRAVENOUS AS NEEDED
Status: DISCONTINUED | OUTPATIENT
Start: 2023-01-31 | End: 2023-01-31 | Stop reason: HOSPADM

## 2023-01-31 RX ORDER — SODIUM CHLORIDE 0.9 % (FLUSH) 0.9 %
5-40 SYRINGE (ML) INJECTION AS NEEDED
Status: DISCONTINUED | OUTPATIENT
Start: 2023-01-31 | End: 2023-02-10 | Stop reason: HOSPADM

## 2023-01-31 RX ORDER — PROPOFOL 10 MG/ML
INJECTION, EMULSION INTRAVENOUS AS NEEDED
Status: DISCONTINUED | OUTPATIENT
Start: 2023-01-31 | End: 2023-01-31 | Stop reason: HOSPADM

## 2023-01-31 RX ORDER — GLYCOPYRROLATE 0.2 MG/ML
INJECTION INTRAMUSCULAR; INTRAVENOUS AS NEEDED
Status: DISCONTINUED | OUTPATIENT
Start: 2023-01-31 | End: 2023-01-31 | Stop reason: HOSPADM

## 2023-01-31 RX ORDER — SODIUM CHLORIDE, SODIUM LACTATE, POTASSIUM CHLORIDE, CALCIUM CHLORIDE 600; 310; 30; 20 MG/100ML; MG/100ML; MG/100ML; MG/100ML
INJECTION, SOLUTION INTRAVENOUS
Status: DISCONTINUED | OUTPATIENT
Start: 2023-01-31 | End: 2023-01-31 | Stop reason: HOSPADM

## 2023-01-31 RX ORDER — FENTANYL CITRATE 50 UG/ML
50 INJECTION, SOLUTION INTRAMUSCULAR; INTRAVENOUS AS NEEDED
Status: DISCONTINUED | OUTPATIENT
Start: 2023-01-31 | End: 2023-01-31 | Stop reason: HOSPADM

## 2023-01-31 RX ORDER — HYDROMORPHONE HYDROCHLORIDE 2 MG/ML
INJECTION, SOLUTION INTRAMUSCULAR; INTRAVENOUS; SUBCUTANEOUS AS NEEDED
Status: DISCONTINUED | OUTPATIENT
Start: 2023-01-31 | End: 2023-01-31 | Stop reason: HOSPADM

## 2023-01-31 RX ORDER — DEXTROSE, SODIUM CHLORIDE, SODIUM LACTATE, POTASSIUM CHLORIDE, AND CALCIUM CHLORIDE 5; .6; .31; .03; .02 G/100ML; G/100ML; G/100ML; G/100ML; G/100ML
125 INJECTION, SOLUTION INTRAVENOUS CONTINUOUS
Status: DISCONTINUED | OUTPATIENT
Start: 2023-01-31 | End: 2023-01-31 | Stop reason: HOSPADM

## 2023-01-31 RX ORDER — KETAMINE HCL IN 0.9 % NACL 50 MG/5 ML
SYRINGE (ML) INTRAVENOUS AS NEEDED
Status: DISCONTINUED | OUTPATIENT
Start: 2023-01-31 | End: 2023-01-31 | Stop reason: HOSPADM

## 2023-01-31 RX ORDER — SODIUM CHLORIDE 0.9 % (FLUSH) 0.9 %
5-40 SYRINGE (ML) INJECTION EVERY 8 HOURS
Status: DISCONTINUED | OUTPATIENT
Start: 2023-01-31 | End: 2023-01-31 | Stop reason: HOSPADM

## 2023-01-31 RX ORDER — POLYETHYLENE GLYCOL 3350 17 G/17G
17 POWDER, FOR SOLUTION ORAL DAILY
Status: DISCONTINUED | OUTPATIENT
Start: 2023-02-01 | End: 2023-02-04

## 2023-01-31 RX ORDER — NALOXONE HYDROCHLORIDE 0.4 MG/ML
0.4 INJECTION, SOLUTION INTRAMUSCULAR; INTRAVENOUS; SUBCUTANEOUS AS NEEDED
Status: DISCONTINUED | OUTPATIENT
Start: 2023-01-31 | End: 2023-02-10 | Stop reason: HOSPADM

## 2023-01-31 RX ORDER — SODIUM CHLORIDE 9 MG/ML
125 INJECTION, SOLUTION INTRAVENOUS CONTINUOUS
Status: DISPENSED | OUTPATIENT
Start: 2023-01-31 | End: 2023-02-01

## 2023-01-31 RX ORDER — DIPHENHYDRAMINE HYDROCHLORIDE 50 MG/ML
12.5 INJECTION, SOLUTION INTRAMUSCULAR; INTRAVENOUS AS NEEDED
Status: DISCONTINUED | OUTPATIENT
Start: 2023-01-31 | End: 2023-01-31 | Stop reason: HOSPADM

## 2023-01-31 RX ORDER — ACETAMINOPHEN 325 MG/1
650 TABLET ORAL EVERY 6 HOURS
Status: DISCONTINUED | OUTPATIENT
Start: 2023-01-31 | End: 2023-02-10 | Stop reason: HOSPADM

## 2023-01-31 RX ADMIN — FENTANYL CITRATE 25 MCG: 50 INJECTION, SOLUTION INTRAMUSCULAR; INTRAVENOUS at 17:35

## 2023-01-31 RX ADMIN — PHENYLEPHRINE HYDROCHLORIDE 20 MCG/MIN: 10 INJECTION INTRAVENOUS at 13:56

## 2023-01-31 RX ADMIN — ROCURONIUM BROMIDE 20 MG: 10 SOLUTION INTRAVENOUS at 15:40

## 2023-01-31 RX ADMIN — GLYCOPYRROLATE 0.4 MG: 0.2 INJECTION INTRAMUSCULAR; INTRAVENOUS at 16:38

## 2023-01-31 RX ADMIN — TRAZODONE HYDROCHLORIDE 50 MG: 50 TABLET ORAL at 21:53

## 2023-01-31 RX ADMIN — FENTANYL CITRATE 25 MCG: 50 INJECTION, SOLUTION INTRAMUSCULAR; INTRAVENOUS at 17:30

## 2023-01-31 RX ADMIN — Medication 50 MG: at 14:38

## 2023-01-31 RX ADMIN — ACETAMINOPHEN 650 MG: 325 TABLET ORAL at 11:43

## 2023-01-31 RX ADMIN — PROPOFOL 60 MG: 10 INJECTION, EMULSION INTRAVENOUS at 13:15

## 2023-01-31 RX ADMIN — LIDOCAINE HYDROCHLORIDE 20 MG: 20 INJECTION, SOLUTION EPIDURAL; INFILTRATION; INTRACAUDAL; PERINEURAL at 13:15

## 2023-01-31 RX ADMIN — CEFAZOLIN 3 G: 10 INJECTION, POWDER, FOR SOLUTION INTRAVENOUS at 21:53

## 2023-01-31 RX ADMIN — Medication 3 G: at 13:46

## 2023-01-31 RX ADMIN — ACETAMINOPHEN 650 MG: 325 TABLET ORAL at 18:52

## 2023-01-31 RX ADMIN — HYDROMORPHONE HYDROCHLORIDE 1 MG: 2 INJECTION, SOLUTION INTRAMUSCULAR; INTRAVENOUS; SUBCUTANEOUS at 16:56

## 2023-01-31 RX ADMIN — HYDROMORPHONE HYDROCHLORIDE 0.6 MG: 2 INJECTION, SOLUTION INTRAMUSCULAR; INTRAVENOUS; SUBCUTANEOUS at 14:43

## 2023-01-31 RX ADMIN — NEOSTIGMINE METHYLSULFATE 2 MG: 1 INJECTION, SOLUTION INTRAVENOUS at 16:38

## 2023-01-31 RX ADMIN — QUETIAPINE FUMARATE 200 MG: 100 TABLET ORAL at 21:53

## 2023-01-31 RX ADMIN — ACETAMINOPHEN 650 MG: 325 TABLET ORAL at 18:50

## 2023-01-31 RX ADMIN — GABAPENTIN 600 MG: 600 TABLET, FILM COATED ORAL at 21:53

## 2023-01-31 RX ADMIN — ONDANSETRON HYDROCHLORIDE 4 MG: 2 INJECTION, SOLUTION INTRAMUSCULAR; INTRAVENOUS at 16:33

## 2023-01-31 RX ADMIN — HYDROMORPHONE HYDROCHLORIDE 1.5 MG: 2 INJECTION, SOLUTION INTRAMUSCULAR; INTRAVENOUS; SUBCUTANEOUS at 08:26

## 2023-01-31 RX ADMIN — Medication 2 UNITS: at 17:34

## 2023-01-31 RX ADMIN — OXYCODONE HYDROCHLORIDE 10 MG: 5 TABLET ORAL at 18:50

## 2023-01-31 RX ADMIN — SODIUM CHLORIDE, POTASSIUM CHLORIDE, SODIUM LACTATE AND CALCIUM CHLORIDE: 600; 310; 30; 20 INJECTION, SOLUTION INTRAVENOUS at 13:02

## 2023-01-31 RX ADMIN — SERTRALINE HYDROCHLORIDE 100 MG: 50 TABLET ORAL at 08:56

## 2023-01-31 RX ADMIN — FENTANYL CITRATE 25 MCG: 50 INJECTION, SOLUTION INTRAMUSCULAR; INTRAVENOUS at 17:45

## 2023-01-31 RX ADMIN — SENNOSIDES AND DOCUSATE SODIUM 1 TABLET: 50; 8.6 TABLET ORAL at 18:50

## 2023-01-31 RX ADMIN — PROPOFOL 140 MG: 10 INJECTION, EMULSION INTRAVENOUS at 13:18

## 2023-01-31 RX ADMIN — GLYCOPYRROLATE 0.2 MG: 0.2 INJECTION INTRAMUSCULAR; INTRAVENOUS at 13:36

## 2023-01-31 RX ADMIN — FENTANYL CITRATE 25 MCG: 50 INJECTION, SOLUTION INTRAMUSCULAR; INTRAVENOUS at 17:40

## 2023-01-31 RX ADMIN — ROCURONIUM BROMIDE 50 MG: 10 SOLUTION INTRAVENOUS at 13:18

## 2023-01-31 RX ADMIN — HYDROMORPHONE HYDROCHLORIDE 0.4 MG: 2 INJECTION, SOLUTION INTRAMUSCULAR; INTRAVENOUS; SUBCUTANEOUS at 16:45

## 2023-01-31 RX ADMIN — SODIUM CHLORIDE 125 ML/HR: 9 INJECTION, SOLUTION INTRAVENOUS at 17:31

## 2023-01-31 RX ADMIN — DEXAMETHASONE SODIUM PHOSPHATE 8 MG: 4 INJECTION, SOLUTION INTRAMUSCULAR; INTRAVENOUS at 14:06

## 2023-01-31 RX ADMIN — SODIUM CHLORIDE, SODIUM LACTATE, POTASSIUM CHLORIDE, CALCIUM CHLORIDE: 600; 310; 30; 20 INJECTION, SOLUTION INTRAVENOUS at 13:47

## 2023-01-31 RX ADMIN — HYDROMORPHONE HYDROCHLORIDE 1.5 MG: 2 INJECTION, SOLUTION INTRAMUSCULAR; INTRAVENOUS; SUBCUTANEOUS at 02:41

## 2023-01-31 RX ADMIN — GABAPENTIN 600 MG: 600 TABLET, FILM COATED ORAL at 08:56

## 2023-01-31 RX ADMIN — OXYCODONE HYDROCHLORIDE 10 MG: 5 TABLET ORAL at 21:53

## 2023-01-31 RX ADMIN — LIDOCAINE HYDROCHLORIDE 80 MG: 20 INJECTION, SOLUTION EPIDURAL; INFILTRATION; INTRACAUDAL; PERINEURAL at 13:18

## 2023-01-31 RX ADMIN — ROCURONIUM BROMIDE 50 MG: 10 SOLUTION INTRAVENOUS at 14:02

## 2023-01-31 RX ADMIN — FENTANYL CITRATE 100 MCG: 50 INJECTION, SOLUTION INTRAMUSCULAR; INTRAVENOUS at 13:14

## 2023-01-31 RX ADMIN — GLYCOPYRROLATE 0.2 MG: 0.2 INJECTION INTRAMUSCULAR; INTRAVENOUS at 13:40

## 2023-01-31 RX ADMIN — SODIUM CHLORIDE, PRESERVATIVE FREE 10 ML: 5 INJECTION INTRAVENOUS at 06:20

## 2023-01-31 RX ADMIN — SODIUM CHLORIDE, POTASSIUM CHLORIDE, SODIUM LACTATE AND CALCIUM CHLORIDE 125 ML/HR: 600; 310; 30; 20 INJECTION, SOLUTION INTRAVENOUS at 11:41

## 2023-01-31 NOTE — ANESTHESIA PREPROCEDURE EVALUATION
Anesthetic History   No history of anesthetic complications            Review of Systems / Medical History  Patient summary reviewed, nursing notes reviewed and pertinent labs reviewed    Pulmonary  Within defined limits                 Neuro/Psych         Psychiatric history     Cardiovascular    Hypertension                   GI/Hepatic/Renal  Within defined limits              Endo/Other    Diabetes    Arthritis     Other Findings                     Anesthetic Plan    ASA: 2  Anesthesia type: general          Induction: Intravenous  Anesthetic plan and risks discussed with: Patient

## 2023-01-31 NOTE — PERIOP NOTES
FLOSEAL 10mL WAS GIVEN TO THE STERILE FIELD TO BE USED BY MD  REF: [id number]  LOT: [id number]  EXP: 11/03/2024

## 2023-01-31 NOTE — PROGRESS NOTES
Problem: Falls - Risk of  Goal: *Absence of Falls  Description: Document Kaveh Comment Fall Risk and appropriate interventions in the flowsheet. Outcome: Progressing Towards Goal  Note: Fall Risk Interventions:                                Problem: Pressure Injury - Risk of  Goal: *Prevention of pressure injury  Description: Document Lit Scale and appropriate interventions in the flowsheet. Outcome: Progressing Towards Goal  Note: Pressure Injury Interventions:             Activity Interventions: Increase time out of bed, Pressure redistribution bed/mattress(bed type), PT/OT evaluation    Mobility Interventions: Pressure redistribution bed/mattress (bed type), HOB 30 degrees or less    Nutrition Interventions: Document food/fluid/supplement intake

## 2023-01-31 NOTE — PROGRESS NOTES
Today for an intraoperative consult help Mcfaddne catheter placement prior to his spinal surgery. Per report nursing staff had tried multiple times without success. On my arrival to the OR patient is under anesthesia, and waiting for surgery to start. There is blood at the tip of penis. There were no abdominal incisions. On review of his history and he does have history of TURP. I tried to place a 12 [place] coudé tip catheter without success. Under sterile condition I passed a 16 [place] flexible cystoscope. There was evidence of false passage and evidence of previous TURP. There was some degree of bladder neck contracture but it was passable with the cystoscope. Once inside the bladder I passed a sensor wire in the bladder. Over this I tried to place a 12 [place] Mil[place] tip catheter however I could not advance through the contracture. I changed out to a Super Stiff guidewire over a ureteral catheter. Once again I did not succeed in passing through the contracture. We did not have a 15 Western Mil[place]ro [place] catheter. While waiting to trim a 14 [place] Mil[place]ro regular catheter I tried 1 more time coudé tip 14 [place] catheter alongside the wire and I was able to get into the bladder without difficulty with return of clear urine. Wire was removed. Mcfadden balloon was inflated. Recommend leaving catheter for 3 to 5 days. Perioperative antibiotic. He will require urology follow-up after discharge.

## 2023-01-31 NOTE — PERIOP NOTES
After 4 unsuccessful attempts to place mills catheter using a variety of catheters including 12F coude, 18 F coude, 86J silicone, and 14 F silicone, blood was noted upon withdrawal of catheters. As a possible stricture was suspected, call was initiated to urology who successfully used a cyctoscope and variety of wires to insert 14F mills which drained blood-tinged urine.

## 2023-01-31 NOTE — ANESTHESIA POSTPROCEDURE EVALUATION
Procedure(s):  CERVICAL C5 CORPECTOMY, ARTHRODESIS WITH ALLOGRAFT, AUTOGRAFT AND ANTERIOR PLATE S2-K5  CYSTOSCOPY. general    Anesthesia Post Evaluation        Patient location during evaluation: PACU  Patient participation: complete - patient participated  Level of consciousness: awake and alert  Pain management: adequate  Airway patency: patent  Anesthetic complications: no  Cardiovascular status: acceptable  Respiratory status: acceptable  Hydration status: acceptable  Comments: I have seen and evaluated the patient and is ready for discharge. Juan Moreno MD    Post anesthesia nausea and vomiting:  none      INITIAL Post-op Vital signs:   Vitals Value Taken Time   /82 01/31/23 1720   Temp 36.5 °C (97.7 °F) 01/31/23 1707   Pulse 76 01/31/23 1724   Resp 17 01/31/23 1724   SpO2 95 % 01/31/23 1724   Vitals shown include unvalidated device data.

## 2023-01-31 NOTE — OP NOTES
[address]   OPERATIVE REPORT    Name:  Bing De La Torre  MR#:  [id number]  :  1955  ACCOUNT #:  [de-identified]  DATE OF SERVICE:  2023    PREOPERATIVE DIAGNOSIS:  C5 fracture secondary to metastatic tumor involvement with radiculopathy, early signs of myelopathy. POSTOPERATIVE DIAGNOSIS:  C5 fracture secondary to metastatic tumor involvement with radiculopathy, early signs of myelopathy. PROCEDURE PERFORMED:  C5 corpectomy, arthrodesis with 22 mm Cornerstone cage and DBM and anterior cervical plate 40 mm Atlantis Elite, and four 15 mm screws. SURGEON:  Felicita Jaime MD    ASSISTANT:  [facility]    ANESTHESIA:  General endotracheal anesthesia. COMPLICATIONS:  None. SPECIMENS REMOVED:  C45 and C56 disc, C5 vertebral body/tumor. IMPLANTS:  22 mm Cornerstone cage and 40 mm long Atlantis Elite plate and four 15 mm long screws, DBM     ESTIMATED BLOOD LOSS:  75 mL. PROCEDURE:  Review of imaging studies revealed destruction of the C5 vertebral body in this patient who had significant weakness in the biceps and was developing other signs and symptoms in the opposite arm with unknown primary diagnosis. After discussing risks, benefits, and alternatives of surgery with him, he agreed to proceed and he was taken to the operating room where he was induced under general endotracheal anesthesia, placed on the operating table in the supine position. Shoulder roll placed beneath both shoulders, but before that was done, a Mcfadden was placed. There was some difficulty because of the history of a TUR, so the urologist came in and placed the Mcfadden via cystoscope. A time-out was performed to identify the correct patient and procedure. Appropriate antibiotics administered and sequential stockings applied for DVT prophylaxis.   After sterile scrub, prep, and drape, a horizontal incision was made extending from the midline to over the anterior border of sternocleidomastoid at the level of the thyroid cartilage and avascular plane was developed down to the anterior cervical spine. The C5 vertebral body was readily evident. There was a small amount of hemorrhage within the longus colli muscles. The fracture was visible. As I pressed on it lightly with the suction, tumor tissue began to exude out from the area of fracture. I sent a sample for frozen section and spoke to the pathologist who confirmed that there was indeed tumor tissue of unknown origin. Final pathology pending. Initially, I was not sure if this was an abscess or osteomyelitis, so I sent it for culture as well. I placed two self-retaining retractors into the edges of the longus colli muscles from the [facility] system. They were blunted instead of with teeth because I did not want to disrupt the fracture any further. An x-ray was obtained to confirm position when I placed a blunted needle in the C4-5 disk space. Under loupe magnification, I removed the disks first at C4-5 and then at C5-6 and sent them as a separate specimen. I then proceeded to remove the bone after getting down to the posterior longitudinal ligament cephalad and caudad, and using a 1 and 2 mm Kerrison rongeur, I proceeded to remove the C5 vertebral body being careful not to take bone too far laterally, particularly on the left side where it was clear that the left lateral portion of the vertebral body was disrupted and I was careful not to take too much bone laterally for concern of injuring the vertebral artery which was not visible. Once the vertebral body was removed, we were looking at the posterior longitudinal ligament and dura. There was some steady oozing from the vertebral body as I was removing the bone, but once the bone came out, using some FloSeal and Gelfoam bleeding was easily controlled and stopped.   I made sure the C5 and C6 nerve roots were well decompressed as they exited laterally, particularly on the left side where the patient was exhibiting most of his neurologic deficit in the arm. I scraped the edges of the vertebral bodies with a curette in the usual fashion to prepare the surface for application of the PEEK cage. I then used a caliper to measure the height of the defect that had been created. It was 22 mm in height. I placed a 22 mm Cornerstone PSR cage filled with DBM from the [employer] system without difficulty and countersunk it 1-2 mm which seemed quite tight within the space, I then placed a 40 mm Atlantis Elite plate across the top securing it to the C4 and C6 vertebral bodies respectively with two 15 mm screws each and then locked down the screws with the locking screw in the center. An x-ray was obtained to confirm position. The area was irrigated copiously with normal saline. The self-retaining retractors were removed. The area was inspected and a drain was placed and brought out through a separate stab incision. The platysma reapproximated with 3-0 interrupted inverted Vicryl sutures and the subcuticular layer with  running 4-0 Monocryl and Dermabond applied to the skin edges. The needle, sponge, and instrument counts were correct at the end of the procedure.       MD RICK [id number]  D:  01/31/2023 16:58  T:  01/31/2023 18:25  JOB #:  [id number]  CC:  Vicky Fraga MD

## 2023-01-31 NOTE — PROGRESS NOTES
1930 Bedside and Verbal shift change report given to Parmjit (oncoming nurse) by Erin Montanez (offgoing nurse). Report included the following information SBAR, Kardex, and MAR.     5731 Patient more difficult to rouse (loud verbal/light touch), responded AO4 and no neuro changes. Will withhold pain medication until patient more alert and requesting. 0730 During bedside report patient complained that he is \"losing mobility\" in his upper R arm. He demonstrated less mobility at shoulder but reported an increase in pain that may be contributing to limited movement. The patient requested more pain medication at this time. 0730 Bedside and Verbal shift change report given to Gaby Rodriguez (oncoming nurse) by Lacie Hart (offgoing nurse). Report included the following information SBAR, Kardex, and MAR. Problem: Falls - Risk of  Goal: *Absence of Falls  Description: Document Anurag Ledesma Fall Risk and appropriate interventions in the flowsheet.   Outcome: Progressing Towards Goal  Note: Fall Risk Interventions:

## 2023-01-31 NOTE — PROGRESS NOTES
Stable weakness in left arm, pt now feels that he is getting symptomatic in right arm as well.  Discussed case with colleagues and we all feel best treatment is corpectomy at C 5 and fusion with graft and plate to stabilize  procedure , risks and benefits explained and he agrees to proceed  hopefully tissue diagnosis can be made at same time

## 2023-01-31 NOTE — PROGRESS NOTES
Problem: Falls - Risk of  Goal: *Absence of Falls  Description: Document Richard Boothe Fall Risk and appropriate interventions in the flowsheet. Outcome: Progressing Towards Goal  Note: Fall Risk Interventions:                                Problem: Pressure Injury - Risk of  Goal: *Prevention of pressure injury  Description: Document Lit Scale and appropriate interventions in the flowsheet. Outcome: Progressing Towards Goal  Note: Pressure Injury Interventions:             Activity Interventions: Chair cushion, Increase time out of bed, PT/OT evaluation, Pressure redistribution bed/mattress(bed type)    Mobility Interventions: Chair cushion, HOB 30 degrees or less, Pressure redistribution bed/mattress (bed type), PT/OT evaluation    Nutrition Interventions: Document food/fluid/supplement intake, Offer support with meals,snacks and hydration                     Problem: Discharge Planning  Goal: *Discharge to safe environment  Outcome: Progressing Towards Goal  Goal: *Knowledge of discharge instructions  Outcome: Progressing Towards Goal     Problem: Patient Education: Go to Patient Education Activity  Goal: Patient/Family Education  Outcome: Progressing Towards Goal

## 2023-01-31 NOTE — PROGRESS NOTES
1810:TRANSFER - OUT REPORT:    Verbal report given to [address] RN(name) on [facility] Side  being transferred to [facility](unit) for routine post - op       Report consisted of patients Situation, Background, Assessment and   Recommendations(SBAR). Time Pre op antibiotic given:1346  Anesthesia Stop time: 4304    Information from the following report(s) SBAR, Kardex, OR Summary, Intake/Output, MAR, and Recent Results was reviewed with the receiving nurse. Opportunity for questions and clarification was provided. Is the patient on 02? YES       L/Min 2          Is the patient on a monitor? NO    Is the nurse transporting with the patient? NO    Surgical Waiting Area notified of patient's transfer from PACU?  YES      The following personal items collected during your admission accompanied patient upon transfer:   Dental Appliance:    Vision: Visual Aid: Glasses, At bedside  Hearing Aid:    Jewelry:    Clothing:    Other Valuables:    Valuables sent to safe:

## 2023-01-31 NOTE — PROGRESS NOTES
01/31/23 1459   Family Communication   Family Update Message Procedure started   Delivery Origin Nurse    Relationship to Patient Daughter    Phone Number [facility] [phone]   Family/Significant Other Update Called

## 2023-02-01 LAB
ANION GAP SERPL CALC-SCNC: 4 MMOL/L (ref 5–15)
BUN SERPL-MCNC: 23 MG/DL (ref 6–20)
BUN/CREAT SERPL: 28 (ref 12–20)
CALCIUM SERPL-MCNC: 8.9 MG/DL (ref 8.5–10.1)
CHLORIDE SERPL-SCNC: 102 MMOL/L (ref 97–108)
CO2 SERPL-SCNC: 27 MMOL/L (ref 21–32)
CREAT SERPL-MCNC: 0.81 MG/DL (ref 0.7–1.3)
ERYTHROCYTE [DISTWIDTH] IN BLOOD BY AUTOMATED COUNT: 15.1 % (ref 11.5–14.5)
GLUCOSE BLD STRIP.AUTO-MCNC: 106 MG/DL (ref 65–117)
GLUCOSE BLD STRIP.AUTO-MCNC: 111 MG/DL (ref 65–117)
GLUCOSE BLD STRIP.AUTO-MCNC: 130 MG/DL (ref 65–117)
GLUCOSE BLD STRIP.AUTO-MCNC: 136 MG/DL (ref 65–117)
GLUCOSE SERPL-MCNC: 144 MG/DL (ref 65–100)
HCT VFR BLD AUTO: 45.8 % (ref 36.6–50.3)
HGB BLD-MCNC: 14.7 G/DL (ref 12.1–17)
MCH RBC QN AUTO: 29.4 PG (ref 26–34)
MCHC RBC AUTO-ENTMCNC: 32.1 G/DL (ref 30–36.5)
MCV RBC AUTO: 91.6 FL (ref 80–99)
NRBC # BLD: 0 K/UL (ref 0–0.01)
NRBC BLD-RTO: 0 PER 100 WBC
PLATELET # BLD AUTO: 117 K/UL (ref 150–400)
PMV BLD AUTO: 11 FL (ref 8.9–12.9)
POTASSIUM SERPL-SCNC: 4.4 MMOL/L (ref 3.5–5.1)
RBC # BLD AUTO: 5 M/UL (ref 4.1–5.7)
SERVICE CMNT-IMP: ABNORMAL
SERVICE CMNT-IMP: ABNORMAL
SERVICE CMNT-IMP: NORMAL
SERVICE CMNT-IMP: NORMAL
SODIUM SERPL-SCNC: 133 MMOL/L (ref 136–145)
WBC # BLD AUTO: 8.3 K/UL (ref 4.1–11.1)

## 2023-02-01 PROCEDURE — 80048 BASIC METABOLIC PNL TOTAL CA: CPT

## 2023-02-01 PROCEDURE — 65270000046 HC RM TELEMETRY

## 2023-02-01 PROCEDURE — 97116 GAIT TRAINING THERAPY: CPT

## 2023-02-01 PROCEDURE — 36415 COLL VENOUS BLD VENIPUNCTURE: CPT

## 2023-02-01 PROCEDURE — 74011250637 HC RX REV CODE- 250/637: Performed by: INTERNAL MEDICINE

## 2023-02-01 PROCEDURE — 74011250636 HC RX REV CODE- 250/636: Performed by: NEUROLOGICAL SURGERY

## 2023-02-01 PROCEDURE — 82962 GLUCOSE BLOOD TEST: CPT

## 2023-02-01 PROCEDURE — 97165 OT EVAL LOW COMPLEX 30 MIN: CPT

## 2023-02-01 PROCEDURE — 74011250637 HC RX REV CODE- 250/637: Performed by: NEUROLOGICAL SURGERY

## 2023-02-01 PROCEDURE — 74011000250 HC RX REV CODE- 250: Performed by: INTERNAL MEDICINE

## 2023-02-01 PROCEDURE — 97161 PT EVAL LOW COMPLEX 20 MIN: CPT

## 2023-02-01 PROCEDURE — 85027 COMPLETE CBC AUTOMATED: CPT

## 2023-02-01 PROCEDURE — 74011000258 HC RX REV CODE- 258: Performed by: NEUROLOGICAL SURGERY

## 2023-02-01 PROCEDURE — 97535 SELF CARE MNGMENT TRAINING: CPT

## 2023-02-01 RX ADMIN — OXYCODONE HYDROCHLORIDE 10 MG: 5 TABLET ORAL at 22:18

## 2023-02-01 RX ADMIN — QUETIAPINE FUMARATE 200 MG: 100 TABLET ORAL at 22:18

## 2023-02-01 RX ADMIN — OXYCODONE HYDROCHLORIDE 10 MG: 5 TABLET ORAL at 12:04

## 2023-02-01 RX ADMIN — OXYCODONE HYDROCHLORIDE 10 MG: 5 TABLET ORAL at 15:18

## 2023-02-01 RX ADMIN — GABAPENTIN 600 MG: 600 TABLET, FILM COATED ORAL at 15:17

## 2023-02-01 RX ADMIN — GABAPENTIN 600 MG: 600 TABLET, FILM COATED ORAL at 08:41

## 2023-02-01 RX ADMIN — OXYCODONE HYDROCHLORIDE 10 MG: 5 TABLET ORAL at 18:45

## 2023-02-01 RX ADMIN — OXYCODONE HYDROCHLORIDE 10 MG: 5 TABLET ORAL at 08:46

## 2023-02-01 RX ADMIN — ACETAMINOPHEN 650 MG: 325 TABLET ORAL at 04:58

## 2023-02-01 RX ADMIN — SENNOSIDES AND DOCUSATE SODIUM 1 TABLET: 50; 8.6 TABLET ORAL at 08:41

## 2023-02-01 RX ADMIN — GABAPENTIN 600 MG: 600 TABLET, FILM COATED ORAL at 22:18

## 2023-02-01 RX ADMIN — SENNOSIDES AND DOCUSATE SODIUM 1 TABLET: 50; 8.6 TABLET ORAL at 17:03

## 2023-02-01 RX ADMIN — OXYCODONE HYDROCHLORIDE 10 MG: 5 TABLET ORAL at 04:58

## 2023-02-01 RX ADMIN — ACETAMINOPHEN 650 MG: 325 TABLET ORAL at 18:42

## 2023-02-01 RX ADMIN — POLYETHYLENE GLYCOL 3350 17 G: 17 POWDER, FOR SOLUTION ORAL at 08:41

## 2023-02-01 RX ADMIN — ACETAMINOPHEN 650 MG: 325 TABLET ORAL at 22:18

## 2023-02-01 RX ADMIN — SERTRALINE HYDROCHLORIDE 100 MG: 50 TABLET ORAL at 08:41

## 2023-02-01 RX ADMIN — TRAZODONE HYDROCHLORIDE 50 MG: 50 TABLET ORAL at 22:18

## 2023-02-01 RX ADMIN — CEFAZOLIN 3 G: 10 INJECTION, POWDER, FOR SOLUTION INTRAVENOUS at 04:58

## 2023-02-01 RX ADMIN — ACETAMINOPHEN 650 MG: 325 TABLET ORAL at 12:04

## 2023-02-01 NOTE — PROGRESS NOTES
[address] [facility] Adult  Hospitalist Group                                                                                          Hospitalist Progress Note  Rocky Kinney MD  Answering service: [phone] from in house phone        Date of Service:  2023  NAME:  Chino Merlos  :  1955  MRN:  [id number]       Admission Summary: This is a 68-year-old man with past medical history significant for type 2 diabetes, anxiety/depression, and hypertension; presented at [facility] emergency room with neck pain. This has been going on for a couple of weeks. The pain is located at the back of the neck. The patient described the pain as severe 10/10 in severity with no history of trauma to the neck. No known aggravating or relieving factors. The pain is dull ache. He was seen by the orthopedic surgeon. The patient was told that he probably has a pinched nerve and outpatient MRI was ordered. The appointment for the outpatient MRI is not until this coming Monday. Because of persistent pain, the patient came to the emergency room. The pain has now become associated with inability to lift the left arm off the bed without difficulties, but the patient denies pain in the left arm. Just some weakness. The patient has had a CT scan of the neck done which shows significant degenerative changes. When the patient arrived at the emergency room, CT scan of the head was obtained. This was negative for acute pathology. The neurosurgeon on-call at [facility] was consulted by the emergency room physician and decision was made to transfer the patient to [facility] for MRI of the neck and also for further evaluation by the neurosurgeon. The hospitalist service was asked to directly admit the patient from [facility] emergency room to [facility] for that purpose.       Interval history / Subjective:   Patient is seen and examined at bedside this AM. He underwent cervical tumor resection, tolerated well. Right arm feels better but he still has left arm weakness. Discussed with nursing, cm     Spoke with daughter Milad Capone on phone and updated patient's status - tolerated the surgery well, worked with PT - [place] VAR[place]A. Pathology inconclusive - final read still pending - need outpt f/u. Assessment & Plan:     Cervical cord tumor with cord compression s/p C5 corpectomy, arthrodesis with cage and anterior cervical plate 0/67.   - MRI c spine: Metastatic disease to the cervical spine at C5 and C7 with extraosseous  spread of tumor. At C5 there is collapse of the vertebral body and epidural spread of tumor resulting in significant canal compression. The epidural spread of tumor extends cranially on the left to C4 and fills both C4-5 and C5-6 foramen. The epidural tumor at both C5 and C7 distorts the adjacent vertebral arteries  -  appreciate NSGY input   - s/p C5 corpectomy, arthrodesis with 22 mm Cornerstone cage and DBM and anterior cervical plate 40 mm Atlantis Elite, and four 15 mm screws.  - PT/OT   - pain meds      Metastatic disease with unknown primary   - CT: Osseous metastatic disease. The L5 spinous process mass is amenable to  nonemergent CT-guided percutaneous biopsy if clinically indicated. Pulmonary nodules. Largest nodule is 1 cm in the posterior basilar left lower lobe. Metastatic disease is most likely. Retroperitoneal lymphadenopathy is likely due to metastatic disease. Possible hepatic metastatic disease  - MRI brain: No evidence of intracranial metastatic disease.  - oncology on board   - CT guided L5 biopsy done on 1/30 . Path: Poorly differentiated malignancy, pending immunohistochemical stains   - Pathologist confirmed on frozen section that vertebral body was replaced by poorly differentiated tumor cells but final path identification pending.      Hematuria - likely traumatic   - pt had difficulty to place mills in OR  - appreciate urology input  - on mills     Hyponatremia:  mild  - most likely due to volume depletion vs SIADH  - s/p IVF  - will monitor     Type 2 diabetes: A1c 5.9. C/w ISS    Thrombocytopenia: mild. -  asymptomatic.  will monitor     Hx alcohol abuse   - liver cirrhosis on imaging     Anxiety/depression:  c/w seroquel, zoloft, trazodone   Obesity BMI of 37.84. Diet and exercise advised. .     Code status: Full. Patient wants his daughter Hubert Jerez is first [place], daughter Brenda Mccoy is second mpoa. He is  with his wife Eneida. AMD needs to be filled and he is agreeable -  consulted. Prophylaxis: Lovenox on hold   Care Plan discussed with: pt, RN  Anticipated Disposition:  TBD. IPR when ready   Inpatient  Cardiac monitoring: Telemetry,     Hospital Problems  Date Reviewed: 1/29/2023            Codes Class Noted POA    * (Principal) Cervical myelopathy with cervical radiculopathy (HCC) ICD-10-CM: G95.9, M54.12  ICD-9-CM: 721.1  1/29/2023 Yes        Acute myelopathy (HCC) ICD-10-CM: G95.9  ICD-9-CM: 336.9  1/29/2023 Unknown         Social Determinants of Health     Tobacco Use: Medium Risk    Smoking Tobacco Use: Former    Smokeless Tobacco Use: Never    Passive Exposure: Not on file   Alcohol Use: Not on file   Financial Resource Strain: Not on file   Food Insecurity: Not on file   Transportation Needs: Not on file   Physical Activity: Not on file   Stress: Not on file   Social Connections: Not on file   Intimate Partner Violence: Not on file   Depression: Not on file   Housing Stability: Not on file       Review of Systems:   A comprehensive review of systems was negative except for that written in the HPI. Vital Signs:    Last 24hrs VS reviewed since prior progress note.  Most recent are:  Visit Vitals  BP (!) 158/87   Pulse 77   Temp 98.2 °F (36.8 °C)   Resp 20   Ht 6' 2\" (1.88 m)   Wt 133.7 kg (294 lb 12.1 oz)   SpO2 96%   BMI 37.84 kg/m²         Intake/Output Summary (Last 24 hours) at 2/1/2023 1331  Last data filed at 2/1/2023 1052  Gross per 24 hour   Intake 1300 ml   Output 3450 ml   Net -2150 ml          Physical Examination:     I had a face to face encounter with this patient and independently examined them on 2/1/2023 as outlined below:          Constitutional:  No acute distress, cooperative, pleasant    ENT:  Oral mucosa moist, oropharynx benign. Resp:  CTA bilaterally. No wheezing/rhonchi/rales. No accessory muscle use. CV:  Regular rhythm, normal rate, no murmurs, gallops, rubs    GI:  Soft, non distended, non tender. normoactive bowel sounds, no hepatosplenomegaly     Musculoskeletal:  No edema, warm, 2+ pulses throughout    Neurologic:   AAOx2, Right arm 3/5 strength             Data Review:    I personally reviewed  Image    I have independently reviewed and interpreted patient's lab and all other diagnostic data    Labs:     Recent Labs     02/01/23 0247 01/31/23  0559   WBC 8.3 6.2   HGB 14.7 14.7   HCT 45.8 44.6   * 111*       Recent Labs     02/01/23 0247 01/31/23  0559 01/30/23  0556   * 132* 133*   K 4.4 4.2 4.3    100 102   CO2 27 28 26   BUN 23* 27* 32*   CREA 0.81 0.76 0.94   * 112* 96   CA 8.9 8.6 8.7       No results for input(s): ALT, AP, TBIL, TBILI, TP, ALB, GLOB, GGT, AML, LPSE in the last 72 hours. No lab exists for component: SGOT, GPT, AMYP, HLPSE    Recent Labs     01/30/23  0828   INR 1.2*   PTP 12.3*   APTT 29.1        No results for input(s): FE, TIBC, PSAT, FERR in the last 72 hours. No results found for: FOL, RBCF   No results for input(s): PH, PCO2, PO2 in the last 72 hours. No results for input(s): CPK, CKNDX, TROIQ in the last 72 hours.     No lab exists for component: CPKMB  Lab Results   Component Value Date/Time    Cholesterol, total 141 03/22/2020 04:24 AM    HDL Cholesterol 73 03/22/2020 04:24 AM    LDL, calculated 53.8 03/22/2020 04:24 AM    Triglyceride 71 03/22/2020 04:24 AM    CHOL/HDL Ratio 1.9 03/22/2020 04:24 AM     Lab Results   Component Value Date/Time    Glucose (POC) 136 (H) 02/01/2023 11:48 AM    Glucose (POC) 111 02/01/2023 06:35 AM    Glucose (POC) 131 (H) 01/31/2023 09:21 PM    Glucose (POC) 140 (H) 01/31/2023 05:29 PM    Glucose (POC) 103 01/31/2023 08:22 AM     Lab Results   Component Value Date/Time    Color YELLOW/STRAW 01/29/2023 11:11 AM    Appearance CLOUDY (A) 01/29/2023 11:11 AM    Specific gravity 1.028 01/29/2023 11:11 AM    Specific gravity 1.023 08/19/2021 06:39 AM    pH (UA) 6.5 01/29/2023 11:11 AM    Protein Negative 01/29/2023 11:11 AM    Glucose Negative 01/29/2023 11:11 AM    Ketone Negative 01/29/2023 11:11 AM    Bilirubin Negative 01/29/2023 11:11 AM    Urobilinogen 1.0 01/29/2023 11:11 AM    Nitrites Positive (A) 01/29/2023 11:11 AM    Leukocyte Esterase Negative 01/29/2023 11:11 AM    Epithelial cells FEW 01/29/2023 11:11 AM    Bacteria 3+ (A) 01/29/2023 11:11 AM    WBC 0-4 01/29/2023 11:11 AM    RBC 0-5 01/29/2023 11:11 AM       Notes reviewed from all clinical/nonclinical/nursing services involved in patient's clinical care. Care coordination discussions were held with appropriate clinical/nonclinical/ nursing providers based on care coordination needs. Patients current active Medications were reviewed, considered, added and adjusted based on the clinical condition today. Home Medications were reconciled to the best of my ability given all available resources at the time of admission. Route is PO if not otherwise noted.       Medications Reviewed:     Current Facility-Administered Medications   Medication Dose Route Frequency    0.9% sodium chloride infusion  125 mL/hr IntraVENous CONTINUOUS    sodium chloride (NS) flush 5-40 mL  5-40 mL IntraVENous Q8H    sodium chloride (NS) flush 5-40 mL  5-40 mL IntraVENous PRN    acetaminophen (TYLENOL) tablet 650 mg  650 mg Oral Q6H    oxyCODONE IR (ROXICODONE) tablet 5 mg  5 mg Oral Q3H PRN    oxyCODONE IR (ROXICODONE) tablet 10 mg  10 mg Oral Q3H PRN    naloxone (NARCAN) injection 0.4 mg  0.4 mg IntraVENous PRN    ondansetron (ZOFRAN ODT) tablet 4 mg  4 mg Oral Q4H PRN    senna-docusate (PERICOLACE) 8.6-50 mg per tablet 1 Tablet  1 Tablet Oral BID    polyethylene glycol (MIRALAX) packet 17 g  17 g Oral DAILY    phenol throat spray (CHLORASEPTIC) 1 Spray  1 Spray Oral PRN    lidocaine 4 % patch 1 Patch  1 Patch TransDERmal Q24H    HYDROmorphone (DILAUDID) injection 1.5 mg  1.5 mg IntraVENous Q3H PRN    gabapentin (NEURONTIN) tablet 600 mg  600 mg Oral TID    hydrOXYzine HCL (ATARAX) tablet 50 mg  50 mg Oral Q6H PRN    QUEtiapine (SEROquel) tablet 200 mg  200 mg Oral QHS    sertraline (ZOLOFT) tablet 100 mg  100 mg Oral DAILY    traZODone (DESYREL) tablet 50 mg  50 mg Oral QHS    sodium chloride (NS) flush 5-40 mL  5-40 mL IntraVENous Q8H    sodium chloride (NS) flush 5-40 mL  5-40 mL IntraVENous PRN    acetaminophen (TYLENOL) tablet 650 mg  650 mg Oral Q6H PRN    Or    acetaminophen (TYLENOL) suppository 650 mg  650 mg Rectal Q6H PRN    polyethylene glycol (MIRALAX) packet 17 g  17 g Oral DAILY PRN    ondansetron (ZOFRAN ODT) tablet 4 mg  4 mg Oral Q8H PRN    Or    ondansetron (ZOFRAN) injection 4 mg  4 mg IntraVENous Q6H PRN    insulin lispro (HUMALOG) injection   SubCUTAneous AC&HS    glucose chewable tablet 16 g  4 Tablet Oral PRN    glucagon (GLUCAGEN) injection 1 mg  1 mg IntraMUSCular PRN    dextrose 10 % infusion 0-250 mL  0-250 mL IntraVENous PRN    naloxone (NARCAN) injection 0.4 mg  0.4 mg IntraVENous PRN    [Held by provider] enoxaparin (LOVENOX) injection 30 mg  30 mg SubCUTAneous Q12H     ______________________________________________________________________  EXPECTED LENGTH OF STAY: 2d 21h  ACTUAL LENGTH OF STAY:          4                 Madison Keith MD

## 2023-02-01 NOTE — PROGRESS NOTES
Problem: Falls - Risk of  Goal: *Absence of Falls  Description: Document Darshan Sal Fall Risk and appropriate interventions in the flowsheet. Outcome: Progressing Towards Goal  Note: Fall Risk Interventions:                                Problem: Patient Education: Go to Patient Education Activity  Goal: Patient/Family Education  Outcome: Progressing Towards Goal     Problem: Pressure Injury - Risk of  Goal: *Prevention of pressure injury  Description: Document Lit Scale and appropriate interventions in the flowsheet. Outcome: Progressing Towards Goal  Note: Pressure Injury Interventions:       Moisture Interventions: Offer toileting Q_hr, Moisture barrier, Minimize layers    Activity Interventions: PT/OT evaluation, Increase time out of bed    Mobility Interventions: PT/OT evaluation, HOB 30 degrees or less, Turn and reposition approx.  every two hours(pillow and wedges)    Nutrition Interventions: Document food/fluid/supplement intake                     Problem: Patient Education: Go to Patient Education Activity  Goal: Patient/Family Education  Outcome: Progressing Towards Goal     Problem: Discharge Planning  Goal: *Discharge to safe environment  Outcome: Progressing Towards Goal  Goal: *Knowledge of medication management  Outcome: Progressing Towards Goal  Goal: *Knowledge of discharge instructions  Outcome: Progressing Towards Goal     Problem: Patient Education: Go to Patient Education Activity  Goal: Patient/Family Education  Outcome: Progressing Towards Goal     Problem: Complex Spine Procedure:  Day of Surgery  Goal: Off Pathway (Use only if patient is Off Pathway)  Outcome: Progressing Towards Goal  Goal: Activity/Safety  Outcome: Progressing Towards Goal  Goal: Consults, if ordered  Outcome: Progressing Towards Goal  Goal: Diagnostic Test/Procedures  Outcome: Progressing Towards Goal  Goal: Nutrition/Diet  Outcome: Progressing Towards Goal  Goal: Discharge Planning  Outcome: Progressing Towards Goal  Goal: Medications  Outcome: Progressing Towards Goal  Goal: Respiratory  Outcome: Progressing Towards Goal  Goal: Treatments/Interventions/Procedures  Outcome: Progressing Towards Goal  Goal: Psychosocial  Outcome: Progressing Towards Goal  Goal: *Optimal pain control at patient's stated goal  Outcome: Progressing Towards Goal  Goal: *Demonstrates progressive activity  Outcome: Progressing Towards Goal  Goal: *Respiratory status stable  Outcome: Progressing Towards Goal

## 2023-02-01 NOTE — PROGRESS NOTES
Orders received, chart reviewed and patient evaluated by occupational therapy. Pending progression with skilled acute occupational therapy, recommend:  Therapy 3 hours per day 5-7 days per week     Recommend with nursing ADLs with supervision/setup, OOB to chair 3x/day and toileting via beside commode with 2 assist and  gait belt . Thank you for completing as able in order to maintain patient strength, endurance and independence. Full evaluation to follow.

## 2023-02-01 NOTE — PROGRESS NOTES
Orders received, chart reviewed and patient evaluated by physical therapy. Pending progression with skilled acute physical therapy, recommend:  Therapy 3 hours per day 5-7 days per week    Recommend with nursing OOB to chair 3x/day and walking daily with 1 assist and walker. Thank you for completing as able in order to maintain patient strength, endurance and independence. Full evaluation to follow.

## 2023-02-01 NOTE — PROGRESS NOTES
Patient: Lisbet Dean MRN: [id number]  SSN: [id number]    YOB: 1955  Age: 69 y.o. Sex: male        ADMITTED: 2023 to Leeroy Zazueta MD by Leandro Monroe MD [facility] [G95.9]  POD# 1 Day Post-Op Procedure(s):  CERVICAL C5 CORPECTOMY, ARTHRODESIS WITH ALLOGRAFT, AUTOGRAFT AND ANTERIOR PLATE P0-F6  CYSTOSCOPY    Lisbet Dean is doing fair he is eating breakfast . Explained reason for visit , difficult mills placement in OR . Pt made aware mills will need to remain for 3-5 days . Urine with hematuria this morning . Merlot colored in bag and tubing. Explained need to irrigate bladder and remove any old blood or clots from bladder . Vss afebrile   Hgb14.7  Cr 0.81    Vitals: Temp (24hrs), Av.1 °F (36.7 °C), Min:97.3 °F (36.3 °C), Max:99.2 °F (37.3 °C)    Blood pressure (!) 158/87, pulse (!) 58, temperature 98.2 °F (36.8 °C), resp. rate 20, height 6' 2\" (1.88 m), weight 133.7 kg (294 lb 12.1 oz), SpO2 96 %. Intake and Output:   1901 -  0700  In: 1300 [I.V.:1300]  Out: 2700 [Urine:2450]  No intake/output data recorded. TREVA Output lats 24 hrs: No data found. TREVA Output last 8 hrs: No data found. BM over last 24 hrs: No data found. Exam:   Gen: NAD  CV: extremities well perfused  Lungs: nonlabored respirations.  Symmetric chest expansion  Ext: no edema  Abdomen: soft NTND no suprapubic pain   : mills in place      Labs:  CBC:   Lab Results   Component Value Date/Time    WBC 8.3 2023 02:47 AM    HCT 45.8 2023 02:47 AM    PLATELET 855 (L)  02:47 AM     BMP:   Lab Results   Component Value Date/Time    Glucose 144 (H) 2023 02:47 AM    Glucose 85 2020 04:24 AM    Sodium 133 (L) 2023 02:47 AM    Potassium 4.4 2023 02:47 AM    Chloride 102 2023 02:47 AM    CO2 27 2023 02:47 AM    BUN 23 (H) 2023 02:47 AM    Creatinine 0.81 2023 02:47 AM    Calcium 8.9 2023 02:47 AM     Cultures: N/A    Imaging: N/A  Assessment/Plan:     1. Gross hematuria likely related to mills trauma   - irrigate PRN for bloody urine or no output   - mills on traction   - will need mills for 3-5 days ( 2/3-5)   Urology will follow    D/w Dr Sherryle Rule    Signed By: Kenny Guerrero.  Faraz Dunlap, TANA - February 1, 2023

## 2023-02-01 NOTE — PROGRESS NOTES
Spine Surgery Progress Note  Bhavya James PA-C    Admit Date: 2023   LOS: 4 days        Daily Progress Note: 2023    HPI: Mr. Samantha Willett is a pleasant 67yo gentleman with a history of T2DM, anxiety/depression, and hypertension who presented to [facility] with neck pain for 2 weeks that radiates into his shoulders and is associated with BUE weakness, LUE>RUE. Imaging revealed \"Metastatic disease to the cervical spine at C5 and C7 with extraosseous spread of tumor. At C5 there is collapse of the vertebral body and epidural spread of tumor resulting in significant canal compression. The epidural spread of tumor extends cranially on the left to C4 and fills both C4-5 and C5-6 foramen. The epidural tumor at both C5 and C7 distorts the adjacent vertebral arteries. \" Patient was transferred to [facility]. He has no known cancer diagnosis. Pt denies bowel and bladder incontinence. Pt underwent C5 corpectomy, arthrodesis, and anterior cervical plating on 23. He tolerated the surgery well. Subjective:     Pt sitting up comfortably in a chair. Pain is well-controlled. Swallowing without issue. No complaints.     Objective:     Vital signs  Temp (24hrs), Av °F (36.7 °C), Min:97.3 °F (36.3 °C), Max:99.2 °F (37.3 °C)   701 -  190  In: -   Out: 760 [Urine:750; Drains:10]  1901 -  0700  In: 1300 [I.V.:1300]  Out: 2700 [Urine:2450]    Visit Vitals  BP (!) 158/87   Pulse 77   Temp 98.2 °F (36.8 °C)   Resp 20   Ht 6' 2\" (1.88 m)   Wt 133.7 kg (294 lb 12.1 oz)   SpO2 96%   BMI 37.84 kg/m²    O2 Flow Rate (L/min): 2 l/min O2 Device: None (Room air)     Output (mL)  Urine Voided: 325 ml (23 1744)  Last Bowel Movement Date: 23 (23 0840)     Pain control  Pain Assessment  Pain Scale 1: Numeric (0 - 10)  Pain Intensity 1: 7  Pain Onset 1: pot opt  Pain Location 1: Back  Pain Orientation 1: Lower  Pain Description 1: Aching  Pain Intervention(s) 1: Medication (see MAR)    PT/OT  Gait Physical Exam:  Gen: No acute distress. Neuro: A&Ox3. Follows commands. Speech clear. Affect normal. Intermittently confused; pt states he thinks it is due to the pain medicine. PERRL. EOMI. Face symmetric. Strength 3/5 in Right deltoid/bicep. 4/5 right . LUE bicep/deltoid strength 0/5. Strength 4/5 triceps, wrist, intrinsics. Sensation intact. No clonus. Gait deferred. Incision C/D/I    24 hour results:    Recent Results (from the past 24 hour(s))   GLUCOSE, POC    Collection Time: 01/31/23  5:29 PM   Result Value Ref Range    Glucose (POC) 140 (H) 65 - 117 mg/dL    Performed by Daja Delgado    GLUCOSE, POC    Collection Time: 01/31/23  9:21 PM   Result Value Ref Range    Glucose (POC) 131 (H) 65 - 117 mg/dL    Performed by Sonal Parker    CBC W/O DIFF    Collection Time: 02/01/23  2:47 AM   Result Value Ref Range    WBC 8.3 4.1 - 11.1 K/uL    RBC 5.00 4. 10 - 5.70 M/uL    HGB 14.7 12.1 - 17.0 g/dL    HCT 45.8 36.6 - 50.3 %    MCV 91.6 80.0 - 99.0 FL    MCH 29.4 26.0 - 34.0 PG    MCHC 32.1 30.0 - 36.5 g/dL    RDW 15.1 (H) 11.5 - 14.5 %    PLATELET 064 (L) 862 - 400 K/uL    MPV 11.0 8.9 - 12.9 FL    NRBC 0.0 0  WBC    ABSOLUTE NRBC 0.00 0.00 - 4.78 K/uL   METABOLIC PANEL, BASIC    Collection Time: 02/01/23  2:47 AM   Result Value Ref Range    Sodium 133 (L) 136 - 145 mmol/L    Potassium 4.4 3.5 - 5.1 mmol/L    Chloride 102 97 - 108 mmol/L    CO2 27 21 - 32 mmol/L    Anion gap 4 (L) 5 - 15 mmol/L    Glucose 144 (H) 65 - 100 mg/dL    BUN 23 (H) 6 - 20 MG/DL    Creatinine 0.81 0.70 - 1.30 MG/DL    BUN/Creatinine ratio 28 (H) 12 - 20      eGFR >60 >60 ml/min/1.73m2    Calcium 8.9 8.5 - 10.1 MG/DL   GLUCOSE, POC    Collection Time: 02/01/23  6:35 AM   Result Value Ref Range    Glucose (POC) 111 65 - 117 mg/dL    Performed by [facility] [address], POC    Collection Time: 02/01/23 11:48 AM   Result Value Ref Range    Glucose (POC) 136 (H) 65 - 117 mg/dL    Performed by Cody Ott (CON)         Assessment:     Principal Problem:    Cervical myelopathy with cervical radiculopathy ([facility] (1/29/2023)    Active Problems:    Acute myelopathy ([facility] (1/29/2023)    Plan:     Patient with metastatic disease to spine. Mets to lungs, lumbar spine, and liver seen on additional imaging. S/p C5 corpectomy and fusion 01/31  Intraop op and L5 bx pathology pending. Oncology following. Continue pain control. PT/OT - no need for brace  WIll d/c drain tomorrow.   Urology following for blood in mills  Medical mgmt per primary team    Plan d/w Dr. Andrew Valladares, PA

## 2023-02-01 NOTE — PROGRESS NOTES
Transition of Care Plan  RUR- Low  10%   DISPOSITION: The disposition plan is IPR; pending medical progression  IPR Accepted: Brendanolyn Frees to be started 2/2   [facility]   F/U with PCP/Specialist    Transport: [facility]   Barrier:  Jacolyn Frees  Per Urology Note: will need mills for 3-5 days ( 2/3-5)     Transition of Care Plan:     The Plan for Transition of Care is related to the following treatment goals: IPR    The Patient was provided with a choice of provider and agrees  with the discharge plan. Yes [x] No []    A Freedom of choice list was provided with basic dialogue that supports the patient's individualized plan of care/goals and shares the quality data associated with the providers.        Yes [x] No []    CM: [address] [place],   [phone]

## 2023-02-01 NOTE — PROGRESS NOTES
Spiritual Care Assessment/Progress Note  [facility]      NAME: Julia Santizo      MRN: [id number]  AGE: 69 y.o.  SEX: male  Gnosticism Affiliation: Adventist   Language: English     2/1/2023     Total Time (in minutes): 54     Spiritual Assessment begun in [facility] 6W1 ORTHO SPINE through conversation with:         [x]Patient        [] Family    [] Friend(s)        Reason for Consult: Advance medical directive consult     Spiritual beliefs: (Please include comment if needed)     [x] Identifies with a gilmar tradition:    Adventist     [] Supported by a gilmar community:            [] Claims no spiritual orientation:           [] Seeking spiritual identity:                [] Adheres to an individual form of spirituality:           [] Not able to assess:                           Identified resources for coping:      [x] Prayer                               [] Music                  [] Guided Imagery     [x] Family/friends                 [] Pet visits     [] Devotional reading                         [] Unknown     [] Other:                                               Interventions offered during this visit: (See comments for more details)    Patient Interventions: Advance medical directive completed, Coping skills reviewed/reinforced, Catharsis/review of pertinent events in supportive environment, Gnosticism beliefs/image of God discussed, Prayer (actual), Affirmation of emotions/emotional suffering           Plan of Care:     [x] Support spiritual and/or cultural needs    [x] Support AMD and/or advance care planning process      [] Support grieving process   [] Coordinate Rites and/or Rituals    [] Coordination with community clergy   [] No spiritual needs identified at this time   [x] Detailed Plan of Care below (See Comments)  [] Make referral to Music Therapy  [] Make referral to Pet Therapy     [] Make referral to Addiction services  [] Make referral to [facility]  [] Make referral to Spiritual Care Partner  [] No future visits requested        [] Contact Spiritual Care for further referrals     Comments: Visit for advance medical directive consult referred by . With patient in room on unit. Patient was sitting in chair watching television. Patient shared family support from two daughters whom he wanted on his advance medical directive. Patient shared he is legally  from his spouse. Patient confirmed he wanted to name daughters as medical power of . Visit with patient in room on unit for an initial spiritual assessment. Patient shared that he has family support and that he is a [place]. Provided ministry of presence, listening and affirmation. Provided a prayer of comfort and support. Patient requested daily spiritual care support for prayer and companionship. Advised patient and nurse of  availability.      Charted by: ANAHI Intern Ivy Zhu

## 2023-02-01 NOTE — PROGRESS NOTES
Problem: Mobility Impaired (Adult and Pediatric)  Goal: *Acute Goals and Plan of Care (Insert Text)  Description: FUNCTIONAL STATUS PRIOR TO ADMISSION: Patient was independent and active without use of DME.    HOME SUPPORT PRIOR TO ADMISSION: The patient lived alone with no local to provide assistance. Physical Therapy Goals  Initiated 2/1/2023  1. Patient will move from supine to sit and sit to supine  in bed with modified independence within 7 day(s). 2.  Patient will transfer from bed to chair and chair to bed with modified independence using the least restrictive device within 7 day(s). 3.  Patient will perform sit to stand with modified independence within 7 day(s). 4.  Patient will ambulate with supervision/set-up for 150 feet with the least restrictive device within 7 day(s). 5.  Patient will ascend/descend 14 stairs with one handrail(s) with minimal assistance/contact guard assist within 7 day(s). Outcome: Progressing Towards Goal   PHYSICAL THERAPY EVALUATION  Patient: Eric Franco (89 y.o. male)  Date: 2/1/2023  Primary Diagnosis: Acute myelopathy ([facility] [address] [G95.9]  Procedure(s) (LRB):  CERVICAL C5 CORPECTOMY, ARTHRODESIS WITH ALLOGRAFT, AUTOGRAFT AND ANTERIOR PLATE A0-S8 (N/A)  CYSTOSCOPY (N/A) 1 Day Post-Op   Precautions:   Fall      ASSESSMENT  Based on the objective data described below, the patient presents with decreased activity tolerance and gait deviations. Pt demonstrated BUE weakness resulting in difficulty ascending from chair. Pt able to minimally use RUE to ascend from chair, yet noted increased difficulty and decreased ROM on RUE. Pt returned to chair to rest then progressed to ambulation. Pt fairly steady with ambulation requiring intermittent cues for managing rolling walker and navigating hallway. Pt presents significantly below baseline for mobility and anticipate needs for physical assistance with mobility upon discharge. Will continue to progress ambulation as tolerated. Current Level of Function Impacting Discharge (mobility/balance): min A transfers    Functional Outcome Measure: The patient scored Total Score: 10/28 on the Tinetti outcome measure which is indicative of high fall risk. Other factors to consider for discharge: fall risk, UE weakness     Patient will benefit from skilled therapy intervention to address the above noted impairments. PLAN :  Recommendations and Planned Interventions: bed mobility training, transfer training, gait training, therapeutic exercises, neuromuscular re-education, patient and family training/education, and therapeutic activities      Frequency/Duration: Patient will be followed by physical therapy:  daily for a week to address goals. Recommendation for discharge: (in order for the patient to meet his/her long term goals)  Therapy 3 hours per day 5-7 days per week    This discharge recommendation:  Has been made in collaboration with the attending provider and/or case management    IF patient discharges home will need the following DME: rolling walker         SUBJECTIVE:   Patient stated I can walk.     OBJECTIVE DATA SUMMARY:   HISTORY:    Past Medical History:   Diagnosis Date    Depression     DJD (degenerative joint disease) 3/8/2010    HTN (hypertension) 3/8/2010    STATES NO LONGER TAKING MEDICATION-STATES MD STOPPED    Insomnia     NIDDM (non-insulin dependent diabetes mellitus) 3/8/2010    Other ill-defined conditions(799.89)     BPH    Other ill-defined conditions(799.89)     previous hx of DTs from ETOH withdrawal    Other ill-defined conditions(799.89)     DDD/back problems    Psychiatric disorder     paranoid schizophrenia, anxiety    Substance abuse ([facility] [address]     Suicidal thoughts      Past Surgical History:   Procedure Laterality Date    BIOPSY PROSTATE      HX UROLOGICAL      turp       Personal factors and/or comorbidities impacting plan of care: PMH    Home Situation  Home Environment: Apartment  # Steps to Enter: 15  Rails to Enter: Yes  [employer] : Right  One/Two Story Residence: Two story  # of Interior Steps: 14  Living Alone: Yes  Support Systems: No Support Systems  Patient Expects to be Discharged to[de-identified] Home  Current DME Used/Available at Home: None  Tub or Shower Type: Tub/Shower combination    EXAMINATION/PRESENTATION/DECISION MAKING:   Critical Behavior:  Neurologic State: Alert  Orientation Level: Oriented X4  Cognition: Appropriate for age attention/concentration, Follows commands  Safety/Judgement: Awareness of environment, Fall prevention  Hearing:     Skin:  intact  Edema: none  Range Of Motion:  AROM: Grossly decreased, non-functional (RUE generally with more impairment proximally than distally, LUE grossly)          Strength:    Strength: Grossly decreased, non-functional (RUE generally with more impairment proximally than distally, LUE grossly)          Tone & Sensation:   Tone: Abnormal (LUE with fluctuating tone)              Sensation: Intact               Coordination:  Coordination: Grossly decreased, non-functional (RUE generally with more impairment proximally than distally, LUE grossly)  Vision:   Tracking: Able to track stimulus in all quadrants w/o difficulty  Diplopia: No  Acuity: Within Defined Limits  Corrective Lenses: Reading glasses  Functional Mobility:  Bed Mobility:              Transfers:  Sit to Stand: Minimum assistance  Stand to Sit: Minimum assistance       Balance:   Sitting: Impaired  Sitting - Static: Good (unsupported)  Sitting - Dynamic: Fair (occasional)  Standing: Impaired; With support  Standing - Static: Fair  Standing - Dynamic : Fair  Ambulation/Gait Training:  Distance (ft): 40 Feet (ft)  Assistive Device: Gait belt;Walker, rolling  Ambulation - Level of Assistance: Minimal assistance        Gait Abnormalities: Antalgic;Decreased step clearance        Base of Support: Narrowed     Speed/Valorie: Pace decreased (<100 feet/min); Slow  Step Length: Left shortened;Right shortened       Functional Measure:  Tinetti test:    Sitting Balance: 1  Arises: 1  Attempts to Rise: 0  Immediate Standing Balance: 1  Standing Balance: 1  Nudged: 0  Eyes Closed: 0  Turn 360 Degrees - Continuous/Discontinuous: 0  Turn 360 Degrees - Steady/Unsteady: 1  Sitting Down: 1  Balance Score: 6 Balance total score  Indication of Gait: 0  R Step Length/Height: 0  L Step Length/Height: 0  R Foot Clearance: 1  L Foot Clearance: 1  Step Symmetry: 0  Step Continuity: 0  Path: 1  Trunk: 1  Walking Time: 0  Gait Score: 4 Gait total score  Total Score: 10/28 Overall total score         Tinetti Tool Score Risk of Falls  <19 = High Fall Risk  19-24 = Moderate Fall Risk  25-28 = Low Fall Risk  Tinetti ME. Performance-Oriented Assessment of Mobility Problems in Elderly Patients. [address] 66; [id number].  (Scoring Description: PT Bulletin Feb. 10, 1993)    Older adults: Eli Patel et al, 2009; n = 1000 [facility] elderly evaluated with ABC, NORAH, ADL, and IADL)  · Mean NORAH score for males aged 69-68 years = 26.21(3.40)  · Mean NORAH score for females age 69-68 years = 25.16(4.30)  · Mean NORAH score for males over 80 years = 23.29(6.02)  · Mean NORAH score for females over 80 years = 17.20(8.32)        Physical Therapy Evaluation Charge Determination   History Examination Presentation Decision-Making   HIGH Complexity :3+ comorbidities / personal factors will impact the outcome/ POC  LOW Complexity : 1-2 Standardized tests and measures addressing body structure, function, activity limitation and / or participation in recreation  MEDIUM Complexity : Evolving with changing characteristics  Other outcome measures tinetti  HIGH       Based on the above components, the patient evaluation is determined to be of the following complexity level: LOW       Activity Tolerance:   Fair and requires rest breaks      After treatment patient left in no apparent distress:   Sitting in chair and Call bell within reach    COMMUNICATION/EDUCATION:   The patients plan of care was discussed with: Registered nurse and Case management. Fall prevention education was provided and the patient/caregiver indicated understanding., Patient/family have participated as able in goal setting and plan of care. , and Patient/family agree to work toward stated goals and plan of care.     Thank you for this referral.  Supa Naylor, PT   Time Calculation: 17 mins

## 2023-02-01 NOTE — ACP (ADVANCE CARE PLANNING)
Advance Care Planning   Advance Care Planning Inpatient Note  [address] Department    Today's Date: 2/1/2023  Unit: [facility] 0E6 [facility]ERT[facility]- ALL SAINTS    Received request from IDT member. Upon review of chart and communication with care team, patient's decision making abilities are not in question. Patient was/were present in the room during visit. Goals of ACP Conversation:  Discuss Advance Care planning documents    Health Care Decision Makers:      Primary Decision Maker: Chavo Cunningham - Daughter - [phone]    Summary:  Completed [address]  Documented Next of Kin, per patient report  Advance Care Planning Documents (Patient Wishes) on file:  Healthcare Power of /Advance Directive appointment of Health care agent  Living Will/ Advance Directive  Anatomical Gift/Organ donation     Assessment:    Visit for advance medical directive consult referred by . Patient shared family support from two daughters whom he wanted on his advance medical directive. Patient shared he is  from his spouse. Patient confirmed he wanted to name daughters as medical power of . Interventions:  Provided education on documents for clarity and greater understanding  Discussed and provided education on state decision maker hierarchy  Assisted in the completion of documents according to patient's wishes at this time  Encouraged ongoing ACP conversation with future decision makers and loved ones    Care Preferences Communicated:    Hospitalization:  If the patient's health worsens and it becomes clear that the chance of recovery is unlikely,     the patient prefers comfort-focused treatment without hospitalization. Ventilation:   If the patient, in their present state of health, suddenly became very ill and unable to breathe on their own,     the patient would NOT desire the use of a ventilator (breathing machine).     If their health worsens and it becomes clear that the chance of recovery is unlikely,       the patient would NOT desire the use of a ventilator (breathing machine). Resuscitation:  In the event the patient's heart stopped as a result of an underlying serious health condition, the patient communicates a preference for      a natural death (no CPR).     Outcomes/Plan:  ACP Discussion Completed  New Advance Directive completed  Returned original document(s) to patient, as well as copies for distribution to appointed agents  Copy of Advance Directive given to staff to scan into medical record  Teach Back Method used to verify the patient/Healthcare Decision Maker's understanding of key information in the advance directive documents    [facility] on 2/1/2023 at 2:45 PM    Charted by CPE Intern, Otilia Pabon

## 2023-02-01 NOTE — PROGRESS NOTES
POD 1 after removal C5 vertebral body and replacement with cage and anterior plate instrumentation. 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

## 2023-02-01 NOTE — PROGRESS NOTES
Problem: Complex Spine Procedure:  Day of Surgery  Goal: Activity/Safety  Outcome: Progressing Towards Goal  Goal: Medications  Outcome: Progressing Towards Goal  Goal: *Optimal pain control at patient's stated goal  Outcome: Progressing Towards Goal

## 2023-02-01 NOTE — PROGRESS NOTES
Problem: Self Care Deficits Care Plan (Adult)  Goal: *Acute Goals and Plan of Care (Insert Text)  Description: FUNCTIONAL STATUS PRIOR TO ADMISSION: Patient was independent and active without use of DME.     HOME SUPPORT: The patient lived alone with no local support. Occupational Therapy Goals  Initiated 2/1/2023  1. Patient will perform self-feeding with minimal assistance within 7 day(s). 2.  Patient will perform grooming with minimal assistance within 7 day(s). 3.  Patient will perform anterior neck to thigh bathing with moderate assistance  within 7 day(s). 4.  Patient will perform toilet transfers to/from with minimal assistance within 7 day(s). 5.  Patient will perform all aspects of toileting with moderate assistance within 7 day(s). 6.  Patient will participate in upper extremity therapeutic exercise/activities with moderate assistance  for 5 minutes within 7 day(s). 7.  Patient will utilize energy conservation techniques during functional activities with verbal cues within 7 day(s). Outcome: Not Met    OCCUPATIONAL THERAPY EVALUATION  Patient: Daniela Lozada (52 y.o. male)  Date: 2/1/2023  Primary Diagnosis: Acute myelopathy ([facility] [address] [G95.9]  Procedure(s) (LRB):  CERVICAL C5 CORPECTOMY, ARTHRODESIS WITH ALLOGRAFT, AUTOGRAFT AND ANTERIOR PLATE U8-Q4 (N/A)  CYSTOSCOPY (N/A) 1 Day Post-Op   Precautions: falls       ASSESSMENT  Based on the objective data described below, the patient presents with impaired balance, generalized weakness, decreased functional activity tolerance, limited lower body access, and impaired function in BUE (LUE worse than RUE) s/p admission for acute myelopathy with resultant need for cervical C5 corpectomy and anterior plate of E8-5, now POD #1. Patient received in chair and agreeable to therapy. Noted patient with more movement in distal aspects of BUE with RUE better than L.  Noted patient able to  items and bend R elbow, but unable to flex/extend either shoulder or bend L elbow. Patient required mod A for grooming using RUE for FM tasks and LUE for GM stabilization. Patient educated on importance of slow and controlled movement to allow for increased success with targeting and to decrease risk of injury - patient verbalized understanding. Patient left sitting in chair with call bell in reach, RN aware, all needs met, NAD. Will continue to follow, recommend IPR once cleared for [facility]. to maximize functional IND and safety. Current Level of Function Impacting Discharge (ADLs/self-care): min-total A for ADLs    Functional Outcome Measure: The patient scored Total: 25/100 on the Barthel Index outcome measure which is indicative of being 75% impaired in basic self-care. Other factors to consider for discharge: lives alone, was IND PTA, reporting no assistance at home     Patient will benefit from skilled therapy intervention to address the above noted impairments. PLAN :  Recommendations and Planned Interventions: self care training, functional mobility training, therapeutic exercise, balance training, therapeutic activities, endurance activities, neuromuscular re-education, patient education, home safety training, and family training/education    Frequency/Duration: Patient will be followed by occupational therapy 5 times a week to address goals. Recommendation for discharge: (in order for the patient to meet his/her long term goals)  Therapy 3 hours per day 5-7 days per week    This discharge recommendation:  Has been made in collaboration with the attending provider and/or case management    IF patient discharges home will need the following DME: AE: long handled bathing, AE: long handled dressing, AE: feeding, bedside commode, and transfer bench       SUBJECTIVE:   Patient stated I can't do that.  re: bending L elbow    OBJECTIVE DATA SUMMARY:   HISTORY:   Past Medical History:   Diagnosis Date    Depression     DJD (degenerative joint disease) 3/8/2010 HTN (hypertension) 3/8/2010    STATES NO LONGER TAKING MEDICATION-STATES MD STOPPED    Insomnia     NIDDM (non-insulin dependent diabetes mellitus) 3/8/2010    Other ill-defined conditions(799.89)     BPH    Other ill-defined conditions(799.89)     previous hx of DTs from ETOH withdrawal    Other ill-defined conditions(799.89)     DDD/back problems    Psychiatric disorder     paranoid schizophrenia, anxiety    Substance abuse ([facility] [address]     Suicidal thoughts      Past Surgical History:   Procedure Laterality Date    BIOPSY PROSTATE      HX UROLOGICAL      turp       Expanded or extensive additional review of patient history:     Home Situation  Home Environment: Apartment  # Steps to Enter: 15  Rails to Enter: Yes  Hand Rails : Right  One/Two Story Residence: Two story  # of Interior Steps: 14  Living Alone: Yes  Support Systems: No Support Systems  Patient Expects to be Discharged to[de-identified] Home  Current DME Used/Available at Home: None  Tub or Shower Type: Tub/Shower combination    Hand dominance: Right    EXAMINATION OF PERFORMANCE DEFICITS:  Cognitive/Behavioral Status:  Neurologic State: Alert  Orientation Level: Oriented X4  Cognition: Appropriate for age attention/concentration; Follows commands  Perception: Appears intact  Perseveration: No perseveration noted  Safety/Judgement: Awareness of environment; Fall prevention    Skin: appears grossly intact    Edema: none noted in BUEs    Hearing:       Vision/Perceptual:    Tracking: Able to track stimulus in all quadrants w/o difficulty                 Diplopia: No    Acuity: Within Defined Limits    Corrective Lenses: Reading glasses    Range of Motion:  In BUEs  AROM: Grossly decreased, non-functional (RUE generally with more impairment proximally than distally, LUE grossly)     Strength:   In BUEs  Strength: Grossly decreased, non-functional (RUE generally with more impairment proximally than distally, LUE grossly)    Coordination:  Coordination: Grossly decreased, non-functional (RUE generally with more impairment proximally than distally, LUE grossly)  Fine Motor Skills-Upper: Left Impaired;Right Impaired    Gross Motor Skills-Upper: Left Impaired;Right Impaired    Tone & Sensation:  In BUEs  Tone: Abnormal (LUE with fluctuating tone)  Sensation: Intact    Balance:  Sitting: Impaired  Sitting - Static: Good (unsupported)  Sitting - Dynamic: Good (unsupported); Fair (occasional)    Functional Mobility and Transfers for ADLs:  Bed Mobility:   NT this session  - completed in chair    Transfers:  Sit to Stand:  (NT this session)    ADL Assessment:  Feeding: Moderate assistance (for bimanual tasks)    Oral Facial Hygiene/Grooming: Moderate assistance    Bathing: Maximum assistance    Upper Body Dressing: Maximum assistance    Lower Body Dressing: Maximum assistance    Toileting: Maximum assistance       ADL Intervention and task modifications:  Feeding  Container Management: Maximum assistance  Cutting Food: Maximum assistance  Utensil Management: Set-up  Food to Mouth: Set-up  Drink to Mouth: Set-up  Cues: Physical assistance;Verbal cues provided    Grooming  Position Performed: Seated in chair  Washing Face: Set-up  Brushing Teeth: Minimum assistance (using mouthwash and cup)  Cues: Physical assistance;Verbal cues provided    Toileting  Bladder Hygiene: Total assistance (dependent) (gene)    Cognitive Retraining  Safety/Judgement: Awareness of environment; Fall prevention    Functional Measure:    Barthel Index:  Bathin  Bladder: 0  Bowels: 10  Groomin  Dressin  Feedin  Mobility: 0  Stairs: 0  Toilet Use: 0  Transfer (Bed to Chair and Back): 5  Total: 25/100      The Barthel ADL Index: Guidelines  1. The index should be used as a record of what a patient does, not as a record of what a patient could do. 2. The main aim is to establish degree of independence from any help, physical or verbal, however minor and for whatever reason.   3. The need for supervision renders the patient not independent. 4. A patient's performance should be established using the best available evidence. Asking the patient, friends/relatives and nurses are the usual sources, but direct observation and common sense are also important. However direct testing is not needed. 5. Usually the patient's performance over the preceding 24-48 hours is important, but occasionally longer periods will be relevant. 6. Middle categories imply that the patient supplies over 50 per cent of the effort. 7. Use of aids to be independent is allowed. Score Interpretation (from [address] [address])    Independent   60-79 Minimally independent   40-59 Partially dependent   20-39 Very dependent   <20 Totally dependent     -Mana Sweeney., Barthel, D.W. (1965). Functional evaluation: the Barthel Index. [address] ([address] (1997). The Barthel activities of daily living index: self-reporting versus actual performance in the old (> or = 75 years). Journal [address] 45(7), [address] [facility], NAT, Oralia Villavicencio., [place]. (1999). Measuring the change in disability after inpatient rehabilitation; comparison of the responsiveness of the Barthel Index and Functional Dafter Measure. Journal of Neurology, Neurosurgery, and Psychiatry, 66(4), 314-329. JENNY Peoples.YADIEL, AMNA Mojica, & Luis Foote MLAVELLE. (2004) Assessment of post-stroke quality of life in cost-effectiveness studies: The usefulness of the Barthel Index and the EuroQoL-5D.  Quality of Life Research, 15, 232-03     Occupational Therapy Evaluation Charge Determination   History Examination Decision-Making   LOW Complexity : Brief history review  MEDIUM Complexity : 3-5 performance deficits relating to physical, cognitive , or psychosocial skils that result in activity limitations and / or participation restrictions HIGH Complexity : Patient presents with comorbidities that affect occupational performance. Signifigant modification of tasks or assistance (eg, physical or verbal) with assessment (s) is necessary to enable patient to complete evaluation       Based on the above components, the patient evaluation is determined to be of the following complexity level: MEDIUM  Pain Rating:  Reporting minimal pain in neck to but did not quantify    Activity Tolerance:   Fair    After treatment patient left in no apparent distress:    Sitting in chair, Call bell within reach, and RN aware    COMMUNICATION/EDUCATION:   The patients plan of care was discussed with: Physical therapist, Registered nurse, and Case management. Home safety education was provided and the patient/caregiver indicated understanding. and Patient/family have participated as able in goal setting and plan of care. This patients plan of care is appropriate for delegation to [facility].     Thank you for this referral.  Armond Dasilva OT  Time Calculation: 22 mins

## 2023-02-02 ENCOUNTER — APPOINTMENT (OUTPATIENT)
Dept: MRI IMAGING | Age: 68
End: 2023-02-02
Attending: INTERNAL MEDICINE
Payer: MEDICARE

## 2023-02-02 LAB
ACID FAST STN SPEC: NEGATIVE
GLUCOSE BLD STRIP.AUTO-MCNC: 113 MG/DL (ref 65–117)
GLUCOSE BLD STRIP.AUTO-MCNC: 114 MG/DL (ref 65–117)
GLUCOSE BLD STRIP.AUTO-MCNC: 121 MG/DL (ref 65–117)
GLUCOSE BLD STRIP.AUTO-MCNC: 123 MG/DL (ref 65–117)
MYCOBACTERIUM SPEC QL CULT: NORMAL
SERVICE CMNT-IMP: ABNORMAL
SERVICE CMNT-IMP: ABNORMAL
SERVICE CMNT-IMP: NORMAL
SERVICE CMNT-IMP: NORMAL
SPECIMEN PREPARATION: NORMAL
SPECIMEN SOURCE: NORMAL

## 2023-02-02 PROCEDURE — 65270000046 HC RM TELEMETRY

## 2023-02-02 PROCEDURE — 74011000258 HC RX REV CODE- 258: Performed by: FAMILY MEDICINE

## 2023-02-02 PROCEDURE — 97116 GAIT TRAINING THERAPY: CPT

## 2023-02-02 PROCEDURE — 74011250637 HC RX REV CODE- 250/637: Performed by: PHYSICIAN ASSISTANT

## 2023-02-02 PROCEDURE — 74011250636 HC RX REV CODE- 250/636: Performed by: PHYSICIAN ASSISTANT

## 2023-02-02 PROCEDURE — 74011250637 HC RX REV CODE- 250/637: Performed by: NEUROLOGICAL SURGERY

## 2023-02-02 PROCEDURE — 74011250637 HC RX REV CODE- 250/637: Performed by: INTERNAL MEDICINE

## 2023-02-02 PROCEDURE — 97110 THERAPEUTIC EXERCISES: CPT

## 2023-02-02 PROCEDURE — 74011000250 HC RX REV CODE- 250: Performed by: FAMILY MEDICINE

## 2023-02-02 PROCEDURE — 74011250637 HC RX REV CODE- 250/637: Performed by: NURSE PRACTITIONER

## 2023-02-02 PROCEDURE — 97535 SELF CARE MNGMENT TRAINING: CPT

## 2023-02-02 PROCEDURE — 74011000250 HC RX REV CODE- 250: Performed by: NEUROLOGICAL SURGERY

## 2023-02-02 PROCEDURE — 74011250636 HC RX REV CODE- 250/636

## 2023-02-02 PROCEDURE — A9576 INJ PROHANCE MULTIPACK: HCPCS

## 2023-02-02 PROCEDURE — 82962 GLUCOSE BLOOD TEST: CPT

## 2023-02-02 PROCEDURE — 74011000250 HC RX REV CODE- 250: Performed by: INTERNAL MEDICINE

## 2023-02-02 PROCEDURE — 77030021566 MRI ABD W WO CONT

## 2023-02-02 PROCEDURE — 74011000250 HC RX REV CODE- 250: Performed by: PHYSICIAN ASSISTANT

## 2023-02-02 PROCEDURE — 99223 1ST HOSP IP/OBS HIGH 75: CPT | Performed by: INTERNAL MEDICINE

## 2023-02-02 RX ORDER — CYCLOBENZAPRINE HCL 10 MG
5 TABLET ORAL 3 TIMES DAILY
Status: DISCONTINUED | OUTPATIENT
Start: 2023-02-02 | End: 2023-02-03

## 2023-02-02 RX ORDER — CYCLOBENZAPRINE HCL 10 MG
10 TABLET ORAL
Status: DISCONTINUED | OUTPATIENT
Start: 2023-02-02 | End: 2023-02-02

## 2023-02-02 RX ORDER — KETOROLAC TROMETHAMINE 30 MG/ML
15 INJECTION, SOLUTION INTRAMUSCULAR; INTRAVENOUS EVERY 6 HOURS
Status: COMPLETED | OUTPATIENT
Start: 2023-02-02 | End: 2023-02-03

## 2023-02-02 RX ORDER — SODIUM CHLORIDE 0.9 % (FLUSH) 0.9 %
10 SYRINGE (ML) INJECTION
Status: COMPLETED | OUTPATIENT
Start: 2023-02-02 | End: 2023-02-02

## 2023-02-02 RX ADMIN — FAMOTIDINE 20 MG: 10 INJECTION, SOLUTION INTRAVENOUS at 20:35

## 2023-02-02 RX ADMIN — GADOTERIDOL 20 ML: 279.3 INJECTION, SOLUTION INTRAVENOUS at 19:00

## 2023-02-02 RX ADMIN — SERTRALINE HYDROCHLORIDE 100 MG: 50 TABLET ORAL at 09:33

## 2023-02-02 RX ADMIN — ACETAMINOPHEN 650 MG: 325 TABLET ORAL at 12:48

## 2023-02-02 RX ADMIN — KETOROLAC TROMETHAMINE 15 MG: 30 INJECTION, SOLUTION INTRAMUSCULAR; INTRAVENOUS at 18:00

## 2023-02-02 RX ADMIN — POLYETHYLENE GLYCOL 3350 17 G: 17 POWDER, FOR SOLUTION ORAL at 09:32

## 2023-02-02 RX ADMIN — GABAPENTIN 600 MG: 600 TABLET, FILM COATED ORAL at 15:07

## 2023-02-02 RX ADMIN — GABAPENTIN 600 MG: 600 TABLET, FILM COATED ORAL at 09:32

## 2023-02-02 RX ADMIN — TRAZODONE HYDROCHLORIDE 50 MG: 50 TABLET ORAL at 20:35

## 2023-02-02 RX ADMIN — OXYCODONE HYDROCHLORIDE 10 MG: 5 TABLET ORAL at 06:24

## 2023-02-02 RX ADMIN — OXYCODONE HYDROCHLORIDE 10 MG: 5 TABLET ORAL at 09:32

## 2023-02-02 RX ADMIN — KETOROLAC TROMETHAMINE 15 MG: 30 INJECTION, SOLUTION INTRAMUSCULAR; INTRAVENOUS at 23:06

## 2023-02-02 RX ADMIN — ACETAMINOPHEN 650 MG: 325 TABLET ORAL at 06:24

## 2023-02-02 RX ADMIN — SODIUM CHLORIDE, PRESERVATIVE FREE 10 ML: 5 INJECTION INTRAVENOUS at 11:06

## 2023-02-02 RX ADMIN — CYCLOBENZAPRINE 5 MG: 10 TABLET, FILM COATED ORAL at 20:35

## 2023-02-02 RX ADMIN — QUETIAPINE FUMARATE 200 MG: 100 TABLET ORAL at 20:35

## 2023-02-02 RX ADMIN — ACETAMINOPHEN 650 MG: 325 TABLET ORAL at 18:00

## 2023-02-02 RX ADMIN — SODIUM CHLORIDE 100 ML: 900 INJECTION, SOLUTION INTRAVENOUS at 19:56

## 2023-02-02 RX ADMIN — OXYCODONE HYDROCHLORIDE 10 MG: 5 TABLET ORAL at 20:35

## 2023-02-02 RX ADMIN — GABAPENTIN 600 MG: 600 TABLET, FILM COATED ORAL at 20:35

## 2023-02-02 RX ADMIN — SENNOSIDES AND DOCUSATE SODIUM 1 TABLET: 50; 8.6 TABLET ORAL at 09:32

## 2023-02-02 RX ADMIN — SENNOSIDES AND DOCUSATE SODIUM 1 TABLET: 50; 8.6 TABLET ORAL at 18:00

## 2023-02-02 RX ADMIN — OXYCODONE HYDROCHLORIDE 10 MG: 5 TABLET ORAL at 16:56

## 2023-02-02 RX ADMIN — OXYCODONE HYDROCHLORIDE 10 MG: 5 TABLET ORAL at 12:48

## 2023-02-02 RX ADMIN — ACETAMINOPHEN 650 MG: 325 TABLET ORAL at 23:06

## 2023-02-02 RX ADMIN — SODIUM CHLORIDE, PRESERVATIVE FREE 10 ML: 5 INJECTION INTRAVENOUS at 19:56

## 2023-02-02 RX ADMIN — CYCLOBENZAPRINE 10 MG: 10 TABLET, FILM COATED ORAL at 11:07

## 2023-02-02 NOTE — PROGRESS NOTES
[facility] at [address]  W: [phone]  F: [phone]    Reason for Evaluation:   Jailyn Brown is a 69 y.o. male with PMHx of T2DM, HTN, and history of heavy EtOH use, now sober, who is seen in consultation at the request of Dr. Zachary Bacon for evaluation of metastatic disease    Hematology/Oncology Treatment History:     PRIMARY DIAGNOSIS: poorly differentiated malignancy of unknown primary  DATE OF DIAGNOSIS:  1/30/23  ORIGINAL STAGE: IV  DIAGNOSTICS:   L5 bone biopsy: poorly differentiated malignancy, IHC stains pending  CEA 1.2, ,847    SITES OF DISEASE: scattered lung nodules (largest 1 cm), possible hepatic mets, retroperitoneal adenopathy, multiple osseous lesions (C5 - C7 epidural mass, L5 soft tissue enhancing mass, T10/T11 soft tissue mass, left scapula soft tissue masS)    PRIOR TREATMENT: Status pos C5 corpectomy and resection of tumor     CURRENT TREATMENT: TBD    History of Present Illness: Jailyn Brown is a pleasant 69 y.o. male who presents today for evaluation of suspected metastatic cancer. Patient presented with a  1-2 week history of neck pain and 1 day history LUE weakness. States he could  his hand but was unable to lift it above his shoulder. 1/29/23 MRI Cervical spine with osseous mets at C5 and C7 with epidural spread with canal compression. 1/29/23 CT CAP: multiple osseous mets, including L5 enhancing mass, T11 soft tissue mass, and left scapula soft tissue mass; scattered pulmonary nodules (largest 1 cm in posterior LLL), RP LAD, and possible hepatic metastases. 1/30/23: CT-guided bone biopsy of L5  1/31/23: C5 corepctomy and tumor resection     CEA 1.2  ,594    Interval History:  Status post corpectomy 1/31/23. Preliminary biopsy results confirm adenocarcinoma. IHC stains pending. AFP significantly elevated. Working with PT.OT  Urology evaluating for gross hematuria.   Felt to be related to mills trauma. Patient complains of pain in R shoulder    Social History:   Lives alone.  from wife Eneida. Has 5 children. Retired small. Former smoker (1 ppd x 40 years), quit summer . Former history of heavy alcohol use. Has had admission for alcohol withdrawal syndrome. Sober since early . Family History: Mother - , lymphoma  Sister - , cancer, unknown details  5 children    Review of systems was obtained and pertinent findings reviewed above. Past medical history, social history, family history, medications, and allergies are located in the electronic medical record. Physical Exam:   Visit Vitals  BP (!) 143/81 (BP 1 Location: Left lower arm, BP Patient Position: At rest;Sitting)   Pulse 70   Temp 99.2 °F (37.3 °C)   Resp 16   Ht 6' 2\" (1.88 m)   Wt 294 lb 12.1 oz (133.7 kg)   SpO2 94%   BMI 37.84 kg/m²     ECOG PS: 2  General: no distress  Eyes: anicteric sclerae  HENT: oropharynx clear  Neck: supple  Lymphatic: no cervical supraclavicular adenopathy  Respiratory: CTAB, normal respiratory effort  CV: RRR, no peripheral edema  Ext: warm, well perfused, no edema  GI: soft, nontender, nondistended, no masses  Skin: no rashes, no ecchymoses, no petechiae  Neuro: grossly intact    Results:     Lab Results   Component Value Date/Time    WBC 8.3 2023 02:47 AM    HGB 14.7 2023 02:47 AM    HCT 45.8 2023 02:47 AM    PLATELET 658 (L)  02:47 AM    MCV 91.6 2023 02:47 AM    ABS.  NEUTROPHILS 8.1 (H) 2023 03:40 AM    Hemoglobin (POC) 16.3 2013 05:28 PM    Hematocrit (POC) 48 2013 05:28 PM     Lab Results   Component Value Date/Time    Sodium 133 (L) 2023 02:47 AM    Potassium 4.4 2023 02:47 AM    Chloride 102 2023 02:47 AM    CO2 27 2023 02:47 AM    Glucose 144 (H) 2023 02:47 AM    Glucose 85 2020 04:24 AM    BUN 23 (H) 2023 02:47 AM    Creatinine 0.81 2023 02:47 AM    GFR est AA >60 08/20/2021 05:29 AM    GFR est non-AA >60 08/20/2021 05:29 AM    Calcium 8.9 02/01/2023 02:47 AM    Sodium (POC) 137 01/28/2023 05:12 PM    Potassium (POC) 4.8 01/28/2023 05:12 PM    Chloride (POC) 104 01/28/2023 05:12 PM    Glucose (POC) 114 02/02/2023 04:49 PM    BUN (POC) 19 01/14/2013 05:28 PM    Creatinine (POC) 0.86 01/28/2023 05:12 PM    Calcium, ionized (POC) 1.18 01/28/2023 05:12 PM     Lab Results   Component Value Date/Time    Bilirubin, total 0.4 01/29/2023 03:40 AM    ALT (SGPT) 49 01/29/2023 03:40 AM    Alk. phosphatase 146 (H) 01/29/2023 03:40 AM    Protein, total 8.4 (H) 01/29/2023 03:40 AM    Albumin 2.9 (L) 01/29/2023 03:40 AM    Globulin 5.5 (H) 01/29/2023 03:40 AM     Records from [facility] reviewed and summarized above. Test results above have been reviewed. Imaging has been personally reviewed and discussed with patient. Assessment:     #) Poorly differentiated malignancy of unknown primary:  Presented with LUE > RUE weakness  MRI C-spike with osseous lesion at C5 and C7 with epidural spread of tumor and canal compression  MRI brain with no intracranial mets. Possible osseous mets in skull/cervical spine  Staging CT CAP with scattered lung nodules (largest 1 cm), possible liver metastases, retroperitoneal adenopathy, multiple osseous lesions (C5 - C7 epidural mass, L5 soft tissue enhancing mass, T10/T11 soft tissue mass, left scapula soft tissue mass)    Biopsy from L5 bone lesion confirms poorly differentiated malignancy, IHC pending    Discussed with patient the diagnosis of metastatic cancer. Unclear primary site. Will follow up IHC stains  Patient has significant smoking history as well as possible history of cirrhosis  Has never had screening colonoscopy. CEA wnl. AFP significantly elevated 127,000. Recommend MRI liver protocol to further evaluate hepatic lesions. [facility] although patient does have evidence of cirrhosis on imaging. #) C5 and C7 Epidural Tumor with cervical myelopathy/radiculopathy: status post C5 corpectomy and tumor resection. #) Cirrhosis: noted on CT CAP. Possibly 2/2 EtOH use +/- fatty liver disease +/- chronic HBV infection      #) Thrombocytopenia, mild: likely due to undiagnosed cirrhosis    #) History of tobacco use: 25+ pack year history, quit ~6 months ago    #) History of alcohol abuse: currently sober    Plan:     Obtain MRI abdomen liver protocol  IHC pending - will follow up  Check Hepatitis B serologies   Oncology will continue to follow along    Thank you for involving me in the care of this patient.     Signed By: Sneha Hernandez MD

## 2023-02-02 NOTE — PROGRESS NOTES
Problem: Mobility Impaired (Adult and Pediatric)  Goal: *Acute Goals and Plan of Care (Insert Text)  Description: FUNCTIONAL STATUS PRIOR TO ADMISSION: Patient was independent and active without use of DME.    HOME SUPPORT PRIOR TO ADMISSION: The patient lived alone with no local to provide assistance. Physical Therapy Goals  Initiated 2/1/2023  1. Patient will move from supine to sit and sit to supine  in bed with modified independence within 7 day(s). 2.  Patient will transfer from bed to chair and chair to bed with modified independence using the least restrictive device within 7 day(s). 3.  Patient will perform sit to stand with modified independence within 7 day(s). 4.  Patient will ambulate with supervision/set-up for 150 feet with the least restrictive device within 7 day(s). 5.  Patient will ascend/descend 14 stairs with one handrail(s) with minimal assistance/contact guard assist within 7 day(s). Outcome: Progressing Towards Goal   PHYSICAL THERAPY TREATMENT  Patient: Rakan Child (4 y.o. male)  Date: 2/2/2023  Diagnosis: Acute myelopathy ([facility] [address] [G95.9] Cervical myelopathy with cervical radiculopathy (HCC)  Procedure(s) (LRB):  CERVICAL C5 CORPECTOMY, ARTHRODESIS WITH ALLOGRAFT, AUTOGRAFT AND ANTERIOR PLATE M4-O9 (N/A)  CYSTOSCOPY (N/A) 2 Days Post-Op  Precautions: Fall No bending, no lifting greater than 5 lbs, no twisting, log-roll technique, repositioning every 20-30 min except when sleeping, brace when OOB (if ordered)  Chart, physical therapy assessment, plan of care and goals were reviewed. ASSESSMENT  Patient continues with skilled PT services and is progressing towards goals. Patient received in chair, continuing to report pain in BUE. Pt demonstrated improvements in activity tolerance and able to ambulate increased distance. Pt required cues to relax shoulders and maintain rolling walker closer. Pt returned to room and performed seated exercises.  Continue to anticipate need for physical assistance upon discharge due to BUE weakness and pt below baseline for mobility. Current Level of Function Impacting Discharge (mobility/balance): CGA ambulation    Other factors to consider for discharge: fall risk, BUE weakness, cervical corpectomy          PLAN :  Patient continues to benefit from skilled intervention to address the above impairments. Continue treatment per established plan of care. to address goals. Recommendation for discharge: (in order for the patient to meet his/her long term goals)  Therapy 3 hours per day 5-7 days per week    This discharge recommendation:  Has been made in collaboration with the attending provider and/or case management    IF patient discharges home will need the following DME: rolling walker       SUBJECTIVE:   Patient stated I am doing better.     OBJECTIVE DATA SUMMARY:   Critical Behavior:  Neurologic State: Alert  Orientation Level: Oriented X4  Cognition: Appropriate for age attention/concentration  Safety/Judgement: Awareness of environment, Fall prevention    Spinal diagnosis intervention:  The patient required minimal cues to maintain back precautions during functional activity. Functional Mobility Training:    Bed Mobility:  Log                   Transfers:  Sit to Stand: Minimum assistance  Stand to Sit: Minimum assistance                             Balance:  Sitting: Impaired; Without support  Sitting - Static: Good (unsupported)  Sitting - Dynamic: Fair (occasional)  Standing: Impaired; With support  Standing - Static: Good  Standing - Dynamic : Fair;Constant support  Ambulation/Gait Training:  Distance (ft): 60 Feet (ft) (x2)  Assistive Device: Gait belt;Walker, rolling  Ambulation - Level of Assistance: Contact guard assistance        Gait Abnormalities: Antalgic;Decreased step clearance        Base of Support: Narrowed     Speed/Valorie: Pace decreased (<100 feet/min); Slow  Step Length: Left shortened;Right shortened Therapeutic Exercises:   Pt performed seated B ankle pumps, LAQ and marches x10. Provided pt with written handout    Activity Tolerance:   Good    After treatment patient left in no apparent distress:   Sitting in chair and Call bell within reach    COMMUNICATION/COLLABORATION:   The patients plan of care was discussed with: Occupational therapist, Registered nurse, and Case management.      Alex Gan PT   Time Calculation: 27 mins

## 2023-02-02 NOTE — PROGRESS NOTES
Patient: Tonia Garrison MRN: [id number]  SSN: [id number]    YOB: 1955  Age: 69 y.o. Sex: male        ADMITTED: 2023 to Noemi Riedel D, NP by Amarilis Rodriguez MD for Acute myelopathy ([facility] [G95.9]  POD# 2 Days Post-Op Procedure(s):  CERVICAL C5 CORPECTOMY, ARTHRODESIS WITH ALLOGRAFT, AUTOGRAFT AND ANTERIOR PLATE Y9-H5  CYSTOSCOPY    Tonia Garrison is doing fair he is  complaining of shoulder pain . Pt in chair ,nurse reports urine with brown sediments , urine visualized in tubing tea colored . Urine in bag light cheery colored . Discussed with nurse when to flush bladder and encouraged pt to increase po hydration . Vss afebrile   No new labs     Vitals: Temp (24hrs), Av.2 °F (36.8 °C), Min:97.8 °F (36.6 °C), Max:98.6 °F (37 °C)    Blood pressure 137/80, pulse 68, temperature 97.8 °F (36.6 °C), resp. rate 16, height 6' 2\" (1.88 m), weight 133.7 kg (294 lb 12.1 oz), SpO2 98 %. Intake and Output:   1901 -  0700  In: -   Out: 9269 [Urine:1400; Drains:15]  No intake/output data recorded. TREVA Output lats 24 hrs: No data found. TREVA Output last 8 hrs: No data found. BM over last 24 hrs: No data found. Exam:   Gen: NAD  CV: extremities well perfused  Lungs: nonlabored respirations.  Symmetric chest expansion  Ext: no edema  Abdomen: soft NTND no flank or cva tenderness   : mills      Labs:  CBC:   Lab Results   Component Value Date/Time    WBC 8.3 2023 02:47 AM    HCT 45.8 2023 02:47 AM    PLATELET 796 (L)  02:47 AM     BMP:   Lab Results   Component Value Date/Time    Glucose 144 (H) 2023 02:47 AM    Glucose 85 2020 04:24 AM    Sodium 133 (L) 2023 02:47 AM    Potassium 4.4 2023 02:47 AM    Chloride 102 2023 02:47 AM    CO2 27 2023 02:47 AM    BUN 23 (H) 2023 02:47 AM    Creatinine 0.81 2023 02:47 AM    Calcium 8.9 2023 02:47 AM     Cultures: N/A    Imaging: N/A  Assessment/Plan:     1. . Gross hematuria likely related to mills trauma - resolved        - keep  mills for 3-5 days  preferably 5 days if pt still in house            Urology will sign off for now     D/w Dr Abdifatah Spain By: Bridgett Nicholson.  Kayy Martínez, TANA - February 2, 2023

## 2023-02-02 NOTE — PROGRESS NOTES
Spine Surgery Progress Note  Jenn Angulo PA-C    Admit Date: 2023   LOS: 5 days        Daily Progress Note: 2023    HPI: Mr. Ruthie Adair is a pleasant 67yo gentleman with a history of T2DM, anxiety/depression, and hypertension who presented to [facility] with neck pain for 2 weeks that radiates into his shoulders and is associated with BUE weakness, LUE>RUE. Imaging revealed \"Metastatic disease to the cervical spine at C5 and C7 with extraosseous spread of tumor. At C5 there is collapse of the vertebral body and epidural spread of tumor resulting in significant canal compression. The epidural spread of tumor extends cranially on the left to C4 and fills both C4-5 and C5-6 foramen. The epidural tumor at both C5 and C7 distorts the adjacent vertebral arteries. \" Patient was transferred to [facility]. He has no known cancer diagnosis. Pt denies bowel and bladder incontinence. Pt underwent C5 corpectomy, arthrodesis, and anterior cervical plating on 23. He tolerated the surgery well. Subjective:     Pt sitting up comfortably in a chair. Complaining of severe bilat shoulder pain. Motor unchanged. Swallowing without issue. Objective:     Vital signs  Temp (24hrs), Av.4 °F (36.9 °C), Min:97.8 °F (36.6 °C), Max:99.2 °F (37.3 °C)   No intake/output data recorded.    1901 -  0700  In: -   Out: 5309 [Urine:1400; Drains:15]    Visit Vitals  BP (!) 143/81 (BP 1 Location: Left lower arm, BP Patient Position: At rest;Sitting)   Pulse 70   Temp 99.2 °F (37.3 °C)   Resp 16   Ht 6' 2\" (1.88 m)   Wt 133.7 kg (294 lb 12.1 oz)   SpO2 94%   BMI 37.84 kg/m²    O2 Flow Rate (L/min): 2 l/min O2 Device: None (Room air)     Output (mL)  Urine Voided: 325 ml (23 1744)  Last Bowel Movement Date: 23 (23 0840)     Pain control  Pain Assessment  Pain Scale 1: Numeric (0 - 10)  Pain Intensity 1: 9  Pain Onset 1: post opt  Pain Location 1: Neck  Pain Orientation 1: Left  Pain Description 1: Aching  Pain Intervention(s) 1: Medication (see MAR)    PT/OT  Gait     Gait  Base of Support: Narrowed  Speed/Valorie: Pace decreased (<100 feet/min), Slow  Step Length: Left shortened, Right shortened  Gait Abnormalities: Antalgic, Decreased step clearance  Ambulation - Level of Assistance: Contact guard assistance  Distance (ft): 60 Feet (ft) (x2)  Assistive Device: Gait belt, Walker, rolling        Physical Exam:  Gen: No acute distress. Neuro: A&Ox3. Follows commands. Speech clear. Affect normal.  PERRL. EOMI. Face symmetric. Strength 3/5 in Right deltoid/bicep. 4/5 right . LUE bicep/deltoid strength 0/5. Strength 4/5 triceps, wrist, intrinsics. Sensation intact. No clonus. Gait deferred. Incision C/D/I    24 hour results:    Recent Results (from the past 24 hour(s))   GLUCOSE, POC    Collection Time: 02/01/23  4:42 PM   Result Value Ref Range    Glucose (POC) 130 (H) 65 - 117 mg/dL    Performed by [address] [place] [address],[address], POC    Collection Time: 02/01/23 11:23 PM   Result Value Ref Range    Glucose (POC) 106 65 - 117 mg/dL    Performed by [facility]  [address], POC    Collection Time: 02/02/23  7:03 AM   Result Value Ref Range    Glucose (POC) 123 (H) 65 - 117 mg/dL    Performed by [facility]  [address], POC    Collection Time: 02/02/23 12:12 PM   Result Value Ref Range    Glucose (POC) 121 (H) 65 - 117 mg/dL    Performed by Eleanor Spain         Assessment:     Principal Problem:    Cervical myelopathy with cervical radiculopathy ([facility] [address] (1/29/2023)    Active Problems:    Acute myelopathy ([facility] [address] (1/29/2023)    Plan:     Patient with metastatic disease to spine. Mets to lungs, lumbar spine, and liver seen on additional imaging. S/p C5 corpectomy and fusion 01/31  Intraop op pathology pending. L5 bx patholgy \"Poorly differentiated malignancy, pending immunohistochemical stains\"  Oncology following. Continue pain control.    PT/OT - no need for brace  Drain d/c'd 2/2  Urology following for blood in mills  Medical mgmt per primary team  Will need IPR    Plan d/w Dr. Lianna Tapia, PA

## 2023-02-02 NOTE — CONSULTS
[address], MD, FACP, [address] [place], [place]      CAROLA Barrett, PCNP-BC   Javi Pavon, [facility]-   Elvira Mcmullen, LISE Almazan, FNPKAREN Klein Angelica, PCNP-BC      [address]   at [facility]   [address] [facility], [address]   [phone]   FAX: [phone]  [facility] [place] [facility]   at [address], [address] [place] [address], [address]   [phone]   FAX: [phone]       HEPATOLOGY CONSULT NOTE  I was asked to see this patient in consultation for management of [facility] [address] I have reviewed the Emergency room note, Hospital admission note, Notes by all other physicians who have seen the patient during this hospitalization to date. I have reviewed the problem list, all past medical history and the reason for this hospitalization. I have reviewed the allergies and the medications the patient was taking at home prior to this hospitalization. HISTORY:  The patient is a 69 y.o. male who was found to have chronic HBV in 3/2020. He was never treated with anti-viral therapy for HBV. He has probably had cirrhosis dating back to 2010 since he has had persistent thrombocytopenia since at least then. He has a history of drug abuse and numerous drug tox screens that are intermittently positive for cocaine. He came to the ED because of pain in the neck and shoulders. He had weakness of the upper arms and could not raise his arms. A CT scan demonstrated a mass in C5 with spinal cord compression. He underwent surgical decompression. Biopsy of he mass was positive for poorly differentiated cancer. Cell type to be defined. CT scan abdomen demonstrated cirrhosis and multiple liver mass lesions. Multiple pulmonary nodules.   Metasastic bone disease to spine including L5.    ,000!!!! Hepatology Plan:  HBV serologies to include HBV DNA, HBEantigen, anti-HBE  Anti-HCV  Will consider candidate for immune therapy. Dynamic MRI of liver    ASSESSMENT AND PLAN:  Cirrhosis  The diagnosis of cirrhosis is based upon imaging, laboratory studies, complications of cirrhosis. Cirrhosis is secondary to chronic HBV and alcohol. The CTP is 7. Child class B. The MELD score is 10. Chronic HBV  Chronic HBV with cirrhosis. The immune status of HBV is undefined at this time. Will obtain serology for HBV  Will obtain serology for HCV    The patient is not currently receiving treatment for HBV. Will perform imaging of the liver with dynamic MRI. There is no reason to perform liver biopsy at this time. Hepatocellular carcinoma   The diagnosis was made in 2/2023 by multiple liver masses and an AFP > 147,000. At diagnosis the [address] metastatic to spine, lungs. Only treatment option will be immune therapy. Screening for Esophageal varices   The patient has not had an EGD to screen for varices. Will do this Friday afternoon. NPO Friday. Hepatic encephalopathy   Overt HE has not developed to date. There is no reason for treatment with lactulose of xifaxan  There is no need to restrict dietary protein at this time. Thrombocytopenia   This is secondary to cirrhosis. There is no evidence of overt bleeding. No treatment is required. The platelet count is adequate for the patient to undergo procedures without the need for platelet transfusion or platelet growth factors. SYSTEM REVIEW NOT RELATED TO LIVER DISEASE OR REVIEWED ABOVE:  Constitution systems: Negative for fever, chills, weight gain, weight loss. Eyes: Negative for visual changes. ENT: Negative for sore throat, painful swallowing. Respiratory: Negative for cough, hemoptysis, SOB. Cardiology: Negative for chest pain, palpitations. GI:  Negative for constipation or diarrhea.   : Negative for urinary frequency, dysuria, hematuria, nocturia. Skin: Negative for rash. Hematology: Negative for easy bruising, blood clots. Musculo-skelatal: Cannot raise arms above shoulders. Neurologic: Negative for headaches, dizziness, vertigo, memory problems not related to HE. Psychology: Negative for anxiety, depression. FAMILY HISTORY:  The father  of CHF. The mother  of Lymphoma. There is no family history of liver disease. SOCIAL HISTORY:  The patient has never been . The patient has 5 children, and 7 grandchildren. The patient currently smokes a few cigarettes daily. The patient has previously consumed alcohol in excess. The patient has been abstinent from alcohol since   The patient used to work as small. PHYSICAL EXAMINATION:  VS: per nursing note  General:  No acute distress. Eyes:  Sclera anicteric. ENT:  No oral lesions. Thyroid normal.  Nodes:  No adenopathy. Skin:  No spider angiomata. No jaundice. Respiratory:  Lungs clear to auscultation. Cardiovascular:  Regular heart rate. Abdomen:  Soft non-tender, No obvious ascites. Extremities:  2+ lower extremity edema. Unable to move upper arms. Neurologic:  Alert and oriented. Cranial nerves grossly intact. No asterixis.         LABORATORY:   Latest Reference Range & Units 23 03:40 23 05:56 23 05:59 23 02:47   WBC 4.1 - 11.1 K/uL 9.7  6.2 8.3   HGB 12.1 - 17.0 g/dL 14.7  14.7 14.7   PLATELET 369 - 484 K/uL 135 (L)  111 (L) 117 (L)   INR 0.9 - 1.1    1.2 (H)     Sodium 136 - 145 mmol/L 131 (L) 133 (L) 132 (L) 133 (L)   Potassium 3.5 - 5.1 mmol/L 4.5 4.3 4.2 4.4   Chloride 97 - 108 mmol/L 103 102 100 102   CO2 21 - 32 mmol/L 24 26 28 27   Glucose 65 - 100 mg/dL 139 (H) 96 112 (H) 144 (H)   BUN 6 - 20 MG/DL 28 (H) 32 (H) 27 (H) 23 (H)   Creatinine 0.70 - 1.30 MG/DL 0.83 0.94 0.76 0.81   Bilirubin, total 0.2 - 1.0 MG/DL 0.4      Albumin 3.5 - 5.0 g/dL 2.9 (L)      ALT 12 - 78 U/L 49      AST 15 - 37 U/L 142 (H)      Alk.  phosphatase 45 - 117 U/L 146 (H)      (L): Data is abnormally low  (H): Data is abnormally high      Junior Raisa MD   [address] [address] [address] [place] [address], [facility] [address]. [phone]  FAX:  [phone]

## 2023-02-02 NOTE — PROGRESS NOTES
Problem: Falls - Risk of  Goal: *Absence of Falls  Description: Document Deleon Pancoast Fall Risk and appropriate interventions in the flowsheet.   Outcome: Progressing Towards Goal  Note: Fall Risk Interventions:

## 2023-02-02 NOTE — PROGRESS NOTES
Problem: Self Care Deficits Care Plan (Adult)  Goal: *Acute Goals and Plan of Care (Insert Text)  Description: FUNCTIONAL STATUS PRIOR TO ADMISSION: Patient was independent and active without use of DME.     HOME SUPPORT: The patient lived alone with no local support. Occupational Therapy Goals  Initiated 2/1/2023  1. Patient will perform self-feeding with minimal assistance within 7 day(s). 2.  Patient will perform grooming with minimal assistance within 7 day(s). 3.  Patient will perform anterior neck to thigh bathing with moderate assistance  within 7 day(s). 4.  Patient will perform toilet transfers to/from with minimal assistance within 7 day(s). 5.  Patient will perform all aspects of toileting with moderate assistance within 7 day(s). 6.  Patient will participate in upper extremity therapeutic exercise/activities with moderate assistance  for 5 minutes within 7 day(s). 7.  Patient will utilize energy conservation techniques during functional activities with verbal cues within 7 day(s). Outcome: Progressing Towards Goal   OCCUPATIONAL THERAPY TREATMENT  Patient: Alex Leroy (17 y.o. male)  Date: 2/2/2023  Diagnosis: Acute myelopathy ([facility] [address] [G95.9] Cervical myelopathy with cervical radiculopathy ([facility])  Procedure(s) (LRB):  CERVICAL C5 CORPECTOMY, ARTHRODESIS WITH ALLOGRAFT, AUTOGRAFT AND ANTERIOR PLATE B4-G9 (N/A)  CYSTOSCOPY (N/A) 2 Days Post-Op  Precautions: Fall  Chart, occupational therapy assessment, plan of care, and goals were reviewed. ASSESSMENT  Patient continues with skilled OT services and is progressing towards goals. Pt was received sitting in chair and agreeable to therapy. Pt was min x2 for sit to stand with use of walker. Pt was able to demonstrate functional mobility to bathroom with min A. Pt was sit to stand min A.  Pt ws educated on self assisted prom for flexion/extension of elbow, finger to thumb coordination, and finger extension exercises to reduce possibility of swelling in UE. Pt was given different levels of therapy balls to squeeze for increased/sustained handgrip strength. Pt was propped up in chair with BUE elevated for relief and prevention of swelling, in addition for hot packs for his shoulders. Nurse was made aware of pt's complaints of pain. Pt was sitting in chair with all needs met and call bell in reach. Pt is progressing towards goals but would still benefit from skill OT while admitted in the hospital. In addition, pt would benefit from IPR to increase independence, bilateral coordination, and strength with BADLS and IADLS. Current Level of Function Impacting Discharge (ADLs): Pt is currently demonstrating difficulty with fine, gross, and bilateral UE movements that are impacting badls and iadls     Other factors to consider for discharge: weakness decreased bilateral coordination, lives alone         PLAN :  Patient continues to benefit from skilled intervention to address the above impairments. Continue treatment per established plan of care to address goals. Recommend with staff: Ivelisse Flatness for meals    Recommend next OT session: BSC transfers     Recommendation for discharge: (in order for the patient to meet his/her long term goals)  Therapy 3 hours per day 5-7 days per week    This discharge recommendation:  Has not yet been discussed the attending provider and/or case management    IF patient discharges home will need the following DME: TBD       SUBJECTIVE:   Patient stated My shoulder hurts.     OBJECTIVE DATA SUMMARY:   Cognitive/Behavioral Status:  Neurologic State: Alert  Orientation Level: Oriented X4  Cognition: Appropriate decision making; Appropriate safety awareness             Functional Mobility and Transfers for ADLs:  Bed Mobility:       Transfers:  Sit to Stand: Minimum assistance          Balance:  Sitting: Intact; Without support  Sitting - Static: Fair (occasional)  Sitting - Dynamic: Fair (occasional)  Standing: Impaired; With support  Standing - Static: Fair  Standing - Dynamic : Fair;Constant support    ADL Intervention:  Feeding  Drink to Mouth: Set-up              Type of Bath: Chlorhexidine (CHG)                   Toileting  Toileting Assistance: Total assistance(dependent)  Bladder Hygiene: Total assistance (dependent) ([place])         Therapeutic Exercises:    strength: squeezing different levels of therapy balls  Pt educated on prom elbow flex/extension for LUE, finger exercises(finger to thumb for opposition)    Pain:  Pt complained about pain in L shoulder, nurse was made aware    Activity Tolerance:   Fair and requires rest breaks    After treatment patient left in no apparent distress:   Sitting in chair and Call bell within reach    COMMUNICATION/COLLABORATION:   The patients plan of care was discussed with: Physical therapist and Registered nurse.      Brandy Yee OT  Time Calculation: 22 mins

## 2023-02-02 NOTE — PROGRESS NOTES
[facility] [facility] Adult  Hospitalist Group                                                                                          Hospitalist Progress Note  Charlotte Mota NP  Answering service: [phone] from in house phone        Date of Service:  2023  NAME:  Tonia Garrison  :  1955  MRN:  [id number]       Admission Summary: This is a 75-year-old man with past medical history significant for type 2 diabetes, anxiety/depression, and hypertension; presented at [facility] emergency room with neck pain. This has been going on for a couple of weeks. The pain is located at the back of the neck. The patient described the pain as severe 10/10 in severity with no history of trauma to the neck. No known aggravating or relieving factors. The pain is dull ache. He was seen by the orthopedic surgeon. The patient was told that he probably has a pinched nerve and outpatient MRI was ordered. The appointment for the outpatient MRI is not until this coming Monday. Because of persistent pain, the patient came to the emergency room. The pain has now become associated with inability to lift the left arm off the bed without difficulties, but the patient denies pain in the left arm. Just some weakness. The patient has had a CT scan of the neck done which shows significant degenerative changes. When the patient arrived at the emergency room, CT scan of the head was obtained. This was negative for acute pathology. The neurosurgeon on-call at [facility] was consulted by the emergency room physician and decision was made to transfer the patient to [facility] for MRI of the neck and also for further evaluation by the neurosurgeon. The hospitalist service was asked to directly admit the patient from [facility] emergency room to [facility] for that purpose.       Interval history / Subjective:   Pt was tearful as c/o severe neck and right shoulder pain  No N/V       Assessment & Plan:     Cervical cord tumor with cord compression  s/p C5 corpectomy, arthrodesis with cage and anterior cervical plate 6/72   - MRI c spine: Metastatic disease to the cervical spine at C5 and C7 with extraosseous  spread of tumor. At C5 there is collapse of the vertebral body and epidural spread of tumor resulting in significant canal compression. The epidural spread of tumor extends cranially on the left to C4 and fills both C4-5 and C5-6 foramen. The epidural tumor at both C5 and C7 distorts the adjacent vertebral arteries     metastatic disease to spine. Mets to lungs, lumbar spine, and liver seen on additional imaging. Neurosurgery following; no need for brace, plan to remove drainage today     Post op care per neurosurgery team     Continue with IV dilaudid as need for pain     Continue with flexeril for muscle spasm    Metastatic disease with unknown primary   Thrombocytopenia  Elevated LFT  - CT: Osseous metastatic disease. The L5 spinous process mass is amenable to  nonemergent CT-guided percutaneous biopsy if clinically indicated. Pulmonary nodules. Largest nodule is 1 cm in the posterior basilar left lower lobe. Metastatic disease is most likely. Retroperitoneal lymphadenopathy is likely due to metastatic disease. Possible hepatic metastatic disease    MRI brain: No evidence of intracranial metastatic disease. Oncology following;     CT guided L5 biopsy (1/30) Poorly differentiated malignancy    (+) AFB tumor marker    CEA (-)    - concerned with possible  HCC vs prostate cancer, Liver: Heterogeneous. Possible hypoattenuating masses. Cirrhotic surface from CT of abd/pel. In 2011 pt had prostate bx done for bph, at that time the bx was benign prostate tissue, Prostate Ag 4.9, mildly elevated it.        please call heme/onc to follow up on this pt; last seen on 1/30.         Send acute hepatitis panel & PSA screening (d/w urology team)       Consult for hepatology for evaluate              LFT panel was added to am lab       Daily cmp    Hematuria due to mills trauma  - pt had difficulty to place mills in OR    Urology following; mills in place, flush with saline PRN. Recommend to remove mills cath in the morning of discharge day or remove in 5 days whichever comes first.    Hyponatremia; maintaining at 133  - most likely due to volume depletion vs SIADH    S/p IVF    Controlled Type 2 diabetes  - A1c 5.9. C/w ISS    Hx alcohol abuse     Anxiety/depression:  c/w seroquel, zoloft, trazodone   Obesity BMI of 37.84. Diet and exercise advised. .     Code status: Full. Patient wants his daughter Joan Conroy is first Farhana Girard, daughter Frantz Nagy is second ad. He is  with his wife Eneida. AMD needs to be filled and he is agreeable -  consulted. Prophylaxis: Lovenox on hold   Care Plan discussed with: pt, pt's RN, CM, and Dr. Michael Chavez  Anticipated Disposition:  TBD. IPR when ready   Inpatient  Cardiac monitoring: Telemetry     Hospital Problems  Date Reviewed: 1/29/2023            Codes Class Noted POA    * (Principal) Cervical myelopathy with cervical radiculopathy (HCC) ICD-10-CM: G95.9, M54.12  ICD-9-CM: 721.1  1/29/2023 Yes        Acute myelopathy (HCC) ICD-10-CM: G95.9  ICD-9-CM: 336.9  1/29/2023 Unknown         Social Determinants of Health     Tobacco Use: Medium Risk    Smoking Tobacco Use: Former    Smokeless Tobacco Use: Never    Passive Exposure: Not on file   Alcohol Use: Not on file   Financial Resource Strain: Not on file   Food Insecurity: Not on file   Transportation Needs: Not on file   Physical Activity: Not on file   Stress: Not on file   Social Connections: Not on file   Intimate Partner Violence: Not on file   Depression: Not on file   Housing Stability: Not on file       Review of Systems:   A comprehensive review of systems was negative except for that written in the HPI. Vital Signs:    Last 24hrs VS reviewed since prior progress note.  Most recent are:  Visit Vitals  /80 (BP 1 Location: Right upper arm, BP Patient Position: At rest;Sitting)   Pulse 63   Temp 97.8 °F (36.6 °C)   Resp 16   Ht 6' 2\" (1.88 m)   Wt 133.7 kg (294 lb 12.1 oz)   SpO2 98%   BMI 37.84 kg/m²         Intake/Output Summary (Last 24 hours) at 2/2/2023 0809  Last data filed at 2/2/2023 4954  Gross per 24 hour   Intake --   Output 765 ml   Net -765 ml          Physical Examination:     I had a face to face encounter with this patient and independently examined them on 2/2/2023 as outlined below:          Constitutional:  No acute distress, cooperative, pleasant    ENT:  Oral mucosa moist, oropharynx benign. Resp:  CTA bilaterally. No wheezing/rhonchi/rales. No accessory muscle use. CV:  Regular rhythm, normal rate, no murmurs, gallops, rubs    GI:  Soft, non distended, non tender. normoactive bowel sounds, no hepatosplenomegaly     Musculoskeletal:  No edema, warm, 2+ pulses throughout    Neurologic:   AAOx2, Right arm 3/5 strength             Data Review:    I personally reviewed  Image    I have independently reviewed and interpreted patient's lab and all other diagnostic data    Labs:     Recent Labs     02/01/23  0247 01/31/23  0559   WBC 8.3 6.2   HGB 14.7 14.7   HCT 45.8 44.6   * 111*       Recent Labs     02/01/23  0247 01/31/23  0559   * 132*   K 4.4 4.2    100   CO2 27 28   BUN 23* 27*   CREA 0.81 0.76   * 112*   CA 8.9 8.6       No results for input(s): ALT, AP, TBIL, TBILI, TP, ALB, GLOB, GGT, AML, LPSE in the last 72 hours. No lab exists for component: SGOT, GPT, AMYP, HLPSE    Recent Labs     01/30/23  0828   INR 1.2*   PTP 12.3*   APTT 29.1        No results for input(s): FE, TIBC, PSAT, FERR in the last 72 hours. No results found for: FOL, RBCF   No results for input(s): PH, PCO2, PO2 in the last 72 hours. No results for input(s): CPK, CKNDX, TROIQ in the last 72 hours.     No lab exists for component: CPKMB  Lab Results Component Value Date/Time    Cholesterol, total 141 03/22/2020 04:24 AM    HDL Cholesterol 73 03/22/2020 04:24 AM    LDL, calculated 53.8 03/22/2020 04:24 AM    Triglyceride 71 03/22/2020 04:24 AM    CHOL/HDL Ratio 1.9 03/22/2020 04:24 AM     Lab Results   Component Value Date/Time    Glucose (POC) 123 (H) 02/02/2023 07:03 AM    Glucose (POC) 106 02/01/2023 11:23 PM    Glucose (POC) 130 (H) 02/01/2023 04:42 PM    Glucose (POC) 136 (H) 02/01/2023 11:48 AM    Glucose (POC) 111 02/01/2023 06:35 AM     Lab Results   Component Value Date/Time    Color YELLOW/STRAW 01/29/2023 11:11 AM    Appearance CLOUDY (A) 01/29/2023 11:11 AM    Specific gravity 1.028 01/29/2023 11:11 AM    Specific gravity 1.023 08/19/2021 06:39 AM    pH (UA) 6.5 01/29/2023 11:11 AM    Protein Negative 01/29/2023 11:11 AM    Glucose Negative 01/29/2023 11:11 AM    Ketone Negative 01/29/2023 11:11 AM    Bilirubin Negative 01/29/2023 11:11 AM    Urobilinogen 1.0 01/29/2023 11:11 AM    Nitrites Positive (A) 01/29/2023 11:11 AM    Leukocyte Esterase Negative 01/29/2023 11:11 AM    Epithelial cells FEW 01/29/2023 11:11 AM    Bacteria 3+ (A) 01/29/2023 11:11 AM    WBC 0-4 01/29/2023 11:11 AM    RBC 0-5 01/29/2023 11:11 AM       Notes reviewed from all clinical/nonclinical/nursing services involved in patient's clinical care. Care coordination discussions were held with appropriate clinical/nonclinical/ nursing providers based on care coordination needs. Patients current active Medications were reviewed, considered, added and adjusted based on the clinical condition today. Home Medications were reconciled to the best of my ability given all available resources at the time of admission. Route is PO if not otherwise noted.       Medications Reviewed:     Current Facility-Administered Medications   Medication Dose Route Frequency    sodium chloride (NS) flush 5-40 mL  5-40 mL IntraVENous Q8H    sodium chloride (NS) flush 5-40 mL  5-40 mL IntraVENous PRN    acetaminophen (TYLENOL) tablet 650 mg  650 mg Oral Q6H    oxyCODONE IR (ROXICODONE) tablet 5 mg  5 mg Oral Q3H PRN    oxyCODONE IR (ROXICODONE) tablet 10 mg  10 mg Oral Q3H PRN    naloxone (NARCAN) injection 0.4 mg  0.4 mg IntraVENous PRN    ondansetron (ZOFRAN ODT) tablet 4 mg  4 mg Oral Q4H PRN    senna-docusate (PERICOLACE) 8.6-50 mg per tablet 1 Tablet  1 Tablet Oral BID    polyethylene glycol (MIRALAX) packet 17 g  17 g Oral DAILY    phenol throat spray (CHLORASEPTIC) 1 Spray  1 Spray Oral PRN    lidocaine 4 % patch 1 Patch  1 Patch TransDERmal Q24H    HYDROmorphone (DILAUDID) injection 1.5 mg  1.5 mg IntraVENous Q3H PRN    gabapentin (NEURONTIN) tablet 600 mg  600 mg Oral TID    hydrOXYzine HCL (ATARAX) tablet 50 mg  50 mg Oral Q6H PRN    QUEtiapine (SEROquel) tablet 200 mg  200 mg Oral QHS    sertraline (ZOLOFT) tablet 100 mg  100 mg Oral DAILY    traZODone (DESYREL) tablet 50 mg  50 mg Oral QHS    sodium chloride (NS) flush 5-40 mL  5-40 mL IntraVENous Q8H    sodium chloride (NS) flush 5-40 mL  5-40 mL IntraVENous PRN    acetaminophen (TYLENOL) tablet 650 mg  650 mg Oral Q6H PRN    Or    acetaminophen (TYLENOL) suppository 650 mg  650 mg Rectal Q6H PRN    polyethylene glycol (MIRALAX) packet 17 g  17 g Oral DAILY PRN    ondansetron (ZOFRAN ODT) tablet 4 mg  4 mg Oral Q8H PRN    Or    ondansetron (ZOFRAN) injection 4 mg  4 mg IntraVENous Q6H PRN    insulin lispro (HUMALOG) injection   SubCUTAneous AC&HS    glucose chewable tablet 16 g  4 Tablet Oral PRN    glucagon (GLUCAGEN) injection 1 mg  1 mg IntraMUSCular PRN    dextrose 10 % infusion 0-250 mL  0-250 mL IntraVENous PRN    naloxone (NARCAN) injection 0.4 mg  0.4 mg IntraVENous PRN    [Held by provider] enoxaparin (LOVENOX) injection 30 mg  30 mg SubCUTAneous Q12H     ______________________________________________________________________  EXPECTED LENGTH OF STAY: 3d 19h  ACTUAL LENGTH OF STAY:          5                 Jamil Greene NP

## 2023-02-03 ENCOUNTER — ANESTHESIA EVENT (OUTPATIENT)
Dept: ENDOSCOPY | Age: 68
DRG: 471 | End: 2023-02-03
Payer: MEDICARE

## 2023-02-03 ENCOUNTER — ANESTHESIA (OUTPATIENT)
Dept: ENDOSCOPY | Age: 68
DRG: 471 | End: 2023-02-03
Payer: MEDICARE

## 2023-02-03 LAB
ALBUMIN SERPL-MCNC: 2.6 G/DL (ref 3.5–5)
ALBUMIN/GLOB SERPL: 0.6 (ref 1.1–2.2)
ALP SERPL-CCNC: 156 U/L (ref 45–117)
ALT SERPL-CCNC: 43 U/L (ref 12–78)
ANION GAP SERPL CALC-SCNC: 6 MMOL/L (ref 5–15)
AST SERPL-CCNC: 199 U/L (ref 15–37)
BILIRUB SERPL-MCNC: 1 MG/DL (ref 0.2–1)
BUN SERPL-MCNC: 28 MG/DL (ref 6–20)
BUN/CREAT SERPL: 41 (ref 12–20)
CALCIUM SERPL-MCNC: 8.4 MG/DL (ref 8.5–10.1)
CHLORIDE SERPL-SCNC: 102 MMOL/L (ref 97–108)
CO2 SERPL-SCNC: 26 MMOL/L (ref 21–32)
CREAT SERPL-MCNC: 0.68 MG/DL (ref 0.7–1.3)
GLOBULIN SER CALC-MCNC: 4.1 G/DL (ref 2–4)
GLUCOSE BLD STRIP.AUTO-MCNC: 101 MG/DL (ref 65–117)
GLUCOSE BLD STRIP.AUTO-MCNC: 90 MG/DL (ref 65–117)
GLUCOSE BLD STRIP.AUTO-MCNC: 95 MG/DL (ref 65–117)
GLUCOSE BLD STRIP.AUTO-MCNC: 96 MG/DL (ref 65–117)
GLUCOSE SERPL-MCNC: 117 MG/DL (ref 65–100)
HAV IGM SER QL: NONREACTIVE
HBV CORE IGM SER QL: NONREACTIVE
HBV SURFACE AB SER QL: REACTIVE
HBV SURFACE AB SER-ACNC: 17.13 MIU/ML
HBV SURFACE AG SER QL: >1000 INDEX
HBV SURFACE AG SER QL: POSITIVE
HCV AB SERPL QL IA: NONREACTIVE
POTASSIUM SERPL-SCNC: 4 MMOL/L (ref 3.5–5.1)
PROT SERPL-MCNC: 6.7 G/DL (ref 6.4–8.2)
PSA SERPL-MCNC: 4 NG/ML (ref 0.01–4)
SERVICE CMNT-IMP: NORMAL
SODIUM SERPL-SCNC: 134 MMOL/L (ref 136–145)
SP1: ABNORMAL
SP2: ABNORMAL
SP3: ABNORMAL

## 2023-02-03 PROCEDURE — 97116 GAIT TRAINING THERAPY: CPT | Performed by: PHYSICAL THERAPIST

## 2023-02-03 PROCEDURE — 36415 COLL VENOUS BLD VENIPUNCTURE: CPT

## 2023-02-03 PROCEDURE — 77030021593 HC FCPS BIOP ENDOSC BSC -A: Performed by: INTERNAL MEDICINE

## 2023-02-03 PROCEDURE — 74011000250 HC RX REV CODE- 250: Performed by: INTERNAL MEDICINE

## 2023-02-03 PROCEDURE — 74011250637 HC RX REV CODE- 250/637: Performed by: NEUROLOGICAL SURGERY

## 2023-02-03 PROCEDURE — 97535 SELF CARE MNGMENT TRAINING: CPT

## 2023-02-03 PROCEDURE — 2709999900 HC NON-CHARGEABLE SUPPLY: Performed by: INTERNAL MEDICINE

## 2023-02-03 PROCEDURE — 74011250636 HC RX REV CODE- 250/636: Performed by: NURSE ANESTHETIST, CERTIFIED REGISTERED

## 2023-02-03 PROCEDURE — 82962 GLUCOSE BLOOD TEST: CPT

## 2023-02-03 PROCEDURE — 97110 THERAPEUTIC EXERCISES: CPT

## 2023-02-03 PROCEDURE — 43239 EGD BIOPSY SINGLE/MULTIPLE: CPT | Performed by: INTERNAL MEDICINE

## 2023-02-03 PROCEDURE — 74011000250 HC RX REV CODE- 250: Performed by: NURSE ANESTHETIST, CERTIFIED REGISTERED

## 2023-02-03 PROCEDURE — 74011250636 HC RX REV CODE- 250/636: Performed by: NURSE PRACTITIONER

## 2023-02-03 PROCEDURE — 76040000019: Performed by: INTERNAL MEDICINE

## 2023-02-03 PROCEDURE — 86707 HEPATITIS BE ANTIBODY: CPT

## 2023-02-03 PROCEDURE — 86706 HEP B SURFACE ANTIBODY: CPT

## 2023-02-03 PROCEDURE — 0FB04ZX EXCISION OF LIVER, PERCUTANEOUS ENDOSCOPIC APPROACH, DIAGNOSTIC: ICD-10-PCS | Performed by: INTERNAL MEDICINE

## 2023-02-03 PROCEDURE — 0DB78ZX EXCISION OF STOMACH, PYLORUS, VIA NATURAL OR ARTIFICIAL OPENING ENDOSCOPIC, DIAGNOSTIC: ICD-10-PCS | Performed by: INTERNAL MEDICINE

## 2023-02-03 PROCEDURE — 84153 ASSAY OF PSA TOTAL: CPT

## 2023-02-03 PROCEDURE — 80053 COMPREHEN METABOLIC PANEL: CPT

## 2023-02-03 PROCEDURE — 74011250637 HC RX REV CODE- 250/637: Performed by: INTERNAL MEDICINE

## 2023-02-03 PROCEDURE — 76060000031 HC ANESTHESIA FIRST 0.5 HR: Performed by: INTERNAL MEDICINE

## 2023-02-03 PROCEDURE — 86704 HEP B CORE ANTIBODY TOTAL: CPT

## 2023-02-03 PROCEDURE — 65270000046 HC RM TELEMETRY

## 2023-02-03 PROCEDURE — 88305 TISSUE EXAM BY PATHOLOGIST: CPT

## 2023-02-03 PROCEDURE — 80074 ACUTE HEPATITIS PANEL: CPT

## 2023-02-03 PROCEDURE — 87517 HEPATITIS B DNA QUANT: CPT

## 2023-02-03 PROCEDURE — 74011000250 HC RX REV CODE- 250: Performed by: NEUROLOGICAL SURGERY

## 2023-02-03 PROCEDURE — 87350 HEPATITIS BE AG IA: CPT

## 2023-02-03 PROCEDURE — 74011250637 HC RX REV CODE- 250/637: Performed by: NURSE PRACTITIONER

## 2023-02-03 PROCEDURE — 74011000250 HC RX REV CODE- 250: Performed by: PHYSICIAN ASSISTANT

## 2023-02-03 PROCEDURE — 99233 SBSQ HOSP IP/OBS HIGH 50: CPT | Performed by: INTERNAL MEDICINE

## 2023-02-03 PROCEDURE — 97530 THERAPEUTIC ACTIVITIES: CPT | Performed by: PHYSICAL THERAPIST

## 2023-02-03 PROCEDURE — 74011250636 HC RX REV CODE- 250/636: Performed by: PHYSICIAN ASSISTANT

## 2023-02-03 RX ORDER — MIDAZOLAM HYDROCHLORIDE 1 MG/ML
5-10 INJECTION, SOLUTION INTRAMUSCULAR; INTRAVENOUS
Status: DISCONTINUED | OUTPATIENT
Start: 2023-02-03 | End: 2023-02-03 | Stop reason: HOSPADM

## 2023-02-03 RX ORDER — NALOXONE HYDROCHLORIDE 0.4 MG/ML
0.4 INJECTION, SOLUTION INTRAMUSCULAR; INTRAVENOUS; SUBCUTANEOUS
Status: DISCONTINUED | OUTPATIENT
Start: 2023-02-03 | End: 2023-02-03 | Stop reason: HOSPADM

## 2023-02-03 RX ORDER — POLYETHYLENE GLYCOL 3350 17 G/17G
17 POWDER, FOR SOLUTION ORAL DAILY
Status: DISCONTINUED | OUTPATIENT
Start: 2023-02-03 | End: 2023-02-10 | Stop reason: HOSPADM

## 2023-02-03 RX ORDER — ATROPINE SULFATE 0.1 MG/ML
0.5 INJECTION INTRAVENOUS
Status: DISCONTINUED | OUTPATIENT
Start: 2023-02-03 | End: 2023-02-03 | Stop reason: HOSPADM

## 2023-02-03 RX ORDER — SODIUM CHLORIDE 9 MG/ML
INJECTION, SOLUTION INTRAVENOUS
Status: DISCONTINUED | OUTPATIENT
Start: 2023-02-03 | End: 2023-02-03 | Stop reason: HOSPADM

## 2023-02-03 RX ORDER — DEXTROMETHORPHAN/PSEUDOEPHED 2.5-7.5/.8
1.2 DROPS ORAL
Status: DISCONTINUED | OUTPATIENT
Start: 2023-02-03 | End: 2023-02-03 | Stop reason: HOSPADM

## 2023-02-03 RX ORDER — CYCLOBENZAPRINE HCL 10 MG
10 TABLET ORAL 3 TIMES DAILY
Status: DISCONTINUED | OUTPATIENT
Start: 2023-02-03 | End: 2023-02-09

## 2023-02-03 RX ORDER — SODIUM CHLORIDE 9 MG/ML
50 INJECTION, SOLUTION INTRAVENOUS CONTINUOUS
Status: DISPENSED | OUTPATIENT
Start: 2023-02-03 | End: 2023-02-03

## 2023-02-03 RX ORDER — FLUMAZENIL 0.1 MG/ML
0.2 INJECTION INTRAVENOUS
Status: DISCONTINUED | OUTPATIENT
Start: 2023-02-03 | End: 2023-02-03 | Stop reason: HOSPADM

## 2023-02-03 RX ORDER — FENTANYL CITRATE 50 UG/ML
50-200 INJECTION, SOLUTION INTRAMUSCULAR; INTRAVENOUS
Status: DISCONTINUED | OUTPATIENT
Start: 2023-02-03 | End: 2023-02-03 | Stop reason: HOSPADM

## 2023-02-03 RX ORDER — LIDOCAINE HYDROCHLORIDE 20 MG/ML
INJECTION, SOLUTION EPIDURAL; INFILTRATION; INTRACAUDAL; PERINEURAL AS NEEDED
Status: DISCONTINUED | OUTPATIENT
Start: 2023-02-03 | End: 2023-02-03 | Stop reason: HOSPADM

## 2023-02-03 RX ORDER — PROPOFOL 10 MG/ML
INJECTION, EMULSION INTRAVENOUS AS NEEDED
Status: DISCONTINUED | OUTPATIENT
Start: 2023-02-03 | End: 2023-02-03 | Stop reason: HOSPADM

## 2023-02-03 RX ORDER — PANTOPRAZOLE SODIUM 40 MG/1
40 TABLET, DELAYED RELEASE ORAL
Status: DISCONTINUED | OUTPATIENT
Start: 2023-02-03 | End: 2023-02-10 | Stop reason: HOSPADM

## 2023-02-03 RX ORDER — HYDROMORPHONE HYDROCHLORIDE 2 MG/ML
2 INJECTION, SOLUTION INTRAMUSCULAR; INTRAVENOUS; SUBCUTANEOUS
Status: DISCONTINUED | OUTPATIENT
Start: 2023-02-03 | End: 2023-02-09

## 2023-02-03 RX ORDER — SODIUM CHLORIDE 0.9 % (FLUSH) 0.9 %
5-40 SYRINGE (ML) INJECTION EVERY 8 HOURS
Status: DISCONTINUED | OUTPATIENT
Start: 2023-02-03 | End: 2023-02-06

## 2023-02-03 RX ORDER — SODIUM CHLORIDE 0.9 % (FLUSH) 0.9 %
5-40 SYRINGE (ML) INJECTION AS NEEDED
Status: DISCONTINUED | OUTPATIENT
Start: 2023-02-03 | End: 2023-02-10 | Stop reason: HOSPADM

## 2023-02-03 RX ORDER — EPINEPHRINE 0.1 MG/ML
1 INJECTION INTRACARDIAC; INTRAVENOUS
Status: DISCONTINUED | OUTPATIENT
Start: 2023-02-03 | End: 2023-02-03 | Stop reason: HOSPADM

## 2023-02-03 RX ADMIN — OXYCODONE HYDROCHLORIDE 10 MG: 5 TABLET ORAL at 21:59

## 2023-02-03 RX ADMIN — SENNOSIDES AND DOCUSATE SODIUM 1 TABLET: 50; 8.6 TABLET ORAL at 18:47

## 2023-02-03 RX ADMIN — POLYETHYLENE GLYCOL 3350 17 G: 17 POWDER, FOR SOLUTION ORAL at 10:16

## 2023-02-03 RX ADMIN — PANTOPRAZOLE SODIUM 40 MG: 40 TABLET, DELAYED RELEASE ORAL at 19:06

## 2023-02-03 RX ADMIN — FAMOTIDINE 20 MG: 10 INJECTION, SOLUTION INTRAVENOUS at 10:17

## 2023-02-03 RX ADMIN — PROPOFOL 40 MG: 10 INJECTION, EMULSION INTRAVENOUS at 17:58

## 2023-02-03 RX ADMIN — GABAPENTIN 600 MG: 600 TABLET, FILM COATED ORAL at 10:16

## 2023-02-03 RX ADMIN — HYDROMORPHONE HYDROCHLORIDE 2 MG: 2 INJECTION, SOLUTION INTRAMUSCULAR; INTRAVENOUS; SUBCUTANEOUS at 14:03

## 2023-02-03 RX ADMIN — OXYCODONE HYDROCHLORIDE 10 MG: 5 TABLET ORAL at 07:05

## 2023-02-03 RX ADMIN — QUETIAPINE FUMARATE 200 MG: 100 TABLET ORAL at 21:59

## 2023-02-03 RX ADMIN — ACETAMINOPHEN 650 MG: 325 TABLET ORAL at 18:47

## 2023-02-03 RX ADMIN — TRAZODONE HYDROCHLORIDE 50 MG: 50 TABLET ORAL at 21:59

## 2023-02-03 RX ADMIN — SERTRALINE HYDROCHLORIDE 100 MG: 50 TABLET ORAL at 10:15

## 2023-02-03 RX ADMIN — PROPOFOL 40 MG: 10 INJECTION, EMULSION INTRAVENOUS at 18:02

## 2023-02-03 RX ADMIN — OXYCODONE HYDROCHLORIDE 10 MG: 5 TABLET ORAL at 10:16

## 2023-02-03 RX ADMIN — LIDOCAINE HYDROCHLORIDE 40 MG: 20 INJECTION, SOLUTION EPIDURAL; INFILTRATION; INTRACAUDAL; PERINEURAL at 17:55

## 2023-02-03 RX ADMIN — POLYETHYLENE GLYCOL 3350 17 G: 17 POWDER, FOR SOLUTION ORAL at 10:18

## 2023-02-03 RX ADMIN — SODIUM CHLORIDE, PRESERVATIVE FREE 10 ML: 5 INJECTION INTRAVENOUS at 07:05

## 2023-02-03 RX ADMIN — GABAPENTIN 600 MG: 600 TABLET, FILM COATED ORAL at 22:01

## 2023-02-03 RX ADMIN — OXYCODONE HYDROCHLORIDE 10 MG: 5 TABLET ORAL at 18:47

## 2023-02-03 RX ADMIN — HYDROMORPHONE HYDROCHLORIDE 2 MG: 2 INJECTION, SOLUTION INTRAMUSCULAR; INTRAVENOUS; SUBCUTANEOUS at 22:22

## 2023-02-03 RX ADMIN — ACETAMINOPHEN 650 MG: 325 TABLET ORAL at 12:53

## 2023-02-03 RX ADMIN — CYCLOBENZAPRINE 10 MG: 10 TABLET, FILM COATED ORAL at 22:00

## 2023-02-03 RX ADMIN — SODIUM CHLORIDE, PRESERVATIVE FREE 10 ML: 5 INJECTION INTRAVENOUS at 22:00

## 2023-02-03 RX ADMIN — SENNOSIDES AND DOCUSATE SODIUM 1 TABLET: 50; 8.6 TABLET ORAL at 10:16

## 2023-02-03 RX ADMIN — SODIUM CHLORIDE: 900 INJECTION, SOLUTION INTRAVENOUS at 17:40

## 2023-02-03 RX ADMIN — KETOROLAC TROMETHAMINE 15 MG: 30 INJECTION, SOLUTION INTRAMUSCULAR; INTRAVENOUS at 07:05

## 2023-02-03 RX ADMIN — SODIUM CHLORIDE, PRESERVATIVE FREE 10 ML: 5 INJECTION INTRAVENOUS at 22:22

## 2023-02-03 RX ADMIN — PROPOFOL 80 MG: 10 INJECTION, EMULSION INTRAVENOUS at 17:55

## 2023-02-03 NOTE — PROGRESS NOTES
[address] [facility] Adult  Hospitalist Group                                                                                          Hospitalist Progress Note  Gabi Lebron NP  Answering service: [phone] OR [phone] from in house phone        Date of Service:  2/3/2023  NAME:  Jailyn Brown  :  1955  MRN:  [id number]       Admission Summary: This is a 38-year-old man with past medical history significant for type 2 diabetes, anxiety/depression, and hypertension; presented at [facility] emergency room with neck pain. This has been going on for a couple of weeks. The pain is located at the back of the neck. The patient described the pain as severe 10/10 in severity with no history of trauma to the neck. No known aggravating or relieving factors. The pain is dull ache. He was seen by the orthopedic surgeon. The patient was told that he probably has a pinched nerve and outpatient MRI was ordered. The appointment for the outpatient MRI is not until this coming Monday. Because of persistent pain, the patient came to the emergency room. The pain has now become associated with inability to lift the left arm off the bed without difficulties, but the patient denies pain in the left arm. Just some weakness. The patient has had a CT scan of the neck done which shows significant degenerative changes. When the patient arrived at the emergency room, CT scan of the head was obtained. This was negative for acute pathology. The neurosurgeon on-call at [facility] was consulted by the emergency room physician and decision was made to transfer the patient to [facility] for MRI of the neck and also for further evaluation by the neurosurgeon. The hospitalist service was asked to directly admit the patient from [facility] emergency room to [facility] for that purpose. Interval history / Subjective:     I saw the patient this morning on rounds.   With complaints of pain at the moment of the encounter was just about to work with physical therapy     Assessment & Plan:     Cervical cord tumor with cord compression  s/p C5 corpectomy, arthrodesis with cage and anterior cervical plate 6/54   MRI c spine: Metastatic disease to the cervical spine at C5 and C7 with extraosseous  spread of tumor. At C5 there is collapse of the vertebral body and epidural spread of tumor resulting in significant canal compression. The epidural spread of tumor extends cranially on the left to C4 and fills both C4-5 and C5-6 foramen. The epidural tumor at both C5 and C7 distorts the adjacent vertebral arteries     metastatic disease to spine. Mets to lungs, lumbar spine, and liver seen on additional imaging. I discussed the case with neurosurgery, no need for brace  Continuing multimodal pain control, he does have complaints of ongoing spasm therefore I will increase dosage of cyclobenzaprine. Also with pain, increasing time interval between dosages of hydromorphone          Metastatic disease with unknown primary   Thrombocytopenia  Elevated LFT  CT: Osseous metastatic disease. The L5 spinous process mass is amenable to  nonemergent CT-guided percutaneous biopsy if clinically indicated. Pulmonary nodules. Largest nodule is 1 cm in the posterior basilar left lower lobe. Metastatic disease is most likely. Retroperitoneal lymphadenopathy is likely due to metastatic disease. Possible hepatic metastatic disease    MRI brain: No evidence of intracranial metastatic disease. Oncology following;     CT guided L5 biopsy (1/30) Poorly differentiated malignancy    (+) AFB tumor marker    CEA (-)    - concerned with possible  HCC vs prostate cancer, Liver: Heterogeneous. Possible hypoattenuating masses. Cirrhotic surface from CT of abd/pel. In 2011 pt had prostate bx done for bph, at that time the bx was benign prostate tissue, Prostate Ag 4.9, mildly elevated it.           Hepatology has evaluated, I reviewed hepatology notes  ,000  Patient with hepatitis B not currently on treatment  LFTs uptrending, , alk phos 156 mildly up from yesterday      Hematuria due to mills trauma   pt had difficulty to place mills in OR  Urology following, I reviewed the urology consult note, to keep Mills catheter for 5 days, will remove prior to discharge        Hyponatremia; Received IV hydration, stable in the 130 range, 134 today        Controlled Type 2 diabetes  A1c 5.9. SSI          Anxiety/depression:    Stable  Continue trazodone  Continue quetiapine  Continue sertraline      Obesity  BMI of 37  Counseled on healthy dietary choices         Code status: Full. Patient wants his daughter Vikash Jalloh is first Clay Radish, daughter Bhupinder Guzman is second mpoa. He is  with his wife Eneida. AMD needs to be filled and he is agreeable -  consulted. Prophylaxis: SCD  Care Plan discussed with: Patient, nurse, care manager  Anticipated Disposition:  IPR   Inpatient  Cardiac monitoring: Telemetry     Hospital Problems  Date Reviewed: 1/29/2023            Codes Class Noted POA    * (Principal) Cervical myelopathy with cervical radiculopathy (HCC) ICD-10-CM: G95.9, M54.12  ICD-9-CM: 721.1  1/29/2023 Yes        Acute myelopathy ([facility] [address] ICD-10-CM: G95.9  ICD-9-CM: 336.9  1/29/2023 Unknown         Social Determinants of Health     Tobacco Use: Medium Risk    Smoking Tobacco Use: Former    Smokeless Tobacco Use: Never    Passive Exposure: Not on file   Alcohol Use: Not on file   Financial Resource Strain: Not on file   Food Insecurity: Not on file   Transportation Needs: Not on file   Physical Activity: Not on file   Stress: Not on file   Social Connections: Not on file   Intimate Partner Violence: Not on file   Depression: Not on file   Housing Stability: Not on file       Review of Systems:   A comprehensive review of systems was negative except for that written in the HPI.        Vital Signs:    Last 24hrs VS reviewed since prior progress note. Most recent are:  Visit Vitals  /84   Pulse (!) 58   Temp (!) 96.7 °F (35.9 °C)   Resp 18   Ht 6' 2\" (1.88 m)   Wt 133.7 kg (294 lb 12.1 oz)   SpO2 96%   BMI 37.84 kg/m²         Intake/Output Summary (Last 24 hours) at 2/3/2023 0847  Last data filed at 2/3/2023 3888  Gross per 24 hour   Intake --   Output 750 ml   Net -750 ml          Physical Examination:     I had a face to face encounter with this patient and independently examined them on 2/3/2023 as outlined below:          Constitutional:  No acute distress, cooperative, pleasant    ENT:  Oral mucosa moist, oropharynx benign. Resp:  CTA bilaterally. No wheezing/rhonchi/rales. No accessory muscle use. CV:  Regular rhythm, normal rate, no murmurs, gallops, rubs    GI:  Soft, non distended, non tender. normoactive bowel sounds, no hepatosplenomegaly     Musculoskeletal:  No edema, warm, 2+ pulses throughout    Neurologic:   AAOx2, Right arm 3/5 strength             Data Review:    I personally reviewed  Image    I have independently reviewed and interpreted patient's lab and all other diagnostic data    Labs:     Recent Labs     02/01/23 0247   WBC 8.3   HGB 14.7   HCT 45.8   *       Recent Labs     02/03/23  0030 02/01/23 0247   * 133*   K 4.0 4.4    102   CO2 26 27   BUN 28* 23*   CREA 0.68* 0.81   * 144*   CA 8.4* 8.9       Recent Labs     02/03/23  0030   ALT 43   *   TBILI 1.0   TP 6.7   ALB 2.6*   GLOB 4.1*       No results for input(s): INR, PTP, APTT, INREXT, INREXT in the last 72 hours. No results for input(s): FE, TIBC, PSAT, FERR in the last 72 hours. No results found for: FOL, RBCF   No results for input(s): PH, PCO2, PO2 in the last 72 hours. No results for input(s): CPK, CKNDX, TROIQ in the last 72 hours.     No lab exists for component: CPKMB  Lab Results   Component Value Date/Time    Cholesterol, total 141 03/22/2020 04:24 AM    HDL Cholesterol 73 03/22/2020 04:24 AM    LDL, calculated 53.8 03/22/2020 04:24 AM    Triglyceride 71 03/22/2020 04:24 AM    CHOL/HDL Ratio 1.9 03/22/2020 04:24 AM     Lab Results   Component Value Date/Time    Glucose (POC) 95 02/03/2023 06:30 AM    Glucose (POC) 113 02/02/2023 11:03 PM    Glucose (POC) 114 02/02/2023 04:49 PM    Glucose (POC) 121 (H) 02/02/2023 12:12 PM    Glucose (POC) 123 (H) 02/02/2023 07:03 AM     Lab Results   Component Value Date/Time    Color YELLOW/STRAW 01/29/2023 11:11 AM    Appearance CLOUDY (A) 01/29/2023 11:11 AM    Specific gravity 1.028 01/29/2023 11:11 AM    Specific gravity 1.023 08/19/2021 06:39 AM    pH (UA) 6.5 01/29/2023 11:11 AM    Protein Negative 01/29/2023 11:11 AM    Glucose Negative 01/29/2023 11:11 AM    Ketone Negative 01/29/2023 11:11 AM    Bilirubin Negative 01/29/2023 11:11 AM    Urobilinogen 1.0 01/29/2023 11:11 AM    Nitrites Positive (A) 01/29/2023 11:11 AM    Leukocyte Esterase Negative 01/29/2023 11:11 AM    Epithelial cells FEW 01/29/2023 11:11 AM    Bacteria 3+ (A) 01/29/2023 11:11 AM    WBC 0-4 01/29/2023 11:11 AM    RBC 0-5 01/29/2023 11:11 AM       Notes reviewed from all clinical/nonclinical/nursing services involved in patient's clinical care. Care coordination discussions were held with appropriate clinical/nonclinical/ nursing providers based on care coordination needs. Patients current active Medications were reviewed, considered, added and adjusted based on the clinical condition today. Home Medications were reconciled to the best of my ability given all available resources at the time of admission. Route is PO if not otherwise noted.       Medications Reviewed:     Current Facility-Administered Medications   Medication Dose Route Frequency    cyclobenzaprine (FLEXERIL) tablet 5 mg  5 mg Oral TID    famotidine (PF) (PEPCID) 20 mg in 0.9% sodium chloride 10 mL injection  20 mg IntraVENous Q12H    sodium chloride (NS) flush 5-40 mL  5-40 mL IntraVENous Q8H sodium chloride (NS) flush 5-40 mL  5-40 mL IntraVENous PRN    acetaminophen (TYLENOL) tablet 650 mg  650 mg Oral Q6H    oxyCODONE IR (ROXICODONE) tablet 5 mg  5 mg Oral Q3H PRN    oxyCODONE IR (ROXICODONE) tablet 10 mg  10 mg Oral Q3H PRN    naloxone (NARCAN) injection 0.4 mg  0.4 mg IntraVENous PRN    ondansetron (ZOFRAN ODT) tablet 4 mg  4 mg Oral Q4H PRN    senna-docusate (PERICOLACE) 8.6-50 mg per tablet 1 Tablet  1 Tablet Oral BID    polyethylene glycol (MIRALAX) packet 17 g  17 g Oral DAILY    phenol throat spray (CHLORASEPTIC) 1 Spray  1 Spray Oral PRN    lidocaine 4 % patch 1 Patch  1 Patch TransDERmal Q24H    HYDROmorphone (DILAUDID) injection 1.5 mg  1.5 mg IntraVENous Q3H PRN    gabapentin (NEURONTIN) tablet 600 mg  600 mg Oral TID    hydrOXYzine HCL (ATARAX) tablet 50 mg  50 mg Oral Q6H PRN    QUEtiapine (SEROquel) tablet 200 mg  200 mg Oral QHS    sertraline (ZOLOFT) tablet 100 mg  100 mg Oral DAILY    traZODone (DESYREL) tablet 50 mg  50 mg Oral QHS    sodium chloride (NS) flush 5-40 mL  5-40 mL IntraVENous Q8H    sodium chloride (NS) flush 5-40 mL  5-40 mL IntraVENous PRN    acetaminophen (TYLENOL) tablet 650 mg  650 mg Oral Q6H PRN    Or    acetaminophen (TYLENOL) suppository 650 mg  650 mg Rectal Q6H PRN    polyethylene glycol (MIRALAX) packet 17 g  17 g Oral DAILY PRN    ondansetron (ZOFRAN ODT) tablet 4 mg  4 mg Oral Q8H PRN    Or    ondansetron (ZOFRAN) injection 4 mg  4 mg IntraVENous Q6H PRN    insulin lispro (HUMALOG) injection   SubCUTAneous AC&HS    glucose chewable tablet 16 g  4 Tablet Oral PRN    glucagon (GLUCAGEN) injection 1 mg  1 mg IntraMUSCular PRN    dextrose 10 % infusion 0-250 mL  0-250 mL IntraVENous PRN    naloxone (NARCAN) injection 0.4 mg  0.4 mg IntraVENous PRN    [Held by provider] enoxaparin (LOVENOX) injection 30 mg  30 mg SubCUTAneous Q12H     ______________________________________________________________________  EXPECTED LENGTH OF STAY: 3d 19h  ACTUAL LENGTH OF STAY: [address] [place] [address],

## 2023-02-03 NOTE — PROGRESS NOTES
[address] [facility], MD, FACP, [address] [place], [place]      Joaquin Rodriges PA-C    April S Mauri, [facility]NP-BC   Anny Chucho, [facility]   Sugar Daily, FNFRACISCO Bosch, FNP-C   Chloe Salinas, SUJATANP-BC      [address]   at [address], [address]   [phone]   FAX: [phone]  [place] [place] [facility]   at [address], [facility], [address]   [phone]   FAX: [phone]       HEPATOLOGY PROGRESS NOTE  The patient is a 69 y.o. male who was found to have chronic HBV in 3/2020. He was never treated with anti-viral therapy for HBV. He has probably had cirrhosis dating back to 2010 since he has had persistent thrombocytopenia since at least then. He has a history of drug abuse and numerous drug tox screens that are intermittently positive for cocaine. He came to the ED because of pain in the neck and shoulders. He had weakness of the upper arms and could not raise his arms. A CT scan demonstrated a mass in C5 with spinal cord compression. He underwent surgical decompression. Biopsy of he mass was positive for poorly differentiated cancer. Cell type to be defined. CT scan abdomen demonstrated cirrhosis and multiple liver mass lesions. Multiple pulmonary nodules. Metasastic bone disease to spine including L5. ,000!!!! Hospital Course:  HBV serologies to include HBV DNA, HBEantigen, anti-HBE, Anti-HCV have been obtained. Dynamic MRI of liver shows wide infiltration of right lobe with HCC including PV invasion. Widely metastatic disease to spine, pulmonary nodules suggestive of metastasis. ADDENDUM:  EGD showed 2 gastric ulcers and 2 duodenal ulcers. No active bleeding.   Start double dose PPI    Hepatology Plan:  Start entecovir for HBV  No need for liver biopsy  Will consider starting immune therapy for [address] as soon as we are able. ASSESSMENT AND PLAN:  Cirrhosis  The diagnosis of cirrhosis is based upon imaging, laboratory studies, complications of cirrhosis. Cirrhosis is secondary to chronic HBV and alcohol. The CTP is 7. Child class B. The MELD score is 10. Chronic HBV  Chronic HBV with cirrhosis. The immune status of HBV is undefined at this time. Will obtain serology for HBV  Will obtain serology for HCV    The patient is not currently receiving treatment for HBV. Will perform imaging of the liver with dynamic MRI. There is no reason to perform liver biopsy at this time. Hepatocellular carcinoma   The diagnosis was made in 2/2023 by multiple liver masses and an AFP > 147,000. At diagnosis the [address] metastatic to spine, lungs. Dynamic MRI shows [address] that is diffusely infiltrated throughout right lobe with PV invasion. Only treatment option will be immune therapy. Portal vein thrombosis  This is secondary to tumor invasion into PV. No indication for anticoagulation. Screening for Esophageal varices   The patient has not had an EGD to screen for varices. Will do this Friday afternoon. NPO Friday. Hepatic encephalopathy   Overt HE has not developed to date. There is no reason for treatment with lactulose of xifaxan  There is no need to restrict dietary protein at this time. Thrombocytopenia   This is secondary to cirrhosis. There is no evidence of overt bleeding. No treatment is required. The platelet count is adequate for the patient to undergo procedures without the need for platelet transfusion or platelet growth factors. PHYSICAL EXAMINATION:  VS: per nursing note  General:  No acute distress. Eyes:  Sclera anicteric. ENT:  No oral lesions. Thyroid normal.  Nodes:  No adenopathy. Skin:  No spider angiomata. No jaundice. Respiratory:  Lungs clear to auscultation. Cardiovascular:  Regular heart rate. Abdomen:  Soft non-tender, No obvious ascites. Extremities:  2+ lower extremity edema. Unable to move upper arms. Neurologic:  Alert and oriented. Cranial nerves grossly intact. No asterixis. LABORATORY:   Latest Reference Range & Units 1/29/23 03:40 1/30/23 05:56 1/31/23 05:59 2/1/23 02:47   WBC 4.1 - 11.1 K/uL 9.7  6.2 8.3   HGB 12.1 - 17.0 g/dL 14.7  14.7 14.7   PLATELET 869 - 655 K/uL 135 (L)  111 (L) 117 (L)   INR 0.9 - 1.1    1.2 (H)     Sodium 136 - 145 mmol/L 131 (L) 133 (L) 132 (L) 133 (L)   Potassium 3.5 - 5.1 mmol/L 4.5 4.3 4.2 4.4   Chloride 97 - 108 mmol/L 103 102 100 102   CO2 21 - 32 mmol/L 24 26 28 27   Glucose 65 - 100 mg/dL 139 (H) 96 112 (H) 144 (H)   BUN 6 - 20 MG/DL 28 (H) 32 (H) 27 (H) 23 (H)   Creatinine 0.70 - 1.30 MG/DL 0.83 0.94 0.76 0.81   Bilirubin, total 0.2 - 1.0 MG/DL 0.4      Albumin 3.5 - 5.0 g/dL 2.9 (L)      ALT 12 - 78 U/L 49      AST 15 - 37 U/L 142 (H)      Alk.  phosphatase 45 - 117 U/L 146 (H)      (L): Data is abnormally low  (H): Data is abnormally high      MD [address] [address] [address]   [address]  [address]. [phone]  FAX:  [phone]

## 2023-02-03 NOTE — PERIOP NOTES
TRANSFER - IN REPORT:    Verbal report received from Emily Agustin RN (name) on Rakan Child  being received from [id number] (unit) for ordered procedure      Information from the following report(s) SBAR, ED Summary, MAR, Pre Procedure Checklist, Procedure Verification, and Quality Measures was reviewed with the receiving nurse. Opportunity for questions and clarification was provided.

## 2023-02-03 NOTE — ANESTHESIA PREPROCEDURE EVALUATION
Relevant Problems   No relevant active problems       Anesthetic History   No history of anesthetic complications            Review of Systems / Medical History  Patient summary reviewed, nursing notes reviewed and pertinent labs reviewed    Pulmonary  Within defined limits                 Neuro/Psych   Within defined limits      Psychiatric history     Cardiovascular  Within defined limits  Hypertension              Exercise tolerance: >4 METS     GI/Hepatic/Renal  Within defined limits              Endo/Other  Within defined limits  Diabetes    Arthritis     Other Findings              Physical Exam    Airway  Mallampati: II  TM Distance: > 6 cm  Neck ROM: normal range of motion   Mouth opening: Normal     Cardiovascular  Regular rate and rhythm,  S1 and S2 normal,  no murmur, click, rub, or gallop             Dental  No notable dental hx       Pulmonary  Breath sounds clear to auscultation               Abdominal  GI exam deferred       Other Findings            Anesthetic Plan    ASA: 3  Anesthesia type: MAC          Induction: Intravenous  Anesthetic plan and risks discussed with: Patient

## 2023-02-03 NOTE — DISCHARGE INSTRUCTIONS
Discharge Summary      PATIENT ID: Heriberto An  MRN: [id number]   YOB: 1955    DATE OF ADMISSION: 1/28/2023  9:57 PM    DATE OF DISCHARGE: 2/10/2023  PRIMARY CARE PROVIDER: Praveena Pardo MD   ATTENDING PHYSICIAN: Gill Hough MD   DISCHARGING PROVIDER: Minda Frank NP       CONSULTATIONS: IP CONSULT TO NEUROSURGERY  IP CONSULT TO ONCOLOGY  IP CONSULT TO HEPATOLOGY  IP CONSULT TO UROLOGY     PROCEDURES/SURGERIES: Procedure(s):  ESOPHAGOGASTRODUODENOSCOPY (EGD)  ESOPHAGOGASTRODUODENAL (EGD) [facility] COURSE:      Mr. Eliana Becerril is a  57-year-old man with past medical history significant for type 2 diabetes, anxiety/depression, and hypertension; presented at [facility] emergency room with neck pain. This has been going on for a couple of weeks. The pain is located at the back of the neck. The patient described the pain as severe 10/10 in severity with no history of trauma to the neck. No known aggravating or relieving factors. The pain is dull ache. He was seen by the orthopedic surgeon. The patient was told that he probably has a pinched nerve and outpatient MRI was ordered. The appointment for the outpatient MRI is not until this coming Monday. Because of persistent pain, the patient came to the emergency room. The pain has now become associated with inability to lift the left arm off the bed without difficulties, but the patient denies pain in the left arm. Just some weakness. The patient has had a CT scan of the neck done which shows significant degenerative changes. When the patient arrived at the emergency room, CT scan of the head was obtained. This was negative for acute pathology. The neurosurgeon on-call at [facility] was consulted by the emergency room physician and decision was made to transfer the patient to [facility] for MRI of the neck and also for further evaluation by the neurosurgeon.   The hospitalist service was asked to directly admit the patient from [facility] emergency room to [facility] for that purpose. DISCHARGE DIAGNOSES / PLAN:       Cervical cord tumor with cord compression  s/p C5 corpectomy, arthrodesis with cage and anterior cervical plate 3/00  - MRI c spine: Metastatic disease to the cervical spine at C5 and see 7 with extraosseous spread of tumor. At C5 there is collapse of the vertebral body and epidural spread of tumor resulting in significant canal compression. The epidural spread of tumor extends cranially on the left to C4 and fills both C4-5 and C5-6 foramen. The epidural tumor at both C5 and C7 distorts the adjacent vertebral arteries. Metastatic disease to spine. Mets to lungs, lumbar spine, and liver seen on additional imaging.  - per Nsgy no brace needed. - pt oversedated on 1/31, flexeril, gabapentin and trazadone held  - Restarted gabapentin with lower dose with no drowsiness   - stopped IV dilaudid but pt with increased pain this am AFTER 7.5 mg oxycodone. Will change to oral dilaudid and may have flexeril TID prn.   - Continue with lidocaine patch     Metastatic disease with unknown primary   Thrombocytopenia  Elevated LFT  - CT: Osseous metastatic disease. The L5 spinous process mass is amenable to nonemergent CT-guided percutaneous biopsy if clinically indicated. Pulmonary nodules, largest nodule is 1 cm in the posterior basilar left lower lobe. Metastatic disease is most likely. Retroperitoneal lymphadenopathy is likely due to metastatic disease. Possible hepatic metastatic disease.    - MRI brain: No evidence of intracranial metastatic disease   - CT guided L5 biopsy (1/30) Poorly differentiated malignancy  - (+) AFB tumor marker  - CEA (-)  - Concerned with possible HCC versus prostate cancer. Liver: Heterogeneous. Possible hypoattenuating masses. Cirrhotic surface from CT of abdomen and pelvis.  In 2011 patient had prostate biopsy done for BPH, at that time the biopsy was benign prostate tissue.  - Prostate AG 4.9, mildly elevated   - HBV/HCV serologies pending   - ,000  - Patient with hepatitis B has started treatment this admission  - Has been started on entecovir for HBV   - per note will consider starting immune therapy for [address] when able, should follow-up with Dr. Elana Cha outpatient  - screening EGD for esophageal varices revealed 2 gastric ulcers and 2 duodenal ulcers , no active bleeding, patient started on BID PPI  - follow up with oncology outpatient (Leonel)     Hematuria due to mills trauma  - pt had difficulty to place mills in OR  - mills catheter to stay in place for now per urology consult, can attempt voiding trial prior to discharge   - 2/9 Mills is still putting out bloody urine with clots. Urology had signed off ~1 week ago reporting that urine was then clear. Nursing to reach out to urology once again due to persistent hematuria with clots. Likely not safe to remove Mills catheter at this time due to the presence of clots. - Appreciate urology reconsult. - mills discontinued and has voided x 3  - follow up with urology outpatient - per their notes, they will arrange follow up visit     Hyponatremia   - Received IV hydration, stable in the 130 range, recheck in 3-5 days  - Mentation is stable     Diet Controlled Type 2 diabetes  - A1c 5.9.   - Blood sugars have been well controlled  - stop SSI on d/c     Anxiety/depression:    - c/w seroquel, zoloft   - trazadone stopped   - stable      BMI: Body mass index is 37.75 kg/m². . This patient: Meets criteria for obesity given BMI >/= 30 and < 40 due to excess calories/nutritional. Weight loss and lifestyle modifications should be encouraged as an outpatient.       PENDING TEST RESULTS:   At the time of discharge the following test results are still pending: none      ADDITIONAL CARE RECOMMENDATIONS:   Check BMP in 3-5 days     Pain medications changed prior to discharge -now on 4 mg of Dilaudid every 4 hours as needed for pain. Flexeril started on discharge, 10 mg 3 times daily as needed. Monitor for sedation when using this in addition to opioids. Encourage elevation of his bilateral lower extremities, he tends to sit up in a chair all day. He may benefit from bilateral RADHA stockings and may need additional dosing of diuretics. Activity  -Limit the amount of time you sit to 20-30 minute intervals. Sitting for prolonged periods of time will be uncomfortable for you following surgery. - Do NOT lift anything over 5 pounds  -From now on, even when lifting light weight, bend with your knees and not your back.  -Do NOT do any neck exercises until you have been instructed by your doctor  -When you are in bed, you may lay on your back or on either side. Do NOT lie on your stomach    Diet  -resume usual diet; drink plenty of fluids; eat foods high in fiber  -It is important to have regular bowel movements. Pain medications may cause constipation. You may want to take a stool softener (such as Senokot-S or Colace) to prevent constipation.  -If constipation occurs, take a laxative (such as Dulcolax tablets, Milk of Magnesia, or a suppository). Laxatives should only be used if the above preventable measures have failed and you still have not had a bowel movement after three days    Incision Care  -You may take brief showers but do not run the water run directly onto the wound.  After showering or bathing gently blot the wound dry with a soft towel.  -Do not rub or apply any lotions or ointments to your incision site.   -Do not soak or scrub your wound; do not swim until the adhesive film has fallen off naturally  -Do not scratch, rub, or pick at the wound or skin glue, doing so may loosen the film before the wound is fully healed  -Stay out of direct sunlight and do not use tanning lamps       DIET: Regular Diet     ACTIVITY: PT/OT Eval and Treat     EQUIPMENT needed: as per PT/OT  - at this time, NEEDS TO BE FED  - will likely need special utensils to assist with feeding himself     NOTIFY YOUR PHYSICIAN FOR ANY OF THE FOLLOWING:   Fever over 101 degrees for 24 hours. Chest pain, shortness of breath, fever, chills, nausea, vomiting, diarrhea, change in mentation, falling, weakness, bleeding. Severe pain or pain not relieved by medications, as well as any other signs or symptoms that you may have questions about. FOLLOW UP APPOINTMENTS:    Follow-up Information         Follow up With Specialties Details Why Contact Andre Duqueday, MD Neurosurgery Schedule an appointment as soon as possible for a visit   [address] [place] [address] dr BAPTISTE [address] [address] [phone]        Jesusita Rodney MD Hematology and Oncology Follow up in 2 week(s) Metastatic cancer discussion/treatment options [address] [facility],[address] Floor [address] [place] [address]        Cathy Ramsay MD Hepatology Follow up in 2 week(s) Hepatitis treatment [address]        [address] [place] [address]  [phone]     [facility] on 3/3/2023 8 AM with Dr. Bartolome Cisneros / Isael Mccullough  [facility], [address]  [phone]     Troy Antoine MD Family Medicine     [address] [facility]  [phone]                DISCHARGE MEDICATIONS:  Current Discharge Medication List               START taking these medications     Details   cyclobenzaprine (FLEXERIL) 10 mg tablet Take 1 Tablet by mouth three (3) times daily as needed for Muscle Spasm(s). Indications: muscle spasm  Qty: 30 Tablet, Refills: 0  Start date: 2/10/2023       HYDROmorphone (Dilaudid) 4 mg tablet Take 1 Tablet by mouth every four (4) hours as needed for Pain for up to 3 days. Max Daily Amount: 24 mg.  Indications: excessive pain  Qty: 20 Tablet, Refills: 0  Start date: 2/10/2023, End date: 2/13/2023     Associated Diagnoses: Cervical myelopathy with cervical radiculopathy (HCC)       entecavir (BARACLUDE) 0.5 mg tablet Take 1 Tablet by mouth Daily (before dinner). Indications: chronic hepatitis B  Qty: 30 Tablet, Refills: 0  Start date: 2/9/2023       acetaminophen (TYLENOL) 325 mg tablet Take 2 Tablets by mouth every six (6) hours as needed for Pain. Indications: pain  Qty: 30 Tablet, Refills: 0  Start date: 2/9/2023       finasteride (PROSCAR) 5 mg tablet Take 1 Tablet by mouth daily. Qty: 30 Tablet, Refills: 0  Start date: 2/10/2023       tamsulosin (FLOMAX) 0.4 mg capsule Take 1 Capsule by mouth nightly. Indications: enlarged prostate with urination problem  Qty: 30 Capsule, Refills: 0  Start date: 2/9/2023       senna-docusate (PERICOLACE) 8.6-50 mg per tablet Take 1 Tablet by mouth two (2) times a day. Qty: 60 Tablet, Refills: 0  Start date: 2/10/2023       polyethylene glycol (MIRALAX) 17 gram packet Take 1 Packet by mouth daily. Indications: constipation  Qty: 30 Each, Refills: 0  Start date: 2/10/2023       lidocaine 4 % patch Apply patch to neck 12 hours a day and remove for 12 hours a day. Qty: 30 Each, Refills: 0  Start date: 2/10/2023       pantoprazole (PROTONIX) 40 mg tablet Take 1 Tablet by mouth Before breakfast and dinner. Indications: a stomach ulcer  Qty: 60 Tablet, Refills: 0  Start date: 2/10/2023       furosemide (Lasix) 40 mg tablet Take 1 Tablet by mouth daily. Indications: visible water retention  Qty: 30 Tablet, Refills: 0  Start date: 2/9/2023                   CONTINUE these medications which have CHANGED     Details   gabapentin (NEURONTIN) 600 mg tablet Take 0.5 Tablets by mouth three (3) times daily. Max Daily Amount: 900 mg.  Indications: neuropathic pain  Qty: 90 Tablet, Refills: 0  Start date: 2/9/2023     Associated Diagnoses: Acute myelopathy ([address]                  CONTINUE these medications which have NOT CHANGED     Details   QUEtiapine (SEROquel) 200 mg tablet Take 1 Tablet by mouth nightly. Indications: mood  Qty: 30 Tablet, Refills: 0       sertraline (ZOLOFT) 100 mg tablet Take 1 Tablet by mouth daily. Indications: major depressive disorder  Qty: 30 Tablet, Refills: 0       hydrOXYzine HCL (ATARAX) 50 mg tablet Take 1 Tablet by mouth every six (6) hours as needed for Anxiety. Indications: anxious  Qty: 30 Tablet, Refills: 0                   STOP taking these medications         traZODone (DESYREL) 50 mg tablet Comments:   Reason for Stopping:                 DISPOSITION:    Home With:    OT   PT   HH   RN      xx Long term SNF/Inpatient Rehab     Independent/assisted living     Hospice     Other:      PATIENT CONDITION AT DISCHARGE:   Functional status   xx Poor (upper arm mobility)   xx Deconditioned      Independent       Cognition   xx  Lucid      Forgetful      Dementia       Catheters/lines (plus indication)     Mcfadden      PICC      PEG    xx None       Code status   xx  Full code      DNR       PHYSICAL EXAMINATION AT DISCHARGE:     Patient was seen and examined earlier this morning, he was sitting up in a chair in notable pain, reports its 10 out of 10 on the pain scale. He reports the pain is in his neck upper back and shoulders. His IV Dilaudid had been discontinued and 7.5 of oxycodone ordered. This had not been given yet this morning. Patient's Mcfadden was also removed earlier in the morning and had not yet voided. Was called by nursing ~930 this morning, 7.5 mg of oxycodone was ineffective for pain management. One-time order of 2 mg of p.o. Dilaudid was given. 1200 patient seen once again, he reports his pain is somewhat better with the 2 mg of Dilaudid. We discussed possibly restarting muscle relaxer as this may help with the tightness throughout his shoulders and he was agreeable. We will order this on discharge.   Patient also reports that he has voided 3 times since his Mcfadden was removed and feels no urgency and has no pain with palpation. We will plan to discharge patient to [address] this afternoon. His pain medications may need to be adjusted further in the next several days. General:          Alert, cooperative, no distress, appears stated age. HEENT:           MMM  Neck:               Trachea midline. Healing incision to neck  Lungs:             Clear to auscultation bilaterally. Heart:              Regular rhythm,  No murmur. Abdomen:        Soft, non-tender. Not distended. Bowel sounds normal  Extremities:     Bilateral lower extremity edema, 2-3+ pitting. Encouraged elevation. Skin:                Not pale. No rashes   Psych:             Not anxious or agitated. Neurologic:      Alert, moves all extremities, answers questions appropriately and responds to commands      CHRONIC MEDICAL DIAGNOSES:  Problem List as of 2/10/2023 Date Reviewed: 2/9/2023              Codes Class Noted - Resolved     * (Principal) Cervical myelopathy with cervical radiculopathy ([facility] ICD-10-CM: G95.9, M54.12  ICD-9-CM: 721.1   1/29/2023 - Present           Acute myelopathy ([facility] ICD-10-CM: G95.9  ICD-9-CM: 336.9   1/29/2023 - Present           Schizophrenia ([facility] ICD-10-CM: F20.9  ICD-9-CM: 295.90   8/19/2021 - Present           Depression ICD-10-CM: F32. A  ICD-9-CM: 896   8/19/2021 - Present           ETOH abuse ICD-10-CM: F10.10  ICD-9-CM: 305.00   8/19/2021 - Present           Substance induced mood disorder ([facility] ICD-10-CM: F19.94  ICD-9-CM: 292.84   3/21/2020 - Present           Alcohol withdrawal ([facility] ICD-10-CM: U58.950  ICD-9-CM: 291.81   3/16/2020 - Present           Psychosis ([facility] ICD-10-CM: F29  ICD-9-CM: 298.9   1/15/2013 - Present     Overview Signed 1/15/2013 11:18 AM by Neva Jesus MD       A/V Hallucinations                 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  ICD-9-CM: 291.3 1/15/2013 - Present           Alcohol abuse ICD-10-CM: F10.10  ICD-9-CM: 305.00   1/15/2013 - Present           Noncompliance with treatment (Chronic) ICD-10-CM: Z91.199  ICD-9-CM: V15.81   5/18/2011 - Present           HTN (hypertension) (Chronic) ICD-10-CM: I10  ICD-9-CM: 401.9   3/8/2010 - Present           NIDDM (non-insulin dependent diabetes mellitus) (Chronic) ICD-9-CM: 250.00   3/8/2010 - Present           DJD (degenerative joint disease) ICD-10-CM: M19.90  ICD-9-CM: 715.90   3/8/2010 - Present     Overview Signed 3/8/2010  8:30 AM by Manuel Perez                    RESOLVED: Altered mental status ICD-10-CM: R41.82  ICD-9-CM: 780.97   6/21/2012 - 6/24/2012           RESOLVED: Malingering (Chronic) ICD-10-CM: Z76.5  ICD-9-CM: V65.2   5/18/2011 - 1/15/2013           RESOLVED: rule out Paranoid schizophrenia ICD-10-CM: F20.0  ICD-9-CM: 295.30   5/17/2011 - 1/15/2013           RESOLVED: Adjustment disorder with depressed mood (Chronic) ICD-10-CM: F43.21  ICD-9-CM: 309.0   10/21/2010 - 1/15/2013           RESOLVED: Polysubstance dependence ([facility] (Chronic) ICD-10-CM: C84.39  ICD-9-CM: 304.80   10/21/2010 - 1/15/2013            Greater than 30 minutes were spent with the patient on counseling and coordination of care     Signed:   Adam Florence NP  2/10/2023  10:07 PM

## 2023-02-03 NOTE — PROGRESS NOTES
Stable exam remains with weak left arm  path noted.  Incision healing well defer care to medical team now  follow as needed

## 2023-02-03 NOTE — PERIOP NOTES
TRANSFER - OUT REPORT:    Verbal report given to Bee Turner RN (name) on Gerardo Huitron  being transferred to [facility](unit) for routine progression of care       Information from the following report(s) Procedure Summary and Recent Results was reviewed with the receiving nurse. Lines:   Peripheral IV 02/03/23 Posterior;Right Wrist (Active)        Opportunity for questions and clarification was provided.

## 2023-02-03 NOTE — PROGRESS NOTES
Problem: Mobility Impaired (Adult and Pediatric)  Goal: *Acute Goals and Plan of Care (Insert Text)  Description: FUNCTIONAL STATUS PRIOR TO ADMISSION: Patient was independent and active without use of DME.    HOME SUPPORT PRIOR TO ADMISSION: The patient lived alone with no local to provide assistance. Physical Therapy Goals  Initiated 2/1/2023  1. Patient will move from supine to sit and sit to supine  in bed with modified independence within 7 day(s). 2.  Patient will transfer from bed to chair and chair to bed with modified independence using the least restrictive device within 7 day(s). 3.  Patient will perform sit to stand with modified independence within 7 day(s). 4.  Patient will ambulate with supervision/set-up for 150 feet with the least restrictive device within 7 day(s). 5.  Patient will ascend/descend 14 stairs with one handrail(s) with minimal assistance/contact guard assist within 7 day(s). Outcome: Progressing Towards Goal   PHYSICAL THERAPY TREATMENT  Patient: Gareth Yousif (36 y.o. male)  Date: 2/3/2023  Diagnosis: Acute myelopathy ([facility] [address] [G95.9] Cervical myelopathy with cervical radiculopathy (HCC)  Procedure(s) (LRB):  CERVICAL C5 CORPECTOMY, ARTHRODESIS WITH ALLOGRAFT, AUTOGRAFT AND ANTERIOR PLATE Y5-I8 (N/A)  CYSTOSCOPY (N/A) 3 Days Post-Op  Precautions: Fall  Chart, physical therapy assessment, plan of care and goals were reviewed. ASSESSMENT  Patient continues with skilled PT services and is progressing towards goals. Patient overall limited by pain, gait instability, UE weakness, impaired balance and decreased activity tolerance. Currently needing CGA for bed mobility and transfers. Amb approx 75 feet x 2 with RW and CGA with no overt LOB but slow ho and decreased activity tolerance. His UE's continue to be profoundly weak and this impacts his independence with mobility and self care. Recommend IPR to progress to more independent level of function.   Will continue to follow for mobility progression. Other factors to consider for discharge: at risk for falls, below baseline         PLAN :  Patient continues to benefit from skilled intervention to address the above impairments. Continue treatment per established plan of care. to address goals. Recommendation for discharge: (in order for the patient to meet his/her long term goals)  Therapy 3 hours per day 5-7 days per week        IF patient discharges home will need the following DME: RW if does not have one       SUBJECTIVE:   Patient stated I still really can't use my arms much.     OBJECTIVE DATA SUMMARY:   Critical Behavior:  Neurologic State: Alert  Orientation Level: Oriented X4  Cognition: Appropriate decision making, Appropriate for age attention/concentration  Safety/Judgement: Awareness of environment, Fall prevention  Functional Mobility Training:  Bed Mobility:     Supine to Sit: Contact guard assistance  Sit to Supine: Contact guard assistance           Transfers:  Sit to Stand: Contact guard assistance  Stand to Sit: Contact guard assistance        Bed to Chair: Contact guard assistance                    Balance:  Sitting: Intact  Sitting - Static: Good (unsupported)  Sitting - Dynamic: Fair (occasional)  Standing: Intact; With support  Standing - Static: Good  Standing - Dynamic : Fair;Constant support  Ambulation/Gait Training:  Distance (ft): 75 Feet (ft) (x 2)  Assistive Device: Gait belt;Walker, rolling  Ambulation - Level of Assistance: Contact guard assistance        Gait Abnormalities: Antalgic;Decreased step clearance        Base of Support: Widened     Speed/Valorie: Pace decreased (<100 feet/min); Slow  Step Length: Left shortened;Right shortened       Pain Rating:  No c/o pain    Activity Tolerance:   Fair and requires rest breaks    After treatment patient left in no apparent distress:   Sitting in chair and Call bell within reach    COMMUNICATION/COLLABORATION:   The patients plan of care was discussed with: Physical therapist, Occupational therapist, and Registered nurse.      Luna Asencio, PT, DPT   Time Calculation: 23 mins

## 2023-02-03 NOTE — PROCEDURES
[facility] hospitals, MD, FACP, [facility], [facility], CAROLA Dotson, [facility]   Delia Pat, [facility]   Terrineryyoko Andrew, FNP-C  Otilia Rojas, FNP-C   Arden Richards, PCNP-BC      [address]   at [address], [facility] [address]  22.   [phone]   FAX: [address]   at [address], [address] [place] [address], [address]   [phone]   FAX: [phone]       UPPER ENDOSCOPY PROCEDURE NOTE    Julia Jhain  1955    INDICATION: Cirrhosis. Screening for esophageal varices with variceal ligation if  indicated. : Suzette York MD    SURGICAL ASSISTANT:  None    PROSTHETIC DEVISES, TISSUE GRAFTS, ORGAN TRANSPLANTS:  Not applicable     ANESTHESIA/SEDATION: MAC Sedation per anesthesiology    PROCEDURE DESCRIPTION:  [employer] consent was obtained from the patient for the procedure. All risks and benefits of the procedure explained. The procedure was performed in the endoscopy suite. The patient was laying on a stretcher and moved to the left lateral decubitus position prior to administration of sedation. Sedation was administered by anesthesiology. See their note for details. The endoscope was inserted into the mouth and advanced under direct vision to the second portion of the duodenum. Careful inspection of upper gastrointestinal tract was made as the endoscope was inserted and withdrawn. Retroflexion of the endoscope to view of the cardia of the stomach was performed. The findings and interventions are described below. FINDINGS:  Esophagus:    Normal.      Stomach:   No portal hypertensive gastropathy   2 Gastric ulcers with clean base located in the antrum just above the pylorus  No active bleeding.   No gastric varices identified. Duodenum:   Duodenal ulcer in bulb and a scond in the 1st portion. Both with clean base and no visible vessel. NO active bleeding    SPECIMENS COLLECTED:   5 biopsies were taken from the antrum. The specimens were placed in preservative and transported to Pathology after the procedure. INTERVENTIONS:  None    COMPLICATIONS: None. The patient tolerated the procedure well. EBL: Negligible. RECOMMENDATIONS:  Observe until discharge parameters are achieved. May resume previous diet. Start double dose PPI BID  Follow-up [facility] office as scheduled.       Humphrey Corley MD  [address]  [address]  [facility] 22.  [phone]  FAX:  [address]

## 2023-02-03 NOTE — ANESTHESIA POSTPROCEDURE EVALUATION
Post-Anesthesia Evaluation and Assessment    Patient: Lukasz Cervantes MRN: [id number]  SSN: [id number]    YOB: 1955  Age: 69 y.o. Sex: male      I have evaluated the patient and they are stable and ready for discharge from the PACU. Cardiovascular Function/Vital Signs  Visit Vitals  BP (!) 152/88   Pulse 73   Temp 36.2 °C (97.1 °F)   Resp 16   Ht 6' 2\" (1.88 m)   Wt 133.4 kg (294 lb)   SpO2 94%   BMI 37.75 kg/m²       Patient is status post MAC anesthesia for Procedure(s):  ESOPHAGOGASTRODUODENOSCOPY (EGD)  ESOPHAGOGASTRODUODENAL (EGD) BIOPSY. Nausea/Vomiting: None    Postoperative hydration reviewed and adequate. Pain:  Pain Scale 1: Numeric (0 - 10) (02/03/23 1703)  Pain Intensity 1: 6 (02/03/23 1703)   Managed    Neurological Status:   Neuro (WDL): Within Defined Limits (01/31/23 1750)  Neuro  Neurologic State: Alert (02/03/23 1000)  Orientation Level: Oriented X4 (02/03/23 1000)  Cognition: Appropriate decision making; Appropriate for age attention/concentration (02/03/23 1000)  Speech: Clear (02/03/23 2139)  Assessment L Pupil: Round (01/31/23 2145)  Size L Pupil (mm): 3 (01/31/23 0800)  Assessment R Pupil: Round (01/31/23 2145)  Size R Pupil (mm): 3 (01/31/23 0800)  LUE Motor Response:  unequal R greater than L (02/03/23 0812)  LLE Motor Response: Purposeful (02/03/23 0812)  RUE Motor Response:  unequal R greater than L (02/03/23 2610)  RLE Motor Response: Purposeful (02/03/23 0812)   At baseline    Mental Status, Level of Consciousness: Alert and  oriented to person, place, and time    Pulmonary Status:   O2 Device: None (Room air) (02/03/23 1815)   Adequate oxygenation and airway patent    Complications related to anesthesia: None    Post-anesthesia assessment completed. No concerns    Signed By: Niesha Rooney MD     February 3, 2023              Procedure(s):  ESOPHAGOGASTRODUODENOSCOPY (EGD)  ESOPHAGOGASTRODUODENAL (EGD) BIOPSY.     MAC    <BSHSIANPOST>    INITIAL Post-op Vital signs:   Vitals Value Taken Time   /88 02/03/23 1820   Temp     Pulse 69 02/03/23 1821   Resp 15 02/03/23 1821   SpO2 91 % 02/03/23 1821   Vitals shown include unvalidated device data.

## 2023-02-03 NOTE — PROGRESS NOTES
Problem: Self Care Deficits Care Plan (Adult)  Goal: *Acute Goals and Plan of Care (Insert Text)  Description: FUNCTIONAL STATUS PRIOR TO ADMISSION: Patient was independent and active without use of DME.     HOME SUPPORT: The patient lived alone with no local support. Occupational Therapy Goals  Initiated 2/1/2023  1. Patient will perform self-feeding with minimal assistance within 7 day(s). 2.  Patient will perform grooming with minimal assistance within 7 day(s). 3.  Patient will perform anterior neck to thigh bathing with moderate assistance  within 7 day(s). 4.  Patient will perform toilet transfers to/from with minimal assistance within 7 day(s). 5.  Patient will perform all aspects of toileting with moderate assistance within 7 day(s). 6.  Patient will participate in upper extremity therapeutic exercise/activities with moderate assistance  for 5 minutes within 7 day(s). 7.  Patient will utilize energy conservation techniques during functional activities with verbal cues within 7 day(s). Outcome: Progressing Towards Goal   OCCUPATIONAL THERAPY TREATMENT  Patient: Ya Dewitt (5 y.o. male)  Date: 2/3/2023  Diagnosis: Acute myelopathy ([facility] [address] [G95.9] Cervical myelopathy with cervical radiculopathy (HCC)  Procedure(s) (LRB):  CERVICAL C5 CORPECTOMY, ARTHRODESIS WITH ALLOGRAFT, AUTOGRAFT AND ANTERIOR PLATE L3-Y6 (N/A)  CYSTOSCOPY (N/A) 3 Days Post-Op  Precautions: Fall  Chart, occupational therapy assessment, plan of care, and goals were reviewed. ASSESSMENT  Patient continues with skilled OT services and is progressing towards goals. Pt was received sitting in chair and agreeable to therapy. Pt stated that he was completing the prom with LUE elbow flexion. And arom finger exercises, and wrist flex/extension. Pt was able to complete 10x table slides BUE with flexion, no pain noted by pt.  Pt was able to use LUE to get different size beads out of thera putty decreased pincer grasp and overall fine motor skills. Pt was able to use RUE to self assist LUE to carry to left side and place in container. Pt is not able to demonstrate supination BUE, pronation is fair/good. Pt was able to use RUE with LUE placed on top to log roll theraputty, no difficulties noted. Due to lack of elbow flexion in RUE to wash face, elbow was propped up still needed mod A to complete task. Pt was able to sit to stand from chair with walker and gait belt with CGA. Pt was able to stand to sit to bed and lay supine with CGA. Attempted to position pt due to pt only feeling comfortable in the chair. Unsuccessful as the pt preferred to transfer back to chair. Max A for donning and doffing socks and applying lotion to UE and LE. Pt was able to transfer back to chair with CGA. A waffle cushion was provided to patient, and were positioned vertically behind scapulas. BUE  and LLE were propped up to reduce/prevent and swelling. Pt was left seated in chair with all needs met and call bell in reach. Pt is currently demonstrating decreased endurance, fine and gross motor coordination unilateral and bilateral. Pt currently lives alone and was IND with all ADLS and IADLS prior to admission. With the described deficits listed this will impact the pt's ability to complete BADLS such as feeding himself with brining food to mouth, cutting food due to decreased bilateral coordination, washing himself, toileting hygiene and dressing due to decreased ROM in both UE, carrying pots/pans impaired supination. Pt would benefit from skilled OT services while admitted into the hospital. In addition pt would benefit from IPR to increase his overall IND with BADLS and IADLS to further his quality of life. Current Level of Function Impacting Discharge (ADLs): Pt is currently living alone and was IND prior to admission.  Pt is limited by UE ROM, strength and coordination impacting BADLS and IADLS    Other factors to consider for discharge: living alone, pain, medical course of dc         PLAN :  Patient continues to benefit from skilled intervention to address the above impairments. Continue treatment per established plan of care to address goals. Recommend with staff: Propping elbows up for light grooming     Recommend next OT session: Pro/sup exercises,     Recommendation for discharge: (in order for the patient to meet his/her long term goals)  Therapy 3 hours per day 5-7 days per week    This discharge recommendation:  Has not yet been discussed the attending provider and/or case management    IF patient discharges home will need the following DME: TBD       SUBJECTIVE:   Patient stated The bed hurts my back and neck.     OBJECTIVE DATA SUMMARY:   Cognitive/Behavioral Status:  Neurologic State: Alert  Orientation Level: Oriented X4  Cognition: Appropriate decision making; Appropriate for age attention/concentration             Functional Mobility and Transfers for ADLs:  Bed Mobility:  Supine to Sit: Contact guard assistance  Sit to Supine: Contact guard assistance    Transfers:  Sit to Stand: Contact guard assistance     Bed to Chair: Contact guard assistance    Balance:  Sitting: Intact; Without support  Sitting - Static: Good (unsupported)  Sitting - Dynamic: Fair (occasional)  Standing: Intact; With support  Standing - Static: Good  Standing - Dynamic : Fair;Constant support    ADL Intervention:       Grooming  Grooming Assistance: Moderate assistance  Position Performed: Seated in chair  Washing Face: Moderate assistance                   Upper Body Dressing Assistance  Dressing Assistance: Moderate assistance  Hospital Gown: Moderate assistance    Lower Body Dressing Assistance  Dressing Assistance: Maximum assistance  Socks: Maximum assistance    Toileting  Toileting Assistance: Maximum assistance; Total assistance(dependent)  Bladder Hygiene:  Total assistance (dependent) ([place])         Therapeutic Exercises:   10x table slides BUE  Fine motor coordination (pincer grasp): picking beads out of putty and using RUE to carry LUE over to place bead in cup  Rolling thera putty  PROM flex/extension of elbow and wrist  Finger stars     Pain:  Controlled pain, Pain in neck and shoulders when attempting to position in bed transferred back to chair for comfort    Activity Tolerance:   Fair and requires rest breaks    After treatment patient left in no apparent distress:   Sitting in chair, Heels elevated for pressure relief, and Call bell within reach    COMMUNICATION/COLLABORATION:   The patients plan of care was discussed with: Registered nurse.      Daniel Costello OT  Time Calculation: 60 mins

## 2023-02-03 NOTE — PERIOP NOTES
TRANSFER - OUT REPORT:    Verbal report given to WINSTON Olivera (name) on Jewel Martinez  being transferred to [id number]  (unit) for routine progression of care         Information from the following report(s) {SBAR REPORTS SNYA:[id number]} was reviewed with the receiving nurse. Lines:   Peripheral IV 02/03/23 Posterior;Right Wrist (Active)        Opportunity for questions and clarification was provided.       Patient transported with:   {TRANSPORTDETAILS:[id number]}

## 2023-02-03 NOTE — PROGRESS NOTES
Spine Surgery Progress Note  Ortiz Faulkner PA-C    Admit Date: 2023   LOS: 6 days        Daily Progress Note: 2/3/2023    HPI: Mr. Candace Queen is a pleasant 69yo gentleman with a history of T2DM, anxiety/depression, and hypertension who presented to [address] [place] [address] with neck pain for 2 weeks that radiates into his shoulders and is associated with BUE weakness, LUE>RUE. Imaging revealed \"Metastatic disease to the cervical spine at C5 and C7 with extraosseous spread of tumor. At C5 there is collapse of the vertebral body and epidural spread of tumor resulting in significant canal compression. The epidural spread of tumor extends cranially on the left to C4 and fills both C4-5 and C5-6 foramen. The epidural tumor at both C5 and C7 distorts the adjacent vertebral arteries. \" Patient was transferred to [facility]. He has no known cancer diagnosis. Pt denies bowel and bladder incontinence. Pt underwent C5 corpectomy, arthrodesis, and anterior cervical plating on 23. He tolerated the surgery well. Subjective:     Pt sitting up comfortably in a chair. Shoulder pain improved today. Motor unchanged. Swallowing without issue. Hepatology and oncology following. Persistent blood in mills; urology following.     Objective:     Vital signs  Temp (24hrs), Av.2 °F (36.8 °C), Min:96.7 °F (35.9 °C), Max:99.2 °F (37.3 °C)    07 -  1900  In: -   Out: 750 [Urine:750]  1901 -  0700  In: -   Out: 5 [Drains:5]    Visit Vitals  /84   Pulse 69   Temp (!) 96.7 °F (35.9 °C)   Resp 18   Ht 6' 2\" (1.88 m)   Wt 133.7 kg (294 lb 12.1 oz)   SpO2 96%   BMI 37.84 kg/m²    O2 Flow Rate (L/min): 2 l/min O2 Device: None (Room air)     Output (mL)  Urine Voided: 325 ml (23 1744)  Last Bowel Movement Date: 23 (23)     Pain control  Pain Assessment  Pain Scale 1: Numeric (0 - 10)  Pain Intensity 1: 9  Pain Onset 1: post op  Pain Location 1: Neck  Pain Orientation 1: Left  Pain Description 1: Aching  Pain Intervention(s) 1: Medication (see MAR)    PT/OT  Gait     Gait  Base of Support: Narrowed  Speed/Avlorie: Pace decreased (<100 feet/min), Slow  Step Length: Left shortened, Right shortened  Gait Abnormalities: Antalgic, Decreased step clearance  Ambulation - Level of Assistance: Contact guard assistance  Distance (ft): 60 Feet (ft) (x2)  Assistive Device: Gait belt, Walker, rolling        Physical Exam:  Gen: No acute distress. Neuro: A&Ox3. Follows commands. Speech clear. Affect normal.  PERRL. EOMI. Face symmetric. Strength 3/5 in Right deltoid/bicep. 4/5 right . LUE bicep/deltoid strength 0/5. Strength 4/5 triceps, wrist, intrinsics. Sensation intact. No clonus. Gait deferred. Incision C/D/I    24 hour results:    Recent Results (from the past 24 hour(s))   GLUCOSE, POC    Collection Time: 02/02/23 12:12 PM   Result Value Ref Range    Glucose (POC) 121 (H) 65 - 117 mg/dL    Performed by Estuardo Burr    GLUCOSE, POC    Collection Time: 02/02/23  4:49 PM   Result Value Ref Range    Glucose (POC) 114 65 - 117 mg/dL    Performed by Carl EDMONDSON    GLUCOSE, POC    Collection Time: 02/02/23 11:03 PM   Result Value Ref Range    Glucose (POC) 113 65 - 117 mg/dL    Performed by Daquan Jacobo    HEPATITIS PANEL, ACUTE    Collection Time: 02/03/23 12:30 AM   Result Value Ref Range    Hepatitis A, IgM NONREACTIVE NR      __          Hepatitis B surface Ag >1,000.00 Index    Hep B surface Ag Interp. Positive (A) NEG      __          Hepatitis B core, IgM NONREACTIVE NR      __          Hep C virus Ab Interp.  NONREACTIVE NR     PSA SCREENING (SCREENING)    Collection Time: 02/03/23 12:30 AM   Result Value Ref Range    Prostate Specific Ag 4.0 0.01 - 4.0 ng/mL   METABOLIC PANEL, COMPREHENSIVE    Collection Time: 02/03/23 12:30 AM   Result Value Ref Range    Sodium 134 (L) 136 - 145 mmol/L    Potassium 4.0 3.5 - 5.1 mmol/L    Chloride 102 97 - 108 mmol/L    CO2 26 21 - 32 mmol/L    Anion gap 6 5 - 15 mmol/L Glucose 117 (H) 65 - 100 mg/dL    BUN 28 (H) 6 - 20 MG/DL    Creatinine 0.68 (L) 0.70 - 1.30 MG/DL    BUN/Creatinine ratio 41 (H) 12 - 20      eGFR >60 >60 ml/min/1.73m2    Calcium 8.4 (L) 8.5 - 10.1 MG/DL    Bilirubin, total 1.0 0.2 - 1.0 MG/DL    ALT (SGPT) 43 12 - 78 U/L    AST (SGOT) 199 (H) 15 - 37 U/L    Alk. phosphatase 156 (H) 45 - 117 U/L    Protein, total 6.7 6.4 - 8.2 g/dL    Albumin 2.6 (L) 3.5 - 5.0 g/dL    Globulin 4.1 (H) 2.0 - 4.0 g/dL    A-G Ratio 0.6 (L) 1.1 - 2.2     HEP B SURFACE AB    Collection Time: 02/03/23 12:30 AM   Result Value Ref Range    Hepatitis B surface Ab 17.13 mIU/mL    Hep B surface Ab Interp. REACTIVE (A) NR     GLUCOSE, POC    Collection Time: 02/03/23  6:30 AM   Result Value Ref Range    Glucose (POC) 95 65 - 117 mg/dL    Performed by Bahman Baxter, POC    Collection Time: 02/03/23 10:09 AM   Result Value Ref Range    Glucose (POC) 96 65 - 117 mg/dL    Performed by Lesa Bob   PCT         Assessment:     Principal Problem:    Cervical myelopathy with cervical radiculopathy ([facility] [address] (1/29/2023)    Active Problems:    Acute myelopathy ([address] (1/29/2023)    Plan:     Patient with metastatic disease to spine. Mets to lungs, lumbar spine, and liver seen on additional imaging. S/p C5 corpectomy and fusion 01/31  Intraop op pathology favors metastatic hepatocellular   carcinoma  L5 bx patholgy \"Poorly differentiated malignancy, pending  immunohistochemical stains\"  Oncology, urology, hepatology following. EGD today to rule out varices   Continue pain control.    PT/OT - no need for brace  Drain d/c'd 2/2  OK to restart chem DVT ppx from nsgy perspective   Medical mgmt per primary team    Will need IPR    Plan d/w HERRERA Castillo

## 2023-02-04 LAB
ALBUMIN SERPL-MCNC: 2.6 G/DL (ref 3.5–5)
ALBUMIN/GLOB SERPL: 0.5 (ref 1.1–2.2)
ALP SERPL-CCNC: 158 U/L (ref 45–117)
ALT SERPL-CCNC: 42 U/L (ref 12–78)
ANION GAP SERPL CALC-SCNC: 3 MMOL/L (ref 5–15)
AST SERPL-CCNC: 268 U/L (ref 15–37)
BILIRUB SERPL-MCNC: 1.3 MG/DL (ref 0.2–1)
BUN SERPL-MCNC: 21 MG/DL (ref 6–20)
BUN/CREAT SERPL: 29 (ref 12–20)
CALCIUM SERPL-MCNC: 8.8 MG/DL (ref 8.5–10.1)
CHLORIDE SERPL-SCNC: 101 MMOL/L (ref 97–108)
CO2 SERPL-SCNC: 29 MMOL/L (ref 21–32)
CREAT SERPL-MCNC: 0.72 MG/DL (ref 0.7–1.3)
GLOBULIN SER CALC-MCNC: 5.2 G/DL (ref 2–4)
GLUCOSE BLD STRIP.AUTO-MCNC: 124 MG/DL (ref 65–117)
GLUCOSE BLD STRIP.AUTO-MCNC: 138 MG/DL (ref 65–117)
GLUCOSE BLD STRIP.AUTO-MCNC: 150 MG/DL (ref 65–117)
GLUCOSE BLD STRIP.AUTO-MCNC: 96 MG/DL (ref 65–117)
GLUCOSE SERPL-MCNC: 127 MG/DL (ref 65–100)
HBV CORE AB SERPL QL IA: POSITIVE
POTASSIUM SERPL-SCNC: 4.2 MMOL/L (ref 3.5–5.1)
PROT SERPL-MCNC: 7.8 G/DL (ref 6.4–8.2)
SERVICE CMNT-IMP: ABNORMAL
SERVICE CMNT-IMP: NORMAL
SODIUM SERPL-SCNC: 133 MMOL/L (ref 136–145)

## 2023-02-04 PROCEDURE — 74011636637 HC RX REV CODE- 636/637: Performed by: INTERNAL MEDICINE

## 2023-02-04 PROCEDURE — 82962 GLUCOSE BLOOD TEST: CPT

## 2023-02-04 PROCEDURE — 74011250637 HC RX REV CODE- 250/637: Performed by: NURSE PRACTITIONER

## 2023-02-04 PROCEDURE — 74011250637 HC RX REV CODE- 250/637: Performed by: INTERNAL MEDICINE

## 2023-02-04 PROCEDURE — 74011250636 HC RX REV CODE- 250/636: Performed by: NURSE PRACTITIONER

## 2023-02-04 PROCEDURE — 74011000250 HC RX REV CODE- 250: Performed by: NEUROLOGICAL SURGERY

## 2023-02-04 PROCEDURE — 74011000250 HC RX REV CODE- 250: Performed by: INTERNAL MEDICINE

## 2023-02-04 PROCEDURE — 74011250637 HC RX REV CODE- 250/637: Performed by: NEUROLOGICAL SURGERY

## 2023-02-04 PROCEDURE — 36415 COLL VENOUS BLD VENIPUNCTURE: CPT

## 2023-02-04 PROCEDURE — 80053 COMPREHEN METABOLIC PANEL: CPT

## 2023-02-04 PROCEDURE — 65270000046 HC RM TELEMETRY

## 2023-02-04 RX ADMIN — CYCLOBENZAPRINE 10 MG: 10 TABLET, FILM COATED ORAL at 21:42

## 2023-02-04 RX ADMIN — SODIUM CHLORIDE, PRESERVATIVE FREE 10 ML: 5 INJECTION INTRAVENOUS at 14:00

## 2023-02-04 RX ADMIN — SERTRALINE HYDROCHLORIDE 100 MG: 50 TABLET ORAL at 09:34

## 2023-02-04 RX ADMIN — OXYCODONE HYDROCHLORIDE 10 MG: 5 TABLET ORAL at 19:05

## 2023-02-04 RX ADMIN — GABAPENTIN 600 MG: 600 TABLET, FILM COATED ORAL at 21:42

## 2023-02-04 RX ADMIN — CYCLOBENZAPRINE 10 MG: 10 TABLET, FILM COATED ORAL at 15:57

## 2023-02-04 RX ADMIN — SODIUM CHLORIDE, PRESERVATIVE FREE 10 ML: 5 INJECTION INTRAVENOUS at 05:06

## 2023-02-04 RX ADMIN — ACETAMINOPHEN 650 MG: 325 TABLET ORAL at 19:05

## 2023-02-04 RX ADMIN — OXYCODONE HYDROCHLORIDE 10 MG: 5 TABLET ORAL at 13:40

## 2023-02-04 RX ADMIN — ACETAMINOPHEN 650 MG: 325 TABLET ORAL at 06:49

## 2023-02-04 RX ADMIN — OXYCODONE HYDROCHLORIDE 5 MG: 5 TABLET ORAL at 10:32

## 2023-02-04 RX ADMIN — SODIUM CHLORIDE, PRESERVATIVE FREE 10 ML: 5 INJECTION INTRAVENOUS at 21:47

## 2023-02-04 RX ADMIN — PANTOPRAZOLE SODIUM 40 MG: 40 TABLET, DELAYED RELEASE ORAL at 06:49

## 2023-02-04 RX ADMIN — Medication 2 UNITS: at 15:58

## 2023-02-04 RX ADMIN — SENNOSIDES AND DOCUSATE SODIUM 1 TABLET: 50; 8.6 TABLET ORAL at 09:37

## 2023-02-04 RX ADMIN — SENNOSIDES AND DOCUSATE SODIUM 1 TABLET: 50; 8.6 TABLET ORAL at 16:00

## 2023-02-04 RX ADMIN — ACETAMINOPHEN 650 MG: 325 TABLET ORAL at 00:21

## 2023-02-04 RX ADMIN — HYDROMORPHONE HYDROCHLORIDE 2 MG: 2 INJECTION, SOLUTION INTRAMUSCULAR; INTRAVENOUS; SUBCUTANEOUS at 05:05

## 2023-02-04 RX ADMIN — HYDROMORPHONE HYDROCHLORIDE 2 MG: 2 INJECTION, SOLUTION INTRAMUSCULAR; INTRAVENOUS; SUBCUTANEOUS at 21:47

## 2023-02-04 RX ADMIN — POLYETHYLENE GLYCOL 3350 17 G: 17 POWDER, FOR SOLUTION ORAL at 09:37

## 2023-02-04 RX ADMIN — ACETAMINOPHEN 650 MG: 325 TABLET ORAL at 13:00

## 2023-02-04 RX ADMIN — GABAPENTIN 600 MG: 600 TABLET, FILM COATED ORAL at 09:36

## 2023-02-04 RX ADMIN — PANTOPRAZOLE SODIUM 40 MG: 40 TABLET, DELAYED RELEASE ORAL at 15:57

## 2023-02-04 RX ADMIN — CYCLOBENZAPRINE 10 MG: 10 TABLET, FILM COATED ORAL at 09:37

## 2023-02-04 RX ADMIN — GABAPENTIN 600 MG: 600 TABLET, FILM COATED ORAL at 15:57

## 2023-02-04 NOTE — PROGRESS NOTES
Problem: Falls - Risk of  Goal: *Absence of Falls  Description: Document Richard Boothe Fall Risk and appropriate interventions in the flowsheet. Outcome: Progressing Towards Goal  Note: Fall Risk Interventions:                                Problem: Patient Education: Go to Patient Education Activity  Goal: Patient/Family Education  Outcome: Progressing Towards Goal     Problem: Pressure Injury - Risk of  Goal: *Prevention of pressure injury  Description: Document Lit Scale and appropriate interventions in the flowsheet.   Outcome: Progressing Towards Goal  Note: Pressure Injury Interventions:       Moisture Interventions: Minimize layers    Activity Interventions: PT/OT evaluation    Mobility Interventions: PT/OT evaluation    Nutrition Interventions: Offer support with meals,snacks and hydration                     Problem: Patient Education: Go to Patient Education Activity  Goal: Patient/Family Education  Outcome: Progressing Towards Goal     Problem: Discharge Planning  Goal: *Discharge to safe environment  Outcome: Progressing Towards Goal  Goal: *Knowledge of medication management  Outcome: Progressing Towards Goal  Goal: *Knowledge of discharge instructions  Outcome: Progressing Towards Goal     Problem: Patient Education: Go to Patient Education Activity  Goal: Patient/Family Education  Outcome: Progressing Towards Goal     Problem: Complex Spine Procedure:  Day of Surgery  Goal: Off Pathway (Use only if patient is Off Pathway)  Outcome: Progressing Towards Goal  Goal: Activity/Safety  Outcome: Progressing Towards Goal  Goal: Consults, if ordered  Outcome: Progressing Towards Goal  Goal: Diagnostic Test/Procedures  Outcome: Progressing Towards Goal  Goal: Nutrition/Diet  Outcome: Progressing Towards Goal  Goal: Discharge Planning  Outcome: Progressing Towards Goal  Goal: Medications  Outcome: Progressing Towards Goal  Goal: Respiratory  Outcome: Progressing Towards Goal  Goal: Treatments/Interventions/Procedures  Outcome: Progressing Towards Goal  Goal: Psychosocial  Outcome: Progressing Towards Goal  Goal: *Optimal pain control at patient's stated goal  Outcome: Progressing Towards Goal  Goal: *Demonstrates progressive activity  Outcome: Progressing Towards Goal  Goal: *Respiratory status stable  Outcome: Progressing Towards Goal     Problem: Patient Education: Go to Patient Education Activity  Goal: Patient/Family Education  Outcome: Progressing Towards Goal     Problem: Patient Education: Go to Patient Education Activity  Goal: Patient/Family Education  Outcome: Progressing Towards Goal

## 2023-02-04 NOTE — PROGRESS NOTES
MRI abdomen with multiple hepatic masses and ,00. Also with disease in spine and lungs. Clinical picture consistent with metastatic HCC. Dr. Jewels Olivas following. Oncology will sign off at this time.

## 2023-02-05 LAB
ALBUMIN SERPL-MCNC: 2.7 G/DL (ref 3.5–5)
ALBUMIN/GLOB SERPL: 0.5 (ref 1.1–2.2)
ALP SERPL-CCNC: 168 U/L (ref 45–117)
ALT SERPL-CCNC: 38 U/L (ref 12–78)
ANION GAP SERPL CALC-SCNC: 5 MMOL/L (ref 5–15)
AST SERPL-CCNC: 208 U/L (ref 15–37)
BILIRUB SERPL-MCNC: 1.5 MG/DL (ref 0.2–1)
BUN SERPL-MCNC: 20 MG/DL (ref 6–20)
BUN/CREAT SERPL: 26 (ref 12–20)
CALCIUM SERPL-MCNC: 9.4 MG/DL (ref 8.5–10.1)
CHLORIDE SERPL-SCNC: 99 MMOL/L (ref 97–108)
CO2 SERPL-SCNC: 27 MMOL/L (ref 21–32)
CREAT SERPL-MCNC: 0.77 MG/DL (ref 0.7–1.3)
GLOBULIN SER CALC-MCNC: 5.6 G/DL (ref 2–4)
GLUCOSE BLD STRIP.AUTO-MCNC: 105 MG/DL (ref 65–117)
GLUCOSE BLD STRIP.AUTO-MCNC: 131 MG/DL (ref 65–117)
GLUCOSE BLD STRIP.AUTO-MCNC: 137 MG/DL (ref 65–117)
GLUCOSE BLD STRIP.AUTO-MCNC: 141 MG/DL (ref 65–117)
GLUCOSE SERPL-MCNC: 113 MG/DL (ref 65–100)
POTASSIUM SERPL-SCNC: 4.3 MMOL/L (ref 3.5–5.1)
PROT SERPL-MCNC: 8.3 G/DL (ref 6.4–8.2)
SERVICE CMNT-IMP: ABNORMAL
SERVICE CMNT-IMP: NORMAL
SODIUM SERPL-SCNC: 131 MMOL/L (ref 136–145)

## 2023-02-05 PROCEDURE — 80053 COMPREHEN METABOLIC PANEL: CPT

## 2023-02-05 PROCEDURE — 74011250637 HC RX REV CODE- 250/637: Performed by: INTERNAL MEDICINE

## 2023-02-05 PROCEDURE — 74011250636 HC RX REV CODE- 250/636: Performed by: NURSE PRACTITIONER

## 2023-02-05 PROCEDURE — 74011000250 HC RX REV CODE- 250: Performed by: INTERNAL MEDICINE

## 2023-02-05 PROCEDURE — 74011636637 HC RX REV CODE- 636/637: Performed by: INTERNAL MEDICINE

## 2023-02-05 PROCEDURE — 74011000250 HC RX REV CODE- 250: Performed by: NEUROLOGICAL SURGERY

## 2023-02-05 PROCEDURE — 74011250637 HC RX REV CODE- 250/637: Performed by: NURSE PRACTITIONER

## 2023-02-05 PROCEDURE — 99232 SBSQ HOSP IP/OBS MODERATE 35: CPT | Performed by: INTERNAL MEDICINE

## 2023-02-05 PROCEDURE — 82962 GLUCOSE BLOOD TEST: CPT

## 2023-02-05 PROCEDURE — 36415 COLL VENOUS BLD VENIPUNCTURE: CPT

## 2023-02-05 PROCEDURE — 65270000046 HC RM TELEMETRY

## 2023-02-05 PROCEDURE — 74011250637 HC RX REV CODE- 250/637: Performed by: NEUROLOGICAL SURGERY

## 2023-02-05 RX ORDER — ENTECAVIR 0.5 MG/1
0.5 TABLET, FILM COATED ORAL
Status: DISCONTINUED | OUTPATIENT
Start: 2023-02-05 | End: 2023-02-10 | Stop reason: HOSPADM

## 2023-02-05 RX ADMIN — SODIUM CHLORIDE, PRESERVATIVE FREE 10 ML: 5 INJECTION INTRAVENOUS at 22:17

## 2023-02-05 RX ADMIN — HYDROMORPHONE HYDROCHLORIDE 2 MG: 2 INJECTION, SOLUTION INTRAMUSCULAR; INTRAVENOUS; SUBCUTANEOUS at 01:01

## 2023-02-05 RX ADMIN — ACETAMINOPHEN 650 MG: 325 TABLET ORAL at 01:01

## 2023-02-05 RX ADMIN — ACETAMINOPHEN 650 MG: 325 TABLET ORAL at 13:20

## 2023-02-05 RX ADMIN — HYDROMORPHONE HYDROCHLORIDE 2 MG: 2 INJECTION, SOLUTION INTRAMUSCULAR; INTRAVENOUS; SUBCUTANEOUS at 21:40

## 2023-02-05 RX ADMIN — GABAPENTIN 600 MG: 600 TABLET, FILM COATED ORAL at 15:44

## 2023-02-05 RX ADMIN — OXYCODONE HYDROCHLORIDE 10 MG: 5 TABLET ORAL at 17:35

## 2023-02-05 RX ADMIN — SODIUM CHLORIDE, PRESERVATIVE FREE 10 ML: 5 INJECTION INTRAVENOUS at 05:42

## 2023-02-05 RX ADMIN — SERTRALINE HYDROCHLORIDE 100 MG: 50 TABLET ORAL at 09:16

## 2023-02-05 RX ADMIN — SENNOSIDES AND DOCUSATE SODIUM 1 TABLET: 50; 8.6 TABLET ORAL at 09:17

## 2023-02-05 RX ADMIN — GABAPENTIN 600 MG: 600 TABLET, FILM COATED ORAL at 09:16

## 2023-02-05 RX ADMIN — TRAZODONE HYDROCHLORIDE 50 MG: 50 TABLET ORAL at 22:15

## 2023-02-05 RX ADMIN — Medication 2 UNITS: at 17:31

## 2023-02-05 RX ADMIN — PANTOPRAZOLE SODIUM 40 MG: 40 TABLET, DELAYED RELEASE ORAL at 06:37

## 2023-02-05 RX ADMIN — HYDROMORPHONE HYDROCHLORIDE 2 MG: 2 INJECTION, SOLUTION INTRAMUSCULAR; INTRAVENOUS; SUBCUTANEOUS at 05:41

## 2023-02-05 RX ADMIN — OXYCODONE HYDROCHLORIDE 10 MG: 5 TABLET ORAL at 22:22

## 2023-02-05 RX ADMIN — SODIUM CHLORIDE, PRESERVATIVE FREE 10 ML: 5 INJECTION INTRAVENOUS at 06:00

## 2023-02-05 RX ADMIN — PANTOPRAZOLE SODIUM 40 MG: 40 TABLET, DELAYED RELEASE ORAL at 15:44

## 2023-02-05 RX ADMIN — SODIUM CHLORIDE, PRESERVATIVE FREE 10 ML: 5 INJECTION INTRAVENOUS at 14:00

## 2023-02-05 RX ADMIN — QUETIAPINE FUMARATE 200 MG: 100 TABLET ORAL at 22:16

## 2023-02-05 RX ADMIN — CYCLOBENZAPRINE 10 MG: 10 TABLET, FILM COATED ORAL at 15:44

## 2023-02-05 RX ADMIN — SENNOSIDES AND DOCUSATE SODIUM 1 TABLET: 50; 8.6 TABLET ORAL at 17:31

## 2023-02-05 RX ADMIN — CYCLOBENZAPRINE 10 MG: 10 TABLET, FILM COATED ORAL at 09:16

## 2023-02-05 RX ADMIN — GABAPENTIN 600 MG: 600 TABLET, FILM COATED ORAL at 22:15

## 2023-02-05 RX ADMIN — ACETAMINOPHEN 650 MG: 325 TABLET ORAL at 06:37

## 2023-02-05 RX ADMIN — OXYCODONE HYDROCHLORIDE 10 MG: 5 TABLET ORAL at 09:27

## 2023-02-05 RX ADMIN — HYDROMORPHONE HYDROCHLORIDE 2 MG: 2 INJECTION, SOLUTION INTRAMUSCULAR; INTRAVENOUS; SUBCUTANEOUS at 13:07

## 2023-02-05 RX ADMIN — ENTECAVIR 0.5 MG: 0.5 TABLET ORAL at 17:36

## 2023-02-05 RX ADMIN — CYCLOBENZAPRINE 10 MG: 10 TABLET, FILM COATED ORAL at 22:15

## 2023-02-05 RX ADMIN — POLYETHYLENE GLYCOL 3350 17 G: 17 POWDER, FOR SOLUTION ORAL at 09:16

## 2023-02-05 RX ADMIN — ACETAMINOPHEN 650 MG: 325 TABLET ORAL at 18:28

## 2023-02-05 NOTE — PROGRESS NOTES
[facility]  Hospitalist Group                                                                                          Hospitalist Progress Note  Shaan Jenkins NP  Answering service: [phone] from in house phone        Date of Service:  2023  NAME:  Lisbet Dean  :  1955  MRN:  [id number]       Admission Summary: This is a 68-year-old man with past medical history significant for type 2 diabetes, anxiety/depression, and hypertension; presented at [facility] emergency room with neck pain. This has been going on for a couple of weeks. The pain is located at the back of the neck. The patient described the pain as severe 10/10 in severity with no history of trauma to the neck. No known aggravating or relieving factors. The pain is dull ache. He was seen by the orthopedic surgeon. The patient was told that he probably has a pinched nerve and outpatient MRI was ordered. The appointment for the outpatient MRI is not until this coming Monday. Because of persistent pain, the patient came to the emergency room. The pain has now become associated with inability to lift the left arm off the bed without difficulties, but the patient denies pain in the left arm. Just some weakness. The patient has had a CT scan of the neck done which shows significant degenerative changes. When the patient arrived at the emergency room, CT scan of the head was obtained. This was negative for acute pathology. The neurosurgeon on-call at [facility] was consulted by the emergency room physician and decision was made to transfer the patient to [facility] for MRI of the neck and also for further evaluation by the neurosurgeon. The hospitalist service was asked to directly admit the patient from [facility] emergency room to [facility] for that purpose.       Interval history / Subjective:     Patient seen and examined this afternoon around lunchtime. He is awakened from sleep and immediately complaining of severe neck pain. Assessment & Plan:     Cervical cord tumor with cord compression  s/p C5 corpectomy, arthrodesis with cage and anterior cervical plate 7/57   MRI c spine: Metastatic disease to the cervical spine at C5 and C7 with extraosseous  spread of tumor. At C5 there is collapse of the vertebral body and epidural spread of tumor resulting in significant canal compression. The epidural spread of tumor extends cranially on the left to C4 and fills both C4-5 and C5-6 foramen. The epidural tumor at both C5 and C7 distorts the adjacent vertebral arteries     metastatic disease to spine. Mets to lungs, lumbar spine, and liver seen on additional imaging.  - per neurosurgery no brace needed. - Continuing multimodal pain control, Flexeril dose increased on 2/3 , lidocaine patches, neurontin, IV dilaudid PRN and PO oxycodone PRN    Metastatic disease with unknown primary   Thrombocytopenia  Elevated LFT  CT: Osseous metastatic disease. The L5 spinous process mass is amenable to  nonemergent CT-guided percutaneous biopsy if clinically indicated. Pulmonary nodules. Largest nodule is 1 cm in the posterior basilar left lower lobe. Metastatic disease is most likely. Retroperitoneal lymphadenopathy is likely due to metastatic disease. Possible hepatic metastatic disease    MRI brain: No evidence of intracranial metastatic disease. Oncology following;     CT guided L5 biopsy (1/30) Poorly differentiated malignancy    (+) AFB tumor marker    CEA (-)    - concerned with possible  HCC vs prostate cancer, Liver: Heterogeneous. Possible hypoattenuating masses. Cirrhotic surface from CT of abd/pel.  In 2011 pt had prostate bx done for bph, at that time the bx was benign prostate tissue, Prostate Ag 4.9, mildly elevated it.          - HBV/HCV serologies pending   - ,000  - Patient with hepatitis B not currently on treatment  - plan is to start entecovir for HBV - per note will consider starting immune therapy for [facility] [address] when able   - screening EGD for esophageal varices revealed 2 gastric ulcers and 2 duodenal ulcers , no active bleeding, patient started on BID PPI    Hematuria due to mills trauma   - pt had difficulty to place mills in OR  - mills catheter to stay in place for now per urology consult, can attempt voiding trial prior to discharge     Hyponatremia; Received IV hydration, stable in the 130 range, 137 today    Diet Controlled Type 2 diabetes  - A1c 5.9.   - SSI    Anxiety/depression:    - c/w trazodone, seroquel, zoloft   - stable        Code status: Full. Patient wants his [facility], daughter Ronal Kaur is second [facility]. He is  with his wife Eneida. Advance directive completed on 2/1. Prophylaxis: SCD  Care Plan discussed with: Patient, nurse, care manager  Anticipated Disposition:  IPR   Inpatient  Cardiac monitoring: Telemetry     Hospital Problems  Date Reviewed: 1/29/2023            Codes Class Noted POA    * (Principal) Cervical myelopathy with cervical radiculopathy (HCC) ICD-10-CM: G95.9, M54.12  ICD-9-CM: 721.1  1/29/2023 Yes        Acute myelopathy ([facility] [address] ICD-10-CM: G95.9  ICD-9-CM: 336.9  1/29/2023 Unknown         Social Determinants of Health     Tobacco Use: Medium Risk    Smoking Tobacco Use: Former    Smokeless Tobacco Use: Never    Passive Exposure: Not on file   Alcohol Use: Not on file   Financial Resource Strain: Not on file   Food Insecurity: Not on file   Transportation Needs: Not on file   Physical Activity: Not on file   Stress: Not on file   Social Connections: Not on file   Intimate Partner Violence: Not on file   Depression: Not on file   Housing Stability: Not on file       Review of Systems:   A comprehensive review of systems was negative except for that written in the HPI. Vital Signs:    Last 24hrs VS reviewed since prior progress note.  Most recent are:  Visit Vitals  /77 (BP 1 Location: Right upper arm, BP Patient Position: Sitting)   Pulse 71   Temp 98.2 °F (36.8 °C)   Resp 15   Ht 6' 2\" (1.88 m)   Wt 133.4 kg (294 lb)   SpO2 94%   BMI 37.75 kg/m²         Intake/Output Summary (Last 24 hours) at 2/4/2023 2233  Last data filed at 2/4/2023 2200  Gross per 24 hour   Intake --   Output 2700 ml   Net -2700 ml          Physical Examination:     I had a face to face encounter with this patient and independently examined them on 2/4/2023 as outlined below:          Constitutional:  No acute distress, cooperative, pleasant    ENT:  Oral mucosa moist, oropharynx benign. Resp:  CTA bilaterally. No wheezing/rhonchi/rales. No accessory muscle use. CV:  Regular rhythm, normal rate, no murmurs, gallops, rubs    GI:  Soft, non distended, non tender. normoactive bowel sounds, no hepatosplenomegaly     Musculoskeletal:  No edema, warm, 2+ pulses throughout    Neurologic:   AAOx2, Right arm 3/5 strength             Data Review:    I personally reviewed  Image    I have independently reviewed and interpreted patient's lab and all other diagnostic data    Labs:     No results for input(s): WBC, HGB, HCT, PLT, HGBEXT, HCTEXT, PLTEXT, HGBEXT, HCTEXT, PLTEXT in the last 72 hours. Recent Labs     02/04/23  0134 02/03/23  0030   * 134*   K 4.2 4.0    102   CO2 29 26   BUN 21* 28*   CREA 0.72 0.68*   * 117*   CA 8.8 8.4*       Recent Labs     02/04/23  0134 02/03/23  0030   ALT 42 43   * 156*   TBILI 1.3* 1.0   TP 7.8 6.7   ALB 2.6* 2.6*   GLOB 5.2* 4.1*       No results for input(s): INR, PTP, APTT, INREXT, INREXT in the last 72 hours. No results for input(s): FE, TIBC, PSAT, FERR in the last 72 hours. No results found for: FOL, RBCF   No results for input(s): PH, PCO2, PO2 in the last 72 hours. No results for input(s): CPK, CKNDX, TROIQ in the last 72 hours.     No lab exists for component: CPKMB  Lab Results   Component Value Date/Time    Cholesterol, total 141 03/22/2020 04:24 AM    HDL Cholesterol 73 03/22/2020 04:24 AM    LDL, calculated 53.8 03/22/2020 04:24 AM    Triglyceride 71 03/22/2020 04:24 AM    CHOL/HDL Ratio 1.9 03/22/2020 04:24 AM     Lab Results   Component Value Date/Time    Glucose (POC) 138 (H) 02/04/2023 09:37 PM    Glucose (POC) 124 (H) 02/04/2023 05:07 PM    Glucose (POC) 150 (H) 02/04/2023 02:12 PM    Glucose (POC) 96 02/04/2023 06:22 AM    Glucose (POC) 101 02/03/2023 10:18 PM     Lab Results   Component Value Date/Time    Color YELLOW/STRAW 01/29/2023 11:11 AM    Appearance CLOUDY (A) 01/29/2023 11:11 AM    Specific gravity 1.028 01/29/2023 11:11 AM    Specific gravity 1.023 08/19/2021 06:39 AM    pH (UA) 6.5 01/29/2023 11:11 AM    Protein Negative 01/29/2023 11:11 AM    Glucose Negative 01/29/2023 11:11 AM    Ketone Negative 01/29/2023 11:11 AM    Bilirubin Negative 01/29/2023 11:11 AM    Urobilinogen 1.0 01/29/2023 11:11 AM    Nitrites Positive (A) 01/29/2023 11:11 AM    Leukocyte Esterase Negative 01/29/2023 11:11 AM    Epithelial cells FEW 01/29/2023 11:11 AM    Bacteria 3+ (A) 01/29/2023 11:11 AM    WBC 0-4 01/29/2023 11:11 AM    RBC 0-5 01/29/2023 11:11 AM       Notes reviewed from all clinical/nonclinical/nursing services involved in patient's clinical care. Care coordination discussions were held with appropriate clinical/nonclinical/ nursing providers based on care coordination needs. Patients current active Medications were reviewed, considered, added and adjusted based on the clinical condition today. Home Medications were reconciled to the best of my ability given all available resources at the time of admission. Route is PO if not otherwise noted.       Medications Reviewed:     Current Facility-Administered Medications   Medication Dose Route Frequency    cyclobenzaprine (FLEXERIL) tablet 10 mg  10 mg Oral TID    HYDROmorphone (DILAUDID) injection 2 mg  2 mg IntraVENous Q3H PRN    polyethylene glycol (MIRALAX) packet 17 g 17 g Oral DAILY    sodium chloride (NS) flush 5-40 mL  5-40 mL IntraVENous Q8H    sodium chloride (NS) flush 5-40 mL  5-40 mL IntraVENous PRN    pantoprazole (PROTONIX) tablet 40 mg  40 mg Oral ACB&D    sodium chloride (NS) flush 5-40 mL  5-40 mL IntraVENous Q8H    sodium chloride (NS) flush 5-40 mL  5-40 mL IntraVENous PRN    acetaminophen (TYLENOL) tablet 650 mg  650 mg Oral Q6H    oxyCODONE IR (ROXICODONE) tablet 5 mg  5 mg Oral Q3H PRN    oxyCODONE IR (ROXICODONE) tablet 10 mg  10 mg Oral Q3H PRN    naloxone (NARCAN) injection 0.4 mg  0.4 mg IntraVENous PRN    ondansetron (ZOFRAN ODT) tablet 4 mg  4 mg Oral Q4H PRN    senna-docusate (PERICOLACE) 8.6-50 mg per tablet 1 Tablet  1 Tablet Oral BID    phenol throat spray (CHLORASEPTIC) 1 Spray  1 Spray Oral PRN    lidocaine 4 % patch 1 Patch  1 Patch TransDERmal Q24H    gabapentin (NEURONTIN) tablet 600 mg  600 mg Oral TID    hydrOXYzine HCL (ATARAX) tablet 50 mg  50 mg Oral Q6H PRN    QUEtiapine (SEROquel) tablet 200 mg  200 mg Oral QHS    sertraline (ZOLOFT) tablet 100 mg  100 mg Oral DAILY    traZODone (DESYREL) tablet 50 mg  50 mg Oral QHS    sodium chloride (NS) flush 5-40 mL  5-40 mL IntraVENous Q8H    sodium chloride (NS) flush 5-40 mL  5-40 mL IntraVENous PRN    acetaminophen (TYLENOL) tablet 650 mg  650 mg Oral Q6H PRN    Or    acetaminophen (TYLENOL) suppository 650 mg  650 mg Rectal Q6H PRN    ondansetron (ZOFRAN ODT) tablet 4 mg  4 mg Oral Q8H PRN    Or    ondansetron (ZOFRAN) injection 4 mg  4 mg IntraVENous Q6H PRN    insulin lispro (HUMALOG) injection   SubCUTAneous AC&HS    glucose chewable tablet 16 g  4 Tablet Oral PRN    glucagon (GLUCAGEN) injection 1 mg  1 mg IntraMUSCular PRN    dextrose 10 % infusion 0-250 mL  0-250 mL IntraVENous PRN    naloxone (NARCAN) injection 0.4 mg  0.4 mg IntraVENous PRN    [Held by provider] enoxaparin (LOVENOX) injection 30 mg  30 mg SubCUTAneous Q12H ______________________________________________________________________  EXPECTED LENGTH OF STAY: 3d 19h  ACTUAL LENGTH OF STAY:          [id number] Alexia Stewart

## 2023-02-05 NOTE — PROGRESS NOTES
[address] [facility] Adult  Hospitalist Group                                                                                          Hospitalist Progress Note  Kaveh Seo NP  Answering service: [phone] from in house phone        Date of Service:  2023  NAME:  Julia Santizo  :  1955  MRN:  [id number]       Admission Summary: This is a 38-year-old man with past medical history significant for type 2 diabetes, anxiety/depression, and hypertension; presented at [facility] emergency room with neck pain. This has been going on for a couple of weeks. The pain is located at the back of the neck. The patient described the pain as severe 10/10 in severity with no history of trauma to the neck. No known aggravating or relieving factors. The pain is dull ache. He was seen by the orthopedic surgeon. The patient was told that he probably has a pinched nerve and outpatient MRI was ordered. The appointment for the outpatient MRI is not until this coming Monday. Because of persistent pain, the patient came to the emergency room. The pain has now become associated with inability to lift the left arm off the bed without difficulties, but the patient denies pain in the left arm. Just some weakness. The patient has had a CT scan of the neck done which shows significant degenerative changes. When the patient arrived at the emergency room, CT scan of the head was obtained. This was negative for acute pathology. The neurosurgeon on-call at [facility] was consulted by the emergency room physician and decision was made to transfer the patient to [facility] for MRI of the neck and also for further evaluation by the neurosurgeon. The hospitalist service was asked to directly admit the patient from [facility] emergency room to [facility] for that purpose.       Interval history / Subjective:     Patient seen and examined this afternoon around lunchtime. Will likely need rehab at discharge. Patient medically stable for discharge hopefully in the next 24-48 hours. Assessment & Plan:     Cervical cord tumor with cord compression  s/p C5 corpectomy, arthrodesis with cage and anterior cervical plate 9/37   MRI c spine: Metastatic disease to the cervical spine at C5 and C7 with extraosseous  spread of tumor. At C5 there is collapse of the vertebral body and epidural spread of tumor resulting in significant canal compression. The epidural spread of tumor extends cranially on the left to C4 and fills both C4-5 and C5-6 foramen. The epidural tumor at both C5 and C7 distorts the adjacent vertebral arteries     metastatic disease to spine. Mets to lungs, lumbar spine, and liver seen on additional imaging.  - per neurosurgery no brace needed. - Continuing multimodal pain control, Flexeril dose increased on 2/3 , lidocaine patches, neurontin, IV dilaudid PRN and PO oxycodone PRN    Metastatic disease with unknown primary   Thrombocytopenia  Elevated LFT  CT: Osseous metastatic disease. The L5 spinous process mass is amenable to  nonemergent CT-guided percutaneous biopsy if clinically indicated. Pulmonary nodules. Largest nodule is 1 cm in the posterior basilar left lower lobe. Metastatic disease is most likely. Retroperitoneal lymphadenopathy is likely due to metastatic disease. Possible hepatic metastatic disease    MRI brain: No evidence of intracranial metastatic disease. Oncology following;     CT guided L5 biopsy (1/30) Poorly differentiated malignancy    (+) AFB tumor marker    CEA (-)    - concerned with possible  HCC vs prostate cancer, Liver: Heterogeneous. Possible hypoattenuating masses. Cirrhotic surface from CT of abd/pel.  In 2011 pt had prostate bx done for bph, at that time the bx was benign prostate tissue, Prostate Ag 4.9, mildly elevated it.          - HBV/HCV serologies pending   - ,000  - Patient with hepatitis B not currently on treatment  - plan is to start entecovir for HBV - per note will consider starting immune therapy for [address] when able   - screening EGD for esophageal varices revealed 2 gastric ulcers and 2 duodenal ulcers , no active bleeding, patient started on BID PPI    Hematuria due to mills trauma   - pt had difficulty to place mills in OR  - mills catheter to stay in place for now per urology consult, can attempt voiding trial prior to discharge     Hyponatremia; Received IV hydration, stable in the 130 range, 137 today    Diet Controlled Type 2 diabetes  - A1c 5.9.   - SSI    Anxiety/depression:    - c/w trazodone, seroquel, zoloft   - stable        Code status: Full. Patient wants his daughter Kenisha Mcnamara is first Dearl Ira, daughter Harpreet Power is second mpoa. He is  with his wife Eneida. Advance directive completed on 2/1. Prophylaxis: SCD  Care Plan discussed with: Patient, nurse, care manager  Anticipated Disposition:  IPR   Inpatient  Cardiac monitoring: Telemetry     Hospital Problems  Date Reviewed: 1/29/2023            Codes Class Noted POA    * (Principal) Cervical myelopathy with cervical radiculopathy (HCC) ICD-10-CM: G95.9, M54.12  ICD-9-CM: 721.1  1/29/2023 Yes        Acute myelopathy ([facility] ICD-10-CM: G95.9  ICD-9-CM: 336.9  1/29/2023 Unknown         Social Determinants of Health     Tobacco Use: Medium Risk    Smoking Tobacco Use: Former    Smokeless Tobacco Use: Never    Passive Exposure: Not on file   Alcohol Use: Not on file   Financial Resource Strain: Not on file   Food Insecurity: Not on file   Transportation Needs: Not on file   Physical Activity: Not on file   Stress: Not on file   Social Connections: Not on file   Intimate Partner Violence: Not on file   Depression: Not on file   Housing Stability: Not on file       Review of Systems:   A comprehensive review of systems was negative except for that written in the HPI. Vital Signs:    Last 24hrs VS reviewed since prior progress note.  Most recent are:  Visit Vitals  /78 (BP 1 Location: Left lower arm, BP Patient Position: At rest;Sitting)   Pulse 78   Temp 97.9 °F (36.6 °C)   Resp 16   Ht 6' 2\" (1.88 m)   Wt 133.4 kg (294 lb)   SpO2 92%   BMI 37.75 kg/m²         Intake/Output Summary (Last 24 hours) at 2/5/2023 1643  Last data filed at 2/5/2023 6071  Gross per 24 hour   Intake --   Output 1550 ml   Net -1550 ml          Physical Examination:     I had a face to face encounter with this patient and independently examined them on 2/5/2023 as outlined below:          Constitutional:  No acute distress, cooperative, pleasant    ENT:  Oral mucosa moist, oropharynx benign. Resp:  CTA bilaterally. No wheezing/rhonchi/rales. No accessory muscle use. CV:  Regular rhythm, normal rate, no murmurs, gallops, rubs    GI:  Soft, non distended, non tender. normoactive bowel sounds, no hepatosplenomegaly     Musculoskeletal:  No edema, warm, 2+ pulses throughout    Neurologic:   AAOx2, Right arm 3/5 strength             Data Review:    I personally reviewed  Image    I have independently reviewed and interpreted patient's lab and all other diagnostic data    Labs:     No results for input(s): WBC, HGB, HCT, PLT, HGBEXT, HCTEXT, PLTEXT, HGBEXT, HCTEXT, PLTEXT in the last 72 hours. Recent Labs     02/05/23 0352 02/04/23 0134 02/03/23  0030   * 133* 134*   K 4.3 4.2 4.0   CL 99 101 102   CO2 27 29 26   BUN 20 21* 28*   CREA 0.77 0.72 0.68*   * 127* 117*   CA 9.4 8.8 8.4*       Recent Labs     02/05/23 0352 02/04/23  0134 02/03/23  0030   ALT 38 42 43   * 158* 156*   TBILI 1.5* 1.3* 1.0   TP 8.3* 7.8 6.7   ALB 2.7* 2.6* 2.6*   GLOB 5.6* 5.2* 4.1*       No results for input(s): INR, PTP, APTT, INREXT, INREXT in the last 72 hours. No results for input(s): FE, TIBC, PSAT, FERR in the last 72 hours. No results found for: FOL, RBCF   No results for input(s): PH, PCO2, PO2 in the last 72 hours.   No results for input(s): CPK, CKNDX, TROIQ in the last 72 hours. No lab exists for component: CPKMB  Lab Results   Component Value Date/Time    Cholesterol, total 141 03/22/2020 04:24 AM    HDL Cholesterol 73 03/22/2020 04:24 AM    LDL, calculated 53.8 03/22/2020 04:24 AM    Triglyceride 71 03/22/2020 04:24 AM    CHOL/HDL Ratio 1.9 03/22/2020 04:24 AM     Lab Results   Component Value Date/Time    Glucose (POC) 141 (H) 02/05/2023 04:33 PM    Glucose (POC) 131 (H) 02/05/2023 11:24 AM    Glucose (POC) 105 02/05/2023 06:30 AM    Glucose (POC) 138 (H) 02/04/2023 09:37 PM    Glucose (POC) 124 (H) 02/04/2023 05:07 PM     Lab Results   Component Value Date/Time    Color YELLOW/STRAW 01/29/2023 11:11 AM    Appearance CLOUDY (A) 01/29/2023 11:11 AM    Specific gravity 1.028 01/29/2023 11:11 AM    Specific gravity 1.023 08/19/2021 06:39 AM    pH (UA) 6.5 01/29/2023 11:11 AM    Protein Negative 01/29/2023 11:11 AM    Glucose Negative 01/29/2023 11:11 AM    Ketone Negative 01/29/2023 11:11 AM    Bilirubin Negative 01/29/2023 11:11 AM    Urobilinogen 1.0 01/29/2023 11:11 AM    Nitrites Positive (A) 01/29/2023 11:11 AM    Leukocyte Esterase Negative 01/29/2023 11:11 AM    Epithelial cells FEW 01/29/2023 11:11 AM    Bacteria 3+ (A) 01/29/2023 11:11 AM    WBC 0-4 01/29/2023 11:11 AM    RBC 0-5 01/29/2023 11:11 AM       Notes reviewed from all clinical/nonclinical/nursing services involved in patient's clinical care. Care coordination discussions were held with appropriate clinical/nonclinical/ nursing providers based on care coordination needs. Patients current active Medications were reviewed, considered, added and adjusted based on the clinical condition today. Home Medications were reconciled to the best of my ability given all available resources at the time of admission. Route is PO if not otherwise noted.       Medications Reviewed:     Current Facility-Administered Medications   Medication Dose Route Frequency    entecavir (BARACLUDE) tablet 0.5 mg  0.5 mg Oral ACD    cyclobenzaprine (FLEXERIL) tablet 10 mg  10 mg Oral TID    HYDROmorphone (DILAUDID) injection 2 mg  2 mg IntraVENous Q3H PRN    polyethylene glycol (MIRALAX) packet 17 g  17 g Oral DAILY    sodium chloride (NS) flush 5-40 mL  5-40 mL IntraVENous Q8H    sodium chloride (NS) flush 5-40 mL  5-40 mL IntraVENous PRN    pantoprazole (PROTONIX) tablet 40 mg  40 mg Oral ACB&D    sodium chloride (NS) flush 5-40 mL  5-40 mL IntraVENous Q8H    sodium chloride (NS) flush 5-40 mL  5-40 mL IntraVENous PRN    acetaminophen (TYLENOL) tablet 650 mg  650 mg Oral Q6H    oxyCODONE IR (ROXICODONE) tablet 5 mg  5 mg Oral Q3H PRN    oxyCODONE IR (ROXICODONE) tablet 10 mg  10 mg Oral Q3H PRN    naloxone (NARCAN) injection 0.4 mg  0.4 mg IntraVENous PRN    ondansetron (ZOFRAN ODT) tablet 4 mg  4 mg Oral Q4H PRN    senna-docusate (PERICOLACE) 8.6-50 mg per tablet 1 Tablet  1 Tablet Oral BID    phenol throat spray (CHLORASEPTIC) 1 Spray  1 Spray Oral PRN    lidocaine 4 % patch 1 Patch  1 Patch TransDERmal Q24H    gabapentin (NEURONTIN) tablet 600 mg  600 mg Oral TID    hydrOXYzine HCL (ATARAX) tablet 50 mg  50 mg Oral Q6H PRN    QUEtiapine (SEROquel) tablet 200 mg  200 mg Oral QHS    sertraline (ZOLOFT) tablet 100 mg  100 mg Oral DAILY    traZODone (DESYREL) tablet 50 mg  50 mg Oral QHS    sodium chloride (NS) flush 5-40 mL  5-40 mL IntraVENous Q8H    sodium chloride (NS) flush 5-40 mL  5-40 mL IntraVENous PRN    acetaminophen (TYLENOL) tablet 650 mg  650 mg Oral Q6H PRN    Or    acetaminophen (TYLENOL) suppository 650 mg  650 mg Rectal Q6H PRN    ondansetron (ZOFRAN ODT) tablet 4 mg  4 mg Oral Q8H PRN    Or    ondansetron (ZOFRAN) injection 4 mg  4 mg IntraVENous Q6H PRN    insulin lispro (HUMALOG) injection   SubCUTAneous AC&HS    glucose chewable tablet 16 g  4 Tablet Oral PRN    glucagon (GLUCAGEN) injection 1 mg  1 mg IntraMUSCular PRN    dextrose 10 % infusion 0-250 mL  0-250 mL IntraVENous PRN    naloxone (NARCAN) injection 0.4 mg  0.4 mg IntraVENous PRN    [Held by provider] enoxaparin (LOVENOX) injection 30 mg  30 mg SubCUTAneous Q12H     ______________________________________________________________________  EXPECTED LENGTH OF STAY: 3d 19h  ACTUAL LENGTH OF STAY:          [address], NP

## 2023-02-05 NOTE — PROGRESS NOTES
[address], MD, FACP, [address], [place]      Wendi Lopez PA-C    April S Mauri, PCNP-BC   Deion Mary Anne, [facility]   Krissy August, LISE Gayle FNP-C   Marybethyamilet Pedra[place], AGPCNP-BC      [address]   at [address], [address]   [phone]   FAX: [phone]  Liver [facility]   at [address], [address] [place] [address], [address]   [phone]   FAX: [phone]       HEPATOLOGY PROGRESS NOTE  The patient is a 69 y.o. male who was found to have chronic HBV in 3/2020. He was never treated with anti-viral therapy for HBV. He has probably had cirrhosis dating back to 2010 since he has had persistent thrombocytopenia since at least then. He has a history of drug abuse and numerous drug tox screens that are intermittently positive for cocaine. He came to the ED because of pain in the neck and shoulders. He had weakness of the upper arms and could not raise his arms. A CT scan demonstrated a mass in C5 with spinal cord compression. He underwent surgical decompression. Biopsy of he mass was positive for poorly differentiated cancer. Cell type to be defined. CT scan abdomen demonstrated cirrhosis and multiple liver mass lesions. Multiple pulmonary nodules. Metasastic bone disease to spine including L5. ,000!!!! Hospital Course:  Dynamic MRI of liver shows wide infiltration of right lobe with HCC including PV invasion. Widely metastatic disease to spine, pulmonary nodules suggestive of metastasis. No need for liver biopsy with ,000    Chronic HBV is now being treated with Entecovir   EGD showed 2 gastric ulcers and 2 duodenal ulcers. No active bleeding.   Now on PPI BID    Planning on going to rehab when stable and ready    Hepatology Plan: Will start immune therapy for [address] as soon as we are able. ASSESSMENT AND PLAN:  Cirrhosis  The diagnosis of cirrhosis is based upon imaging, laboratory studies, complications of cirrhosis. Cirrhosis is secondary to chronic HBV and alcohol. The CTP is 7. Child class B. The MELD score is 10. Chronic HBV  Chronic HBV with cirrhosis. The immune status of HBV is undefined at this time. Will obtain serology for HBV  Will obtain serology for HCV    The patient is not currently receiving treatment for HBV. Will perform imaging of the liver with dynamic MRI. There is no reason to perform liver biopsy at this time. Hepatocellular carcinoma   The diagnosis was made in 2/2023 by multiple liver masses and an AFP > 147,000. At diagnosis the [address] metastatic to spine, lungs. Dynamic MRI shows [address] that is diffusely infiltrated throughout right lobe with PV invasion. Only treatment option will be immune therapy. Portal vein thrombosis  This is secondary to tumor invasion into PV. No indication for anticoagulation. Screening for Esophageal varices   The patient has not had an EGD to screen for varices. Will do this Friday afternoon. NPO Friday. Hepatic encephalopathy   Overt HE has not developed to date. There is no reason for treatment with lactulose of xifaxan  There is no need to restrict dietary protein at this time. Thrombocytopenia   This is secondary to cirrhosis. There is no evidence of overt bleeding. No treatment is required. The platelet count is adequate for the patient to undergo procedures without the need for platelet transfusion or platelet growth factors. PHYSICAL EXAMINATION:  VS: per nursing note  General:  No acute distress. Eyes:  Sclera anicteric. ENT:  No oral lesions. Thyroid normal.  Nodes:  No adenopathy. Skin:  No spider angiomata. No jaundice. Respiratory:  Lungs clear to auscultation. Cardiovascular:  Regular heart rate. Abdomen:  Soft non-tender, No obvious ascites. Extremities:  2+ lower extremity edema. Unable to move upper arms. Neurologic:  Alert and oriented. Cranial nerves grossly intact. No asterixis. LABORATORY:   Latest Reference Range & Units 1/29/23 03:40 1/30/23 05:56 1/31/23 05:59 2/1/23 02:47   WBC 4.1 - 11.1 K/uL 9.7  6.2 8.3   HGB 12.1 - 17.0 g/dL 14.7  14.7 14.7   PLATELET 852 - 343 K/uL 135 (L)  111 (L) 117 (L)   INR 0.9 - 1.1    1.2 (H)     Sodium 136 - 145 mmol/L 131 (L) 133 (L) 132 (L) 133 (L)   Potassium 3.5 - 5.1 mmol/L 4.5 4.3 4.2 4.4   Chloride 97 - 108 mmol/L 103 102 100 102   CO2 21 - 32 mmol/L 24 26 28 27   Glucose 65 - 100 mg/dL 139 (H) 96 112 (H) 144 (H)   BUN 6 - 20 MG/DL 28 (H) 32 (H) 27 (H) 23 (H)   Creatinine 0.70 - 1.30 MG/DL 0.83 0.94 0.76 0.81   Bilirubin, total 0.2 - 1.0 MG/DL 0.4      Albumin 3.5 - 5.0 g/dL 2.9 (L)      ALT 12 - 78 U/L 49      AST 15 - 37 U/L 142 (H)      Alk.  phosphatase 45 - 117 U/L 146 (H)      (L): Data is abnormally low  (H): Data is abnormally high      MD Mary [address] [address] Danny Thomas, [address]  [address] [facility] 22. [phone]  FAX:  [phone]

## 2023-02-05 NOTE — PROGRESS NOTES
Referral source:  initiated visit as a result of Ceil Cam length of stay Annie Fisher [address] [facility]. I reviewed the patient's medical record prior to this encounter. Assessment: Mr. Eliana Becerril had several visitors when I arrived including a nephew who is also clergy. I offered words of encouragement and blessing for continued healing. Outcome: Heriberto An expressed gratitude for life, healing, and meaningful relationships with friends and family. He requested visit tomorrow for prayer. Plan of Care: No further spiritual needs were identified during this visit. Will provide ongoing support as requested. Please contact Spiritual Care services for any additional needs ([phone])    _________________________________________  Rev.  Chevy Huber, Staff Ivonne Ward, MS, [facility]

## 2023-02-05 NOTE — PROGRESS NOTES
Problem: Falls - Risk of  Goal: *Absence of Falls  Description: Document Jesus Kumar Fall Risk and appropriate interventions in the flowsheet. Outcome: Resolved/Not Met  Note: Fall Risk Interventions:                                Problem: Patient Education: Go to Patient Education Activity  Goal: Patient/Family Education  Outcome: Resolved/Not Met     Problem: Pressure Injury - Risk of  Goal: *Prevention of pressure injury  Description: Document Lit Scale and appropriate interventions in the flowsheet.   Outcome: Resolved/Not Met  Note: Pressure Injury Interventions:  Sensory Interventions: Assess changes in LOC    Moisture Interventions: Minimize layers    Activity Interventions: PT/OT evaluation    Mobility Interventions: PT/OT evaluation    Nutrition Interventions: Offer support with meals,snacks and hydration                     Problem: Patient Education: Go to Patient Education Activity  Goal: Patient/Family Education  Outcome: Resolved/Not Met     Problem: Discharge Planning  Goal: *Discharge to safe environment  Outcome: Resolved/Not Met  Goal: *Knowledge of medication management  Outcome: Resolved/Not Met  Goal: *Knowledge of discharge instructions  Outcome: Resolved/Not Met     Problem: Patient Education: Go to Patient Education Activity  Goal: Patient/Family Education  Outcome: Resolved/Not Met     Problem: Complex Spine Procedure:  Day of Surgery  Goal: Off Pathway (Use only if patient is Off Pathway)  Outcome: Resolved/Not Met  Goal: Activity/Safety  Outcome: Resolved/Not Met  Goal: Consults, if ordered  Outcome: Resolved/Not Met  Goal: Diagnostic Test/Procedures  Outcome: Resolved/Not Met  Goal: Nutrition/Diet  Outcome: Resolved/Not Met  Goal: Discharge Planning  Outcome: Resolved/Not Met  Goal: Medications  Outcome: Resolved/Not Met  Goal: Respiratory  Outcome: Resolved/Not Met  Goal: Treatments/Interventions/Procedures  Outcome: Resolved/Not Met  Goal: Psychosocial  Outcome: Resolved/Not Met  Goal: *Optimal pain control at patient's stated goal  Outcome: Resolved/Not Met  Goal: *Demonstrates progressive activity  Outcome: Resolved/Not Met  Goal: *Respiratory status stable  Outcome: Resolved/Not Met     Problem: Patient Education: Go to Patient Education Activity  Goal: Patient/Family Education  Outcome: Resolved/Not Met     Problem: Patient Education: Go to Patient Education Activity  Goal: Patient/Family Education  Outcome: Resolved/Not Met

## 2023-02-06 LAB
ALBUMIN SERPL-MCNC: 2.3 G/DL (ref 3.5–5)
ALBUMIN/GLOB SERPL: 0.4 (ref 1.1–2.2)
ALP SERPL-CCNC: 161 U/L (ref 45–117)
ALT SERPL-CCNC: 33 U/L (ref 12–78)
ANION GAP SERPL CALC-SCNC: 5 MMOL/L (ref 5–15)
AST SERPL-CCNC: 160 U/L (ref 15–37)
BACTERIA SPEC CULT: NORMAL
BILIRUB DIRECT SERPL-MCNC: 0.3 MG/DL (ref 0–0.2)
BILIRUB SERPL-MCNC: 1.2 MG/DL (ref 0.2–1)
BUN SERPL-MCNC: 19 MG/DL (ref 6–20)
BUN/CREAT SERPL: 30 (ref 12–20)
CALCIUM SERPL-MCNC: 9 MG/DL (ref 8.5–10.1)
CHLORIDE SERPL-SCNC: 97 MMOL/L (ref 97–108)
CO2 SERPL-SCNC: 26 MMOL/L (ref 21–32)
CREAT SERPL-MCNC: 0.63 MG/DL (ref 0.7–1.3)
ERYTHROCYTE [DISTWIDTH] IN BLOOD BY AUTOMATED COUNT: 15.4 % (ref 11.5–14.5)
GLOBULIN SER CALC-MCNC: 5.5 G/DL (ref 2–4)
GLUCOSE BLD STRIP.AUTO-MCNC: 104 MG/DL (ref 65–117)
GLUCOSE BLD STRIP.AUTO-MCNC: 106 MG/DL (ref 65–117)
GLUCOSE BLD STRIP.AUTO-MCNC: 131 MG/DL (ref 65–117)
GLUCOSE BLD STRIP.AUTO-MCNC: 180 MG/DL (ref 65–117)
GLUCOSE SERPL-MCNC: 124 MG/DL (ref 65–100)
HCT VFR BLD AUTO: 42.7 % (ref 36.6–50.3)
HGB BLD-MCNC: 13.6 G/DL (ref 12.1–17)
INR PPP: 1.3 (ref 0.9–1.1)
MCH RBC QN AUTO: 29.1 PG (ref 26–34)
MCHC RBC AUTO-ENTMCNC: 31.9 G/DL (ref 30–36.5)
MCV RBC AUTO: 91.4 FL (ref 80–99)
NRBC # BLD: 0 K/UL (ref 0–0.01)
NRBC BLD-RTO: 0 PER 100 WBC
PLATELET # BLD AUTO: 123 K/UL (ref 150–400)
PMV BLD AUTO: 11.8 FL (ref 8.9–12.9)
POTASSIUM SERPL-SCNC: 4.4 MMOL/L (ref 3.5–5.1)
PROT SERPL-MCNC: 7.8 G/DL (ref 6.4–8.2)
PROTHROMBIN TIME: 13 SEC (ref 9–11.1)
RBC # BLD AUTO: 4.67 M/UL (ref 4.1–5.7)
SERVICE CMNT-IMP: ABNORMAL
SERVICE CMNT-IMP: ABNORMAL
SERVICE CMNT-IMP: NORMAL
SODIUM SERPL-SCNC: 128 MMOL/L (ref 136–145)
WBC # BLD AUTO: 9.8 K/UL (ref 4.1–11.1)

## 2023-02-06 PROCEDURE — 97535 SELF CARE MNGMENT TRAINING: CPT

## 2023-02-06 PROCEDURE — 74011250637 HC RX REV CODE- 250/637: Performed by: INTERNAL MEDICINE

## 2023-02-06 PROCEDURE — 74011000250 HC RX REV CODE- 250: Performed by: INTERNAL MEDICINE

## 2023-02-06 PROCEDURE — 74011250637 HC RX REV CODE- 250/637: Performed by: NURSE PRACTITIONER

## 2023-02-06 PROCEDURE — 82962 GLUCOSE BLOOD TEST: CPT

## 2023-02-06 PROCEDURE — 80048 BASIC METABOLIC PNL TOTAL CA: CPT

## 2023-02-06 PROCEDURE — 85610 PROTHROMBIN TIME: CPT

## 2023-02-06 PROCEDURE — 74011250636 HC RX REV CODE- 250/636: Performed by: NURSE PRACTITIONER

## 2023-02-06 PROCEDURE — 65270000046 HC RM TELEMETRY

## 2023-02-06 PROCEDURE — 97116 GAIT TRAINING THERAPY: CPT

## 2023-02-06 PROCEDURE — 80076 HEPATIC FUNCTION PANEL: CPT

## 2023-02-06 PROCEDURE — 74011250637 HC RX REV CODE- 250/637: Performed by: NEUROLOGICAL SURGERY

## 2023-02-06 PROCEDURE — 36415 COLL VENOUS BLD VENIPUNCTURE: CPT

## 2023-02-06 PROCEDURE — 85027 COMPLETE CBC AUTOMATED: CPT

## 2023-02-06 PROCEDURE — 74011636637 HC RX REV CODE- 636/637: Performed by: INTERNAL MEDICINE

## 2023-02-06 PROCEDURE — 74011000250 HC RX REV CODE- 250: Performed by: NEUROLOGICAL SURGERY

## 2023-02-06 RX ADMIN — HYDROMORPHONE HYDROCHLORIDE 2 MG: 2 INJECTION, SOLUTION INTRAMUSCULAR; INTRAVENOUS; SUBCUTANEOUS at 21:46

## 2023-02-06 RX ADMIN — ACETAMINOPHEN 650 MG: 325 TABLET ORAL at 19:24

## 2023-02-06 RX ADMIN — OXYCODONE HYDROCHLORIDE 5 MG: 5 TABLET ORAL at 17:13

## 2023-02-06 RX ADMIN — SODIUM CHLORIDE, PRESERVATIVE FREE 10 ML: 5 INJECTION INTRAVENOUS at 06:43

## 2023-02-06 RX ADMIN — PANTOPRAZOLE SODIUM 40 MG: 40 TABLET, DELAYED RELEASE ORAL at 06:32

## 2023-02-06 RX ADMIN — POLYETHYLENE GLYCOL 3350 17 G: 17 POWDER, FOR SOLUTION ORAL at 09:08

## 2023-02-06 RX ADMIN — ACETAMINOPHEN 650 MG: 325 TABLET ORAL at 06:32

## 2023-02-06 RX ADMIN — ENTECAVIR 0.5 MG: 0.5 TABLET ORAL at 17:12

## 2023-02-06 RX ADMIN — SENNOSIDES AND DOCUSATE SODIUM 1 TABLET: 50; 8.6 TABLET ORAL at 09:07

## 2023-02-06 RX ADMIN — OXYCODONE HYDROCHLORIDE 5 MG: 5 TABLET ORAL at 12:42

## 2023-02-06 RX ADMIN — PANTOPRAZOLE SODIUM 40 MG: 40 TABLET, DELAYED RELEASE ORAL at 17:13

## 2023-02-06 RX ADMIN — HYDROMORPHONE HYDROCHLORIDE 2 MG: 2 INJECTION, SOLUTION INTRAMUSCULAR; INTRAVENOUS; SUBCUTANEOUS at 01:50

## 2023-02-06 RX ADMIN — ACETAMINOPHEN 650 MG: 325 TABLET ORAL at 12:42

## 2023-02-06 RX ADMIN — SENNOSIDES AND DOCUSATE SODIUM 1 TABLET: 50; 8.6 TABLET ORAL at 17:13

## 2023-02-06 RX ADMIN — ACETAMINOPHEN 650 MG: 325 TABLET ORAL at 01:50

## 2023-02-06 RX ADMIN — SODIUM CHLORIDE, PRESERVATIVE FREE 10 ML: 5 INJECTION INTRAVENOUS at 06:28

## 2023-02-06 RX ADMIN — SODIUM CHLORIDE, PRESERVATIVE FREE 10 ML: 5 INJECTION INTRAVENOUS at 19:30

## 2023-02-06 RX ADMIN — Medication 2 UNITS: at 11:57

## 2023-02-06 RX ADMIN — SODIUM CHLORIDE, PRESERVATIVE FREE 10 ML: 5 INJECTION INTRAVENOUS at 14:00

## 2023-02-06 RX ADMIN — SODIUM CHLORIDE, PRESERVATIVE FREE 10 ML: 5 INJECTION INTRAVENOUS at 21:40

## 2023-02-06 RX ADMIN — SODIUM CHLORIDE, PRESERVATIVE FREE 10 ML: 5 INJECTION INTRAVENOUS at 19:32

## 2023-02-06 RX ADMIN — SERTRALINE HYDROCHLORIDE 100 MG: 50 TABLET ORAL at 09:08

## 2023-02-06 RX ADMIN — QUETIAPINE FUMARATE 200 MG: 100 TABLET ORAL at 21:40

## 2023-02-06 NOTE — PROGRESS NOTES
Referral source:  initiated visit as a result of Rosangela Sharpe length of stay Annie Fisher [address] [facility]. I reviewed the patient's medical record prior to this encounter. Assessment: Unable to fully assess. Mr. Milady Alanis was grimacing in pain during this encounter. I offered words of hope and assurance of my prayers. I consulted with pt's bedside nurse regarding Mr Milady Alanis' pain and clarified new diagnosis. Mr. Milady Alanis is a person of gilmar and would benefit from continued spiritual care visits for prayer and encouragement. Please contact Spiritual Care services for any additional needs ([phone])    _________________________________________  Rev.  Connor Gracia, Staff Ivonne Ward, MS, [facility]

## 2023-02-06 NOTE — PROGRESS NOTES
Problem: Self Care Deficits Care Plan (Adult)  Goal: *Acute Goals and Plan of Care (Insert Text)  Description: FUNCTIONAL STATUS PRIOR TO ADMISSION: Patient was independent and active without use of DME.     HOME SUPPORT: The patient lived alone with no local support. Occupational Therapy Goals  Initiated 2/1/2023  1. Patient will perform self-feeding with minimal assistance within 7 day(s). 2.  Patient will perform grooming with minimal assistance within 7 day(s). 3.  Patient will perform anterior neck to thigh bathing with moderate assistance  within 7 day(s). 4.  Patient will perform toilet transfers to/from with minimal assistance within 7 day(s). 5.  Patient will perform all aspects of toileting with moderate assistance within 7 day(s). 6.  Patient will participate in upper extremity therapeutic exercise/activities with moderate assistance  for 5 minutes within 7 day(s). 7.  Patient will utilize energy conservation techniques during functional activities with verbal cues within 7 day(s). Outcome: Progressing Towards Goal     OCCUPATIONAL THERAPY TREATMENT  Patient: Tonia Garrison (19 y.o. male)  Date: 2/6/2023  Diagnosis: Acute myelopathy ([facility] [address] [G95.9] Cervical myelopathy with cervical radiculopathy (HCC)  Procedure(s) (LRB):  ESOPHAGOGASTRODUODENOSCOPY (EGD) (N/A)  ESOPHAGOGASTRODUODENAL (EGD) BIOPSY (N/A) 3 Days Post-Op  Precautions: Fall  Chart, occupational therapy assessment, plan of care, and goals were reviewed. ASSESSMENT  Patient continues with skilled OT services and is progressing towards goals. Pt was received sitting up in chair and initally wasn't not sure if he was up for therapy session, but stated he needed to use the restroom. Pt was CGA for sit to stand from chair and functionally ambulate to bathroom, able to bare weigh through forearms to lift into standing, min A for LUE management to place on arm of chair.  Pt was CGA/MIN A for stand to sit on toilet (small raised toilet)  and min A for stand to sit due to lack of function in BUE, unable to bear weight through forearms in bathroom. Pt was max A for toilet hygiene due to lack of ROM in BUE. Pt was left seated in chair with BUE propped up with pillows to prevent swelling in UE, no swelling noted during session. Pt was left with all needs met and call bell in reach. Pt stated he had some pain in his shoulder after session and nurse was made aware. Pt is progressing towards goals but would still benefit from skilled acute OT services while in the hospital. It is recommended pt continue therapy at SNF. Current Level of Function Impacting Discharge (ADLs): Pt is currently limited by ROM in BUE impacting his ability to perform BADLS IND    Other factors to consider for discharge: support post discharge         PLAN :  Patient continues to benefit from skilled intervention to address the above impairments. Continue treatment per established plan of care to address goals. Recommend with staff:     Recommend next OT session: Pro/sup exercises     Recommendation for discharge: (in order for the patient to meet his/her long term goals)  Therapy up to 5 days/week in SNF setting    This discharge recommendation:  Has not yet been discussed the attending provider and/or case management    IF patient discharges home will need the following DME: TBD       SUBJECTIVE:   Patient stated My family came to visit this weekend.     OBJECTIVE DATA SUMMARY:   Cognitive/Behavioral Status:  Neurologic State: Alert, tired         Functional Mobility and Transfers for ADLs:         Transfers:  Sit to Stand: Contact guard assistance  Functional Transfers  Bathroom Mobility: Minimum assistance  Toilet Transfer : Minimum assistance       Balance:  Sitting: Intact; Without support  Sitting - Static: Good (unsupported)  Sitting - Dynamic: Fair (occasional)  Standing: Intact; With support  Standing - Static: Good;Constant support  Standing - Dynamic : Good;Constant support    ADL Intervention:       Toileting  Toileting Assistance: Total assistance(dependent)  Bladder Hygiene: Total assistance (dependent)  Bowel Hygiene: Total assistance (dependent)  Clothing Management: Maximum assistance          Pain:  Pain in shoulder reported after session, nurse made aware    Activity Tolerance:   Poor and requires rest breaks    After treatment patient left in no apparent distress:   Sitting in chair, Heels elevated for pressure relief, and Call bell within reach    COMMUNICATION/COLLABORATION:   The patients plan of care was discussed with: Registered nurse.      Debra Varghese OT  Time Calculation: 23 mins

## 2023-02-06 NOTE — PROGRESS NOTES
Spine Surgery Progress Note  Justice Ganser, PA-C    Admit Date: 2023   LOS: 9 days        Daily Progress Note: 2023    HPI: Mr. Win Islas is a pleasant 69yo gentleman with a history of T2DM, anxiety/depression, and hypertension who presented to [address] [place] [address] with neck pain for 2 weeks that radiates into his shoulders and is associated with BUE weakness, LUE>RUE. Imaging revealed \"Metastatic disease to the cervical spine at C5 and C7 with extraosseous spread of tumor. At C5 there is collapse of the vertebral body and epidural spread of tumor resulting in significant canal compression. The epidural spread of tumor extends cranially on the left to C4 and fills both C4-5 and C5-6 foramen. The epidural tumor at both C5 and C7 distorts the adjacent vertebral arteries. \" Patient was transferred to [facility]. He has no known cancer diagnosis. Pt denies bowel and bladder incontinence. Pt underwent C5 corpectomy, arthrodesis, and anterior cervical plating on 23. He tolerated the surgery well. Subjective:     Pt sitting up comfortably in a chair. Shoulder pain controlled. Motor slightly improved. Swallowing without issue. Drowsy. Objective:     Vital signs  Temp (24hrs), Av.8 °F (36.6 °C), Min:97.6 °F (36.4 °C), Max:98 °F (36.7 °C)   No intake/output data recorded.    1901 -  0700  In: -   Out: 2750 [Urine:2750]    Visit Vitals  /74   Pulse 70   Temp 97.6 °F (36.4 °C)   Resp 18   Ht 6' 2\" (1.88 m)   Wt 133.4 kg (294 lb)   SpO2 93%   BMI 37.75 kg/m²    O2 Flow Rate (L/min): 2 l/min O2 Device: None (Room air)     Output (mL)  Urine Voided: 400 ml (23)  Last Bowel Movement Date: 23 (23)     Pain control  Pain Assessment  Pain Scale 1: Numeric (0 - 10)  Pain Intensity 1: 6  Pain Onset 1: post op  Pain Location 1: Shoulder, Neck  Pain Orientation 1: Left, Right  Pain Description 1: Aching  Pain Intervention(s) 1: Medication (see MAR)    PT/OT  Gait     Gait  Base of Support: Widened  Speed/Valorie: Pace decreased (<100 feet/min), Slow  Step Length: Left shortened, Right shortened  Gait Abnormalities: Antalgic, Decreased step clearance  Ambulation - Level of Assistance: Contact guard assistance  Distance (ft): 75 Feet (ft) (x 2)  Assistive Device: Gait belt, Walker, rolling        Physical Exam:  Gen: No acute distress. Drowsy. Neuro: A&Ox3. Follows commands. Speech clear. PERRL. EOMI. Face symmetric. Strength 3/5 in Right deltoid/bicep. 4+/5 right . LUE bicep/deltoid strength 1/5. Strength 4/5 triceps, wrist, intrinsics. Left  4+/5  Sensation intact. No clonus. Gait deferred. Incision C/D/I    24 hour results:    Recent Results (from the past 24 hour(s))   GLUCOSE, POC    Collection Time: 02/05/23 11:24 AM   Result Value Ref Range    Glucose (POC) 131 (H) 65 - 117 mg/dL    Performed by Beba Seo    GLUCOSE, POC    Collection Time: 02/05/23  4:33 PM   Result Value Ref Range    Glucose (POC) 141 (H) 65 - 117 mg/dL    Performed by Beba Seo    GLUCOSE, POC    Collection Time: 02/05/23  9:14 PM   Result Value Ref Range    Glucose (POC) 137 (H) 65 - 117 mg/dL    Performed by Brenna Valentin    HEPATIC FUNCTION PANEL    Collection Time: 02/06/23  1:54 AM   Result Value Ref Range    Protein, total 7.8 6.4 - 8.2 g/dL    Albumin 2.3 (L) 3.5 - 5.0 g/dL    Globulin 5.5 (H) 2.0 - 4.0 g/dL    A-G Ratio 0.4 (L) 1.1 - 2.2      Bilirubin, total 1.2 (H) 0.2 - 1.0 MG/DL    Bilirubin, direct 0.3 (H) 0.0 - 0.2 MG/DL    Alk.  phosphatase 161 (H) 45 - 117 U/L    AST (SGOT) 160 (H) 15 - 37 U/L    ALT (SGPT) 33 12 - 78 U/L   METABOLIC PANEL, BASIC    Collection Time: 02/06/23  1:54 AM   Result Value Ref Range    Sodium 128 (L) 136 - 145 mmol/L    Potassium 4.4 3.5 - 5.1 mmol/L    Chloride 97 97 - 108 mmol/L    CO2 26 21 - 32 mmol/L    Anion gap 5 5 - 15 mmol/L    Glucose 124 (H) 65 - 100 mg/dL    BUN 19 6 - 20 MG/DL    Creatinine 0.63 (L) 0.70 - 1.30 MG/DL    BUN/Creatinine ratio 30 (H) 12 - 20      eGFR >60 >60 ml/min/1.73m2    Calcium 9.0 8.5 - 10.1 MG/DL   PROTHROMBIN TIME + INR    Collection Time: 02/06/23  1:54 AM   Result Value Ref Range    INR 1.3 (H) 0.9 - 1.1      Prothrombin time 13.0 (H) 9.0 - 11.1 sec   CBC W/O DIFF    Collection Time: 02/06/23  3:42 AM   Result Value Ref Range    WBC 9.8 4.1 - 11.1 K/uL    RBC 4.67 4.10 - 5.70 M/uL    HGB 13.6 12.1 - 17.0 g/dL    HCT 42.7 36.6 - 50.3 %    MCV 91.4 80.0 - 99.0 FL    MCH 29.1 26.0 - 34.0 PG    MCHC 31.9 30.0 - 36.5 g/dL    RDW 15.4 (H) 11.5 - 14.5 %    PLATELET 544 (L) 335 - 400 K/uL    MPV 11.8 8.9 - 12.9 FL    NRBC 0.0 0  WBC    ABSOLUTE NRBC 0.00 0.00 - 0.01 K/uL   GLUCOSE, POC    Collection Time: 02/06/23  6:26 AM   Result Value Ref Range    Glucose (POC) 104 65 - 117 mg/dL    Performed by Hema Napier         Assessment:     Principal Problem:    Cervical myelopathy with cervical radiculopathy ([facility] [address] (1/29/2023)    Active Problems:    Acute myelopathy ([facility] [address] (1/29/2023)    Plan:     Patient with metastatic disease to spine. Mets to lungs, lumbar spine, and liver seen on additional imaging. S/p C5 corpectomy and fusion 01/31  Intraop op pathology favors metastatic hepatocellular carcinoma  L5 bx patholgy \"Poorly differentiated malignancy, pending immunohistochemical stains\"  Oncology, urology, hepatology following. Continue pain control. PT/OT - no need for brace  Drain d/c'd 2/2  OK to restart chem DVT ppx from nsgy perspective   Hold gabapentin, flexeril for drowsiness; use narcotics sparingly. Medical mgmt per primary team    Will need IPR. Insurance requiring P2P.     Plan d/w Dr. Chito Vázquez, PA

## 2023-02-06 NOTE — PROGRESS NOTES
Comprehensive Nutrition Assessment    Type and Reason for Visit: Initial (LOS)    Nutrition Recommendations/Plan:   Recommend upgrading diet order to Regular   (no difficulty chewing/swallowing per pt)    Continue Ensure Enlive BID       Malnutrition Assessment:  Malnutrition Status:  No malnutrition (02/06/23 1238)         Nutrition Assessment:    70 yo male admitted for acute myelopathy. Pmhx: DM, HTN, ETOH and drug abuse. MRI of ABD shows multiple hepatic masses concerning for [address] with metastatic disease to spine. Currently being treated for HBV - Hepatology following. Seeing pt for LOS. Spoke with pt at bedside. He reports eating about 50% of his meals since admission. Reports good appetite/intakes at home PTA. Currently on Pureed diet, states he eats regular consistency food at home. He is not sure why he is on pureed. Did have a lesion on C5 vertebrae causing cord compression and is s/p cervical tumor resection on 2/1 but denies difficulty chewing/swallowing. SLP not following. Sent a message to NP about possible diet upgrade. Ensure Enlive ordered, pt states he has been drinking it but I noticed 5 bottles piled up in room. Labs: Na+ 128      Nutritionally Significant Medications:  Protonix, Miralax, Pericolace      Estimated Daily Nutrient Needs:  Energy Requirements Based On: Formula  Weight Used for Energy Requirements: Current  Energy (kcal/day): 2320 (MSJ x AF 1.3 - 500)  Weight Used for Protein Requirements: Current  Protein (g/day): 119 (0.9 gm/kg)  Method Used for Fluid Requirements: 1 ml/kcal  Fluid (ml/day): 2320    Nutrition Related Findings:   Edema: none  Last BM: 01/29/23,      Wounds: Surgical incision      Current Nutrition Therapies:  Diet: Pureed  Supplements: Ensure Enlive BID  Meal intake: No data found. Supplement intake: No data found.   Nutrition Support: none      Anthropometric Measures:  Height: 6' 2\" (188 cm)  Ideal Body Weight (IBW): 190 lbs (86 kg)     Current Body Wt: 133.3 kg (293 lb 14 oz), 154.7 % IBW. Bed scale  Current BMI (kg/m2): 37.7        Weight Adjustment: No adjustment                 BMI Category: Obese class 2 (BMI 35.0-39. 9)    Wt Readings from Last 10 Encounters:   02/03/23 133.4 kg (294 lb)   01/28/23 127 kg (280 lb)   11/14/20 117.9 kg (260 lb)   03/21/20 18.1 kg (40 lb)   03/18/20 110.7 kg (244 lb)   12/31/19 131.5 kg (290 lb)   12/23/16 131.5 kg (290 lb)   12/12/16 131.5 kg (290 lb)   04/11/16 142.9 kg (315 lb)   09/06/14 131.5 kg (290 lb)           Nutrition Diagnosis:   Inadequate oral intake related to inadequate protein-energy intake as evidenced by  (PO intakes ~50% meals)    Nutrition Interventions:   Food and/or Nutrient Delivery: Modify current diet, Continue oral nutrition supplement  Nutrition Education/Counseling: Education initiated  Coordination of Nutrition Care: Continue to monitor while inpatient       Goals:     Goals: other (specify)  Specify Other Goals: PO intakes > 50% all meals + ONS over next 5-7 days    Nutrition Monitoring and Evaluation:   Behavioral-Environmental Outcomes: None identified  Food/Nutrient Intake Outcomes: Food and nutrient intake, Supplement intake  Physical Signs/Symptoms Outcomes: Biochemical data, GI status, Weight    Discharge Planning:    Continue current diet, Continue oral nutrition supplement    Milly Lambert RD  Available via [employer]

## 2023-02-06 NOTE — PROGRESS NOTES
Reviewed notes from over the weekend C spine has been stabilized with surgery  Not likely that he will get much function back in left arm despite uncomplicated surgery although still too early to tell. Bigger issue is his overall extensive disease process/likely hepatic cancer.   Defer to medical management  remain available for questions

## 2023-02-06 NOTE — PROGRESS NOTES
Transition Of Care: Update 9:45am: Peer to peer denied per ortho attending. CM emailed SNF list to daughter, Elizabeth Sanchez ([phone], email: [email or web address]). CM awaiting SNF choices. 9:12am: Insurance offering a peer to peer be done today by 12:00 noon for IPR [facility]. Auth initiated on 2/2 with Kavon Cameron (Padmini Funes [phone]). Attending to call [phone] option 5. CM notified ortho attending. CM to give SNF list for choices as a back up plan. BLS likely at discharge. RUR: 12%    CM following for discharge needs.     Sid Johnson RN/CRM

## 2023-02-06 NOTE — PROGRESS NOTES
[address] [facility] Adult  Hospitalist Group                                                                                          Hospitalist Progress Note  Sudhir Conner NP  Answering service: [phone] from in house phone        Date of Service:  2023  NAME:  Thuan Ritter  :  1955  MRN:  [id number]       Admission Summary: This is a 59-year-old man with past medical history significant for type 2 diabetes, anxiety/depression, and hypertension; presented at [facility] emergency room with neck pain. This has been going on for a couple of weeks. The pain is located at the back of the neck. The patient described the pain as severe 10/10 in severity with no history of trauma to the neck. No known aggravating or relieving factors. The pain is dull ache. He was seen by the orthopedic surgeon. The patient was told that he probably has a pinched nerve and outpatient MRI was ordered. The appointment for the outpatient MRI is not until this coming Monday. Because of persistent pain, the patient came to the emergency room. The pain has now become associated with inability to lift the left arm off the bed without difficulties, but the patient denies pain in the left arm. Just some weakness. The patient has had a CT scan of the neck done which shows significant degenerative changes. When the patient arrived at the emergency room, CT scan of the head was obtained. This was negative for acute pathology. The neurosurgeon on-call at [facility] was consulted by the emergency room physician and decision was made to transfer the patient to [facility] for MRI of the neck and also for further evaluation by the neurosurgeon. The hospitalist service was asked to directly admit the patient from [facility] emergency room to [facility] for that purpose.       Interval history / Subjective:     Patient seen and examined this afternoon around lunchtime. Sleeping prior to exam. Denied by IPR. Plans for SNF referral, case management is working on this. He has been started on [employer]. Assessment & Plan:     Cervical cord tumor with cord compression  s/p C5 corpectomy, arthrodesis with cage and anterior cervical plate 2/99   MRI c spine: Metastatic disease to the cervical spine at C5 and C7 with extraosseous  spread of tumor. At C5 there is collapse of the vertebral body and epidural spread of tumor resulting in significant canal compression. The epidural spread of tumor extends cranially on the left to C4 and fills both C4-5 and C5-6 foramen. The epidural tumor at both C5 and C7 distorts the adjacent vertebral arteries     metastatic disease to spine. Mets to lungs, lumbar spine, and liver seen on additional imaging.  - per neurosurgery no brace needed. - Continuing multimodal pain control, Flexeril dose increased on 2/3 , lidocaine patches, neurontin, IV dilaudid PRN and PO oxycodone PRN    Metastatic disease with unknown primary   Thrombocytopenia  Elevated LFT  CT: Osseous metastatic disease. The L5 spinous process mass is amenable to  nonemergent CT-guided percutaneous biopsy if clinically indicated. Pulmonary nodules. Largest nodule is 1 cm in the posterior basilar left lower lobe. Metastatic disease is most likely. Retroperitoneal lymphadenopathy is likely due to metastatic disease. Possible hepatic metastatic disease    MRI brain: No evidence of intracranial metastatic disease. Oncology following;     CT guided L5 biopsy (1/30) Poorly differentiated malignancy    (+) AFB tumor marker    CEA (-)    - concerned with possible  HCC vs prostate cancer, Liver: Heterogeneous. Possible hypoattenuating masses. Cirrhotic surface from CT of abd/pel.  In 2011 pt had prostate bx done for bph, at that time the bx was benign prostate tissue, Prostate Ag 4.9, mildly elevated it.          - HBV/HCV serologies pending   - ,000  - Patient with hepatitis B not currently on treatment  - plan is to start entecovir for HBV - per note will consider starting immune therapy for [facility] when able   - screening EGD for esophageal varices revealed 2 gastric ulcers and 2 duodenal ulcers , no active bleeding, patient started on BID PPI    Hematuria due to mills trauma   - pt had difficulty to place mills in OR  - mills catheter to stay in place for now per urology consult, can attempt voiding trial prior to discharge     Hyponatremia; Received IV hydration, stable in the 130 range, 137 today    Diet Controlled Type 2 diabetes  - A1c 5.9.   - SSI    Anxiety/depression:    - c/w trazodone, seroquel, zoloft   - stable        Code status: Full. Patient wants his daughter Margot Lehman is first Munira Amass, daughter Miriam Laboy is second mpoa. He is  with his wife Eneida. Advance directive completed on 2/1. Prophylaxis: SCD  Care Plan discussed with: Patient, nurse, care manager  Anticipated Disposition:  SNF  Cardiac monitoring: Telemetry     Hospital Problems  Date Reviewed: 1/29/2023            Codes Class Noted POA    * (Principal) Cervical myelopathy with cervical radiculopathy ([facility] ICD-10-CM: G95.9, M54.12  ICD-9-CM: 721.1  1/29/2023 Yes        Acute myelopathy ([facility] ICD-10-CM: G95.9  ICD-9-CM: 336.9  1/29/2023 Unknown         Social Determinants of Health     Tobacco Use: Medium Risk    Smoking Tobacco Use: Former    Smokeless Tobacco Use: Never    Passive Exposure: Not on file   Alcohol Use: Not on file   Financial Resource Strain: Not on file   Food Insecurity: Not on file   Transportation Needs: Not on file   Physical Activity: Not on file   Stress: Not on file   Social Connections: Not on file   Intimate Partner Violence: Not on file   Depression: Not on file   Housing Stability: Not on file       Review of Systems:   A comprehensive review of systems was negative except for that written in the HPI.        Vital Signs:    Last 24hrs VS reviewed since prior progress note. Most recent are:  Visit Vitals  BP (!) 143/84 (BP 1 Location: Left lower arm, BP Patient Position: At rest;Sitting)   Pulse 80   Temp 97.5 °F (36.4 °C)   Resp 16   Ht 6' 2\" (1.88 m)   Wt 133.4 kg (294 lb)   SpO2 95%   BMI 37.75 kg/m²         Intake/Output Summary (Last 24 hours) at 2/6/2023 1558  Last data filed at 2/6/2023 0221  Gross per 24 hour   Intake --   Output 1200 ml   Net -1200 ml          Physical Examination:     I had a face to face encounter with this patient and independently examined them on 2/6/2023 as outlined below:          Constitutional:  No acute distress, cooperative, pleasant    ENT:  Oral mucosa moist, oropharynx benign. Resp:  CTA bilaterally. No wheezing/rhonchi/rales. No accessory muscle use. CV:  Regular rhythm, normal rate, no murmurs, gallops, rubs    GI:  Soft, non distended, non tender. normoactive bowel sounds, no hepatosplenomegaly     Musculoskeletal:  No edema, warm, 2+ pulses throughout    Neurologic:   AAOx2, Right arm 3/5 strength             Data Review:    I personally reviewed  Image    I have independently reviewed and interpreted patient's lab and all other diagnostic data    Labs:     Recent Labs     02/06/23  0342   WBC 9.8   HGB 13.6   HCT 42.7   *       Recent Labs     02/06/23 0154 02/05/23 0352 02/04/23  0134   * 131* 133*   K 4.4 4.3 4.2   CL 97 99 101   CO2 26 27 29   BUN 19 20 21*   CREA 0.63* 0.77 0.72   * 113* 127*   CA 9.0 9.4 8.8       Recent Labs     02/06/23 0154 02/05/23  0352 02/04/23  0134   ALT 33 38 42   * 168* 158*   TBILI 1.2* 1.5* 1.3*   TP 7.8 8.3* 7.8   ALB 2.3* 2.7* 2.6*   GLOB 5.5* 5.6* 5.2*       Recent Labs     02/06/23 0154   INR 1.3*   PTP 13.0*        No results for input(s): FE, TIBC, PSAT, FERR in the last 72 hours. No results found for: FOL, RBCF   No results for input(s): PH, PCO2, PO2 in the last 72 hours. No results for input(s): CPK, CKNDX, TROIQ in the last 72 hours.     No lab exists for component: CPKMB  Lab Results   Component Value Date/Time    Cholesterol, total 141 03/22/2020 04:24 AM    HDL Cholesterol 73 03/22/2020 04:24 AM    LDL, calculated 53.8 03/22/2020 04:24 AM    Triglyceride 71 03/22/2020 04:24 AM    CHOL/HDL Ratio 1.9 03/22/2020 04:24 AM     Lab Results   Component Value Date/Time    Glucose (POC) 180 (H) 02/06/2023 11:06 AM    Glucose (POC) 104 02/06/2023 06:26 AM    Glucose (POC) 137 (H) 02/05/2023 09:14 PM    Glucose (POC) 141 (H) 02/05/2023 04:33 PM    Glucose (POC) 131 (H) 02/05/2023 11:24 AM     Lab Results   Component Value Date/Time    Color YELLOW/STRAW 01/29/2023 11:11 AM    Appearance CLOUDY (A) 01/29/2023 11:11 AM    Specific gravity 1.028 01/29/2023 11:11 AM    Specific gravity 1.023 08/19/2021 06:39 AM    pH (UA) 6.5 01/29/2023 11:11 AM    Protein Negative 01/29/2023 11:11 AM    Glucose Negative 01/29/2023 11:11 AM    Ketone Negative 01/29/2023 11:11 AM    Bilirubin Negative 01/29/2023 11:11 AM    Urobilinogen 1.0 01/29/2023 11:11 AM    Nitrites Positive (A) 01/29/2023 11:11 AM    Leukocyte Esterase Negative 01/29/2023 11:11 AM    Epithelial cells FEW 01/29/2023 11:11 AM    Bacteria 3+ (A) 01/29/2023 11:11 AM    WBC 0-4 01/29/2023 11:11 AM    RBC 0-5 01/29/2023 11:11 AM       Notes reviewed from all clinical/nonclinical/nursing services involved in patient's clinical care. Care coordination discussions were held with appropriate clinical/nonclinical/ nursing providers based on care coordination needs. Patients current active Medications were reviewed, considered, added and adjusted based on the clinical condition today. Home Medications were reconciled to the best of my ability given all available resources at the time of admission. Route is PO if not otherwise noted.       Medications Reviewed:     Current Facility-Administered Medications   Medication Dose Route Frequency    entecavir (BARACLUDE) tablet 0.5 mg  0.5 mg Oral ACD    [Held by provider] cyclobenzaprine (FLEXERIL) tablet 10 mg  10 mg Oral TID    HYDROmorphone (DILAUDID) injection 2 mg  2 mg IntraVENous Q3H PRN    polyethylene glycol (MIRALAX) packet 17 g  17 g Oral DAILY    sodium chloride (NS) flush 5-40 mL  5-40 mL IntraVENous Q8H    sodium chloride (NS) flush 5-40 mL  5-40 mL IntraVENous PRN    pantoprazole (PROTONIX) tablet 40 mg  40 mg Oral ACB&D    sodium chloride (NS) flush 5-40 mL  5-40 mL IntraVENous Q8H    sodium chloride (NS) flush 5-40 mL  5-40 mL IntraVENous PRN    acetaminophen (TYLENOL) tablet 650 mg  650 mg Oral Q6H    oxyCODONE IR (ROXICODONE) tablet 5 mg  5 mg Oral Q3H PRN    [Held by provider] oxyCODONE IR (ROXICODONE) tablet 10 mg  10 mg Oral Q3H PRN    naloxone (NARCAN) injection 0.4 mg  0.4 mg IntraVENous PRN    ondansetron (ZOFRAN ODT) tablet 4 mg  4 mg Oral Q4H PRN    senna-docusate (PERICOLACE) 8.6-50 mg per tablet 1 Tablet  1 Tablet Oral BID    phenol throat spray (CHLORASEPTIC) 1 Spray  1 Spray Oral PRN    lidocaine 4 % patch 1 Patch  1 Patch TransDERmal Q24H    [Held by provider] gabapentin (NEURONTIN) tablet 600 mg  600 mg Oral TID    hydrOXYzine HCL (ATARAX) tablet 50 mg  50 mg Oral Q6H PRN    QUEtiapine (SEROquel) tablet 200 mg  200 mg Oral QHS    sertraline (ZOLOFT) tablet 100 mg  100 mg Oral DAILY    [Held by provider] traZODone (DESYREL) tablet 50 mg  50 mg Oral QHS    sodium chloride (NS) flush 5-40 mL  5-40 mL IntraVENous Q8H    sodium chloride (NS) flush 5-40 mL  5-40 mL IntraVENous PRN    acetaminophen (TYLENOL) tablet 650 mg  650 mg Oral Q6H PRN    Or    acetaminophen (TYLENOL) suppository 650 mg  650 mg Rectal Q6H PRN    ondansetron (ZOFRAN ODT) tablet 4 mg  4 mg Oral Q8H PRN    Or    ondansetron (ZOFRAN) injection 4 mg  4 mg IntraVENous Q6H PRN    insulin lispro (HUMALOG) injection   SubCUTAneous AC&HS    glucose chewable tablet 16 g  4 Tablet Oral PRN    glucagon (GLUCAGEN) injection 1 mg  1 mg IntraMUSCular PRN    dextrose 10 % infusion 0-250 mL  0-250 mL IntraVENous PRN    naloxone (NARCAN) injection 0.4 mg  0.4 mg IntraVENous PRN    [Held by provider] enoxaparin (LOVENOX) injection 30 mg  30 mg SubCUTAneous Q12H     ______________________________________________________________________  EXPECTED LENGTH OF STAY: 3d 19h  ACTUAL LENGTH OF STAY:          [address] Roberto Carlos Amin, NP

## 2023-02-06 NOTE — PROGRESS NOTES
Problem: Falls - Risk of  Goal: *Absence of Falls  Description: Document Ritu Billy Fall Risk and appropriate interventions in the flowsheet. Outcome: Progressing Towards Goal  Note: Fall Risk Interventions: standby assist, call ball within reach, pain assessment.

## 2023-02-06 NOTE — PROGRESS NOTES
Problem: Mobility Impaired (Adult and Pediatric)  Goal: *Acute Goals and Plan of Care (Insert Text)  Description: FUNCTIONAL STATUS PRIOR TO ADMISSION: Patient was independent and active without use of DME.    HOME SUPPORT PRIOR TO ADMISSION: The patient lived alone with no local to provide assistance. Physical Therapy Goals  Initiated 2/1/2023  1. Patient will move from supine to sit and sit to supine  in bed with modified independence within 7 day(s). 2.  Patient will transfer from bed to chair and chair to bed with modified independence using the least restrictive device within 7 day(s). 3.  Patient will perform sit to stand with modified independence within 7 day(s). 4.  Patient will ambulate with supervision/set-up for 150 feet with the least restrictive device within 7 day(s). 5.  Patient will ascend/descend 14 stairs with one handrail(s) with minimal assistance/contact guard assist within 7 day(s). Outcome: Progressing Towards Goal  PHYSICAL THERAPY TREATMENT  Patient: Rodolfo Persaud (21 y.o. male)  Date: 2/6/2023  Diagnosis: Acute myelopathy ([facility] [G95.9] Cervical myelopathy with cervical radiculopathy (HCC)  Procedure(s) (LRB):  ESOPHAGOGASTRODUODENOSCOPY (EGD) (N/A)  ESOPHAGOGASTRODUODENAL (EGD) BIOPSY (N/A) 3 Days Post-Op  Precautions: Fall No bending, no lifting greater than 5 lbs, no twisting, log-roll technique, repositioning every 20-30 min except when sleeping, brace when OOB (if ordered)  Chart, physical therapy assessment, plan of care and goals were reviewed. ASSESSMENT  Patient continues with skilled PT services and is progressing towards goals. Patient found seated in chair and agreeable to PT. Spo2 stable on RA at rest. Patient overall CGA-SBA for transfers and ambulated 60 feet with SBA and RW. Patient declined further ambulation or participation in stair training, citing LE weakness and SOB.  Spo2 97% on RA with activity, though patient demonstrating SAPP and cued for PLB to reduce RR. Patient returned to room and left seated in chair with all needs in reach. Current Level of Function Impacting Discharge (mobility/balance): ambulates 60 feet with SBA and RW     Other factors to consider for discharge: SAPP with stable spo2 on RA, decreased activity tolerance          PLAN :  Patient continues to benefit from skilled intervention to address the above impairments. Continue treatment per established plan of care. to address goals. Recommendation for discharge: (in order for the patient to meet his/her long term goals)  Therapy 3 hours per day 5-7 days per week - noted patient denied IPR; if denied IPR, recommend SNF     This discharge recommendation:  A follow-up discussion with the attending provider and/or case management is planned    IF patient discharges home will need the following DME: rolling walker       SUBJECTIVE:   Patient stated I need to turn around .     OBJECTIVE DATA SUMMARY:   Critical Behavior:  Neurologic State: Alert  Orientation Level: Oriented X4  Cognition: Appropriate decision making  Safety/Judgement: Awareness of environment, Fall prevention    Spinal diagnosis intervention:  The patient stated 2/3 back precautions when prompted. Reviewed all 3 back precautions, log roll technique, and sitting for 30 minutes at a time. Functional Mobility Training:    Bed Mobility:  Log                   Transfers:  Sit to Stand: Contact guard assistance  Stand to Sit: Stand-by assistance                             Balance:  Sitting: Intact; Without support  Sitting - Static: Good (unsupported)  Sitting - Dynamic: Good (unsupported)  Standing: Intact; With support  Standing - Static: Good;Constant support  Standing - Dynamic : Good;Constant support  Ambulation/Gait Training:  Distance (ft): 60 Feet (ft)  Assistive Device: Walker, rolling;Gait belt  Ambulation - Level of Assistance: Stand-by assistance        Gait Abnormalities: Decreased step clearance        Base of Support: Widened     Speed/Valorie: Pace decreased (<100 feet/min)  Step Length: Right shortened;Left shortened                        Pain Ratin/10 low back pain reported - RN aware     Activity Tolerance:   Good, SpO2 stable on RA, requires rest breaks, and observed SOB with activity    After treatment patient left in no apparent distress:   Sitting in chair and Call bell within reach    COMMUNICATION/COLLABORATION:   The patients plan of care was discussed with: Registered nurse.      Oscar Moses, PT   Time Calculation: 16 mins

## 2023-02-07 LAB
BACTERIA SPEC CULT: NORMAL
BACTERIA SPEC CULT: NORMAL
GLUCOSE BLD STRIP.AUTO-MCNC: 104 MG/DL (ref 65–117)
GLUCOSE BLD STRIP.AUTO-MCNC: 109 MG/DL (ref 65–117)
GLUCOSE BLD STRIP.AUTO-MCNC: 128 MG/DL (ref 65–117)
GLUCOSE BLD STRIP.AUTO-MCNC: 129 MG/DL (ref 65–117)
GRAM STN SPEC: NORMAL
GRAM STN SPEC: NORMAL
SERVICE CMNT-IMP: ABNORMAL
SERVICE CMNT-IMP: ABNORMAL
SERVICE CMNT-IMP: NORMAL

## 2023-02-07 PROCEDURE — 82962 GLUCOSE BLOOD TEST: CPT

## 2023-02-07 PROCEDURE — 94760 N-INVAS EAR/PLS OXIMETRY 1: CPT

## 2023-02-07 PROCEDURE — 74011250637 HC RX REV CODE- 250/637: Performed by: INTERNAL MEDICINE

## 2023-02-07 PROCEDURE — 74011000250 HC RX REV CODE- 250: Performed by: INTERNAL MEDICINE

## 2023-02-07 PROCEDURE — 74011250637 HC RX REV CODE- 250/637: Performed by: NEUROLOGICAL SURGERY

## 2023-02-07 PROCEDURE — 65270000029 HC RM PRIVATE

## 2023-02-07 PROCEDURE — 74011250636 HC RX REV CODE- 250/636: Performed by: NURSE PRACTITIONER

## 2023-02-07 PROCEDURE — 97530 THERAPEUTIC ACTIVITIES: CPT

## 2023-02-07 RX ADMIN — PANTOPRAZOLE SODIUM 40 MG: 40 TABLET, DELAYED RELEASE ORAL at 18:49

## 2023-02-07 RX ADMIN — ACETAMINOPHEN 650 MG: 325 TABLET ORAL at 00:05

## 2023-02-07 RX ADMIN — ACETAMINOPHEN 650 MG: 325 TABLET ORAL at 06:42

## 2023-02-07 RX ADMIN — SODIUM CHLORIDE, PRESERVATIVE FREE 10 ML: 5 INJECTION INTRAVENOUS at 13:45

## 2023-02-07 RX ADMIN — SENNOSIDES AND DOCUSATE SODIUM 1 TABLET: 50; 8.6 TABLET ORAL at 09:48

## 2023-02-07 RX ADMIN — HYDROMORPHONE HYDROCHLORIDE 2 MG: 2 INJECTION, SOLUTION INTRAMUSCULAR; INTRAVENOUS; SUBCUTANEOUS at 09:48

## 2023-02-07 RX ADMIN — HYDROMORPHONE HYDROCHLORIDE 2 MG: 2 INJECTION, SOLUTION INTRAMUSCULAR; INTRAVENOUS; SUBCUTANEOUS at 22:28

## 2023-02-07 RX ADMIN — HYDROMORPHONE HYDROCHLORIDE 2 MG: 2 INJECTION, SOLUTION INTRAMUSCULAR; INTRAVENOUS; SUBCUTANEOUS at 06:42

## 2023-02-07 RX ADMIN — SODIUM CHLORIDE, PRESERVATIVE FREE 10 ML: 5 INJECTION INTRAVENOUS at 06:45

## 2023-02-07 RX ADMIN — SENNOSIDES AND DOCUSATE SODIUM 1 TABLET: 50; 8.6 TABLET ORAL at 18:49

## 2023-02-07 RX ADMIN — PANTOPRAZOLE SODIUM 40 MG: 40 TABLET, DELAYED RELEASE ORAL at 06:42

## 2023-02-07 RX ADMIN — HYDROMORPHONE HYDROCHLORIDE 2 MG: 2 INJECTION, SOLUTION INTRAMUSCULAR; INTRAVENOUS; SUBCUTANEOUS at 13:44

## 2023-02-07 RX ADMIN — SERTRALINE HYDROCHLORIDE 100 MG: 50 TABLET ORAL at 09:48

## 2023-02-07 RX ADMIN — HYDROMORPHONE HYDROCHLORIDE 2 MG: 2 INJECTION, SOLUTION INTRAMUSCULAR; INTRAVENOUS; SUBCUTANEOUS at 18:50

## 2023-02-07 RX ADMIN — SODIUM CHLORIDE, PRESERVATIVE FREE 10 ML: 5 INJECTION INTRAVENOUS at 22:34

## 2023-02-07 RX ADMIN — ACETAMINOPHEN 650 MG: 325 TABLET ORAL at 18:49

## 2023-02-07 RX ADMIN — QUETIAPINE FUMARATE 200 MG: 100 TABLET ORAL at 22:35

## 2023-02-07 RX ADMIN — ENTECAVIR 0.5 MG: 0.5 TABLET ORAL at 18:49

## 2023-02-07 RX ADMIN — ACETAMINOPHEN 650 MG: 325 TABLET ORAL at 13:44

## 2023-02-07 NOTE — PROGRESS NOTES
Problem: Self Care Deficits Care Plan (Adult)  Goal: *Acute Goals and Plan of Care (Insert Text)  Description: FUNCTIONAL STATUS PRIOR TO ADMISSION: Patient was independent and active without use of DME.     HOME SUPPORT: The patient lived alone with no local support. Occupational Therapy Goals  Initiated 2/1/2023  1. Patient will perform self-feeding with minimal assistance within 7 day(s). 2.  Patient will perform grooming with minimal assistance within 7 day(s). 3.  Patient will perform anterior neck to thigh bathing with moderate assistance  within 7 day(s). 4.  Patient will perform toilet transfers to/from with minimal assistance within 7 day(s). 5.  Patient will perform all aspects of toileting with moderate assistance within 7 day(s). 6.  Patient will participate in upper extremity therapeutic exercise/activities with moderate assistance  for 5 minutes within 7 day(s). 7.  Patient will utilize energy conservation techniques during functional activities with verbal cues within 7 day(s). Outcome: Progressing Towards Goal   OCCUPATIONAL THERAPY TREATMENT  Patient: Yesy Funes (19 y.o. male)  Date: 2/7/2023  Diagnosis: Acute myelopathy ([facility] [G95.9] Cervical myelopathy with cervical radiculopathy (HCC)  Procedure(s) (LRB):  ESOPHAGOGASTRODUODENOSCOPY (EGD) (N/A)  ESOPHAGOGASTRODUODENAL (EGD) BIOPSY (N/A) 4 Days Post-Op  Precautions: Fall  Chart, occupational therapy assessment, plan of care, and goals were reviewed. ASSESSMENT  Patient continues with skilled OT services and is progressing towards goals. Pt was received sitting supine in chair and agreeable to therapy. Session was okay'd by nursing. Pt appeared very lethargic spoke with nurse and stated he had just had his pain medication prior to session and it makes him tired.  Pt was able to perform table top exercises 10x each stable slides, 10x increase in ball squeezes increase of  strength each set, pro/sup with small body wash container. Pt needed guided assistance with supination motion bilaterally due to active ROM, no difficulties with pronation noted. RUE was propped up to assist with grooming tasks of washing face but pt was unsuccessful and asked for assistance. Pt was left sitting in chair with BUE propped to prevent/reduce any swelling in UE, all needs met and call bell in reach. Pt is progressing towards goals and would still benefit from skilled acute care OT will admitted in the hospital. It would be recommended for continued therapy at dc    Current Level of Function Impacting Discharge (ADLs): Pt is currently progressing towards goals but is limited by active ROM in both BUE L worse then R, decreased supination, and finger coordination overall preventing his ability to perform BADLS without max A    Other factors to consider for discharge: support post dc, medical course of action         PLAN :  Patient continues to benefit from skilled intervention to address the above impairments. Continue treatment per established plan of care to address goals. Recommend next OT session: pro/sup    Recommendation for discharge: (in order for the patient to meet his/her long term goals)  Therapy up to 5 days/week in SNF setting    This discharge recommendation:  Has not yet been discussed the attending provider and/or case management    IF patient discharges home will need the following DME: TBD       SUBJECTIVE:   Patient stated I feel tired.     OBJECTIVE DATA SUMMARY:   Cognitive/Behavioral Status:  Neurologic State: Alert;Lethargic  Orientation Level: Oriented X4                Functional Mobility and Transfers for ADLs:            Balance:  Sitting: Intact; Without support  Sitting - Static: Good (unsupported)    ADL Intervention:       Grooming  Grooming Assistance: Total assistance(dependent)  Position Performed: Seated in chair  Washing Face:  Total assistance (dependent)  Comments: attempted to prop RUE to assist with washing of face but pt was no able to attempt activity and asked for assistance                          Toileting  Toileting Assistance: Total assistance(dependent)  Bladder Hygiene: Total assistance (dependent)         Therapeutic Exercises:   10x each table slides  10x each ball squeeze increase with resistance each set (red, green, blue)  10x each pro/sup with body wash    Pain:  Controlled pain    Activity Tolerance:   Poor and requires rest breaks    After treatment patient left in no apparent distress:   Sitting in chair and Call bell within reach    COMMUNICATION/COLLABORATION:   The patients plan of care was discussed with: Physical therapist and Registered nurse.      Binh Quiroga OT  Time Calculation: 21 mins

## 2023-02-07 NOTE — PROGRESS NOTES
Bedside shift change report given to [facility] WINSTON MENDEZ (oncoming nurse) [facility], RN (offgoing nurse). Report included the following information SBAR, Kardex, ED Summary, OR Summary, Procedure Summary, Intake/Output, MAR, Recent Results, and Med Rec Status.

## 2023-02-07 NOTE — PROGRESS NOTES
ZUHAIR- Pending referrals to [facility] and [facility]. Pt will need auth. CM received SNF choices from pt's daughter, Leila Callejas. She would like referrals to be sent to [facility], [address]. CM sent referrals to all of these facilities via Paw[employer]att.  Shahrzad Collier

## 2023-02-08 LAB
GLUCOSE BLD STRIP.AUTO-MCNC: 104 MG/DL (ref 65–117)
GLUCOSE BLD STRIP.AUTO-MCNC: 117 MG/DL (ref 65–117)
GLUCOSE BLD STRIP.AUTO-MCNC: 133 MG/DL (ref 65–117)
GLUCOSE BLD STRIP.AUTO-MCNC: 151 MG/DL (ref 65–117)
SARS-COV-2 RDRP RESP QL NAA+PROBE: NOT DETECTED
SERVICE CMNT-IMP: ABNORMAL
SERVICE CMNT-IMP: ABNORMAL
SERVICE CMNT-IMP: NORMAL
SERVICE CMNT-IMP: NORMAL
SOURCE, COVRS: NORMAL

## 2023-02-08 PROCEDURE — 87635 SARS-COV-2 COVID-19 AMP PRB: CPT

## 2023-02-08 PROCEDURE — 74011250637 HC RX REV CODE- 250/637: Performed by: NURSE PRACTITIONER

## 2023-02-08 PROCEDURE — 74011250637 HC RX REV CODE- 250/637: Performed by: INTERNAL MEDICINE

## 2023-02-08 PROCEDURE — 74011250636 HC RX REV CODE- 250/636: Performed by: NURSE PRACTITIONER

## 2023-02-08 PROCEDURE — 97535 SELF CARE MNGMENT TRAINING: CPT

## 2023-02-08 PROCEDURE — 65270000029 HC RM PRIVATE

## 2023-02-08 PROCEDURE — 74011636637 HC RX REV CODE- 636/637: Performed by: INTERNAL MEDICINE

## 2023-02-08 PROCEDURE — 74011000250 HC RX REV CODE- 250: Performed by: INTERNAL MEDICINE

## 2023-02-08 PROCEDURE — 82962 GLUCOSE BLOOD TEST: CPT

## 2023-02-08 PROCEDURE — 97530 THERAPEUTIC ACTIVITIES: CPT

## 2023-02-08 PROCEDURE — 97116 GAIT TRAINING THERAPY: CPT

## 2023-02-08 PROCEDURE — 74011250637 HC RX REV CODE- 250/637: Performed by: NEUROLOGICAL SURGERY

## 2023-02-08 RX ORDER — GABAPENTIN 600 MG/1
300 TABLET ORAL 3 TIMES DAILY
Status: DISCONTINUED | OUTPATIENT
Start: 2023-02-08 | End: 2023-02-10 | Stop reason: HOSPADM

## 2023-02-08 RX ORDER — ALBUMIN HUMAN 250 G/1000ML
12.5 SOLUTION INTRAVENOUS EVERY 6 HOURS
Status: COMPLETED | OUTPATIENT
Start: 2023-02-09 | End: 2023-02-09

## 2023-02-08 RX ORDER — FUROSEMIDE 10 MG/ML
40 INJECTION INTRAMUSCULAR; INTRAVENOUS DAILY
Status: DISCONTINUED | OUTPATIENT
Start: 2023-02-08 | End: 2023-02-10 | Stop reason: HOSPADM

## 2023-02-08 RX ADMIN — OXYCODONE HYDROCHLORIDE 5 MG: 5 TABLET ORAL at 09:40

## 2023-02-08 RX ADMIN — SODIUM CHLORIDE, PRESERVATIVE FREE 10 ML: 5 INJECTION INTRAVENOUS at 14:31

## 2023-02-08 RX ADMIN — ACETAMINOPHEN 650 MG: 325 TABLET ORAL at 19:00

## 2023-02-08 RX ADMIN — SENNOSIDES AND DOCUSATE SODIUM 1 TABLET: 50; 8.6 TABLET ORAL at 09:41

## 2023-02-08 RX ADMIN — ACETAMINOPHEN 650 MG: 325 TABLET ORAL at 14:30

## 2023-02-08 RX ADMIN — HYDROXYZINE HYDROCHLORIDE 50 MG: 50 TABLET, FILM COATED ORAL at 00:52

## 2023-02-08 RX ADMIN — SERTRALINE HYDROCHLORIDE 100 MG: 50 TABLET ORAL at 09:41

## 2023-02-08 RX ADMIN — QUETIAPINE FUMARATE 200 MG: 100 TABLET ORAL at 22:04

## 2023-02-08 RX ADMIN — GABAPENTIN 300 MG: 600 TABLET, FILM COATED ORAL at 22:04

## 2023-02-08 RX ADMIN — PANTOPRAZOLE SODIUM 40 MG: 40 TABLET, DELAYED RELEASE ORAL at 16:30

## 2023-02-08 RX ADMIN — HYDROMORPHONE HYDROCHLORIDE 2 MG: 2 INJECTION, SOLUTION INTRAMUSCULAR; INTRAVENOUS; SUBCUTANEOUS at 10:59

## 2023-02-08 RX ADMIN — SODIUM CHLORIDE, PRESERVATIVE FREE 10 ML: 5 INJECTION INTRAVENOUS at 22:00

## 2023-02-08 RX ADMIN — HYDROMORPHONE HYDROCHLORIDE 2 MG: 2 INJECTION, SOLUTION INTRAMUSCULAR; INTRAVENOUS; SUBCUTANEOUS at 14:30

## 2023-02-08 RX ADMIN — FUROSEMIDE 40 MG: 10 INJECTION, SOLUTION INTRAMUSCULAR; INTRAVENOUS at 22:05

## 2023-02-08 RX ADMIN — HYDROMORPHONE HYDROCHLORIDE 2 MG: 2 INJECTION, SOLUTION INTRAMUSCULAR; INTRAVENOUS; SUBCUTANEOUS at 07:34

## 2023-02-08 RX ADMIN — SODIUM CHLORIDE, PRESERVATIVE FREE 10 ML: 5 INJECTION INTRAVENOUS at 07:34

## 2023-02-08 RX ADMIN — ACETAMINOPHEN 650 MG: 325 TABLET ORAL at 00:52

## 2023-02-08 RX ADMIN — HYDROMORPHONE HYDROCHLORIDE 2 MG: 2 INJECTION, SOLUTION INTRAMUSCULAR; INTRAVENOUS; SUBCUTANEOUS at 22:05

## 2023-02-08 RX ADMIN — Medication 2 UNITS: at 16:29

## 2023-02-08 RX ADMIN — HYDROMORPHONE HYDROCHLORIDE 2 MG: 2 INJECTION, SOLUTION INTRAMUSCULAR; INTRAVENOUS; SUBCUTANEOUS at 17:41

## 2023-02-08 RX ADMIN — POLYETHYLENE GLYCOL 3350 17 G: 17 POWDER, FOR SOLUTION ORAL at 09:43

## 2023-02-08 RX ADMIN — PANTOPRAZOLE SODIUM 40 MG: 40 TABLET, DELAYED RELEASE ORAL at 07:35

## 2023-02-08 RX ADMIN — ENTECAVIR 0.5 MG: 0.5 TABLET ORAL at 17:42

## 2023-02-08 RX ADMIN — ACETAMINOPHEN 650 MG: 325 TABLET ORAL at 07:34

## 2023-02-08 NOTE — PROGRESS NOTES
Problem: Self Care Deficits Care Plan (Adult)  Goal: *Acute Goals and Plan of Care (Insert Text)  Description: FUNCTIONAL STATUS PRIOR TO ADMISSION: Patient was independent and active without use of DME.     HOME SUPPORT: The patient lived alone with no local support. Occupational Therapy Goals  Initiated 2/1/2023  1. Patient will perform self-feeding with minimal assistance within 7 day(s). 2.  Patient will perform grooming with minimal assistance within 7 day(s). 3.  Patient will perform anterior neck to thigh bathing with moderate assistance  within 7 day(s). 4.  Patient will perform toilet transfers to/from with minimal assistance within 7 day(s). 5.  Patient will perform all aspects of toileting with moderate assistance within 7 day(s). 6.  Patient will participate in upper extremity therapeutic exercise/activities with moderate assistance  for 5 minutes within 7 day(s). 7.  Patient will utilize energy conservation techniques during functional activities with verbal cues within 7 day(s). Outcome: Progressing Towards Goal     OCCUPATIONAL THERAPY TREATMENT  Patient: Hazle Schwab (21 y.o. male)  Date: 2/8/2023  Diagnosis: Acute myelopathy ([address] [G95.9] Cervical myelopathy with cervical radiculopathy (HCC)  Procedure(s) (LRB):  ESOPHAGOGASTRODUODENOSCOPY (EGD) (N/A)  ESOPHAGOGASTRODUODENAL (EGD) BIOPSY (N/A) 5 Days Post-Op  Precautions: Fall  Chart, occupational therapy assessment, plan of care, and goals were reviewed. ASSESSMENT  Patient continues with skilled OT services and is progressing towards goals. Pt was received sitting in chair and agreeable to therapy. Nurse okay'd session. Due to lack of ROM in LUE, with  no shoulder elevation, noted of deterioration of  deltoid muscle, but still intact to [facility] it was consulted with provider for a sling at rest to prevent shoulder sublux and to protect the integrity of the arm. Pt was educated on use of sling and it was fit properly to his LUE.  Pt verbalized understanding. Pt needed min A to mod A for BADLS of blowing his nose, pt was able to actively assist LUE to blow nose but A was still provided to complete task. Nurse was informed of pt's sling. Pt was educated on continuing pro/sup exercises with small body wash container, table slides, ball squeezes, self assisted active ROM, finger exercises. Pt was left with legs elevated in chair, call bell in reach, and all needs met. Pt is progressing towards his goals but would still benefit from skilled OT acute care services. It is recommended pt continue skilled therapy at dc at [facility] in order to progress towards his goals. Current Level of Function Impacting Discharge (ADLs): Pt is currently limited by limited ROM in RUE (no shoulder flexion, ~120 elbow flexion, no supination) and in LUE (no/min shoulder elevation, no arom of elbow flexion, no supination noted, but flexion/extension of wrist.) Preventing him to complete BADLS independently. Other factors to consider for discharge: medical course of dc. PLAN :  Patient continues to benefit from skilled intervention to address the above impairments. Continue treatment per established plan of care to address goals. Recommend with staff: Shanti Denny up with pillows    Recommend next OT session: Pro/sup    Recommendation for discharge: (in order for the patient to meet his/her long term goals)  Therapy up to 5 days/week in SNF setting    This discharge recommendation:  Has not yet been discussed the attending provider and/or case management    IF patient discharges home will need the following DME: TBD         SUBJECTIVE:   Patient stated I need to blow my nose.     OBJECTIVE DATA SUMMARY:   Cognitive/Behavioral Status:  Neurologic State: Alert  Orientation Level: Oriented X4  Cognition: Appropriate safety awareness             Functional Mobility and Transfers for ADLs:         Balance:  Sitting: Intact  Sitting - Static: Good (unsupported)  Sitting - Dynamic: Good (unsupported)  Standing: Impaired; With support  Standing - Static: Good  Standing - Dynamic : Fair    ADL Intervention:       Grooming  Grooming Assistance: Minimum assistance;Maximum assistance  Position Performed: Seated in chair  Washing Face:  (blowing nose min A active self assisted with LLU, max A initially)                            Toileting  Toileting Assistance: Total assistance(dependent)  Bladder Hygiene: Total assistance (dependent) ([place])              Pain:  Controlled pain    Activity Tolerance:   Fair and requires rest breaks    After treatment patient left in no apparent distress:   Sitting in chair, Heels elevated for pressure relief, and Call bell within reach    COMMUNICATION/COLLABORATION:   The patients plan of care was discussed with: Physical therapist, Registered nurse, and Physician.      Lanette Stearns OT  Time Calculation: 23 mins

## 2023-02-08 NOTE — PROGRESS NOTES
Bedside shift change report given to Ascencion Cruz (oncoming nurse) by Ana Maria Nolan (offgoing nurse). Report included the following information SBAR, Kardex, OR Summary, and MAR.

## 2023-02-08 NOTE — PROGRESS NOTES
ZUHAIR: anticipate d/c to   [facility], insurance auth started 2/8;  Pt will need a UAI screening as he has secondary Medicaid, requested  to complete    Follow up with PCP & Specialist as directed    Rapid Covid test pending result, as requested by SNF liaison    W/c transport Vs family    Primary family contact: Daughter, Jen Ramos, [phone]    RUR: 13%  -Cervical cord tumor with cord compression, s/p C5 corpectomy, arthrodesis with cage and anterior cervical plate 3/90  -Metastatic disease, no plan for treatment per NP  -Hepatology following  Pt/OT recs. SNF  -1400-CM reviewed pt chart and was informed by NP Attending that pt is stable for d/c today. CM resent referrals to SNFs after clarification about pt needing entociver at d/c for HBV treatment. CM awaiting confirmation of accepting SNF and bed availability. CM to follow. -[id number] spoke with Benny Jalloh of [facility] [phone] and she advised [facility] can accept and that they have started insurance auth today. Cm to follow.   Artemio Lott RN BSN CCM

## 2023-02-08 NOTE — PROGRESS NOTES
Problem: Falls - Risk of  Goal: *Absence of Falls  Description: Document Claudio Arroyo Fall Risk and appropriate interventions in the flowsheet. Outcome: Progressing Towards Goal  Note: Fall Risk Interventions:            Medication Interventions: Teach patient to arise slowly                 Patient received, appears to be asleep, eyes closed, breathing even and unlabored. No signs of distress noted. Will continue to monitor for safety. None known

## 2023-02-08 NOTE — PROGRESS NOTES
[facility] Adult  Hospitalist Group                                                                                          Hospitalist Progress Note  Vinh Guajardo NP  Answering service: [phone] from in house phone        Date of Service:  2023  NAME:  Sarahi Smyth  :  1955  MRN:  [id number]       Admission Summary: This is a 64-year-old man with past medical history significant for type 2 diabetes, anxiety/depression, and hypertension; presented at [facility] emergency room with neck pain. This has been going on for a couple of weeks. The pain is located at the back of the neck. The patient described the pain as severe 10/10 in severity with no history of trauma to the neck. No known aggravating or relieving factors. The pain is dull ache. He was seen by the orthopedic surgeon. The patient was told that he probably has a pinched nerve and outpatient MRI was ordered. The appointment for the outpatient MRI is not until this coming Monday. Because of persistent pain, the patient came to the emergency room. The pain has now become associated with inability to lift the left arm off the bed without difficulties, but the patient denies pain in the left arm. Just some weakness. The patient has had a CT scan of the neck done which shows significant degenerative changes. When the patient arrived at the emergency room, CT scan of the head was obtained. This was negative for acute pathology. The neurosurgeon on-call at [facility] was consulted by the emergency room physician and decision was made to transfer the patient to [facility] for MRI of the neck and also for further evaluation by the neurosurgeon. The hospitalist service was asked to directly admit the patient from [facility] emergency room to [facility] for that purpose. Interval history / Subjective:     Patient seen and examined today.  He is asking why he was denied from [facility]. We discussed reasons why. He states he is feeling mildly better today with decreased pain. Assessment & Plan:     Cervical cord tumor with cord compression  s/p C5 corpectomy, arthrodesis with cage and anterior cervical plate 7/99   MRI c spine: Metastatic disease to the cervical spine at C5 and C7 with extraosseous  spread of tumor. At C5 there is collapse of the vertebral body and epidural spread of tumor resulting in significant canal compression. The epidural spread of tumor extends cranially on the left to C4 and fills both C4-5 and C5-6 foramen. The epidural tumor at both C5 and C7 distorts the adjacent vertebral arteries     metastatic disease to spine. Mets to lungs, lumbar spine, and liver seen on additional imaging.  - per neurosurgery no brace needed. - Continuing multimodal pain control, Flexeril dose increased on 2/3 , lidocaine patches, neurontin, IV dilaudid PRN and PO oxycodone PRN    Metastatic disease with unknown primary   Thrombocytopenia  Elevated LFT  CT: Osseous metastatic disease. The L5 spinous process mass is amenable to  nonemergent CT-guided percutaneous biopsy if clinically indicated. Pulmonary nodules. Largest nodule is 1 cm in the posterior basilar left lower lobe. Metastatic disease is most likely. Retroperitoneal lymphadenopathy is likely due to metastatic disease. Possible hepatic metastatic disease    MRI brain: No evidence of intracranial metastatic disease. Oncology following;     CT guided L5 biopsy (1/30) Poorly differentiated malignancy    (+) AFB tumor marker    CEA (-)    - concerned with possible  HCC vs prostate cancer, Liver: Heterogeneous. Possible hypoattenuating masses. Cirrhotic surface from CT of abd/pel.  In 2011 pt had prostate bx done for bph, at that time the bx was benign prostate tissue, Prostate Ag 4.9, mildly elevated it.          - HBV/HCV serologies pending   - ,000  - Patient with hepatitis B not currently on treatment  - plan is to start entecovir for HBV - per note will consider starting immune therapy for [address] when able   - screening EGD for esophageal varices revealed 2 gastric ulcers and 2 duodenal ulcers , no active bleeding, patient started on BID PPI    Hematuria due to mills trauma   - pt had difficulty to place mills in OR  - mills catheter to stay in place for now per urology consult, can attempt voiding trial prior to discharge     Hyponatremia; Received IV hydration, stable in the 130 range, 137 today    Diet Controlled Type 2 diabetes  - A1c 5.9.   - SSI    Anxiety/depression:    - c/w trazodone, seroquel, zoloft   - stable        Code status: Full. Patient wants his daughter Lindsay Gracia is first Alberto Rhymes, daughter Carol Singh is second mpoa. He is  with his wife Eneida. Advance directive completed on 2/1. Prophylaxis: SCD  Care Plan discussed with: Patient, nurse, care manager  Anticipated Disposition:  SNF  Cardiac monitoring: Telemetry     Hospital Problems  Date Reviewed: 1/29/2023            Codes Class Noted POA    * (Principal) Cervical myelopathy with cervical radiculopathy ([facility] ICD-10-CM: G95.9, M54.12  ICD-9-CM: 721.1  1/29/2023 Yes        Acute myelopathy ([facility] ICD-10-CM: G95.9  ICD-9-CM: 336.9  1/29/2023 Unknown         Social Determinants of Health     Tobacco Use: Medium Risk    Smoking Tobacco Use: Former    Smokeless Tobacco Use: Never    Passive Exposure: Not on file   Alcohol Use: Not on file   Financial Resource Strain: Not on file   Food Insecurity: Not on file   Transportation Needs: Not on file   Physical Activity: Not on file   Stress: Not on file   Social Connections: Not on file   Intimate Partner Violence: Not on file   Depression: Not on file   Housing Stability: Not on file       Review of Systems:   A comprehensive review of systems was negative except for that written in the HPI. Vital Signs:    Last 24hrs VS reviewed since prior progress note.  Most recent are:  Visit Vitals  BP (!) 147/88 (BP 1 Location: Left lower arm, BP Patient Position: At rest;Sitting)   Pulse 76   Temp 98.5 °F (36.9 °C)   Resp 17   Ht 6' 2\" (1.88 m)   Wt 133.4 kg (294 lb)   SpO2 91%   BMI 37.75 kg/m²         Intake/Output Summary (Last 24 hours) at 2/7/2023 2230  Last data filed at 2/7/2023 1854  Gross per 24 hour   Intake --   Output 1500 ml   Net -1500 ml          Physical Examination:     I had a face to face encounter with this patient and independently examined them on 2/7/2023 as outlined below:          Constitutional:  No acute distress, cooperative, pleasant    ENT:  Oral mucosa moist, oropharynx benign. Resp:  CTA bilaterally. No wheezing/rhonchi/rales. No accessory muscle use. CV:  Regular rhythm, normal rate, no murmurs, gallops, rubs    GI:  Soft, non distended, non tender. normoactive bowel sounds, no hepatosplenomegaly     Musculoskeletal:  No edema, warm, 2+ pulses throughout    Neurologic:   AAOx2, Right arm 3/5 strength             Data Review:    I personally reviewed  Image    I have independently reviewed and interpreted patient's lab and all other diagnostic data    Labs:     Recent Labs     02/06/23  0342   WBC 9.8   HGB 13.6   HCT 42.7   *       Recent Labs     02/06/23 0154 02/05/23  0352   * 131*   K 4.4 4.3   CL 97 99   CO2 26 27   BUN 19 20   CREA 0.63* 0.77   * 113*   CA 9.0 9.4       Recent Labs     02/06/23 0154 02/05/23  0352   ALT 33 38   * 168*   TBILI 1.2* 1.5*   TP 7.8 8.3*   ALB 2.3* 2.7*   GLOB 5.5* 5.6*       Recent Labs     02/06/23 0154   INR 1.3*   PTP 13.0*        No results for input(s): FE, TIBC, PSAT, FERR in the last 72 hours. No results found for: FOL, RBCF   No results for input(s): PH, PCO2, PO2 in the last 72 hours. No results for input(s): CPK, CKNDX, TROIQ in the last 72 hours.     No lab exists for component: CPKMB  Lab Results   Component Value Date/Time    Cholesterol, total 141 03/22/2020 04:24 AM    HDL Cholesterol 73 03/22/2020 04:24 AM    LDL, calculated 53.8 03/22/2020 04:24 AM    Triglyceride 71 03/22/2020 04:24 AM    CHOL/HDL Ratio 1.9 03/22/2020 04:24 AM     Lab Results   Component Value Date/Time    Glucose (POC) 104 02/07/2023 04:42 PM    Glucose (POC) 129 (H) 02/07/2023 11:45 AM    Glucose (POC) 128 (H) 02/07/2023 06:41 AM    Glucose (POC) 106 02/06/2023 09:31 PM    Glucose (POC) 131 (H) 02/06/2023 04:18 PM     Lab Results   Component Value Date/Time    Color YELLOW/STRAW 01/29/2023 11:11 AM    Appearance CLOUDY (A) 01/29/2023 11:11 AM    Specific gravity 1.028 01/29/2023 11:11 AM    Specific gravity 1.023 08/19/2021 06:39 AM    pH (UA) 6.5 01/29/2023 11:11 AM    Protein Negative 01/29/2023 11:11 AM    Glucose Negative 01/29/2023 11:11 AM    Ketone Negative 01/29/2023 11:11 AM    Bilirubin Negative 01/29/2023 11:11 AM    Urobilinogen 1.0 01/29/2023 11:11 AM    Nitrites Positive (A) 01/29/2023 11:11 AM    Leukocyte Esterase Negative 01/29/2023 11:11 AM    Epithelial cells FEW 01/29/2023 11:11 AM    Bacteria 3+ (A) 01/29/2023 11:11 AM    WBC 0-4 01/29/2023 11:11 AM    RBC 0-5 01/29/2023 11:11 AM       Notes reviewed from all clinical/nonclinical/nursing services involved in patient's clinical care. Care coordination discussions were held with appropriate clinical/nonclinical/ nursing providers based on care coordination needs. Patients current active Medications were reviewed, considered, added and adjusted based on the clinical condition today. Home Medications were reconciled to the best of my ability given all available resources at the time of admission. Route is PO if not otherwise noted.       Medications Reviewed:     Current Facility-Administered Medications   Medication Dose Route Frequency    entecavir (BARACLUDE) tablet 0.5 mg  0.5 mg Oral ACD    [Held by provider] cyclobenzaprine (FLEXERIL) tablet 10 mg  10 mg Oral TID    HYDROmorphone (DILAUDID) injection 2 mg  2 mg IntraVENous Q3H PRN    polyethylene glycol (MIRALAX) packet 17 g  17 g Oral DAILY    sodium chloride (NS) flush 5-40 mL  5-40 mL IntraVENous PRN    pantoprazole (PROTONIX) tablet 40 mg  40 mg Oral ACB&D    sodium chloride (NS) flush 5-40 mL  5-40 mL IntraVENous PRN    acetaminophen (TYLENOL) tablet 650 mg  650 mg Oral Q6H    oxyCODONE IR (ROXICODONE) tablet 5 mg  5 mg Oral Q3H PRN    [Held by provider] oxyCODONE IR (ROXICODONE) tablet 10 mg  10 mg Oral Q3H PRN    naloxone (NARCAN) injection 0.4 mg  0.4 mg IntraVENous PRN    ondansetron (ZOFRAN ODT) tablet 4 mg  4 mg Oral Q4H PRN    senna-docusate (PERICOLACE) 8.6-50 mg per tablet 1 Tablet  1 Tablet Oral BID    phenol throat spray (CHLORASEPTIC) 1 Spray  1 Spray Oral PRN    lidocaine 4 % patch 1 Patch  1 Patch TransDERmal Q24H    [Held by provider] gabapentin (NEURONTIN) tablet 600 mg  600 mg Oral TID    hydrOXYzine HCL (ATARAX) tablet 50 mg  50 mg Oral Q6H PRN    QUEtiapine (SEROquel) tablet 200 mg  200 mg Oral QHS    sertraline (ZOLOFT) tablet 100 mg  100 mg Oral DAILY    [Held by provider] traZODone (DESYREL) tablet 50 mg  50 mg Oral QHS    sodium chloride (NS) flush 5-40 mL  5-40 mL IntraVENous Q8H    sodium chloride (NS) flush 5-40 mL  5-40 mL IntraVENous PRN    acetaminophen (TYLENOL) tablet 650 mg  650 mg Oral Q6H PRN    Or    acetaminophen (TYLENOL) suppository 650 mg  650 mg Rectal Q6H PRN    ondansetron (ZOFRAN ODT) tablet 4 mg  4 mg Oral Q8H PRN    Or    ondansetron (ZOFRAN) injection 4 mg  4 mg IntraVENous Q6H PRN    insulin lispro (HUMALOG) injection   SubCUTAneous AC&HS    glucose chewable tablet 16 g  4 Tablet Oral PRN    glucagon (GLUCAGEN) injection 1 mg  1 mg IntraMUSCular PRN    dextrose 10 % infusion 0-250 mL  0-250 mL IntraVENous PRN    naloxone (NARCAN) injection 0.4 mg  0.4 mg IntraVENous PRN    [Held by provider] enoxaparin (LOVENOX) injection 30 mg  30 mg SubCUTAneous Q12H     ______________________________________________________________________  EXPECTED LENGTH OF STAY: 6d 14h  ACTUAL LENGTH OF STAY:          Ivan Barnett NP

## 2023-02-08 NOTE — PROGRESS NOTES
Bedside shift change report given to Augusto Maldonado (oncoming nurse) by Chiara Watkins (offgoing nurse). Report included the following information SBAR, Kardex, OR Summary, and MAR.

## 2023-02-08 NOTE — PROGRESS NOTES
Problem: Mobility Impaired (Adult and Pediatric)  Goal: *Acute Goals and Plan of Care (Insert Text)  Description: FUNCTIONAL STATUS PRIOR TO ADMISSION: Patient was independent and active without use of DME.    HOME SUPPORT PRIOR TO ADMISSION: The patient lived alone with no local to provide assistance. Physical Therapy Goals  Reassessed 2/8 and remain appropriate  Initiated 2/1/2023  1. Patient will move from supine to sit and sit to supine  in bed with modified independence within 7 day(s). 2.  Patient will transfer from bed to chair and chair to bed with modified independence using the least restrictive device within 7 day(s). 3.  Patient will perform sit to stand with modified independence within 7 day(s). 4.  Patient will ambulate with supervision/set-up for 150 feet with the least restrictive device within 7 day(s). 5.  Patient will ascend/descend 14 stairs with one handrail(s) with minimal assistance/contact guard assist within 7 day(s). Outcome: Progressing Towards Goal   PHYSICAL THERAPY TREATMENT: WEEKLY REASSESSMENT  Patient: Gerardo Huitron (88 y.o. male)  Date: 2/8/2023  Primary Diagnosis: Acute myelopathy ([facility] [G95.9]  Procedure(s) (LRB):  ESOPHAGOGASTRODUODENOSCOPY (EGD) (N/A)  ESOPHAGOGASTRODUODENAL (EGD) BIOPSY (N/A) 5 Days Post-Op   Precautions:   Fall      ASSESSMENT  Patient continues with skilled PT services and is progressing towards goals. Pt received seated in the chair and agreeable to therapy despite reports of 9/10 pain; notified RN and in room to provide pain medication. Pt continues to be limited by significant UE weakness, non functional UE movement, decreased functional mobility, impaired balance and gait from previous independent and active baseline. Pt tolerated transfers with RW with CGA and noted poor control of LUE when returning to seated position. Pt tolerated gait training x 60 ft with RW with CGA-SBA demonstrating narrow ALEXX. No LOB noted.  Pt remained seated in the chair with all needs met. Pt will benefit from inpatient rehab upon discharge to continue therapy efforts, however aware SNF placement is the current dispo plan. Patient's progression toward goals since last assessment: progress has been made towards all goals    Current Level of Function Impacting Discharge (mobility/balance): CGA transfers, gait training x 60 ft    Other factors to consider for discharge: previously independent and ambulatory without AD, lived alone         PLAN :  Goals have been updated based on progression since last assessment. Patient continues to benefit from skilled intervention to address the above impairments. Recommendations and Planned Interventions: bed mobility training, transfer training, gait training, therapeutic exercises, neuromuscular re-education, patient and family training/education, and therapeutic activities      Frequency/Duration: Patient will be followed by physical therapy:  5 times a week to address goals. Recommendation for discharge: (in order for the patient to meet his/her long term goals)  IPR, SNF if IPR is denied    This discharge recommendation:  Has been made in collaboration with the attending provider and/or case management    IF patient discharges home will need the following DME: to be determined (TBD)         SUBJECTIVE:   Patient stated I'm feeling better now that I've walked some.     OBJECTIVE DATA SUMMARY:   HISTORY:    Past Medical History:   Diagnosis Date    Depression     DJD (degenerative joint disease) 3/8/2010    HTN (hypertension) 3/8/2010    STATES NO LONGER TAKING MEDICATION-STATES MD STOPPED    Insomnia     NIDDM (non-insulin dependent diabetes mellitus) 3/8/2010    Other ill-defined conditions(799.89)     BPH    Other ill-defined conditions(799.89)     previous hx of DTs from ETOH withdrawal    Other ill-defined conditions(799.89)     DDD/back problems    Psychiatric disorder     paranoid schizophrenia, anxiety    Substance abuse ([facility]     Suicidal thoughts      Past Surgical History:   Procedure Laterality Date    BIOPSY PROSTATE      HX UROLOGICAL      turp       Personal factors and/or comorbidities impacting plan of care:     Home Situation  Home Environment: Apartment  # Steps to Enter: 15  Rails to Enter: Yes  Hand Rails : Right  One/Two Story Residence: Two story  # of Interior Steps: 14  Living Alone: Yes  Support Systems: No Support Systems  Patient Expects to be Discharged to[de-identified] Skilled nursing facility  Current DME Used/Available at Home: None  Tub or Shower Type: Tub/Shower combination    EXAMINATION/PRESENTATION/DECISION MAKING:   Critical Behavior:  Neurologic State: Alert  Orientation Level: Oriented X4  Cognition: Appropriate safety awareness  Safety/Judgement: Awareness of environment, Fall prevention  Hearing: Auditory  Auditory Impairment: None    Range Of Motion:  AROM: Grossly decreased, non-functional (BUE's non functional)                       Strength:    Strength: Grossly decreased, non-functional (BUEs grossly decreased)                    Tone & Sensation:                  Sensation: Intact               Coordination:     Vision:      Functional Mobility:  Bed Mobility:     Supine to Sit:  (NT--pt received/returned seated in the chair)        Transfers:  Sit to Stand: Contact guard assistance  Stand to Sit: Contact guard assistance                       Balance:   Sitting: Intact  Standing: Impaired; With support  Standing - Static: Good  Standing - Dynamic : Fair  Ambulation/Gait Training:  Distance (ft): 60 Feet (ft)  Assistive Device: Gait belt;Walker, rolling  Ambulation - Level of Assistance: Contact guard assistance;Stand-by assistance        Gait Abnormalities: Decreased step clearance;Trunk sway increased        Base of Support: Narrowed     Speed/Valorie: Pace decreased (<100 feet/min)  Step Length: Right shortened;Left shortened         Pain Rating:  Pt reported 9/10 pain    Activity Tolerance: Fair    After treatment patient left in no apparent distress:   Sitting in chair, Call bell within reach, and Side rails x 3    COMMUNICATION/EDUCATION:   The patients plan of care was discussed with: Occupational therapist and Registered nurse. Fall prevention education was provided and the patient/caregiver indicated understanding., Patient/family have participated as able in goal setting and plan of care. , and Patient/family agree to work toward stated goals and plan of care.     Thank you for this referral.  Sana Brown, PT, DPT   Time Calculation: 17 mins

## 2023-02-09 LAB
ALBUMIN SERPL-MCNC: 2.6 G/DL (ref 3.5–5)
ALBUMIN/GLOB SERPL: 0.4 (ref 1.1–2.2)
ALP SERPL-CCNC: 176 U/L (ref 45–117)
ALT SERPL-CCNC: 36 U/L (ref 12–78)
ANION GAP SERPL CALC-SCNC: 7 MMOL/L (ref 5–15)
AST SERPL-CCNC: 157 U/L (ref 15–37)
BASOPHILS # BLD: 0 K/UL (ref 0–0.1)
BASOPHILS NFR BLD: 1 % (ref 0–1)
BILIRUB SERPL-MCNC: 1.1 MG/DL (ref 0.2–1)
BUN SERPL-MCNC: 18 MG/DL (ref 6–20)
BUN/CREAT SERPL: 22 (ref 12–20)
CALCIUM SERPL-MCNC: 9.9 MG/DL (ref 8.5–10.1)
CHLORIDE SERPL-SCNC: 95 MMOL/L (ref 97–108)
CO2 SERPL-SCNC: 28 MMOL/L (ref 21–32)
CREAT SERPL-MCNC: 0.82 MG/DL (ref 0.7–1.3)
DIFFERENTIAL METHOD BLD: ABNORMAL
EOSINOPHIL # BLD: 0.1 K/UL (ref 0–0.4)
EOSINOPHIL NFR BLD: 2 % (ref 0–7)
ERYTHROCYTE [DISTWIDTH] IN BLOOD BY AUTOMATED COUNT: 15.5 % (ref 11.5–14.5)
GLOBULIN SER CALC-MCNC: 5.8 G/DL (ref 2–4)
GLUCOSE BLD STRIP.AUTO-MCNC: 116 MG/DL (ref 65–117)
GLUCOSE BLD STRIP.AUTO-MCNC: 118 MG/DL (ref 65–117)
GLUCOSE BLD STRIP.AUTO-MCNC: 92 MG/DL (ref 65–117)
GLUCOSE BLD STRIP.AUTO-MCNC: 96 MG/DL (ref 65–117)
GLUCOSE SERPL-MCNC: 118 MG/DL (ref 65–100)
HCT VFR BLD AUTO: 42.9 % (ref 36.6–50.3)
HGB BLD-MCNC: 14.2 G/DL (ref 12.1–17)
IMM GRANULOCYTES # BLD AUTO: 0.1 K/UL (ref 0–0.04)
IMM GRANULOCYTES NFR BLD AUTO: 1 % (ref 0–0.5)
LYMPHOCYTES # BLD: 1 K/UL (ref 0.8–3.5)
LYMPHOCYTES NFR BLD: 16 % (ref 12–49)
MCH RBC QN AUTO: 29.9 PG (ref 26–34)
MCHC RBC AUTO-ENTMCNC: 33.1 G/DL (ref 30–36.5)
MCV RBC AUTO: 90.3 FL (ref 80–99)
MONOCYTES # BLD: 0.8 K/UL (ref 0–1)
MONOCYTES NFR BLD: 14 % (ref 5–13)
NEUTS SEG # BLD: 4 K/UL (ref 1.8–8)
NEUTS SEG NFR BLD: 66 % (ref 32–75)
NRBC # BLD: 0 K/UL (ref 0–0.01)
NRBC BLD-RTO: 0 PER 100 WBC
PLATELET # BLD AUTO: 118 K/UL (ref 150–400)
PMV BLD AUTO: 12.1 FL (ref 8.9–12.9)
POTASSIUM SERPL-SCNC: 3.9 MMOL/L (ref 3.5–5.1)
PROT SERPL-MCNC: 8.4 G/DL (ref 6.4–8.2)
RBC # BLD AUTO: 4.75 M/UL (ref 4.1–5.7)
SERVICE CMNT-IMP: ABNORMAL
SERVICE CMNT-IMP: NORMAL
SODIUM SERPL-SCNC: 130 MMOL/L (ref 136–145)
WBC # BLD AUTO: 6 K/UL (ref 4.1–11.1)

## 2023-02-09 PROCEDURE — 74011250637 HC RX REV CODE- 250/637: Performed by: INTERNAL MEDICINE

## 2023-02-09 PROCEDURE — 85025 COMPLETE CBC W/AUTO DIFF WBC: CPT

## 2023-02-09 PROCEDURE — 99232 SBSQ HOSP IP/OBS MODERATE 35: CPT | Performed by: INTERNAL MEDICINE

## 2023-02-09 PROCEDURE — 97116 GAIT TRAINING THERAPY: CPT

## 2023-02-09 PROCEDURE — 74011000250 HC RX REV CODE- 250: Performed by: INTERNAL MEDICINE

## 2023-02-09 PROCEDURE — 74011250636 HC RX REV CODE- 250/636: Performed by: NURSE PRACTITIONER

## 2023-02-09 PROCEDURE — 82962 GLUCOSE BLOOD TEST: CPT

## 2023-02-09 PROCEDURE — 65270000029 HC RM PRIVATE

## 2023-02-09 PROCEDURE — 74011250637 HC RX REV CODE- 250/637: Performed by: NURSE PRACTITIONER

## 2023-02-09 PROCEDURE — P9047 ALBUMIN (HUMAN), 25%, 50ML: HCPCS | Performed by: NURSE PRACTITIONER

## 2023-02-09 PROCEDURE — 74011250637 HC RX REV CODE- 250/637: Performed by: NEUROLOGICAL SURGERY

## 2023-02-09 PROCEDURE — 80053 COMPREHEN METABOLIC PANEL: CPT

## 2023-02-09 PROCEDURE — 36415 COLL VENOUS BLD VENIPUNCTURE: CPT

## 2023-02-09 RX ORDER — ACETAMINOPHEN 325 MG/1
650 TABLET ORAL
Qty: 30 TABLET | Refills: 0 | Status: SHIPPED
Start: 2023-02-09

## 2023-02-09 RX ORDER — TAMSULOSIN HYDROCHLORIDE 0.4 MG/1
0.4 CAPSULE ORAL
Status: DISCONTINUED | OUTPATIENT
Start: 2023-02-09 | End: 2023-02-10 | Stop reason: HOSPADM

## 2023-02-09 RX ORDER — HYDROMORPHONE HYDROCHLORIDE 2 MG/1
1 TABLET ORAL ONCE
Status: COMPLETED | OUTPATIENT
Start: 2023-02-10 | End: 2023-02-10

## 2023-02-09 RX ORDER — FINASTERIDE 5 MG/1
5 TABLET, FILM COATED ORAL DAILY
Status: DISCONTINUED | OUTPATIENT
Start: 2023-02-09 | End: 2023-02-10 | Stop reason: HOSPADM

## 2023-02-09 RX ORDER — FINASTERIDE 5 MG/1
5 TABLET, FILM COATED ORAL DAILY
Qty: 30 TABLET | Refills: 0 | Status: SHIPPED
Start: 2023-02-10

## 2023-02-09 RX ORDER — LIDOCAINE 4 G/100G
PATCH TOPICAL
Qty: 30 EACH | Refills: 0 | Status: SHIPPED
Start: 2023-02-10

## 2023-02-09 RX ORDER — TAMSULOSIN HYDROCHLORIDE 0.4 MG/1
0.4 CAPSULE ORAL
Qty: 30 CAPSULE | Refills: 0 | Status: SHIPPED
Start: 2023-02-09

## 2023-02-09 RX ORDER — FUROSEMIDE 40 MG/1
40 TABLET ORAL DAILY
Qty: 30 TABLET | Refills: 0 | Status: SHIPPED
Start: 2023-02-09

## 2023-02-09 RX ORDER — GABAPENTIN 600 MG/1
300 TABLET ORAL 3 TIMES DAILY
Qty: 90 TABLET | Refills: 0 | Status: SHIPPED | OUTPATIENT
Start: 2023-02-09

## 2023-02-09 RX ORDER — OXYCODONE HYDROCHLORIDE 5 MG/1
7.5 TABLET ORAL
Status: DISCONTINUED | OUTPATIENT
Start: 2023-02-09 | End: 2023-02-10 | Stop reason: HOSPADM

## 2023-02-09 RX ORDER — ENTECAVIR 0.5 MG/1
0.5 TABLET, FILM COATED ORAL
Qty: 30 TABLET | Refills: 0 | Status: SHIPPED | OUTPATIENT
Start: 2023-02-09

## 2023-02-09 RX ORDER — POLYETHYLENE GLYCOL 3350 17 G/17G
17 POWDER, FOR SOLUTION ORAL DAILY
Qty: 30 EACH | Refills: 0 | Status: SHIPPED
Start: 2023-02-10

## 2023-02-09 RX ORDER — PANTOPRAZOLE SODIUM 40 MG/1
40 TABLET, DELAYED RELEASE ORAL
Qty: 60 TABLET | Refills: 0 | Status: SHIPPED
Start: 2023-02-10

## 2023-02-09 RX ORDER — AMOXICILLIN 250 MG
1 CAPSULE ORAL 2 TIMES DAILY
Qty: 60 TABLET | Refills: 0 | Status: SHIPPED
Start: 2023-02-10

## 2023-02-09 RX ADMIN — ACETAMINOPHEN 650 MG: 325 TABLET ORAL at 00:15

## 2023-02-09 RX ADMIN — HYDROMORPHONE HYDROCHLORIDE 2 MG: 2 INJECTION, SOLUTION INTRAMUSCULAR; INTRAVENOUS; SUBCUTANEOUS at 16:45

## 2023-02-09 RX ADMIN — HYDROMORPHONE HYDROCHLORIDE 2 MG: 2 INJECTION, SOLUTION INTRAMUSCULAR; INTRAVENOUS; SUBCUTANEOUS at 10:20

## 2023-02-09 RX ADMIN — SERTRALINE HYDROCHLORIDE 100 MG: 50 TABLET ORAL at 08:48

## 2023-02-09 RX ADMIN — ACETAMINOPHEN 650 MG: 325 TABLET ORAL at 13:26

## 2023-02-09 RX ADMIN — TAMSULOSIN HYDROCHLORIDE 0.4 MG: 0.4 CAPSULE ORAL at 22:59

## 2023-02-09 RX ADMIN — OXYCODONE HYDROCHLORIDE 5 MG: 5 TABLET ORAL at 18:51

## 2023-02-09 RX ADMIN — ENTECAVIR 0.5 MG: 0.5 TABLET ORAL at 18:51

## 2023-02-09 RX ADMIN — GABAPENTIN 300 MG: 600 TABLET, FILM COATED ORAL at 22:59

## 2023-02-09 RX ADMIN — SODIUM CHLORIDE, PRESERVATIVE FREE 10 ML: 5 INJECTION INTRAVENOUS at 13:28

## 2023-02-09 RX ADMIN — ACETAMINOPHEN 650 MG: 325 TABLET ORAL at 06:57

## 2023-02-09 RX ADMIN — SENNOSIDES AND DOCUSATE SODIUM 1 TABLET: 50; 8.6 TABLET ORAL at 08:48

## 2023-02-09 RX ADMIN — GABAPENTIN 300 MG: 600 TABLET, FILM COATED ORAL at 08:48

## 2023-02-09 RX ADMIN — ALBUMIN (HUMAN) 12.5 G: 0.25 INJECTION, SOLUTION INTRAVENOUS at 00:15

## 2023-02-09 RX ADMIN — FINASTERIDE 5 MG: 5 TABLET, FILM COATED ORAL at 18:51

## 2023-02-09 RX ADMIN — PANTOPRAZOLE SODIUM 40 MG: 40 TABLET, DELAYED RELEASE ORAL at 06:57

## 2023-02-09 RX ADMIN — QUETIAPINE FUMARATE 200 MG: 100 TABLET ORAL at 22:59

## 2023-02-09 RX ADMIN — SENNOSIDES AND DOCUSATE SODIUM 1 TABLET: 50; 8.6 TABLET ORAL at 18:51

## 2023-02-09 RX ADMIN — POLYETHYLENE GLYCOL 3350 17 G: 17 POWDER, FOR SOLUTION ORAL at 08:48

## 2023-02-09 RX ADMIN — GABAPENTIN 300 MG: 600 TABLET, FILM COATED ORAL at 15:11

## 2023-02-09 RX ADMIN — HYDROMORPHONE HYDROCHLORIDE 2 MG: 2 INJECTION, SOLUTION INTRAMUSCULAR; INTRAVENOUS; SUBCUTANEOUS at 13:27

## 2023-02-09 RX ADMIN — ACETAMINOPHEN 650 MG: 325 TABLET ORAL at 18:51

## 2023-02-09 RX ADMIN — HYDROMORPHONE HYDROCHLORIDE 2 MG: 2 INJECTION, SOLUTION INTRAMUSCULAR; INTRAVENOUS; SUBCUTANEOUS at 06:58

## 2023-02-09 RX ADMIN — ALBUMIN (HUMAN) 12.5 G: 0.25 INJECTION, SOLUTION INTRAVENOUS at 13:27

## 2023-02-09 RX ADMIN — OXYCODONE HYDROCHLORIDE 5 MG: 5 TABLET ORAL at 15:11

## 2023-02-09 RX ADMIN — ALBUMIN (HUMAN) 12.5 G: 0.25 INJECTION, SOLUTION INTRAVENOUS at 18:52

## 2023-02-09 RX ADMIN — PANTOPRAZOLE SODIUM 40 MG: 40 TABLET, DELAYED RELEASE ORAL at 16:45

## 2023-02-09 RX ADMIN — ALBUMIN (HUMAN) 12.5 G: 0.25 INJECTION, SOLUTION INTRAVENOUS at 06:58

## 2023-02-09 NOTE — CONSULTS
Consult Follow-up Note    Patient: Thuan Ritter MRN: [id number]  SSN: [id number]    YOB: 1955  Age: 69 y.o. Sex: male              Assessment:     - Gross hematuria - urine light punch colored small clots noted in tubing     Principal Problem:    Cervical myelopathy with cervical radiculopathy ([facility] (2023)    Active Problems:    Acute myelopathy ([facility] (2023)        Plan:     - [address] - improved  urine light punch colored   Recc removing mills in am for VT please call urology if unable to void   - enc PO hydration   Trauma to Glans from mills irritation  can apply cream per wound care nurse      Will arrange OP follow up   Urology will see in am     -------------------------------------------------------------------------------------------------------------------    Thuan Ritter was admitted on 2023 to [facility] by Rah Roland MD for Acute myelopathy ([facility] [G95.9]. Pt seen initially for mills placement hen subsequently developed GH and had to be irrigated and it  eventually resolved . Nurse report urine yellow several days now punch colored . Pt states cathter was pulled catheter noted on tension , repositioned  . Discussed VT in am with pt and Nurse . Vitals: Temp (24hrs), Av.8 °F (36.6 °C), Min:97.5 °F (36.4 °C), Max:98.3 °F (36.8 °C)    Blood pressure 122/76, pulse 74, temperature 97.7 °F (36.5 °C), resp. rate 16, height 6' 2\" (1.88 m), weight 133.4 kg (294 lb), SpO2 90 %.     Intake and Output:  1901 - 700  In: -   Out: 7404 [Urine:2635]  701 - 1900  In: 240 [P.O.:240]  Out: 170 [Urine:170]    Current Facility-Administered Medications   Medication Dose Route Frequency    furosemide (LASIX) injection 40 mg  40 mg IntraVENous DAILY    gabapentin (NEURONTIN) tablet 300 mg  300 mg Oral TID    albumin human 25% (BUMINATE) solution 12.5 g  12.5 g IntraVENous Q6H    entecavir (BARACLUDE) tablet 0.5 mg  0.5 mg Oral ACD [Held by provider] cyclobenzaprine (FLEXERIL) tablet 10 mg  10 mg Oral TID    HYDROmorphone (DILAUDID) injection 2 mg  2 mg IntraVENous Q3H PRN    polyethylene glycol (MIRALAX) packet 17 g  17 g Oral DAILY    sodium chloride (NS) flush 5-40 mL  5-40 mL IntraVENous PRN    pantoprazole (PROTONIX) tablet 40 mg  40 mg Oral ACB&D    sodium chloride (NS) flush 5-40 mL  5-40 mL IntraVENous PRN    acetaminophen (TYLENOL) tablet 650 mg  650 mg Oral Q6H    oxyCODONE IR (ROXICODONE) tablet 5 mg  5 mg Oral Q3H PRN    [Held by provider] oxyCODONE IR (ROXICODONE) tablet 10 mg  10 mg Oral Q3H PRN    naloxone (NARCAN) injection 0.4 mg  0.4 mg IntraVENous PRN    ondansetron (ZOFRAN ODT) tablet 4 mg  4 mg Oral Q4H PRN    senna-docusate (PERICOLACE) 8.6-50 mg per tablet 1 Tablet  1 Tablet Oral BID    phenol throat spray (CHLORASEPTIC) 1 Spray  1 Spray Oral PRN    lidocaine 4 % patch 1 Patch  1 Patch TransDERmal Q24H    hydrOXYzine HCL (ATARAX) tablet 50 mg  50 mg Oral Q6H PRN    QUEtiapine (SEROquel) tablet 200 mg  200 mg Oral QHS    sertraline (ZOLOFT) tablet 100 mg  100 mg Oral DAILY    sodium chloride (NS) flush 5-40 mL  5-40 mL IntraVENous Q8H    sodium chloride (NS) flush 5-40 mL  5-40 mL IntraVENous PRN    acetaminophen (TYLENOL) tablet 650 mg  650 mg Oral Q6H PRN    Or    acetaminophen (TYLENOL) suppository 650 mg  650 mg Rectal Q6H PRN    ondansetron (ZOFRAN ODT) tablet 4 mg  4 mg Oral Q8H PRN    Or    ondansetron (ZOFRAN) injection 4 mg  4 mg IntraVENous Q6H PRN    insulin lispro (HUMALOG) injection   SubCUTAneous AC&HS    glucose chewable tablet 16 g  4 Tablet Oral PRN    glucagon (GLUCAGEN) injection 1 mg  1 mg IntraMUSCular PRN    dextrose 10 % infusion 0-250 mL  0-250 mL IntraVENous PRN    naloxone (NARCAN) injection 0.4 mg  0.4 mg IntraVENous PRN         Exam:   Abdominal: Soft, non-tender.    Gu - mills , abrasion to glans     Labs:  Recent Labs     02/09/23  0001   WBC 6.0   HGB 14.2   HCT 42.9   *     Recent Labs     02/09/23  0001   *   K 3.9   CL 95*   CO2 28   *   BUN 18   CREA 0.82   CA 9.9     D/w Dr Tatum Kelsey   Signed By: Bridgett Nicholson.  Kayy Martínez, NP - February 9, 2023

## 2023-02-09 NOTE — PROGRESS NOTES
ZUHAIR: anticipate d/c to   [facility], P2P approved as per Attending's report, bed will be available tomorrow    Pt will need a [employer] screening as he has secondary Medicaid, requested  to complete     Follow up with PCP & Specialist as directed     Rapid Covid test negative 2/8     W/c transport Vs family     Primary family contact: Daughter, Dee Stoner, [phone]     RUR: 13%  -Cervical cord tumor with cord compression, s/p C5 corpectomy, arthrodesis with cage and anterior cervical plate 1/01  -Metastatic disease, no plan for treatment per NP  -Hepatology following  -1230-CM received a phone call from [facility] advising that she was informed that pt was denied insurance auth and that [facility] sent to attending as noted above. 1320-CM spoke with Judi who advised she will check bed availability for SNF for today. CM was informed by Attending of P2P approval. CM to follow. 1430-CM spoke with Vianca alec Vásquez and she advised they do not have a bed until tomorrow, 2/10. CM informed Attending via perfect serve.   Krista Contreras RN BSN CCM

## 2023-02-09 NOTE — PROGRESS NOTES
[address] [facility]  Hospitalist Group                                                                                          Hospitalist Progress Note  Jose L Mcginnis NP  Answering service: [phone] from in house phone        Date of Service:  2023  NAME:  Wero Cohen  :  1955  MRN:  [id number]       Admission Summary: This is a 25-year-old man with past medical history significant for type 2 diabetes, anxiety/depression, and hypertension; presented at [facility] emergency room with neck pain. This has been going on for a couple of weeks. The pain is located at the back of the neck. The patient described the pain as severe 10/10 in severity with no history of trauma to the neck. No known aggravating or relieving factors. The pain is dull ache. He was seen by the orthopedic surgeon. The patient was told that he probably has a pinched nerve and outpatient MRI was ordered. The appointment for the outpatient MRI is not until this coming Monday. Because of persistent pain, the patient came to the emergency room. The pain has now become associated with inability to lift the left arm off the bed without difficulties, but the patient denies pain in the left arm. Just some weakness. The patient has had a CT scan of the neck done which shows significant degenerative changes. When the patient arrived at the emergency room, CT scan of the head was obtained. This was negative for acute pathology. The neurosurgeon on-call at [facility] was consulted by the emergency room physician and decision was made to transfer the patient to [facility] for MRI of the neck and also for further evaluation by the neurosurgeon. The hospitalist service was asked to directly admit the patient from [facility] emergency room to [facility] for that purpose. Interval history / Subjective:     Patient seen and examined today.  His B/L extremities are significantly more swollen today , 3+ pitting. He is also noted to have dark tea colored urine in mills bag. Will order repeat labs today. Will give albumin x 4 doses  and Lasix 40 mg. Net negative 1.8 L over last 24 hours. Assessment & Plan:     Cervical cord tumor with cord compression  s/p C5 corpectomy, arthrodesis with cage and anterior cervical plate 4/50   MRI c spine: Metastatic disease to the cervical spine at C5 and C7 with extraosseous  spread of tumor. At C5 there is collapse of the vertebral body and epidural spread of tumor resulting in significant canal compression. The epidural spread of tumor extends cranially on the left to C4 and fills both C4-5 and C5-6 foramen. The epidural tumor at both C5 and C7 distorts the adjacent vertebral arteries     metastatic disease to spine. Mets to lungs, lumbar spine, and liver seen on additional imaging.  - per neurosurgery no brace needed. - patient over sedated on 1/31, flexeril and gabapentin have been hold this week as well as trazadone. Restarted gabapentin today with lower dose. Discontinue trazadone. Continue with lidocaine patches and oxycodone 5 mg PRN. Metastatic disease with unknown primary   Thrombocytopenia  Elevated LFT  CT: Osseous metastatic disease. The L5 spinous process mass is amenable to  nonemergent CT-guided percutaneous biopsy if clinically indicated. Pulmonary nodules. Largest nodule is 1 cm in the posterior basilar left lower lobe. Metastatic disease is most likely. Retroperitoneal lymphadenopathy is likely due to metastatic disease. Possible hepatic metastatic disease    MRI brain: No evidence of intracranial metastatic disease. Oncology following;     CT guided L5 biopsy (1/30) Poorly differentiated malignancy    (+) AFB tumor marker    CEA (-)    - concerned with possible  HCC vs prostate cancer, Liver: Heterogeneous. Possible hypoattenuating masses. Cirrhotic surface from CT of abd/pel.  In 2011 pt had prostate bx done for bph, at that time the bx was benign prostate tissue, Prostate Ag 4.9, mildly elevated it.          - HBV/HCV serologies pending   - ,000  - Patient with hepatitis B has started treatment this admission  - plan is to start entecovir for HBV - per note will consider starting immune therapy for [facility] when able   - screening EGD for esophageal varices revealed 2 gastric ulcers and 2 duodenal ulcers , no active bleeding, patient started on BID PPI    Hematuria due to mills trauma   - pt had difficulty to place mills in OR  - mills catheter to stay in place for now per urology consult, can attempt voiding trial prior to discharge     Hyponatremia; Received IV hydration, stable in the 130 range, 137 today    Diet Controlled Type 2 diabetes  - A1c 5.9.   - SSI    Anxiety/depression:    - c/w seroquel, zoloft   - trazadone stopped d/t over sedation   - stable        Code status: Full. Patient wants his daughter Naya Yan is first Annel , daughter Jc Quach is second mpoa. He is  with his wife Eneida. Advance directive completed on 2/1.    Prophylaxis: SCD, Lovenox on hold d/t thrombocytopenia   Care Plan discussed with: Patient, nurse, care manager  Anticipated Disposition:  SNF ( [address] [place] [address] has started insurance auth)  Cardiac monitoring: Telemetry     Hospital Problems  Date Reviewed: 1/29/2023            Codes Class Noted POA    * (Principal) Cervical myelopathy with cervical radiculopathy ([facility] ICD-10-CM: G95.9, M54.12  ICD-9-CM: 721.1  1/29/2023 Yes        Acute myelopathy ([facility] ICD-10-CM: G95.9  ICD-9-CM: 336.9  1/29/2023 Unknown         Social Determinants of Health     Tobacco Use: Medium Risk    Smoking Tobacco Use: Former    Smokeless Tobacco Use: Never    Passive Exposure: Not on file   Alcohol Use: Not on file   Financial Resource Strain: Not on file   Food Insecurity: Not on file   Transportation Needs: Not on file   Physical Activity: Not on file   Stress: Not on file   Social Connections: Not on file   Intimate Partner Violence: Not on file   Depression: Not on file   Housing Stability: Not on file       Review of Systems:   A comprehensive review of systems was negative except for that written in the HPI. Vital Signs:    Last 24hrs VS reviewed since prior progress note. Most recent are:  Visit Vitals  /78 (BP 1 Location: Right lower arm, BP Patient Position: At rest;Sitting)   Pulse 70   Temp 98.3 °F (36.8 °C)   Resp 16   Ht 6' 2\" (1.88 m)   Wt 133.4 kg (294 lb)   SpO2 94%   BMI 37.75 kg/m²         Intake/Output Summary (Last 24 hours) at 2/8/2023 2142  Last data filed at 2/8/2023 8061  Gross per 24 hour   Intake --   Output 1135 ml   Net -1135 ml          Physical Examination:     I had a face to face encounter with this patient and independently examined them on 2/8/2023 as outlined below:          Constitutional:  No acute distress, cooperative, pleasant    ENT:  Oral mucosa moist, oropharynx benign. Resp:  CTA bilaterally. No wheezing/rhonchi/rales. No accessory muscle use. CV:  Regular rhythm, normal rate, no murmurs, gallops, rubs    GI:  Soft, non distended, non tender.  normoactive bowel sounds, no hepatosplenomegaly     Musculoskeletal:  3+ pitting edema to B/L LE,  warm, 2+ pulses throughout    Neurologic:   AAOx2, Right arm 3/5 strength   : Mcfadden in place with tea colored urine             Data Review:    I personally reviewed  Image    I have independently reviewed and interpreted patient's lab and all other diagnostic data    Labs:     Recent Labs     02/06/23  0342   WBC 9.8   HGB 13.6   HCT 42.7   *       Recent Labs     02/06/23  0154   *   K 4.4   CL 97   CO2 26   BUN 19   CREA 0.63*   *   CA 9.0       Recent Labs     02/06/23 0154   ALT 33   *   TBILI 1.2*   TP 7.8   ALB 2.3*   GLOB 5.5*       Recent Labs     02/06/23 0154   INR 1.3*   PTP 13.0*        No results for input(s): FE, TIBC, PSAT, FERR in the last 72 hours. No results found for: FOL, RBCF   No results for input(s): PH, PCO2, PO2 in the last 72 hours. No results for input(s): CPK, CKNDX, TROIQ in the last 72 hours. No lab exists for component: CPKMB  Lab Results   Component Value Date/Time    Cholesterol, total 141 03/22/2020 04:24 AM    HDL Cholesterol 73 03/22/2020 04:24 AM    LDL, calculated 53.8 03/22/2020 04:24 AM    Triglyceride 71 03/22/2020 04:24 AM    CHOL/HDL Ratio 1.9 03/22/2020 04:24 AM     Lab Results   Component Value Date/Time    Glucose (POC) 104 02/08/2023 09:29 PM    Glucose (POC) 151 (H) 02/08/2023 04:03 PM    Glucose (POC) 133 (H) 02/08/2023 11:12 AM    Glucose (POC) 117 02/08/2023 07:05 AM    Glucose (POC) 109 02/07/2023 10:32 PM     Lab Results   Component Value Date/Time    Color YELLOW/STRAW 01/29/2023 11:11 AM    Appearance CLOUDY (A) 01/29/2023 11:11 AM    Specific gravity 1.028 01/29/2023 11:11 AM    Specific gravity 1.023 08/19/2021 06:39 AM    pH (UA) 6.5 01/29/2023 11:11 AM    Protein Negative 01/29/2023 11:11 AM    Glucose Negative 01/29/2023 11:11 AM    Ketone Negative 01/29/2023 11:11 AM    Bilirubin Negative 01/29/2023 11:11 AM    Urobilinogen 1.0 01/29/2023 11:11 AM    Nitrites Positive (A) 01/29/2023 11:11 AM    Leukocyte Esterase Negative 01/29/2023 11:11 AM    Epithelial cells FEW 01/29/2023 11:11 AM    Bacteria 3+ (A) 01/29/2023 11:11 AM    WBC 0-4 01/29/2023 11:11 AM    RBC 0-5 01/29/2023 11:11 AM       Notes reviewed from all clinical/nonclinical/nursing services involved in patient's clinical care. Care coordination discussions were held with appropriate clinical/nonclinical/ nursing providers based on care coordination needs. Patients current active Medications were reviewed, considered, added and adjusted based on the clinical condition today. Home Medications were reconciled to the best of my ability given all available resources at the time of admission.  Route is PO if not otherwise noted.      Medications Reviewed:     Current Facility-Administered Medications   Medication Dose Route Frequency    entecavir (BARACLUDE) tablet 0.5 mg  0.5 mg Oral ACD    [Held by provider] cyclobenzaprine (FLEXERIL) tablet 10 mg  10 mg Oral TID    HYDROmorphone (DILAUDID) injection 2 mg  2 mg IntraVENous Q3H PRN    polyethylene glycol (MIRALAX) packet 17 g  17 g Oral DAILY    sodium chloride (NS) flush 5-40 mL  5-40 mL IntraVENous PRN    pantoprazole (PROTONIX) tablet 40 mg  40 mg Oral ACB&D    sodium chloride (NS) flush 5-40 mL  5-40 mL IntraVENous PRN    acetaminophen (TYLENOL) tablet 650 mg  650 mg Oral Q6H    oxyCODONE IR (ROXICODONE) tablet 5 mg  5 mg Oral Q3H PRN    [Held by provider] oxyCODONE IR (ROXICODONE) tablet 10 mg  10 mg Oral Q3H PRN    naloxone (NARCAN) injection 0.4 mg  0.4 mg IntraVENous PRN    ondansetron (ZOFRAN ODT) tablet 4 mg  4 mg Oral Q4H PRN    senna-docusate (PERICOLACE) 8.6-50 mg per tablet 1 Tablet  1 Tablet Oral BID    phenol throat spray (CHLORASEPTIC) 1 Spray  1 Spray Oral PRN    lidocaine 4 % patch 1 Patch  1 Patch TransDERmal Q24H    [Held by provider] gabapentin (NEURONTIN) tablet 600 mg  600 mg Oral TID    hydrOXYzine HCL (ATARAX) tablet 50 mg  50 mg Oral Q6H PRN    QUEtiapine (SEROquel) tablet 200 mg  200 mg Oral QHS    sertraline (ZOLOFT) tablet 100 mg  100 mg Oral DAILY    [Held by provider] traZODone (DESYREL) tablet 50 mg  50 mg Oral QHS    sodium chloride (NS) flush 5-40 mL  5-40 mL IntraVENous Q8H    sodium chloride (NS) flush 5-40 mL  5-40 mL IntraVENous PRN    acetaminophen (TYLENOL) tablet 650 mg  650 mg Oral Q6H PRN    Or    acetaminophen (TYLENOL) suppository 650 mg  650 mg Rectal Q6H PRN    ondansetron (ZOFRAN ODT) tablet 4 mg  4 mg Oral Q8H PRN    Or    ondansetron (ZOFRAN) injection 4 mg  4 mg IntraVENous Q6H PRN    insulin lispro (HUMALOG) injection   SubCUTAneous AC&HS    glucose chewable tablet 16 g  4 Tablet Oral PRN    glucagon (GLUCAGEN) injection 1 mg  1 mg IntraMUSCular PRN    dextrose 10 % infusion 0-250 mL  0-250 mL IntraVENous PRN    naloxone (NARCAN) injection 0.4 mg  0.4 mg IntraVENous PRN    [Held by provider] enoxaparin (LOVENOX) injection 30 mg  30 mg SubCUTAneous Q12H     ______________________________________________________________________  EXPECTED LENGTH OF STAY: 6d 14h  ACTUAL LENGTH OF STAY:          4673 Pasha Champion, NP

## 2023-02-09 NOTE — PROGRESS NOTES
Problem: Mobility Impaired (Adult and Pediatric)  Goal: *Acute Goals and Plan of Care (Insert Text)  Description: FUNCTIONAL STATUS PRIOR TO ADMISSION: Patient was independent and active without use of DME.    HOME SUPPORT PRIOR TO ADMISSION: The patient lived alone with no local to provide assistance. Physical Therapy Goals  Reassessed 2/8 and remain appropriate  Initiated 2/1/2023  1. Patient will move from supine to sit and sit to supine  in bed with modified independence within 7 day(s). 2.  Patient will transfer from bed to chair and chair to bed with modified independence using the least restrictive device within 7 day(s). 3.  Patient will perform sit to stand with modified independence within 7 day(s). 4.  Patient will ambulate with supervision/set-up for 150 feet with the least restrictive device within 7 day(s). 5.  Patient will ascend/descend 14 stairs with one handrail(s) with minimal assistance/contact guard assist within 7 day(s). Outcome: Progressing Towards Goal     PHYSICAL THERAPY TREATMENT  Patient: Jewel Martinez (40 y.o. male)  Date: 2/9/2023  Diagnosis: Acute myelopathy ([facility] [address] [G95.9] Cervical myelopathy with cervical radiculopathy (HCC)  Procedure(s) (LRB):  ESOPHAGOGASTRODUODENOSCOPY (EGD) (N/A)  ESOPHAGOGASTRODUODENAL (EGD) BIOPSY (N/A) 6 Days Post-Op  Precautions: Fall  Chart, physical therapy assessment, plan of care and goals were reviewed. ASSESSMENT  Patient continues with skilled PT services and is progressing towards goals. PTA pt was completely independent without a use of A. D. pt able to utilize UE to carry items as needed during gait. Pt could use UE to self correct LOB. Pt currently is requiring requiring rolling walker to assist with gait. And balance. Pt has decrease UE bilateral strength right weaker than left.  general shoulder and bicep 2+/5. Pt would unable to correct LOB with UE due to weakness and decrease usage, like he was able to perform PTA.  Due to UE weakness pt has decrease righting reflex making him a fall risk. Pt reports improvement in UE post surgery. Pt's RN confirms a progression in UE movement. Pt currently unable to feed self due to the inability to bring hand to mouth. Pt utilizing setup and forward trunk to  assist  with drinking from a straw. Due to decrease independence, IADLs, mobility pt needs rehab. Pt has shown progression and would benefit from continued  therapy to optimize mobility and independence also relearn ADL skills if needed to be able to return home. Pt would benefit from inpt rehab but that has been denied. The next option would be SNF    Current Level of Function Impacting Discharge (mobility/balance): CGA    Other factors to consider for discharge: decrease righting reaction, Fall risk, decrease ADLS, below functional baseline, lives alone          PLAN :  Patient continues to benefit from skilled intervention to address the above impairments. Continue treatment per established plan of care. to address goals. Recommendation for discharge: (in order for the patient to meet his/her long term goals)  Therapy up to 5 days/week in SNF setting due to inpt rehab     This discharge recommendation:  Has not yet been discussed the attending provider and/or case management    IF patient discharges home will need the following DME: to be determined (TBD)       SUBJECTIVE:   Patient stated it's getting better thank God.     OBJECTIVE DATA SUMMARY:   Critical Behavior:  Neurologic State: Alert  Orientation Level: Oriented X4  Cognition: Appropriate safety awareness, Appropriate for age attention/concentration  Safety/Judgement: Awareness of environment, Fall prevention  Functional Mobility Training:  Bed Mobility:                    Transfers:  Sit to Stand: Contact guard assistance  Stand to Sit: Contact guard assistance (decrease control due to unability to control with UE.)                             Balance:     Ambulation/Gait Training:  Distance (ft): 50 Feet (ft) (2 trials)  Assistive Device: Gait belt;Walker, rolling  Ambulation - Level of Assistance: Contact guard assistance        Gait Abnormalities: Decreased step clearance        Base of Support: Narrowed     Speed/Valorie: Pace decreased (<100 feet/min)  Step Length: Right shortened;Left shortened                  Stairs: Therapeutic Exercises:   Edematous feet educated on ankle pumps  . Pain Rating:  Back Just received IV pain medicine     Activity Tolerance:   Fair    After treatment patient left in no apparent distress:   Sitting in chair and Call bell within reach    COMMUNICATION/COLLABORATION:   The patients plan of care was discussed with: Registered nurse.      Yady Vyas PTA   Time Calculation: 17 mins

## 2023-02-09 NOTE — PROGRESS NOTES
0900-CM reviewed pt chart & CM spoke with [facility] [phone] and she advised that she is still awaiting insurance auth and they are checking on medication cost for Entociver for HBV treatment and will get back to CM shortly. CM to follow.   Rapid Covid test negative 2/8  Nitesh Alatorre RN BSN CCM

## 2023-02-09 NOTE — PROGRESS NOTES
[facility] Adult  Hospitalist Group                                                                                          Hospitalist Progress Note  Yolandasuzy Escudero TANA        Date of Service:  2023  NAME:  Thuan Mail  :  1955  MRN:  [id number]    Admission Summary:   Mr. Mary Kate Burr is a  68-year-old man with past medical history significant for type 2 diabetes, anxiety/depression, and hypertension; presented at [facility] emergency room with neck pain. This has been going on for a couple of weeks. The pain is located at the back of the neck. The patient described the pain as severe 10/10 in severity with no history of trauma to the neck. No known aggravating or relieving factors. The pain is dull ache. He was seen by the orthopedic surgeon. The patient was told that he probably has a pinched nerve and outpatient MRI was ordered. The appointment for the outpatient MRI is not until this coming Monday. Because of persistent pain, the patient came to the emergency room. The pain has now become associated with inability to lift the left arm off the bed without difficulties, but the patient denies pain in the left arm. Just some weakness. The patient has had a CT scan of the neck done which shows significant degenerative changes. When the patient arrived at the emergency room, CT scan of the head was obtained. This was negative for acute pathology. The neurosurgeon on-call at [facility] was consulted by the emergency room physician and decision was made to transfer the patient to [facility] for MRI of the neck and also for further evaluation by the neurosurgeon. The hospitalist service was asked to directly admit the patient from [facility] emergency room to [facility] for that purpose. Interval history / Subjective:     Patient was seen and examined, he was sitting up in a chair with lunch in front of him.   He is unable to feed himself, staff called to help assist.  He denies any pain and is aware we are waiting on skilled nursing facility placement. Peer to peer was completed this afternoon, he has been accepted to [address] [place] [address] and rehab, pending bed placement for tomorrow     Assessment & Plan:     Cervical cord tumor with cord compression  s/p C5 corpectomy, arthrodesis with cage and anterior cervical plate 7/49   MRI c spine: Metastatic disease to the cervical spine at C5 and see 7 with extraosseous spread of tumor. At C5 there is collapse of the vertebral body and epidural spread of tumor resulting in significant canal compression. The epidural spread of tumor extends cranially on the left to C4 and fills both C4-5 and C5-6 foramen. The epidural tumor at both C5 and C7 distorts the adjacent vertebral arteries. Metastatic disease to spine. Mets to lungs, lumbar spine, and liver seen on additional imaging.  - per neurosurgery no brace needed. - patient over sedated on 1/31, flexeril and gabapentin have been hold this week as well as trazadone.   - Restarted gabapentin with lower dose. Discontinue trazadone.   - Continue with lidocaine patches and oxycodone 5 mg PRN. Metastatic disease with unknown primary   Thrombocytopenia  Elevated LFT  CT: Osseous metastatic disease. The L5 spinous process mass is amenable to nonemergent CT-guided percutaneous biopsy if clinically indicated. Pulmonary nodules, largest nodule is 1 cm in the posterior basilar left lower lobe. Metastatic disease is most likely. Retroperitoneal lymphadenopathy is likely due to metastatic disease. Possible hepatic metastatic disease.    - MRI brain: No evidence of intracranial metastatic disease   - Oncology following;   - CT guided L5 biopsy (1/30) Poorly differentiated malignancy  - (+) AFB tumor marker  - CEA (-)  -Concerned with possible HCC versus prostate cancer. Liver: Heterogeneous. Possible hypoattenuating masses.   Cirrhotic surface from CT of abdomen and pelvis. In 2011 patient had prostate biopsy done for BPH, at that time the biopsy was benign prostate tissue.  -Prostate AG 4.9, mildly elevated   - HBV/HCV serologies pending   - ,000  - Patient with hepatitis B has started treatment this admission  -Has started on entecovir for HBV   - per note will consider starting immune therapy for [facility] [address] when able, should follow-up with Dr. Kris Alves outpatient  - screening EGD for esophageal varices revealed 2 gastric ulcers and 2 duodenal ulcers , no active bleeding, patient started on BID PPI    Hematuria due to mills trauma  - pt had difficulty to place mills in OR  - mills catheter to stay in place for now per urology consult, can attempt voiding trial prior to discharge   - Mills is still putting out bloody urine with clots. Urology had signed off ~1 week ago reporting that urine was then clear. Nursing to reach out to urology once again due to persistent hematuria with clots. Likely not safe to remove Mills catheter at this time due to the presence of clots. Hyponatremia   - Received IV hydration, stable in the 130 range  -Mentation is stable    Diet Controlled Type 2 diabetes  - A1c 5.9.   - SSI as needed. Blood sugars have been well controlled, /24 hours    Anxiety/depression:    - c/w seroquel, zoloft   - trazadone stopped d/t over sedation   - stable      Code status: Full. Patient wants his daughter Kalpana Lazcano is first Osie Rater, daughter Bunny Dewitt is second mpoa. He is  with his wife Eneida. Advance directive completed on 2/1.    Prophylaxis: SCD, Lovenox on hold d/t thrombocytopenia   Care Plan discussed with: Patient, nurse, care manager  Anticipated Disposition:  [facility] and [address] [facility]  Date Reviewed: 1/29/2023            Codes Class Noted POA    * (Principal) Cervical myelopathy with cervical radiculopathy ([facility] [address] ICD-10-CM: G95.9, M54.12  ICD-9-CM: 721.1  1/29/2023 Yes Acute myelopathy (HCC) ICD-10-CM: G95.9  ICD-9-CM: 336.9  1/29/2023 Unknown         Social Determinants of Health     Tobacco Use: Medium Risk    Smoking Tobacco Use: Former    Smokeless Tobacco Use: Never    Passive Exposure: Not on file   Alcohol Use: Not on file   Financial Resource Strain: Not on file   Food Insecurity: Not on file   Transportation Needs: Not on file   Physical Activity: Not on file   Stress: Not on file   Social Connections: Not on file   Intimate Partner Violence: Not on file   Depression: Not on file   Housing Stability: Not on file     Review of Systems:     Denies headache, dizziness  No chest pain or pressure  No shortness of breath or cough  No GI complaints such as nausea, vomiting, diarrhea or constipation  Pain is controlled    Vital Signs:    Last 24hrs VS reviewed since prior progress note. Most recent are:  Visit Vitals  BP (!) 147/119 (BP 1 Location: Left upper arm, BP Patient Position: Sitting)   Pulse 70   Temp 97.7 °F (36.5 °C)   Resp 16   Ht 6' 2\" (1.88 m)   Wt 133.4 kg (294 lb)   SpO2 93%   BMI 37.75 kg/m²       Intake/Output Summary (Last 24 hours) at 2/9/2023 4542  Last data filed at 2/9/2023 0015  Gross per 24 hour   Intake --   Output 1500 ml   Net -1500 ml        Physical Examination:     I had a face to face encounter with this patient and independently examined them on 2/9/2023 as outlined below:        Constitutional: -American male sitting up in a chair in no acute distress, cooperative, pleasant    ENT:  Oral mucosa moist, oropharynx benign. Resp:  CTA bilaterally. No accessory muscle use. Oxygen sats 97% on room air   CV:  Regular rhythm, normal rate, heart rate 67    GI:  Soft, non distended, non tender. normoactive bowel sounds, + bm today   : Mcfadden in place with red-tinged urine with clots    Musculoskeletal:  3+ pitting edema to B/L LE -sitting with legs dependent. ,  warm, 2+ pulses throughout    Neurologic:   AAOx2, Right arm 3/5 strength but has poor control       Data Review:   Review and/or order of clinical lab test  Review and/or order of tests in the medicine section of [facility]    I have independently reviewed and interpreted patient's lab and all other diagnostic data    Labs:     Recent Labs     02/09/23  0001   WBC 6.0   HGB 14.2   HCT 42.9   *       Recent Labs     02/09/23  0001   *   K 3.9   CL 95*   CO2 28   BUN 18   CREA 0.82   *   CA 9.9       Recent Labs     02/09/23  0001   ALT 36   *   TBILI 1.1*   TP 8.4*   ALB 2.6*   GLOB 5.8*       No results for input(s): INR, PTP, APTT, INREXT, INREXT in the last 72 hours. No results for input(s): FE, TIBC, PSAT, FERR in the last 72 hours. No results found for: FOL, RBCF   No results for input(s): PH, PCO2, PO2 in the last 72 hours. No results for input(s): CPK, CKNDX, TROIQ in the last 72 hours.     No lab exists for component: CPKMB  Lab Results   Component Value Date/Time    Cholesterol, total 141 03/22/2020 04:24 AM    HDL Cholesterol 73 03/22/2020 04:24 AM    LDL, calculated 53.8 03/22/2020 04:24 AM    Triglyceride 71 03/22/2020 04:24 AM    CHOL/HDL Ratio 1.9 03/22/2020 04:24 AM     Lab Results   Component Value Date/Time    Glucose (POC) 116 02/09/2023 06:52 AM    Glucose (POC) 104 02/08/2023 09:29 PM    Glucose (POC) 151 (H) 02/08/2023 04:03 PM    Glucose (POC) 133 (H) 02/08/2023 11:12 AM    Glucose (POC) 117 02/08/2023 07:05 AM     Lab Results   Component Value Date/Time    Color YELLOW/STRAW 01/29/2023 11:11 AM    Appearance CLOUDY (A) 01/29/2023 11:11 AM    Specific gravity 1.028 01/29/2023 11:11 AM    Specific gravity 1.023 08/19/2021 06:39 AM    pH (UA) 6.5 01/29/2023 11:11 AM    Protein Negative 01/29/2023 11:11 AM    Glucose Negative 01/29/2023 11:11 AM    Ketone Negative 01/29/2023 11:11 AM    Bilirubin Negative 01/29/2023 11:11 AM    Urobilinogen 1.0 01/29/2023 11:11 AM    Nitrites Positive (A) 01/29/2023 11:11 AM    Leukocyte Esterase Negative 01/29/2023 11:11 AM    Epithelial cells FEW 01/29/2023 11:11 AM    Bacteria 3+ (A) 01/29/2023 11:11 AM    WBC 0-4 01/29/2023 11:11 AM    RBC 0-5 01/29/2023 11:11 AM     Notes reviewed from all clinical/nonclinical/nursing services involved in patient's clinical care. Care coordination discussions were held with appropriate clinical/nonclinical/ nursing providers based on care coordination needs. Patients current active Medications were reviewed, considered, added and adjusted based on the clinical condition today. Home Medications were reconciled to the best of my ability given all available resources at the time of admission. Route is PO if not otherwise noted.     Medications Reviewed:     Current Facility-Administered Medications   Medication Dose Route Frequency    furosemide (LASIX) injection 40 mg  40 mg IntraVENous DAILY    gabapentin (NEURONTIN) tablet 300 mg  300 mg Oral TID    albumin human 25% (BUMINATE) solution 12.5 g  12.5 g IntraVENous Q6H    entecavir (BARACLUDE) tablet 0.5 mg  0.5 mg Oral ACD    [Held by provider] cyclobenzaprine (FLEXERIL) tablet 10 mg  10 mg Oral TID    HYDROmorphone (DILAUDID) injection 2 mg  2 mg IntraVENous Q3H PRN    polyethylene glycol (MIRALAX) packet 17 g  17 g Oral DAILY    sodium chloride (NS) flush 5-40 mL  5-40 mL IntraVENous PRN    pantoprazole (PROTONIX) tablet 40 mg  40 mg Oral ACB&D    sodium chloride (NS) flush 5-40 mL  5-40 mL IntraVENous PRN    acetaminophen (TYLENOL) tablet 650 mg  650 mg Oral Q6H    oxyCODONE IR (ROXICODONE) tablet 5 mg  5 mg Oral Q3H PRN    [Held by provider] oxyCODONE IR (ROXICODONE) tablet 10 mg  10 mg Oral Q3H PRN    naloxone (NARCAN) injection 0.4 mg  0.4 mg IntraVENous PRN    ondansetron (ZOFRAN ODT) tablet 4 mg  4 mg Oral Q4H PRN    senna-docusate (PERICOLACE) 8.6-50 mg per tablet 1 Tablet  1 Tablet Oral BID    phenol throat spray (CHLORASEPTIC) 1 Spray  1 Spray Oral PRN    lidocaine 4 % patch 1 Patch  1 Patch TransDERmal Q24H hydrOXYzine HCL (ATARAX) tablet 50 mg  50 mg Oral Q6H PRN    QUEtiapine (SEROquel) tablet 200 mg  200 mg Oral QHS    sertraline (ZOLOFT) tablet 100 mg  100 mg Oral DAILY    sodium chloride (NS) flush 5-40 mL  5-40 mL IntraVENous Q8H    sodium chloride (NS) flush 5-40 mL  5-40 mL IntraVENous PRN    acetaminophen (TYLENOL) tablet 650 mg  650 mg Oral Q6H PRN    Or    acetaminophen (TYLENOL) suppository 650 mg  650 mg Rectal Q6H PRN    ondansetron (ZOFRAN ODT) tablet 4 mg  4 mg Oral Q8H PRN    Or    ondansetron (ZOFRAN) injection 4 mg  4 mg IntraVENous Q6H PRN    insulin lispro (HUMALOG) injection   SubCUTAneous AC&HS    glucose chewable tablet 16 g  4 Tablet Oral PRN    glucagon (GLUCAGEN) injection 1 mg  1 mg IntraMUSCular PRN    dextrose 10 % infusion 0-250 mL  0-250 mL IntraVENous PRN    naloxone (NARCAN) injection 0.4 mg  0.4 mg IntraVENous PRN   ______________________________________________________________________  EXPECTED LENGTH OF STAY: 6d 14h  ACTUAL LENGTH OF STAY:          [address] NP

## 2023-02-09 NOTE — WOUND CARE
Wound Care Note:     New consult placed by nurse request for scrotal tear    Chart shows:  Admitted for cervical myelopathy with cervical radiculopathy  WBC = 6.0 on 2/9/23  Admitted from [facility]    Assessment:   Patient is A&O x 4, communicative, continent with some assistance needed in repositioning. Patient has a Mcfadden. Diet: Adult diet dysphagia- pureed with nutritional supplements  Patient reports no pain. Bilateral heels skin intact and without erythema. Buttocks and sacral skin were not assessed; patient up in chair. 1. Scrotum with open wound measuring 4 cm x 2.5 cm x 0.1 cm, wound bed is pink, moist, patient thinks it was caused by getting in and out of bed/chair and rubbing on chux, no drainage, wound edges are open. Z guard paste and Optifoam Gentle applied. 2.  Penis with open area measuring 0.5 cm x 1 cm x 0.1 cm, wound bed is pink, scant dried sero/sang drainage to fox-wound, wound edges are open, fox-wound cleaned and found intact. Patient unsure how wound occurred. Z guard paste applied. Spoke with Marcella Moise NP, wound care orders obtained. Patient up in chair. Recommendations:    Scrotum and penis- Every 12 hours and as needed, apply Z guard paste; then place Optifoam Gentle on scrotal wound to avoid further friction/shear. Skin Care & Pressure Prevention:  Minimize layers of linen/pads under patient to optimize support surface. Turn/reposition approximately every 2 hours and offload heels.   Manage incontinence / promote continence   Nourishing Skin Cream to dry skin, minimize use of briefs when able    Discussed above plan with patient & Jhoana France RN    Transition of Care: Plan to follow as needed while admitted to hospital.    KILEY BurdenN, RN, [facility]  Certified Wound and Ostomy Nurse  office [phone]  Best way to contact me is through [address] [place] [address]

## 2023-02-10 VITALS
BODY MASS INDEX: 37.73 KG/M2 | HEART RATE: 65 BPM | RESPIRATION RATE: 14 BRPM | WEIGHT: 294 LBS | TEMPERATURE: 97.6 F | SYSTOLIC BLOOD PRESSURE: 139 MMHG | DIASTOLIC BLOOD PRESSURE: 84 MMHG | HEIGHT: 74 IN | OXYGEN SATURATION: 96 %

## 2023-02-10 LAB
COMMENT, HOLDF: NORMAL
GLUCOSE BLD STRIP.AUTO-MCNC: 114 MG/DL (ref 65–117)
GLUCOSE BLD STRIP.AUTO-MCNC: 119 MG/DL (ref 65–117)
HBV DNA SERPL NAA+PROBE-ACNC: NORMAL IU/ML
HBV DNA SERPL NAA+PROBE-LOG IU: 5.15 LOG10 IU/ML
HBV E AB SERPL QL IA: POSITIVE
HBV E AG SERPL QL IA: NEGATIVE
SAMPLES BEING HELD,HOLD: NORMAL
SERVICE CMNT-IMP: ABNORMAL
SERVICE CMNT-IMP: NORMAL
TEST INFORMATION: NORMAL

## 2023-02-10 PROCEDURE — 74011250637 HC RX REV CODE- 250/637: Performed by: NURSE PRACTITIONER

## 2023-02-10 PROCEDURE — 74011250637 HC RX REV CODE- 250/637: Performed by: INTERNAL MEDICINE

## 2023-02-10 PROCEDURE — 74011250636 HC RX REV CODE- 250/636: Performed by: NURSE PRACTITIONER

## 2023-02-10 PROCEDURE — 74011250637 HC RX REV CODE- 250/637

## 2023-02-10 PROCEDURE — 74011000250 HC RX REV CODE- 250: Performed by: INTERNAL MEDICINE

## 2023-02-10 PROCEDURE — 74011250637 HC RX REV CODE- 250/637: Performed by: NEUROLOGICAL SURGERY

## 2023-02-10 PROCEDURE — 82962 GLUCOSE BLOOD TEST: CPT

## 2023-02-10 RX ORDER — CYCLOBENZAPRINE HCL 10 MG
10 TABLET ORAL
Qty: 30 TABLET | Refills: 0 | Status: SHIPPED
Start: 2023-02-10

## 2023-02-10 RX ORDER — HYDROMORPHONE HYDROCHLORIDE 2 MG/1
2 TABLET ORAL ONCE
Status: COMPLETED | OUTPATIENT
Start: 2023-02-10 | End: 2023-02-10

## 2023-02-10 RX ORDER — HYDROMORPHONE HYDROCHLORIDE 4 MG/1
4 TABLET ORAL
Qty: 20 TABLET | Refills: 0 | Status: SHIPPED | OUTPATIENT
Start: 2023-02-10 | End: 2023-02-13

## 2023-02-10 RX ADMIN — HYDROMORPHONE HYDROCHLORIDE 2 MG: 2 TABLET ORAL at 09:59

## 2023-02-10 RX ADMIN — PANTOPRAZOLE SODIUM 40 MG: 40 TABLET, DELAYED RELEASE ORAL at 08:27

## 2023-02-10 RX ADMIN — SERTRALINE HYDROCHLORIDE 100 MG: 50 TABLET ORAL at 08:27

## 2023-02-10 RX ADMIN — FUROSEMIDE 40 MG: 10 INJECTION, SOLUTION INTRAMUSCULAR; INTRAVENOUS at 08:29

## 2023-02-10 RX ADMIN — SENNOSIDES AND DOCUSATE SODIUM 1 TABLET: 50; 8.6 TABLET ORAL at 08:28

## 2023-02-10 RX ADMIN — FINASTERIDE 5 MG: 5 TABLET, FILM COATED ORAL at 08:28

## 2023-02-10 RX ADMIN — OXYCODONE HYDROCHLORIDE 7.5 MG: 5 TABLET ORAL at 08:28

## 2023-02-10 RX ADMIN — HYDROMORPHONE HYDROCHLORIDE 1 MG: 2 TABLET ORAL at 00:12

## 2023-02-10 RX ADMIN — OXYCODONE HYDROCHLORIDE 7.5 MG: 5 TABLET ORAL at 05:00

## 2023-02-10 RX ADMIN — GABAPENTIN 300 MG: 600 TABLET, FILM COATED ORAL at 08:27

## 2023-02-10 RX ADMIN — POLYETHYLENE GLYCOL 3350 17 G: 17 POWDER, FOR SOLUTION ORAL at 08:28

## 2023-02-10 RX ADMIN — ACETAMINOPHEN 650 MG: 325 TABLET ORAL at 00:12

## 2023-02-10 NOTE — PROGRESS NOTES
0800: Bedside and Verbal shift change report given to [address] [place] [address] (oncoming nurse) by Dimas Andre RN (offgoing nurse). Report included the following information SBAR and Kardex. [phone] Lei NP on phone. Pt requesting different pain medications for persistent 10/10 pain. Orders received to give 2mg PO dilaudid once now. 1330: Calling report to [facility] but  unable to find available RN. Maria Sanders NP updated pain medication and prescription printed and place with discharge paperwork. 1400: [employer] on telephone for report. Hospital to home transport at bedside. Pt belongings including cell phone, , photo, two personal booklets, wallet and clothing collected with patient belonging bags. Paper work including dilaudid prescription visualized in discharge packet. 1425: Pt being discharged via stretcher.

## 2023-02-10 NOTE — PROGRESS NOTES
ZUHAIR: anticipate d/c to   [facility] today, P2P approved as per Attending's report  Report #[phone]  Room#45A     Pt will need a UAI screening as he has secondary Medicaid, requested  to complete    Entocevir script faxed to accepting SNF and original copy placed in transport packet for SNF     Follow up with PCP & Specialist as directed     Rapid Covid test negative 2/8     BLS Transport scheduled with [facility] for 2pm     Primary family contact: DaughterShandra, [phone]     RUR: 13%  -Cervical cord tumor with cord compression, s/p C5 corpectomy, arthrodesis with cage and anterior cervical plate 9/43  -Metastatic disease, no plan for treatment per NP  -Hepatology following -0845-CM reviewed pt chart & sent message in perfect serve and confirmed pt is medically stable for d/c today. CM spoke with [facility] [phone] and she confirmed that they can accept pt today and requested 2pm transport. CM set up transport as noted above and spoke with pt's dtr [facility] and she is agreeable to d/c plan. CM informed nurse and Attending as well. Alisia Etienne RN BSN [facility]  Medicare pt has received, reviewed, and signed 2nd IM letter informing them of their right to appeal the discharge. Signed copy has been placed on pt bedside chart. Transition of Care Plan to SNF/Rehab    SNF/Rehab Transition:  Patient has been accepted to [facility] and meets criteria for admission. Patient will transported by [facility] and expected to leave at 2pm.    Communication to Patient/Family:  Met with patient  contacted pt's daughterShandra (identified care giver) and they are agreeable to the transition plan.     Communication to SNF/Rehab:  Bedside RN, Garth Son has been notified to update the transition plan to the facility and call report (phone number [phone]  Discharge information has been updated on the AVS.        Nursing Please include all hard scripts for controlled substances, med rec and dc summary, and AVS in packet. Reviewed and [facility] manage the patient care needs for the following:     SNF/Rehab Transition:  PCP/Specialist: as directed    Reviewed and confirmed with [facility] they can manage the patient care needs for the following:     Elaine Riddles with (X) only those applicable:    Medication:  [x]  Medications will be available at the facility  []  IV Antibiotics   []  Controlled Substance - hard copy to be sent with patient   []  Weekly Labs   Documents:  [x] Hard RX  [x] MAR  [] Kardex  [x] AVS  []Transfer Summary  [x]Discharge   Equipment:  []  CPAP/BiPAP  []  Wound Vacuum  []  Mcfadden or Urinary Device  []  PICC/Central Line  []  Nebulizer  []  Ventilator   Treatment:  []Isolation (for MRSA, VRE, etc.)  []Surgical Drain Management  []Tracheostomy Care  []Dressing Changes  []Dialysis with transportation and chair time[]PEG Care  []Oxygen  []Daily Weights for Heart Failure   Dietary:  []Any diet limitations  []Tube Feedings   []Total Parenteral Management (TPN)   Eligible for Medicaid Long Term Services and Supports  Yes:  [] Eligible for medical assistance or will become eligible within 180 days and UAI completed. [] Provider/Patient and/or support system has requested screening. [] UAI copy provided to patient or responsible party,   [x] UAI unavailable at discharge will send once processed to SNF provider. [] UAI unavailable at discharged mailed to patient  No:   [] Private pay and is not financially eligible for Medicaid within the next 180 days. [] Reside out-of-state.   [] A residents of a state owned/operated facility that is licensed  by Department [facility] [employer] and [employer] or [facility]  [] Enrollment in [facility]  [] [address]  [] Patient /Family declines to have screening completed or provide financial information for screening     Financial Resources:  Medicaid    [] Initiated and application pending   [] Full coverage     Advanced Care Plan:  []Surrogate Decision Maker of Care  []POA  [x]Communicated Code Status FULL CODE   Other

## 2023-02-10 NOTE — PROGRESS NOTES
Patient: Amiel Essex MRN: [id number]  SSN: [id number]    YOB: 1955  Age: 69 y.o. Sex: male        ADMITTED: 2023 to Mauri Zeng, NP by Jimbo Williamson MD for Acute myelopathy [facility]) [G95.9]  POD# 7 Days Post-Op Procedure(s):  ESOPHAGOGASTRODUODENOSCOPY (EGD)  ESOPHAGOGASTRODUODENAL (EGD) BIOPSY    Amiel Essex is doing fair. Complains of back pain. Mills removed this AM. He has voided twice. Urine in urinal light yellow. 300 cc documented UOP today. Vitals: Temp (24hrs), Av.7 °F (36.5 °C), Min:97.6 °F (36.4 °C), Max:98 °F (36.7 °C)    Blood pressure 139/84, pulse 65, temperature 97.6 °F (36.4 °C), resp. rate 14, height 6' 2\" (1.88 m), weight 133.4 kg (294 lb), SpO2 96 %. Intake and Output:  1901 - 02/10 07  In: 240 [P.O.:240]  Out: 6734 [Urine:1670]  02/10 0701 - 02/10 1900  In: -   Out: 300 [Urine:300]  TREVA Output lats 24 hrs: No data found. TREVA Output last 8 hrs: No data found. BM over last 24 hrs: No data found. Physical Exam  General: NAD, appears uncomfortable   Respiratory: no distress, breathing easily   Abdomen: soft, non-tender  : voiding independently, yellow UA  Neuro: Appropriate, no focal neurological deficits  Skin: warm, dry  Extremities: moves all, full ROM    Labs:  CBC:   Lab Results   Component Value Date/Time    WBC 6.0 2023 12:01 AM    HCT 42.9 2023 12:01 AM    PLATELET 149 (L)  12:01 AM     BMP:   Lab Results   Component Value Date/Time    Glucose 118 (H) 2023 12:01 AM    Glucose 85 2020 04:24 AM    Sodium 130 (L) 2023 12:01 AM    Potassium 3.9 2023 12:01 AM    Chloride 95 (L) 2023 12:01 AM    CO2 28 2023 12:01 AM    BUN 18 2023 12:01 AM    Creatinine 0.82 2023 12:01 AM    Calcium 9.9 2023 12:01 AM      Assessment/Plan:     1. Difficult mills placement 2/2 bladder neck contracture with gross hematuria - Patient passed voiding trial. Urine clear.  Continue Flomax, finasteride. Our office will arrange follow up. Please call with questions.      Signed By: Fidencio Levin NP - February 10, 2023

## 2023-02-10 NOTE — DISCHARGE SUMMARY
Discharge Summary     PATIENT ID: Mark Crystal  MRN: [id number]   YOB: 1955    DATE OF ADMISSION: 1/28/2023  9:57 PM    DATE OF DISCHARGE: 2/10/2023  PRIMARY CARE PROVIDER: Remedios Willis MD   ATTENDING PHYSICIAN: Linda Owens MD   DISCHARGING PROVIDER: Leanna Bowman NP      CONSULTATIONS: IP CONSULT TO NEUROSURGERY  IP CONSULT TO ONCOLOGY  IP CONSULT TO HEPATOLOGY  IP CONSULT TO UROLOGY    PROCEDURES/SURGERIES: Procedure(s):  ESOPHAGOGASTRODUODENOSCOPY (EGD)  ESOPHAGOGASTRODUODENAL (EGD) [address] COURSE:     Mr. Dani Kim is a  64-year-old man with past medical history significant for type 2 diabetes, anxiety/depression, and hypertension; presented at [facility] emergency room with neck pain. This has been going on for a couple of weeks. The pain is located at the back of the neck. The patient described the pain as severe 10/10 in severity with no history of trauma to the neck. No known aggravating or relieving factors. The pain is dull ache. He was seen by the orthopedic surgeon. The patient was told that he probably has a pinched nerve and outpatient MRI was ordered. The appointment for the outpatient MRI is not until this coming Monday. Because of persistent pain, the patient came to the emergency room. The pain has now become associated with inability to lift the left arm off the bed without difficulties, but the patient denies pain in the left arm. Just some weakness. The patient has had a CT scan of the neck done which shows significant degenerative changes. When the patient arrived at the emergency room, CT scan of the head was obtained. This was negative for acute pathology. The neurosurgeon on-call at [facility] was consulted by the emergency room physician and decision was made to transfer the patient to [facility] for MRI of the neck and also for further evaluation by the neurosurgeon.   The hospitalist service was asked to directly admit the patient from [facility] emergency room to [facility] for that purpose. DISCHARGE DIAGNOSES / PLAN:      Cervical cord tumor with cord compression  s/p C5 corpectomy, arthrodesis with cage and anterior cervical plate 8/49  - MRI c spine: Metastatic disease to the cervical spine at C5 and see 7 with extraosseous spread of tumor. At C5 there is collapse of the vertebral body and epidural spread of tumor resulting in significant canal compression. The epidural spread of tumor extends cranially on the left to C4 and fills both C4-5 and C5-6 foramen. The epidural tumor at both C5 and C7 distorts the adjacent vertebral arteries. Metastatic disease to spine. Mets to lungs, lumbar spine, and liver seen on additional imaging.  - per Nsgy no brace needed. - pt oversedated on 1/31, flexeril, gabapentin and trazadone held  - Restarted gabapentin with lower dose with no drowsiness   - stopped IV dilaudid but pt with increased pain this am AFTER 7.5 mg oxycodone. Will change to oral dilaudid and may have flexeril TID prn.   - Continue with lidocaine patch     Metastatic disease with unknown primary   Thrombocytopenia  Elevated LFT  - CT: Osseous metastatic disease. The L5 spinous process mass is amenable to nonemergent CT-guided percutaneous biopsy if clinically indicated. Pulmonary nodules, largest nodule is 1 cm in the posterior basilar left lower lobe. Metastatic disease is most likely. Retroperitoneal lymphadenopathy is likely due to metastatic disease. Possible hepatic metastatic disease.    - MRI brain: No evidence of intracranial metastatic disease   - CT guided L5 biopsy (1/30) Poorly differentiated malignancy  - (+) AFB tumor marker  - CEA (-)  - Concerned with possible HCC versus prostate cancer. Liver: Heterogeneous. Possible hypoattenuating masses. Cirrhotic surface from CT of abdomen and pelvis.  In 2011 patient had prostate biopsy done for BPH, at that time the biopsy was benign prostate tissue.  - Prostate AG 4.9, mildly elevated   - HBV/HCV serologies pending   - ,000  - Patient with hepatitis B has started treatment this admission  - Has been started on entecovir for HBV   - per note will consider starting immune therapy for [address] when able, should follow-up with Dr. Alissa Abrams outpatient  - screening EGD for esophageal varices revealed 2 gastric ulcers and 2 duodenal ulcers , no active bleeding, patient started on BID PPI  - follow up with oncology outpatient ([facility])     Hematuria due to mills trauma  - pt had difficulty to place mills in OR  - mills catheter to stay in place for now per urology consult, can attempt voiding trial prior to discharge   - 2/9 Mills is still putting out bloody urine with clots. Urology had signed off ~1 week ago reporting that urine was then clear. Nursing to reach out to urology once again due to persistent hematuria with clots. Likely not safe to remove Mills catheter at this time due to the presence of clots. - Appreciate urology reconsult. - mills discontinued and has voided x 3  - follow up with urology outpatient - per their notes, they will arrange follow up visit     Hyponatremia   - Received IV hydration, stable in the 130 range, recheck in 3-5 days  - Mentation is stable     Diet Controlled Type 2 diabetes  - A1c 5.9.   - Blood sugars have been well controlled  - stop SSI on d/c     Anxiety/depression:    - c/w seroquel, zoloft   - trazadone stopped   - stable     BMI: Body mass index is 37.75 kg/m². . This patient: Meets criteria for obesity given BMI >/= 30 and < 40 due to excess calories/nutritional. Weight loss and lifestyle modifications should be encouraged as an outpatient.      PENDING TEST RESULTS:   At the time of discharge the following test results are still pending: none     ADDITIONAL CARE RECOMMENDATIONS:   Check BMP in 3-5 days    Pain medications changed prior to discharge -now on 4 mg of Dilaudid every 4 hours as needed for pain. Flexeril started on discharge, 10 mg 3 times daily as needed. Monitor for sedation when using this in addition to opioids. Encourage elevation of his bilateral lower extremities, he tends to sit up in a chair all day. He may benefit from bilateral RADHA stockings and may need additional dosing of diuretics. Activity  -Limit the amount of time you sit to 20-30 minute intervals. Sitting for prolonged periods of time will be uncomfortable for you following surgery. - Do NOT lift anything over 5 pounds  -From now on, even when lifting light weight, bend with your knees and not your back.  -Do NOT do any neck exercises until you have been instructed by your doctor  -When you are in bed, you may lay on your back or on either side. Do NOT lie on your stomach    Diet  -resume usual diet; drink plenty of fluids; eat foods high in fiber  -It is important to have regular bowel movements. Pain medications may cause constipation. You may want to take a stool softener (such as Senokot-S or Colace) to prevent constipation.  -If constipation occurs, take a laxative (such as Dulcolax tablets, Milk of Magnesia, or a suppository). Laxatives should only be used if the above preventable measures have failed and you still have not had a bowel movement after three days    Incision Care  -You may take brief showers but do not run the water run directly onto the wound.  After showering or bathing gently blot the wound dry with a soft towel.  -Do not rub or apply any lotions or ointments to your incision site.   -Do not soak or scrub your wound; do not swim until the adhesive film has fallen off naturally  -Do not scratch, rub, or pick at the wound or skin glue, doing so may loosen the film before the wound is fully healed  -Stay out of direct sunlight and do not use tanning lamps    DIET: Regular Diet    ACTIVITY: PT/OT Eval and Treat    EQUIPMENT needed: as per PT/OT  - at this time, NEEDS TO BE FED  - will likely need special utensils to assist with feeding himself    NOTIFY YOUR PHYSICIAN FOR ANY OF THE FOLLOWING:   Fever over 101 degrees for 24 hours. Chest pain, shortness of breath, fever, chills, nausea, vomiting, diarrhea, change in mentation, falling, weakness, bleeding. Severe pain or pain not relieved by medications, as well as any other signs or symptoms that you may have questions about. FOLLOW UP APPOINTMENTS:    Follow-up Information       Follow up With Specialties Details Why Contact Info    Elizabeth Chopra MD Neurosurgery Schedule an appointment as soon as possible for a visit  [address] [place] [address] dr BAPTISTE [address] [id number]  [phone]      Ann Trinh MD Hematology and Oncology Follow up in 2 week(s) Metastatic cancer discussion/treatment options [address] [facility] [facility] [address] [place] [address]      Laly Mcgrath MD Hepatology Follow up in 2 week(s) Hepatitis treatment [facility]      [address] [address]  [phone]    [facility] Go on 3/3/2023 8 AM with Dr. Sarah Brown / Shyla Chase  [facility], [address]  [phone]    Deidra Butt MD Family Medicine   [address] [facility]  [phone]             DISCHARGE MEDICATIONS:  Current Discharge Medication List        START taking these medications    Details   cyclobenzaprine (FLEXERIL) 10 mg tablet Take 1 Tablet by mouth three (3) times daily as needed for Muscle Spasm(s). Indications: muscle spasm  Qty: 30 Tablet, Refills: 0  Start date: 2/10/2023      HYDROmorphone (Dilaudid) 4 mg tablet Take 1 Tablet by mouth every four (4) hours as needed for Pain for up to 3 days. Max Daily Amount: 24 mg.  Indications: excessive pain  Qty: 20 Tablet, Refills: 0  Start date: 2/10/2023, End date: 2/13/2023    Associated Diagnoses: Cervical myelopathy with cervical radiculopathy (HCC)      entecavir (BARACLUDE) 0.5 mg tablet Take 1 Tablet by mouth Daily (before dinner). Indications: chronic hepatitis B  Qty: 30 Tablet, Refills: 0  Start date: 2/9/2023      acetaminophen (TYLENOL) 325 mg tablet Take 2 Tablets by mouth every six (6) hours as needed for Pain. Indications: pain  Qty: 30 Tablet, Refills: 0  Start date: 2/9/2023      finasteride (PROSCAR) 5 mg tablet Take 1 Tablet by mouth daily. Qty: 30 Tablet, Refills: 0  Start date: 2/10/2023      tamsulosin (FLOMAX) 0.4 mg capsule Take 1 Capsule by mouth nightly. Indications: enlarged prostate with urination problem  Qty: 30 Capsule, Refills: 0  Start date: 2/9/2023      senna-docusate (PERICOLACE) 8.6-50 mg per tablet Take 1 Tablet by mouth two (2) times a day. Qty: 60 Tablet, Refills: 0  Start date: 2/10/2023      polyethylene glycol (MIRALAX) 17 gram packet Take 1 Packet by mouth daily. Indications: constipation  Qty: 30 Each, Refills: 0  Start date: 2/10/2023      lidocaine 4 % patch Apply patch to neck 12 hours a day and remove for 12 hours a day. Qty: 30 Each, Refills: 0  Start date: 2/10/2023      pantoprazole (PROTONIX) 40 mg tablet Take 1 Tablet by mouth Before breakfast and dinner. Indications: a stomach ulcer  Qty: 60 Tablet, Refills: 0  Start date: 2/10/2023      furosemide (Lasix) 40 mg tablet Take 1 Tablet by mouth daily. Indications: visible water retention  Qty: 30 Tablet, Refills: 0  Start date: 2/9/2023           CONTINUE these medications which have CHANGED    Details   gabapentin (NEURONTIN) 600 mg tablet Take 0.5 Tablets by mouth three (3) times daily. Max Daily Amount: 900 mg.  Indications: neuropathic pain  Qty: 90 Tablet, Refills: 0  Start date: 2/9/2023    Associated Diagnoses: Acute myelopathy ([address]           CONTINUE these medications which have NOT CHANGED    Details   QUEtiapine (SEROquel) 200 mg tablet Take 1 Tablet by mouth nightly. Indications: mood  Qty: 30 Tablet, Refills: 0      sertraline (ZOLOFT) 100 mg tablet Take 1 Tablet by mouth daily. Indications: major depressive disorder  Qty: 30 Tablet, Refills: 0      hydrOXYzine HCL (ATARAX) 50 mg tablet Take 1 Tablet by mouth every six (6) hours as needed for Anxiety. Indications: anxious  Qty: 30 Tablet, Refills: 0           STOP taking these medications       traZODone (DESYREL) 50 mg tablet Comments:   Reason for Stopping:             DISPOSITION:    Home With:   OT  PT  HH  RN      xx Long term SNF/Inpatient Rehab    Independent/assisted living    Hospice    Other:     PATIENT CONDITION AT DISCHARGE:   Functional status   xx Poor (upper arm mobility)   xx Deconditioned     Independent      Cognition   xx  Lucid     Forgetful     Dementia      Catheters/lines (plus indication)    Mcfadden     PICC     PEG    xx None      Code status   xx  Full code     DNR      PHYSICAL EXAMINATION AT DISCHARGE:    Patient was seen and examined earlier this morning, he was sitting up in a chair in notable pain, reports its 10 out of 10 on the pain scale. He reports the pain is in his neck upper back and shoulders. His IV Dilaudid had been discontinued and 7.5 of oxycodone ordered. This had not been given yet this morning. Patient's Mcfadden was also removed earlier in the morning and had not yet voided. Was called by nursing ~930 this morning, 7.5 mg of oxycodone was ineffective for pain management. One-time order of 2 mg of p.o. Dilaudid was given. 1200 patient seen once again, he reports his pain is somewhat better with the 2 mg of Dilaudid. We discussed possibly restarting muscle relaxer as this may help with the tightness throughout his shoulders and he was agreeable. We will order this on discharge. Patient also reports that he has voided 3 times since his Mcfadden was removed and feels no urgency and has no pain with palpation.   We will plan to discharge patient to [facility] this afternoon. His pain medications may need to be adjusted further in the next several days. General:          Alert, cooperative, no distress, appears stated age. HEENT:           MMM  Neck:               Trachea midline. Healing incision to neck  Lungs:             Clear to auscultation bilaterally. Heart:              Regular rhythm,  No murmur. Abdomen:        Soft, non-tender. Not distended. Bowel sounds normal  Extremities:     Bilateral lower extremity edema, 2-3+ pitting. Encouraged elevation. Skin:                Not pale. No rashes   Psych:             Not anxious or agitated. Neurologic:      Alert, moves all extremities, answers questions appropriately and responds to commands     CHRONIC MEDICAL DIAGNOSES:  Problem List as of 2/10/2023 Date Reviewed: 2/9/2023            Codes Class Noted - Resolved    * (Principal) Cervical myelopathy with cervical radiculopathy ([facility] ICD-10-CM: G95.9, M54.12  ICD-9-CM: 721.1  1/29/2023 - Present        Acute myelopathy ([facility] ICD-10-CM: G95.9  ICD-9-CM: 336.9  1/29/2023 - Present        Schizophrenia ([facility] ICD-10-CM: F20.9  ICD-9-CM: 295.90  8/19/2021 - Present        Depression ICD-10-CM: F32. 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  ICD-9-CM: 291.3  1/15/2013 - Present        Alcohol abuse ICD-10-CM: F10.10  ICD-9-CM: 305.00  1/15/2013 - Present        Noncompliance with treatment (Chronic) ICD-10-CM: Z91.199  ICD-9-CM: V15.81  5/18/2011 - Present        HTN (hypertension) (Chronic) ICD-10-CM: I10  ICD-9-CM: 401.9  3/8/2010 - Present        NIDDM (non-insulin dependent diabetes mellitus) (Chronic) ICD-9-CM: 250.00  3/8/2010 - Present        DJD (degenerative joint disease) ICD-10-CM: M19.90  ICD-9-CM: 715.90  3/8/2010 - Present    Overview Signed 3/8/2010  8:30 AM by Valeria Perez               RESOLVED: Altered mental status ICD-10-CM: R41.82  ICD-9-CM: 780.97  6/21/2012 - 6/24/2012        RESOLVED: Malingering (Chronic) ICD-10-CM: Z76.5  ICD-9-CM: V65.2  5/18/2011 - 1/15/2013        RESOLVED: rule out Paranoid schizophrenia ICD-10-CM: F20.0  ICD-9-CM: 295.30  5/17/2011 - 1/15/2013        RESOLVED: Adjustment disorder with depressed mood (Chronic) ICD-10-CM: F43.21  ICD-9-CM: 309.0  10/21/2010 - 1/15/2013        RESOLVED: Polysubstance dependence ([facility] (Chronic) ICD-10-CM: E29.13  ICD-9-CM: 304.80  10/21/2010 - 1/15/2013         Greater than 30 minutes were spent with the patient on counseling and coordination of care    Signed:   Emilia Bowens NP  2/10/2023  10:07 PM

## 2023-02-10 NOTE — PROGRESS NOTES
[facility], MD, FACP, [facility], [place]      Tania Cardona PA-C    April S Mauri, [facility] CenterNP-BC   Padmini Prescott, [facility]   Haim Tse, LISE Elizabeth FNROSALIO-SACHI Burkett, SUJATANP-BC      [address]   at [address], [address]   [phone]   FAX: [phone]  Liver Stevenson of [facility]   at [facility]   [address], [address] [facility], [address]   [phone]   FAX: [phone]       HEPATOLOGY PROGRESS NOTE  The patient is a 69 y.o. male who was found to have chronic HBV in 3/2020. He was never treated with anti-viral therapy for HBV. He has probably had cirrhosis dating back to 2010 since he has had persistent thrombocytopenia since at least then. He has a history of drug abuse and numerous drug tox screens that are intermittently positive for cocaine. He came to the ED because of pain in the neck and shoulders. He had weakness of the upper arms and could not raise his arms. A CT scan demonstrated a mass in C5 with spinal cord compression. He underwent surgical decompression. Biopsy of he mass was positive for poorly differentiated cancer. Cell type to be defined. CT scan abdomen demonstrated cirrhosis and multiple liver mass lesions. Multiple pulmonary nodules. Metasastic bone disease to spine including L5. ,000!!!! Hospital Course:  Dynamic MRI of liver shows wide infiltration of right lobe with HCC including PV invasion. Widely metastatic disease to spine, pulmonary nodules suggestive of metastasis. No need for liver biopsy with ,000    Chronic HBV is now being treated with Entecovir   EGD showed 2 gastric ulcers and 2 duodenal ulcers. No active bleeding. Now on PPI BID    Plan is to transfer to rehab center tomorrow. Hepatology Plan:  My team will reach out to rehab center once there and make arrangements for him to recive immune check pint inhibitor for liver cancer in the [facility]. Infusions are given once every 4 weeks. ASSESSMENT AND PLAN:  Cirrhosis  The diagnosis of cirrhosis is based upon imaging, laboratory studies, complications of cirrhosis. Cirrhosis is secondary to chronic HBV and alcohol. The CTP is 7. Child class B. The MELD score is 10. Chronic HBV  Chronic HBV with cirrhosis. The immune status of HBV is undefined at this time. Will obtain serology for HBV  Will obtain serology for HCV    The patient is not currently receiving treatment for HBV. Will perform imaging of the liver with dynamic MRI. There is no reason to perform liver biopsy at this time. Hepatocellular carcinoma   The diagnosis was made in 2/2023 by multiple liver masses and an AFP > 147,000. At diagnosis the [address] metastatic to spine, lungs. Dynamic MRI shows [address] that is diffusely infiltrated throughout right lobe with PV invasion. Only treatment option will be immune therapy. Portal vein thrombosis  This is secondary to tumor invasion into PV. No indication for anticoagulation. Screening for Esophageal varices   The patient has not had an EGD to screen for varices. Will do this Friday afternoon. NPO Friday. Hepatic encephalopathy   Overt HE has not developed to date. There is no reason for treatment with lactulose of xifaxan  There is no need to restrict dietary protein at this time. Thrombocytopenia   This is secondary to cirrhosis. There is no evidence of overt bleeding. No treatment is required. The platelet count is adequate for the patient to undergo procedures without the need for platelet transfusion or platelet growth factors. PHYSICAL EXAMINATION:  VS: per nursing note  General:  No acute distress. Eyes:  Sclera anicteric. ENT:  No oral lesions.   Thyroid normal.  Nodes:  No adenopathy. Skin:  No spider angiomata. No jaundice. Respiratory:  Lungs clear to auscultation. Cardiovascular:  Regular heart rate. Abdomen:  Soft non-tender, No obvious ascites. Extremities:  2+ lower extremity edema. Unable to move upper arms. Neurologic:  Alert and oriented. Cranial nerves grossly intact. No asterixis. LABORATORY:   Latest Reference Range & Units 1/29/23 03:40 1/30/23 05:56 1/31/23 05:59 2/1/23 02:47   WBC 4.1 - 11.1 K/uL 9.7  6.2 8.3   HGB 12.1 - 17.0 g/dL 14.7  14.7 14.7   PLATELET 307 - 076 K/uL 135 (L)  111 (L) 117 (L)   INR 0.9 - 1.1    1.2 (H)     Sodium 136 - 145 mmol/L 131 (L) 133 (L) 132 (L) 133 (L)   Potassium 3.5 - 5.1 mmol/L 4.5 4.3 4.2 4.4   Chloride 97 - 108 mmol/L 103 102 100 102   CO2 21 - 32 mmol/L 24 26 28 27   Glucose 65 - 100 mg/dL 139 (H) 96 112 (H) 144 (H)   BUN 6 - 20 MG/DL 28 (H) 32 (H) 27 (H) 23 (H)   Creatinine 0.70 - 1.30 MG/DL 0.83 0.94 0.76 0.81   Bilirubin, total 0.2 - 1.0 MG/DL 0.4      Albumin 3.5 - 5.0 g/dL 2.9 (L)      ALT 12 - 78 U/L 49      AST 15 - 37 U/L 142 (H)      Alk.  phosphatase 45 - 117 U/L 146 (H)      (L): Data is abnormally low  (H): Data is abnormally high      MD Mary Lyman [address] [address] [address]   [address]  [address]. [phone]  FAX:  [phone] [place]

## 2023-02-13 LAB
BACTERIA SPEC CULT: NORMAL
SERVICE CMNT-IMP: NORMAL

## 2023-02-20 ENCOUNTER — TELEPHONE (OUTPATIENT)
Dept: HEMATOLOGY | Age: 68
End: 2023-02-20

## 2023-02-20 LAB
BACTERIA SPEC CULT: NORMAL
SERVICE CMNT-IMP: NORMAL

## 2023-02-20 NOTE — TELEPHONE ENCOUNTER
Called and spoke with patient's daughter, Stefany Umanzor, to FU on status of patient's rehab. She is unaware of how long he will be in rehab, but would like to go ahead and schedule an appt for him to see Dr. Dre Khan and discuss cancer treatment.  Scheduled patient for 3/3/23 at 12:30pm.

## 2023-02-24 ENCOUNTER — TELEPHONE (OUTPATIENT)
Dept: HEMATOLOGY | Age: 68
End: 2023-02-24

## 2023-02-24 NOTE — TELEPHONE ENCOUNTER
Patient daughter called regarding patient's declining health. Reports severe pain, not able to walk, can only  move his arms a little bit, voice is raspy and low. Patient is in a long term care facility. Requesting a sooner appointment but appt is scheduled for 3.3.23 (1 week from today) and there are no openings currently.

## 2023-02-24 NOTE — TELEPHONE ENCOUNTER
Patient's daughter called wanting to know if her dad could see Dr. Charan Andujar earlier than next week on 3/3/23 as he has really gone down since he has been in the [facility] for 2 weeks. She states that he cannot even move on his own now, he can't move his arms or use his walker. She states that PT and OT are still working with him but he is complaining of pain still even though he gets pain medication every 4 hours. Advised her to discuss her concerns with the nurse and doctor at the facility - patient may need to come back into the hospital to be evaluated as Dr. Charan Andujar would not be able to see the patient before his appointment on 3/3/23. Let her know that she may have to ask that her dad be taken to the hospital if she feels he is not doing well. She verbalized understanding and states she will keep me up to date.

## 2023-02-27 NOTE — TELEPHONE ENCOUNTER
Called patient's daughter to check in on status of her father, she had called last week saying that he was declining - she states that there hasn't been a lot of change at this point and they will be coming to the appt on Friday. She did speak with the doctor at the rehab and he didn't think going to the hospital was needed at this time. She asked if treatment would be started the same day as the clinic appt - advised that it would take 1-2 weeks to get insurance authorization, order the medication and set up the [facility] appt. We will provide education re: the immunotherapy during the clinic visit. Patient's daughter verbalized understanding.

## 2023-02-28 PROBLEM — Z71.89 DNR (DO NOT RESUSCITATE) DISCUSSION: Status: ACTIVE | Noted: 2023-01-01

## 2023-02-28 PROBLEM — J96.90 RESPIRATORY FAILURE (HCC): Status: ACTIVE | Noted: 2023-01-01

## 2023-02-28 PROBLEM — R41.0 DELIRIUM: Status: ACTIVE | Noted: 2023-01-01

## 2023-02-28 PROBLEM — R65.20 SEVERE SEPSIS (HCC): Status: ACTIVE | Noted: 2023-01-01

## 2023-02-28 PROBLEM — Z51.5 PALLIATIVE CARE BY SPECIALIST: Status: ACTIVE | Noted: 2023-01-01

## 2023-02-28 PROBLEM — A41.9 SEVERE SEPSIS (HCC): Status: ACTIVE | Noted: 2023-01-01

## 2023-02-28 PROBLEM — Z71.89 GOALS OF CARE, COUNSELING/DISCUSSION: Status: ACTIVE | Noted: 2023-01-01

## 2023-02-28 PROBLEM — M54.2 NECK PAIN: Status: ACTIVE | Noted: 2023-01-01

## 2023-02-28 NOTE — PROGRESS NOTES
Pharmacy Automatic Renal Dosing Adjustment     Labs:  Recent Labs     23  [id number]   CREA 3.02* 5.60*   BUN 62* 78*   WBC 12.3* 13.4*     Temp (24hrs), Av.9 °F (38.3 °C), Min:99.3 °F (37.4 °C), Max:101.8 °F (38.8 °C)      Creatinine Clearance (mL/min) or Dialysis: CrCl: 32.8 mL/min (based on Adjusted Body Weight)    Impression/Plan:   Scr improved   Zosyn has been adjusted from q12h to q8h based on the renal dosing protocol. Pharmacy will follow daily and adjust medications as appropriate for renal function.     Thank you,  BERE Peck

## 2023-02-28 NOTE — PROGRESS NOTES
MRI Pending:    Need completed MRI screening form. Sign and fax to [phone]. Call [phone] once faxed.

## 2023-02-28 NOTE — CONSULTS
Palliative Medicine Consult  Tone: [phone]    Patient Name: Petra Urena  YOB: 1955    Date of Initial Consult: 2/28/23  Date of Today's Visit: 2/28/2023  Reason for Consult: Care Decisions  Requesting Provider: Ana Gates NP   Primary Care Physician: Monico Pearson MD     SUMMARY:   Petra Urena is a 69 y.o. with a past history of DM2, anxiety, htn, and recent diagnosis of metastatic [address] (to lung and spine- cervical), who was admitted on 2/27/2023 from rehab with a diagnosis of severe sepsis due to UTI and PNA. PALLIATIVE DIAGNOSES:   Goals of care  Cervical neck pain- cancer pain  Delirium  DNR Discussion  Palliative Care     PLAN:   Met with patient (although he does not participate much) and his daughter Jesse Wynn (mpoa) after speaking with the intensivist and the oncologist  Jesse Wynn shared with me that she knows her dad cannot get treatment for his cancer, and hospice is being recommended, but she is hoping to get him home. He is currently on higflow oxygen, and his pain is NOT controlled, so I recommended we take a few days to see if we can wean him off the high flow, and get his pain better controlled. She is in agreement with this  We also talked about having the back up plan of inpatient hospice support if he declines, is unable to interact with his world, or if we are unable to wean him off the oxygen. She is also in agreement with DNR/DNI in the event that he dies here in the hosptial, but for now we will continue current treatment plan with the goal to get his pain better controlled, and to wean him off the oxygen. Will complete a DDNR with her when we have a better plan for discharge  Put in an order for IV hydromorphone as well is SL- if he can tolerate the SL dose and it manages his pain OK, would prefer that we use that one, so that he is ready to get home should the window open.   Would reserve the IV option for severe pain that is not controlled by the SL dose  Will check in again tomorrow to see how he is doing  Will also consult hospice so they are aware of the patient and can work with family to ensure that home is a viable option should we get to that point  Initial consult note routed to primary continuity provider and/or primary health care team members  Communicated plan of care with: Palliative [facility]sen[facility] Team     GOALS OF CARE / TREATMENT PREFERENCES:     GOALS OF CARE:  Patient/Health Care Proxy Stated Goals: Comfort    TREATMENT PREFERENCES:   Code Status: DNR    Advance Care Planning:  [x] [facility] Interdisciplinary Team has updated the ACP Navigator with Health Care Decision Maker and Patient Capacity      Primary Decision Maker: Bernice Gutiérrez - Daughter - [phone]    Secondary Decision Maker: Livier Tyson - Daughter - [phone]  Advance Care Planning 2/28/2023   Confirm Advance Directive Yes, on file   Patient Would Like to Complete Advance Directive -       Medical Interventions: Limited additional interventions       Other:    As far as possible, the palliative care team has discussed with patient / health care proxy about goals of care / treatment preferences for patient.      HISTORY:     History obtained from: chart, daughter    CHIEF COMPLAINT: :My back hurts so bad\"    HPI/SUBJECTIVE:    The patient is:   [x] Verbal and participatory  [] Non-participatory due to:        Clinical Pain Assessment (nonverbal scale for severity on nonverbal patients):   Clinical Pain Assessment  Severity: 8  Location: neck and shoulders  Character: unable to tell me  Duration: unable to tell me  Effect: unable to tell me  Factors: unable to tell me  Frequency: unable to tell me          Duration: for how long has pt been experiencing pain (e.g., 2 days, 1 month, years)  Frequency: how often pain is an issue (e.g., several times per day, once every few days, constant)     FUNCTIONAL ASSESSMENT:     Palliative Performance Scale (PPS):          PSYCHOSOCIAL/SPIRITUAL SCREENING:     Palliative IDT has assessed this patient for cultural preferences / practices and a referral made as appropriate to needs (Cultural Services, Patient Advocacy, Ethics, etc.)    Any spiritual / Taoism concerns:  [] Yes /  [x] No   If \"Yes\" to discuss with pastoral care during IDT     Does caregiver feel burdened by caring for their loved one:   [] Yes /  [x] No /  [] No Caregiver Present/Available [] No Caregiver [] Pt Lives at [place] [address]  If \"Yes\" to discuss with social work during IDT    Anticipatory grief assessment:   [x] Normal  / [] Maladaptive     If \"Maladaptive\" to discuss with social work during IDT    ESAS Anxiety: Anxiety: 3    ESAS Depression: Depression: 0        REVIEW OF SYSTEMS:     Positive and pertinent negative findings in ROS are noted above in HPI. The following systems were [x] reviewed / [] unable to be reviewed as noted in HPI  Other findings are noted below. Systems: constitutional, ears/nose/mouth/throat, respiratory, gastrointestinal, genitourinary, musculoskeletal, integumentary, neurologic, psychiatric, endocrine. Positive findings noted below. Modified ESAS Completed by: provider   Fatigue: 4     Depression: 0 Pain: 8   Anxiety: 3     Anorexia: 10 Dyspnea: 5           Stool Occurrence(s): 1        PHYSICAL EXAM:     From RN flowsheet:  Wt Readings from Last 3 Encounters:   02/27/23 281 lb 4.9 oz (127.6 kg)   02/03/23 294 lb (133.4 kg)   01/28/23 280 lb (127 kg)     Blood pressure 139/81, pulse (!) 103, temperature (!) 100.9 °F (38.3 °C), resp. rate 25, height 6' 2\" (1.88 m), weight 281 lb 4.9 oz (127.6 kg), SpO2 93 %.     Pain Scale 1: Numeric (0 - 10)  Pain Intensity 1: 0                 Last bowel movement, if known:     Constitutional: appears in a lot of pain, conversational but oriented to person only  Eyes: pupils equal, anicteric  ENMT: dry mucous membranes, poor dentition  Cardiovascular: regular rhythm, distal pulses intact  Respiratory: breathing not labored, symmetric  Gastrointestinal: soft non-tender, +bowel sounds  Musculoskeletal: no deformity, no tenderness to palpation  Skin: warm, dry  Other:       HISTORY:     Active Problems:    Respiratory failure (HCC) (2/28/2023)      Severe sepsis ([facility] (2/28/2023)      Goals of care, counseling/discussion (2/28/2023)      Neck pain (2/28/2023)      Delirium (2/28/2023)      DNR (do not resuscitate) discussion (2/28/2023)      Palliative care by specialist (2/28/2023)    Past Medical History:   Diagnosis Date    Depression     DJD (degenerative joint disease) 3/8/2010    HTN (hypertension) 3/8/2010    STATES NO Elen Ridley MD STOPPED    Insomnia     NIDDM (non-insulin dependent diabetes mellitus) 3/8/2010    Other ill-defined conditions(799.89)     BPH    Other ill-defined conditions(799.89)     previous hx of DTs from ETOH withdrawal    Other ill-defined conditions(799.89)     DDD/back problems    Psychiatric disorder     paranoid schizophrenia, anxiety    Substance abuse ([facility]     Suicidal thoughts       Past Surgical History:   Procedure Laterality Date    BIOPSY PROSTATE      HX UROLOGICAL      turp      Family History   Problem Relation Age of Onset    Cancer Mother         lymphoma    Heart Disease Father         CHF      History reviewed, no pertinent family history.   Social History     Tobacco Use    Smoking status: Former     Packs/day: 1.00     Years: 10.00     Pack years: 10.00     Types: Cigarettes    Smokeless tobacco: Never    Tobacco comments:     patient has been decreasing amount of cigarettes   Substance Use Topics    Alcohol use: No     Alcohol/week: 35.0 standard drinks     Types: 42 Cans of beer per week     Comment: states stopped drinking 1 month ago     Allergies   Allergen Reactions    Motrin [Ibuprofen] Other (comments)     \"Stomach ulcers\"        Current Facility-Administered Medications   Medication Dose Route Frequency    sodium chloride (NS) flush 5-40 mL  5-40 mL IntraVENous Q8H    sodium chloride (NS) flush 5-40 mL  5-40 mL IntraVENous PRN    acetaminophen (TYLENOL) tablet 650 mg  650 mg Oral Q6H PRN    Or    acetaminophen (TYLENOL) suppository 650 mg  650 mg Rectal Q6H PRN    polyethylene glycol (MIRALAX) packet 17 g  17 g Oral DAILY PRN    enoxaparin (LOVENOX) injection 30 mg  30 mg SubCUTAneous DAILY    [Held by provider] entecavir (BARACLUDE) tablet 0.5 mg  0.5 mg Oral ACD    [Held by provider] gabapentin (NEURONTIN) capsule 300 mg  300 mg Oral Q8H    glucose chewable tablet 16 g  4 Tablet Oral PRN    glucagon (GLUCAGEN) injection 1 mg  1 mg IntraMUSCular PRN    dextrose 10% infusion 0-250 mL  0-250 mL IntraVENous PRN    insulin lispro (HUMALOG) injection   SubCUTAneous Q6H    piperacillin-tazobactam (ZOSYN) 3.375 g in 0.9% sodium chloride (MBP/ADV) 100 mL MBP  3.375 g IntraVENous Q8H    alcohol 62% (NOZIN) nasal  1 Ampule  1 Ampule Topical Q12H    balsam peru-castor oiL (VENELEX) ointment   Topical BID    pantoprazole (PROTONIX) 40 mg in 0.9% sodium chloride 10 mL injection  40 mg IntraVENous DAILY    [START ON 3/1/2023] azithromycin (ZITHROMAX) 500 mg in 0.9% sodium chloride 250 mL ([id number])  500 mg IntraVENous Q24H    lidocaine 4 % patch 3 Patch  3 Patch TransDERmal Q24H    HYDROmorphone (DILAUDID) injection 0.5 mg  0.5 mg IntraVENous Q3H PRN    HYDROmorphone (DILAUDID) 1 mg/mL oral solution 1 mg  1 mg SubLINGual Q4H PRN    potassium chloride 10 mEq in 100 ml IVPB  10 mEq IntraVENous Q1H    sodium chloride (NS) flush 5-40 mL  5-40 mL IntraVENous PRN    sodium chloride (NS) flush 5-10 mL  5-10 mL IntraVENous PRN          LAB AND IMAGING FINDINGS:     Lab Results   Component Value Date/Time    WBC 12.3 (H) 02/28/2023 03:53 AM    HGB 14.4 02/28/2023 03:53 AM    PLATELET 261 (L) 97/90/1552 03:53 AM     Lab Results   Component Value Date/Time    Sodium 140 02/28/2023 11:26 AM    Potassium 3.3 (L) 02/28/2023 11:26 AM Chloride 107 02/28/2023 11:26 AM    CO2 26 02/28/2023 11:26 AM    BUN 52 (H) 02/28/2023 11:26 AM    Creatinine 1.71 (H) 02/28/2023 11:26 AM    Calcium 9.2 02/28/2023 11:26 AM    Magnesium 2.4 02/28/2023 11:26 AM    Phosphorus 3.7 02/28/2023 03:53 AM      Lab Results   Component Value Date/Time    Alk. phosphatase 194 (H) 02/27/2023 09:29 PM    Protein, total 8.5 (H) 02/27/2023 09:29 PM    Albumin 2.3 (L) 02/27/2023 09:29 PM    Globulin 6.2 (H) 02/27/2023 09:29 PM     Lab Results   Component Value Date/Time    INR 1.3 (H) 02/06/2023 01:54 AM    Prothrombin time 13.0 (H) 02/06/2023 01:54 AM    aPTT 29.1 01/30/2023 08:28 AM      No results found for: IRON, FE, TIBC, IBCT, PSAT, FERR   No results found for: PH, PCO2, PO2  No components found for: [place]   Lab Results   Component Value Date/Time     06/24/2012 03:20 AM    CK - MB 3.8 (H) 06/21/2012 08:00 PM                Total time:   Counseling / coordination time, spent as noted above:   > 50% counseling / coordination?:     Prolonged service was provided for  []30 min   []75 min in face to face time in the presence of the patient, spent as noted above. Time Start:   Time End:   Note: this can only be billed with 15595 (initial) or 73953 (follow up). If multiple start / stop times, list each separately.

## 2023-02-28 NOTE — PROGRESS NOTES
Transition of Care Plan:    RUR:20%    Disposition: TBD    If SNF or IPR: Date FOC offered:TBD    Date FOC received:TBD    Date authorization started with reference number:TBD    Date authorization received and expires:TBD    Accepting facility:TBD    Follow up appointments:If going to facility they will arrange follow up appointment. DME needed:None    Transportation at Discharge: [facility]    Keys or means to access home:  N/A--Patient may be going to snf         Medicare Letter: To be given prior to discharge. Is patient a  and connected with the [address] ? No               If yes, was [employer] transfer form completed and [facility] notified? N/A    Caregiver Contact:Daughter    Discharge Caregiver contacted prior to discharge? Caregiver to be contacted prior to discharge. Care Conference needed?:  None        Reason for Readmission:    Patient came in for respiratory distress, chest pain and fever. This is a readmit. Patient was at [facility] and transferred to [facility] and was discharged from [facility] on 2/10/23. RUR Score/Risk Level:   20%      PCP: First and Last name:  Dr. Hugo Lorenzo     Name of Practice: [facility]     Are you a current patient: Yes/No: Yes     Approximate date of last visit: 1/25/23     Can you participate in a virtual visit with your PCP: No    Is a Care Conference indicated: No        Did you attend your follow up appointment (s): If not, why not: No, patient was in rehab. Resources/supports as identified by patient/family:   Patient has supportive daughters       Top Challenges facing patient (as identified by patient/family and CM): Finances/Medication cost?    No problems     Transportation   Prior to being in rehab patient was driving per daughter. Support system or lack thereof? Patient has supportive daughters. Living arrangements? Patient was living in one story home alone. Self-care/ADLs/Cognition?    Per daughter prior to patient going to snf he was independent. He does not use home oxygen or cpap. Per medical records patient has attended AA for ETOH. Current Advanced Directive/Advance Care Plan:     Advance Care Planning     General Advance Care Planning (ACP) Conversation      Date of Conversation: 2/28/23  Conducted with: Patient with Decision Making Capacity and Healthcare Decision Maker: Next of Kin by law (only applies in absence of a Healthcare Power of  or Legal Guardian)    Healthcare Decision Maker:     Primary Decision Maker: Maria T Mann - Daughter - [phone]    Secondary Decision Maker: Peter Holder - Daughter - [phone]  Click here to complete [address] including selection of the Healthcare Decision Maker Relationship (ie \"Primary\")        Content/Action Overview: Has ACP document(s) on file - reflects the patient's care preferences  Reviewed DNR/DNI and patient confirms current DNR status - completed forms on file (place new order if needed)         Length of Voluntary ACP Conversation in minutes:  <16 minutes (Non-Billable)    Annett Libman, RN                     Plan for utilizing home health:   Patient was at [facility] for snf. Transition of Care Plan:    Based on readmission, the patient's previous Plan of Care   has been evaluated and/or modified. The current Transition of Care Plan is:    based on patient's medical stability and may need to return to snf. Amy Marinelli RN BSN CRM        [phone]      Readmission Assessment  Number of days since last admission?: 8-30 days  Previous disposition: SNF  Who is being interviewed?: Caregiver  What was the patient's/caregiver's perception as to why they think they needed to return back to the hospital?: Other (Comment) (sickness)  Did you visit your Primary Care Physician after you left the hospital, before you returned this time?: No (Patient was in rehab)  Why weren't you able to visit your PCP?: Other (Comment) (Patient was in rehab)  Did you see a specialist, such as Cardiac, Pulmonary, Orthopedic Physician, etc. after you left the hospital?: No  Who advised the patient to return to the hospital?: Skilled Unit  Does the patient report anything that got in the way of taking their medications?: No  In our efforts to provide the best possible care to you and others like you, can you think of anything that we could have done to help you after you left the hospital the first time, so that you might not have needed to return so soon?: Other (Comment) (n/a)

## 2023-02-28 NOTE — CONSULTS
[facility]  [address], [address], [address]  [phone]      Hematology/ Oncology Consult Note      Reason for consult: Dominga Dc is a 69 y.o. male who we have been asked to see by Dr. Torrie Junior for metastatic disease. Subjective: Dominga Dc is a 69year old male admitted to [facility] for severe sepsis r/t PNA, respiratory failure, UTI and CHELLE. PMH includes recently diagnosed metastatic cancer. He was recently admitted to [facility] and underwent evaluation for metastatic disease. Pathology showed poorly differentiated  metastatic carcinoma. Primary was felt to be hepatocellular carcinoma and patient had OP follow up scheduled with Dr. Tanner Munguia planned for after SNF. He experienced worsening debility and respiratory distress and was sent from SNF to ED. Other PMH includes heavy ETOH abuse, diabetes, hypertension, cirrhosis and untreated HBV. Patient seen at bedside in ICU on high flow oxygen. Patient did not communicate with NP, no history obtained. Review of Systems:  Pertinent items are noted in the History of Present Illness.        Past Medical History:   Diagnosis Date    Depression     DJD (degenerative joint disease) 3/8/2010    HTN (hypertension) 3/8/2010    STATES NO Ronel Toussaint MD STOPPED    Insomnia     NIDDM (non-insulin dependent diabetes mellitus) 3/8/2010    Other ill-defined conditions(799.89)     BPH    Other ill-defined conditions(799.89)     previous hx of DTs from ETOH withdrawal    Other ill-defined conditions(799.89)     DDD/back problems    Psychiatric disorder     paranoid schizophrenia, anxiety    Substance abuse ([facility] [address]     Suicidal thoughts      Past Surgical History:   Procedure Laterality Date    BIOPSY PROSTATE      HX UROLOGICAL      turp      Family History   Problem Relation Age of Onset    Cancer Mother         lymphoma    Heart Disease Father CHF     Social History     Tobacco Use    Smoking status: Former     Packs/day: 1.00     Years: 10.00     Pack years: 10.00     Types: Cigarettes    Smokeless tobacco: Never    Tobacco comments:     patient has been decreasing amount of cigarettes   Substance Use Topics    Alcohol use: No     Alcohol/week: 35.0 standard drinks     Types: 42 Cans of beer per week     Comment: states stopped drinking 1 month ago      Current Facility-Administered Medications   Medication Dose Route Frequency Provider Last Rate Last Admin    sodium chloride (NS) flush 5-40 mL  5-40 mL IntraVENous Q8H Liliana Terry, NP   10 mL at 02/28/23 1451    sodium chloride (NS) flush 5-40 mL  5-40 mL IntraVENous PRN Liliana Terry, NP        acetaminophen (TYLENOL) tablet 650 mg  650 mg Oral Q6H PRN Liliana Terry, NP   650 mg at 02/28/23 7808    Or    acetaminophen (TYLENOL) suppository 650 mg  650 mg Rectal Q6H PRN Liliana Terry, NP        polyethylene glycol (MIRALAX) packet 17 g  17 g Oral DAILY PRN Liliana Terry NP        enoxaparin (LOVENOX) injection 30 mg  30 mg SubCUTAneous DAILY Liliana Terry, NP   30 mg at 02/28/23 4426    [Held by provider] entecavir (BARACLUDE) tablet 0.5 mg  0.5 mg Oral ACD Liliana Terry NP        [Held by provider] gabapentin (NEURONTIN) capsule 300 mg  300 mg Oral Q8H Lucille Jeter NP        glucose chewable tablet 16 g  4 Tablet Oral PRN Liliana Terry, NP        glucagon (GLUCAGEN) injection 1 mg  1 mg IntraMUSCular PRN Liliana Terry NP        dextrose 10% infusion 0-250 mL  0-250 mL IntraVENous PRN Liliana Terry NP        insulin lispro (HUMALOG) injection   SubCUTAneous Q6H Liliana Terry NP   2 Units at 02/28/23 1256    piperacillin-tazobactam (ZOSYN) 3.375 g in 0.9% sodium chloride (MBP/ADV) 100 mL MBP  3.375 g IntraVENous Q8H Wilber Willingham MD 25 mL/hr at 02/28/23 1451 3.375 g at 02/28/23 1451 alcohol 62% (NOZIN) nasal  1 Ampule  1 Ampule Topical Q12H Houston Andrade MD   1 Ampule at 02/28/23 1039    balsam peru-castor oiL (VENELEX) ointment   Topical BID Houston Andrade MD   Given at 02/28/23 1132    pantoprazole (PROTONIX) 40 mg in 0.9% sodium chloride 10 mL injection  40 mg IntraVENous DAILY Houston Andrade MD   40 mg at 02/28/23 1132    [START ON 3/1/2023] azithromycin (ZITHROMAX) 500 mg in 0.9% sodium chloride 250 mL ([id number])  500 mg IntraVENous Q24H Houston Andrade MD        fentaNYL citrate (PF) injection 50 mcg  50 mcg IntraVENous Q2H PRN Houston Andrade MD   50 mcg at 02/28/23 1451    lidocaine 4 % patch 3 Patch  3 Patch TransDERmal Q24H Houston Andrade MD   3 Patch at 02/28/23 1208    sodium chloride (NS) flush 5-40 mL  5-40 mL IntraVENous PRN Susan Louise MD        sodium chloride (NS) flush 5-10 mL  5-10 mL IntraVENous PRN Susan Louise MD            Allergies   Allergen Reactions    Motrin [Ibuprofen] Other (comments)     \"Stomach ulcers\"            Objective:     Patient Vitals for the past 8 hrs:   BP Temp Pulse Resp SpO2   02/28/23 1158 -- -- -- -- 92 %   02/28/23 1100 131/82 -- (!) 101 24 --   02/28/23 1000 123/84 -- 100 23 91 %   02/28/23 0900 124/82 -- 99 24 95 %   02/28/23 0806 -- -- -- -- 96 %   02/28/23 0800 120/83 (!) 100.9 °F (38.3 °C) 100 22 95 %   02/28/23 0730 122/81 -- (!) 102 24 95 %       Lab Results   Component Value Date/Time    WBC 12.3 (H) 02/28/2023 03:53 AM    Hemoglobin (POC) 16.3 01/14/2013 05:28 PM    HGB 14.4 02/28/2023 03:53 AM    Hematocrit (POC) 48 01/14/2013 05:28 PM    HCT 43.6 02/28/2023 03:53 AM    PLATELET 191 (L) 65/12/7018 03:53 AM    MCV 89.5 02/28/2023 03:53 AM       Physical Exam:   Visit Vitals  /82   Pulse (!) 101   Temp (!) 100.9 °F (38.3 °C)   Resp 24   Ht 6' 2\" (1.88 m)   Wt 281 lb 4.9 oz (127.6 kg)   SpO2 92%   BMI 36.12 kg/m²     General appearance: severe distress, appears older than stated age, toxic, moderately obese, wearing high flow nasal cannula   Abdomen: soft, non-tender. Bowel sounds normal. No masses,  no organomegaly  Neurologic: Responsive to painful stimuli     Assessment:     Poorly Differentiated Metastatic Hepatocellular Carcinoma with mets to lungs, lumbar spine, liver   Cervical Cord Tumor with Cord Compression   -Previous imaging from [facility] last month:     MRI C-spike with osseous lesion at C5 and C7 with epidural spread of tumor and canal compression    MRI brain with no intracranial mets. Possible osseous mets in skull/cervical spine    Staging CT CAP with scattered lung nodules (largest 1 cm), possible liver metastases, retroperitoneal adenopathy, multiple osseous lesions (C5 - C7 epidural mass, L5 soft tissue enhancing mass, T10/T11 soft tissue mass, left scapula soft tissue mass)      MRI abdomen   IMPRESSION  1. Infiltrative pattern abnormalityoccupying most of the right liver with focal  lesion in the left lobe which appears in the portal vein compatible with  hepatocellular carcinoma and for which image guided needle biopsy can be considered (LR 5-TIV). 2. Malignant-appearing metastatic disease within the bones is redemonstrated and  there is suspected right aortocaval starr metastasis. -AFP significantly elevated 127,000    -Path Results:   * *FINAL PATHOLOGIC DIAGNOSIS* * *     1. Vertebral body, C5, biopsy:        Metastatic poorly differentiated carcinoma. See comment     2. Vertebral body, C5, excision:        Metastatic poorly differentiated carcinoma. See comment     3. Intravertebral disc,C4-5, C5-6, excision:        Metastatic poorly differentiated carcinoma.   See comment     -Follow up was planned with Dr. Dennis Gregory once he completed SNF     -Patient transferred to ED for respiratory failure and worsening debility, unable to move upper extremities       Plan:     > Patient's functional status has continued to decline, he is now critically ill in the ICU with respiratory failure r/t PNA. Very poor short term prognosis and no longer a candidate for systemic therapy for aggressive malignancy.  Hospice is most appropriate.   > Plan for goals of care discussion with family and Palliative at 4 pm.    Discussed with Dr. Ivelisse Gill and Alison Nixon, NP

## 2023-02-28 NOTE — PROGRESS NOTES
36- Report received from Negro Gaines, [address] [place] [address].    0400- Patient brought to the CCU. CHG bath given. Primary Nurse Jeff Arenas RN and Yang Houser RN performed a dual skin assessment on this patient. No impairment noted. Patient assessed. See flowsheet. 7072- Two additional IV's placed. 0700- Report given to the oncoming shift.

## 2023-02-28 NOTE — H&P
[facility] Daily Progress Note. Name: Gail Fernandez   : 1955   MRN: [id number]   Date: 2023        Chief Complaint   Patient presents with    Respiratory Distress     Pt arrives to ed via ems from [facility], per ems call was for cp, unknown baseline orientation status, rapid respirations, 64% on RA, 102.6 tympanic, nrb 15L- 90%, etco2- 23-28. Bs 232, unknown lkw, or baseline, pt was given 324 of asa at facility for cp          HPI:     20-year-old man with past medical history significant for type 2 diabetes, anxiety/depression, and hypertension, recent diagnosis of metastatic disease of the spine, lungs and liver (recent admission  to 2/10 for cervical cord tumor with cord compression  s/p C5 corpectomy,arthrodesis with cage and anterior cervical plate ) presents to ED [facility] for SOB, hypoxia, and fever. Upon arrival to ED [facility] pt in respiratory distress, SPO2 70% on RA. Placed on HFNC with improvement in WOB and SPO2. Labs notable for WBC 13.4, lactic acid of 2.68, CHELLE  with Cr 5.60 (baseline normal), UA with nitrates and leukocytes. Of note pt had to be straight cathed for ua and had 700cc in his bladder. Given pt on 100% FiO2 and 60 LPM of HFNC, ICU consulted for admission. Upon my arrival, pt awake, answering questions appropriately, SPO2 96% on HFNC, HDS. Given higher oxygen requirements pt will be admitted to the ICU for further management.     Active Problems Being Managed:     Severe sepsis  Acute hypoxic respiratory failure  CAP  UTI  CHELLE  DM2    Assessment/Plan:     Severe Sepsis due to UTI + PNA  -lactic acid 2.68  -WBC 13.4  -UA with nitrites leukocytes, and bacteria  -urine culture and blood culture pending  -zosyn and azithromycin for UTI an CAP  -s/p 1 L IVF in ED  -repeat lactic acid  -CT abdomen showed Patchy bilateral airspace disease and bilateral lower lobe consolidation with air bronchograms and  Mild bilateral hydronephrosis may be related to distended urinary bladder. Acute hypoxic respiratory failure due to community acquired PNA  -cont HFNC for goal SPO2 >92%  -CT showed patchy bilateral lower lobe consolidations  -antibiotics as above  -covid and flu negative    CHELLE-likely post obstructive  -Cr 5.6 up from baseline of normal  -pt reports that he has been eating and drinking but his has had issues with urinating  In ED pt had 700 cc in bladder during straight cath and mills placed  -CT abd showed Mild bilateral hydronephrosis may be related to distended urinary bladder. Mills catheter positioned within the prostate  -reposition mills  -strict I/OS  -avoid nephrotoxic agents    DM2  -FS insulin sliding scale    Metastatic cancer-unknown primary  -CT abd 2/28 showed Heterogeneous liver concerning for metastatic disease and Osseous metastatic disease.  -Metastatic disease to spine. Mets to lungs, lumbar spine, and liver seen on additional imaging during previous admission  -surgical pathology sent on 1/31 showed Metastatic poorly differentiated carcinoma  --heme/onc consult    DVT prophylaxis: Lovenox  SUP: poa protonix  Code status: Full    Next of kin: Juancho Licea and Estuardomarco Dove (daughters)    I personally spent 60 minutes of critical care time. This is time spent at this critically ill patient's bedside actively involved in patient care as well as the coordination of care and discussions with the patient's family. This does not include any procedural time which has been billed separately. Review of systems:     Review of Systems   Constitutional:  Positive for chills and fever. HENT:  Negative for congestion. Eyes:  Negative for blurred vision. Respiratory:  Positive for cough and shortness of breath. Cardiovascular:  Negative for chest pain, palpitations and leg swelling. Gastrointestinal:  Positive for abdominal pain. Negative for diarrhea, nausea and vomiting. Genitourinary:  Negative for dysuria. Musculoskeletal:  Positive for neck pain. Neurological:  Negative for dizziness and headaches. Objective:     Vital Signs:  Visit Vitals  /87   Pulse (!) 117   Temp 99.3 °F (37.4 °C)   Resp 27   Ht 6' 2\" (1.88 m)   Wt 127.6 kg (281 lb 4.9 oz)   SpO2 96%   BMI 36.12 kg/m²    O2 Flow Rate (L/min): 60 l/min O2 Device: Hi flow nasal cannula Temp (24hrs), Av.1 °F (37.8 °C), Min:99.3 °F (37.4 °C), Max:100.9 °F (38.3 °C)           Intake/Output:   No intake or output data in the 24 hours ending 23 0303    Physical Exam:  Physical Exam    Past Medical History:        has a past medical history of Depression, DJD (degenerative joint disease) (3/8/2010), HTN (hypertension) (3/8/2010), Insomnia, NIDDM (non-insulin dependent diabetes mellitus) (3/8/2010), Other ill-defined conditions(799.89), Other ill-defined conditions(799.89), Other ill-defined conditions(799.89), Psychiatric disorder, Substance abuse ([facility] [address] and Suicidal thoughts. Past Surgical History:      has a past surgical history that includes biopsy prostate and hx urological.      Home Medications:     Prior to Admission medications    Medication Sig Start Date End Date Taking? Authorizing Provider   cyclobenzaprine (FLEXERIL) 10 mg tablet Take 1 Tablet by mouth three (3) times daily as needed for Muscle Spasm(s). Indications: muscle spasm 2/10/23   Heather Othello, NP   entecavir (BARACLUDE) 0.5 mg tablet Take 1 Tablet by mouth Daily (before dinner). Indications: chronic hepatitis B 23   Heather Othello NP   gabapentin (NEURONTIN) 600 mg tablet Take 0.5 Tablets by mouth three (3) times daily. Max Daily Amount: 900 mg. Indications: neuropathic pain 23   Heather Othello, NP   acetaminophen (TYLENOL) 325 mg tablet Take 2 Tablets by mouth every six (6) hours as needed for Pain. Indications: pain 23   Heather Othello, NP   finasteride (PROSCAR) 5 mg tablet Take 1 Tablet by mouth daily.  2/10/23   Heather Radford, TANA   tamsulosin (FLOMAX) 0.4 mg capsule Take 1 Capsule by mouth nightly. Indications: enlarged prostate with urination problem 2/9/23   Elsa Locke NP   senna-docusate (PERICOLACE) 8.6-50 mg per tablet Take 1 Tablet by mouth two (2) times a day. 2/10/23   Elsa Locke NP   polyethylene glycol (MIRALAX) 17 gram packet Take 1 Packet by mouth daily. Indications: constipation 2/10/23   Elsa Locke NP   lidocaine 4 % patch Apply patch to neck 12 hours a day and remove for 12 hours a day. 2/10/23   Elsa Locke NP   pantoprazole (PROTONIX) 40 mg tablet Take 1 Tablet by mouth Before breakfast and dinner. Indications: a stomach ulcer 2/10/23   Elsa Locke NP   furosemide (Lasix) 40 mg tablet Take 1 Tablet by mouth daily. Indications: visible water retention 2/9/23   Elsa Locke NP   QUEtiapine (SEROquel) 200 mg tablet Take 1 Tablet by mouth nightly. Indications: mood 8/23/21   Meg COTTO NP   sertraline (ZOLOFT) 100 mg tablet Take 1 Tablet by mouth daily. Indications: major depressive disorder 8/23/21   Meg COTTO NP   hydrOXYzine HCL (ATARAX) 50 mg tablet Take 1 Tablet by mouth every six (6) hours as needed for Anxiety. Indications: anxious 8/23/21   Terry Ngo NP         Allergies/Social/Family History:      Allergies   Allergen Reactions    Motrin [Ibuprofen] Other (comments)     \"Stomach ulcers\"        Social History     Tobacco Use    Smoking status: Former     Packs/day: 1.00     Years: 10.00     Pack years: 10.00     Types: Cigarettes    Smokeless tobacco: Never    Tobacco comments:     patient has been decreasing amount of cigarettes   Substance Use Topics    Alcohol use: No     Alcohol/week: 35.0 standard drinks     Types: 42 Cans of beer per week     Comment: states stopped drinking 1 month ago      Family History   Problem Relation Age of Onset    Cancer Mother         lymphoma    Heart Disease Father         CHF         LABS AND  DATA:   Reviewed      Peak airway pressure:      Minute ventilation:        CRITICAL CARE CONSULTANT NOTE  I had a face to face encounter with the patient, reviewed and interpreted patient data including clinical events, labs, images, vital signs, I/O's, and examined patient. I have discussed the case and the plan and management of the patient's care with the consulting services, the bedside nurses and the respiratory therapist.      NOTE OF PERSONAL INVOLVEMENT IN CARE   This patient has a high probability of imminent, clinically significant deterioration, which requires the highest level of preparedness to intervene urgently. I participated in the decision-making and personally managed or directed the management of the following life and organ supporting interventions that required my frequent assessment to treat or prevent imminent deterioration.         Fredy Kong NP   [facility]  [phone]  2/28/2023

## 2023-02-28 NOTE — ED PROVIDER NOTES
EMERGENCY DEPARTMENT HISTORY AND PHYSICAL EXAM      Date: 2/27/2023  Patient Name: Albert Ayala    History of Presenting Illness     Chief Complaint   Patient presents with    Respiratory Distress     Pt arrives to ed via ems from formerly [facility], per ems call was for cp, unknown baseline orientation status, rapid respirations, 64% on RA, 102.6 tympanic, nrb 15L- 90%, etco2- 23-28. Bs 232, unknown lkw, or baseline, pt was given 324 of asa at facility for cp          HPI: History From: Patient, EMS and daughters, History limited by: Respiratory distress   Albert Ayala, 69 y.o. male presents to the ED with cc of shortness of breath and hypoxia. Per EMS, he was saturating in the 70s on room air, and complaining of chest pain. Patient is in respiratory distress and not able to provide significant further history other than saying he denies chest pain. At daughter's at bedside later states that over the past 1 week he has seemed more weak and tired than usual, he is also having more difficulty breathing. They state that he was recently diagnosed with metastatic cancer. There are no other complaints, changes, or physical findings at this time. PCP: Zahra Bro MD    No current facility-administered medications on file prior to encounter. Current Outpatient Medications on File Prior to Encounter   Medication Sig Dispense Refill    cyclobenzaprine (FLEXERIL) 10 mg tablet Take 1 Tablet by mouth three (3) times daily as needed for Muscle Spasm(s). Indications: muscle spasm 30 Tablet 0    entecavir (BARACLUDE) 0.5 mg tablet Take 1 Tablet by mouth Daily (before dinner). Indications: chronic hepatitis B 30 Tablet 0    gabapentin (NEURONTIN) 600 mg tablet Take 0.5 Tablets by mouth three (3) times daily. Max Daily Amount: 900 mg. Indications: neuropathic pain 90 Tablet 0    acetaminophen (TYLENOL) 325 mg tablet Take 2 Tablets by mouth every six (6) hours as needed for Pain.  Indications: pain 30 Tablet 0    finasteride (PROSCAR) 5 mg tablet Take 1 Tablet by mouth daily. 30 Tablet 0    tamsulosin (FLOMAX) 0.4 mg capsule Take 1 Capsule by mouth nightly. Indications: enlarged prostate with urination problem 30 Capsule 0    senna-docusate (PERICOLACE) 8.6-50 mg per tablet Take 1 Tablet by mouth two (2) times a day. 60 Tablet 0    polyethylene glycol (MIRALAX) 17 gram packet Take 1 Packet by mouth daily. Indications: constipation 30 Each 0    lidocaine 4 % patch Apply patch to neck 12 hours a day and remove for 12 hours a day. 30 Each 0    pantoprazole (PROTONIX) 40 mg tablet Take 1 Tablet by mouth Before breakfast and dinner. Indications: a stomach ulcer 60 Tablet 0    furosemide (Lasix) 40 mg tablet Take 1 Tablet by mouth daily. Indications: visible water retention 30 Tablet 0    QUEtiapine (SEROquel) 200 mg tablet Take 1 Tablet by mouth nightly. Indications: mood 30 Tablet 0    sertraline (ZOLOFT) 100 mg tablet Take 1 Tablet by mouth daily. Indications: major depressive disorder 30 Tablet 0    hydrOXYzine HCL (ATARAX) 50 mg tablet Take 1 Tablet by mouth every six (6) hours as needed for Anxiety.  Indications: anxious 30 Tablet 0       Past History     Past Medical History:  Past Medical History:   Diagnosis Date    Depression     DJD (degenerative joint disease) 3/8/2010    HTN (hypertension) 3/8/2010    STATES NO LONGER TAKING MEDICATION-STATES MD STOPPED    Insomnia     NIDDM (non-insulin dependent diabetes mellitus) 3/8/2010    Other ill-defined conditions(799.89)     BPH    Other ill-defined conditions(799.89)     previous hx of DTs from ETOH withdrawal    Other ill-defined conditions(799.89)     DDD/back problems    Psychiatric disorder     paranoid schizophrenia, anxiety    Substance abuse ([facility] [address]     Suicidal thoughts        Past Surgical History:  Past Surgical History:   Procedure Laterality Date    BIOPSY PROSTATE      HX UROLOGICAL      turp       Family History:  Family History   Problem Relation Age of Onset    Cancer Mother         lymphoma    Heart Disease Father         CHF       Social History:  Social History     Tobacco Use    Smoking status: Former     Packs/day: 1.00     Years: 10.00     Pack years: 10.00     Types: Cigarettes    Smokeless tobacco: Never    Tobacco comments:     patient has been decreasing amount of cigarettes   Substance Use Topics    Alcohol use: No     Alcohol/week: 35.0 standard drinks     Types: 42 Cans of beer per week     Comment: states stopped drinking 1 month ago    Drug use: No       Allergies: Allergies   Allergen Reactions    Motrin [Ibuprofen] Other (comments)     \"Stomach ulcers\"           Physical Exam   Physical Exam  Constitutional:       General: He is in acute distress. Appearance: He is ill-appearing and toxic-appearing. HENT:      Head: Normocephalic and atraumatic. Mouth/Throat:      Mouth: Mucous membranes are dry. Eyes:      Extraocular Movements: Extraocular movements intact. Cardiovascular:      Rate and Rhythm: Regular rhythm. Tachycardia present. Pulmonary:      Comments: Respirations are tachypneic and labored, coarse breath sounds bilaterally without audible crackles or wheeze  Abdominal:      Palpations: Abdomen is soft. Tenderness: There is no abdominal tenderness. Musculoskeletal:         General: No deformity. Cervical back: Neck supple. Skin:     General: Skin is warm. Neurological:      General: No focal deficit present. Mental Status: He is alert.    Psychiatric:      Comments: Anxious appearing       Diagnostic Study Results     Labs -     Recent Results (from the past 24 hour(s))   BLOOD GAS,CHEM8,LACTIC ACID POC    Collection Time: 02/27/23  9:21 PM   Result Value Ref Range    pH, venous (POC) 7.52 (HH) 7.32 - 7.42      pCO2, venous (POC) 31.9 (L) 41 - 51 MMHG    pO2, venous (POC) 50 (H) 25 - 40 mmHg    BICARBONATE 26 mmol/L    Base excess (POC) 3.8 mmol/L    O2 SAT 89 %    Sodium,  136 - 145 MMOL/L Potassium, POC 4.9 3.5 - 5.5 MMOL/L    Chloride,  100 - 108 MMOL/L    CO2, POC 25 (H) 19 - 24 MMOL/L    Anion gap, POC 11 10 - 20      Glucose,  (H) 74 - 106 MG/DL    Creatinine, POC 5.7 (H) 0.6 - 1.3 MG/DL    eGFR (POC) 10 (L) >60 ml/min/1.73m2    Calcium, ionized (POC) 1.04 (L) 1.12 - 1.32 mmol/L    Lactic Acid (POC) 2.68 (HH) 0.40 - 2.00 mmol/L    Sample source VENOUS BLOOD      Critical value read back BARBARA    EKG, 12 LEAD, INITIAL    Collection Time: 02/27/23  9:24 PM   Result Value Ref Range    Ventricular Rate 120 BPM    Atrial Rate 120 BPM    P-R Interval 160 ms    QRS Duration 84 ms    Q-T Interval 298 ms    QTC Calculation (Bezet) 421 ms    Calculated P Axis 47 degrees    Calculated R Axis 15 degrees    Calculated T Axis 123 degrees    Diagnosis       ** Poor data quality, interpretation may be adversely affected  Sinus tachycardia  Voltage criteria for left ventricular hypertrophy  Possible Inferior infarct , age undetermined  T wave abnormality, consider lateral ischemia  When compared with ECG of 21-MAR-2020 13:13,  Borderline criteria for Inferior infarct are now present  ST no longer elevated in Anterolateral leads  T wave inversion now evident in Lateral leads     METABOLIC PANEL, COMPREHENSIVE    Collection Time: 02/27/23  9:29 PM   Result Value Ref Range    Sodium 136 136 - 145 mmol/L    Potassium 4.4 3.5 - 5.1 mmol/L    Chloride 101 97 - 108 mmol/L    CO2 23 21 - 32 mmol/L    Anion gap 12 5 - 15 mmol/L    Glucose 177 (H) 65 - 100 mg/dL    BUN 78 (H) 6 - 20 MG/DL    Creatinine 5.60 (H) 0.70 - 1.30 MG/DL    BUN/Creatinine ratio 14 12 - 20      eGFR 10 (L) >60 ml/min/1.73m2    Calcium 8.9 8.5 - 10.1 MG/DL    Bilirubin, total 3.1 (H) 0.2 - 1.0 MG/DL    ALT (SGPT) 81 (H) 12 - 78 U/L    AST (SGOT) 377 (H) 15 - 37 U/L    Alk.  phosphatase 194 (H) 45 - 117 U/L    Protein, total 8.5 (H) 6.4 - 8.2 g/dL    Albumin 2.3 (L) 3.5 - 5.0 g/dL    Globulin 6.2 (H) 2.0 - 4.0 g/dL    A-G Ratio 0.4 (L) 1.1 - 2.2     CBC WITH AUTOMATED DIFF    Collection Time: 02/27/23  9:29 PM   Result Value Ref Range    WBC 13.4 (H) 4.1 - 11.1 K/uL    RBC 5.22 4.10 - 5.70 M/uL    HGB 15.5 12.1 - 17.0 g/dL    HCT 46.2 36.6 - 50.3 %    MCV 88.5 80.0 - 99.0 FL    MCH 29.7 26.0 - 34.0 PG    MCHC 33.5 30.0 - 36.5 g/dL    RDW 17.5 (H) 11.5 - 14.5 %    PLATELET 736 721 - 826 K/uL    MPV 11.7 8.9 - 12.9 FL    NRBC 0.3 (H) 0  WBC    ABSOLUTE NRBC 0.04 (H) 0.00 - 0.01 K/uL    NEUTROPHILS 73 32 - 75 %    BAND NEUTROPHILS 1 %    LYMPHOCYTES 13 12 - 49 %    MONOCYTES 13 5 - 13 %    EOSINOPHILS 0 0 - 7 %    BASOPHILS 0 0 - 1 %    IMMATURE GRANULOCYTES 0 0.0 - 0.5 %    ABS. NEUTROPHILS 10.0 (H) 1.8 - 8.0 K/UL    ABS. LYMPHOCYTES 1.7 0.8 - 3.5 K/UL    ABS. MONOCYTES 1.7 (H) 0.0 - 1.0 K/UL    ABS. EOSINOPHILS 0.0 0.0 - 0.4 K/UL    ABS. BASOPHILS 0.0 0.0 - 0.1 K/UL    ABS. IMM. GRANS. 0.0 0.00 - 0.04 K/UL    DF MANUAL      RBC COMMENTS ANISOCYTOSIS  1+       NT-PRO BNP    Collection Time: 02/27/23  9:29 PM   Result Value Ref Range    NT pro- (H) <125 PG/ML   SAMPLES BEING HELD    Collection Time: 02/27/23  9:29 PM   Result Value Ref Range    SAMPLES BEING HELD RED CONNIE GRN PST     COMMENT        Add-on orders for these samples will be processed based on acceptable specimen integrity and analyte stability, which may vary by analyte.    COVID-19 RAPID TEST    Collection Time: 02/27/23  9:29 PM   Result Value Ref Range    Specimen source Nasopharyngeal      COVID-19 rapid test Not detected NOTD     INFLUENZA A+B VIRAL AGS    Collection Time: 02/27/23  9:29 PM   Result Value Ref Range    Influenza A Antigen Negative NEG      Influenza B Antigen Negative NEG     URINALYSIS W/ REFLEX CULTURE    Collection Time: 02/27/23 10:00 PM    Specimen: Urine   Result Value Ref Range    Color YELLOW/STRAW      Appearance TURBID (A) CLEAR      Specific gravity 1.012      pH (UA) 7.5 5.0 - 8.0      Protein TRACE (A) NEG mg/dL    Glucose Negative NEG mg/dL Ketone Negative NEG mg/dL    Bilirubin Negative NEG      Blood MODERATE (A) NEG      Urobilinogen 2.0 (H) 0.2 - 1.0 EU/dL    Nitrites Positive (A) NEG      Leukocyte Esterase LARGE (A) NEG      WBC  0 - 4 /hpf    RBC 10-20 0 - 5 /hpf    Epithelial cells FEW FEW /lpf    Bacteria 1+ (A) NEG /hpf    UA:UC IF INDICATED URINE CULTURE ORDERED (A) CNI     POC LACTIC ACID    Collection Time: 02/28/23 12:21 AM   Result Value Ref Range    Lactic Acid (POC) 2.23 (HH) 0.40 - 2.00 mmol/L       Radiologic Studies -   XR CHEST PORT   Final Result   Bibasilar atelectasis and diminished lung volumes. CT ABD PELV WO CONT    (Results Pending)     CT Results  (Last 48 hours)      None          CXR Results  (Last 48 hours)                 02/27/23 2218  XR CHEST PORT Final result    Impression:  Bibasilar atelectasis and diminished lung volumes. Narrative:  Clinical history: resp distress   INDICATION:   resp distress   COMPARISON: 1/29/2023       FINDINGS:   AP portable upright view of the chest demonstrates a stable prominent   cardiopericardial silhouette. There is no pleural effusion. .Bibasilar atelectasis   and diminished lung volumes. There is no pneumothorax. . Patient is on a cardiac   monitor. Medical Decision Making   I am the first provider for this patient. I reviewed the vital signs, available nursing notes, past medical history, past surgical history, family history and social history. Vital Signs-Reviewed the patient's vital signs.   Patient Vitals for the past 24 hrs:   Temp Pulse Resp BP SpO2   02/27/23 2315 -- (!) 118 15 139/88 96 %   02/27/23 2231 -- (!) 121 30 (!) 125/90 93 %   02/27/23 2225 -- -- -- -- 97 %   02/27/23 2215 -- (!) 118 27 (!) 146/91 96 %   02/27/23 2138 -- -- -- -- 95 %   02/27/23 2130 -- (!) 122 -- (!) 156/87 94 %   02/27/23 2118 (!) 100.9 °F (38.3 °C) (!) 121 (!) 42 (!) 149/91 (!) 83 %         Provider Notes (Medical Decision Making): 29-year-old male presenting with respiratory distress. He is ill-appearing on arrival, tachypneic, on nonrebreather satting about 90%. Concern for arrhythmia, ACS, pneumonia, sepsis, CHF, COVID or flu, renal failure, volume overload, electrolyte or metabolic abnormalities. I am concerned for impending airway and respiratory compromise, will need to trial more respiratory support. He is alert, able to answer questions and tell me his name in between breaths, we will trial high flow as he refuses BiPAP. ED Course:     Initial assessment performed. The patients presenting problems have been discussed, and they are in agreement with the care plan formulated and outlined with them. I have encouraged them to ask questions as they arise throughout their visit.     Medications   sodium chloride (NS) flush 5-40 mL (has no administration in time range)   sodium chloride (NS) flush 5-10 mL (has no administration in time range)   sodium chloride (NS) flush 5-40 mL (has no administration in time range)   sodium chloride (NS) flush 5-40 mL (has no administration in time range)   acetaminophen (TYLENOL) tablet 650 mg (has no administration in time range)     Or   acetaminophen (TYLENOL) suppository 650 mg (has no administration in time range)   polyethylene glycol (MIRALAX) packet 17 g (has no administration in time range)   enoxaparin (LOVENOX) injection 30 mg (has no administration in time range)   piperacillin-tazobactam (ZOSYN) 3.375 g in 0.9% sodium chloride (MBP/ADV) 100 mL MBP (has no administration in time range)   HYDROmorphone (DILAUDID) injection 0.2 mg (has no administration in time range)   entecavir (BARACLUDE) tablet 0.5 mg (has no administration in time range)   gabapentin (NEURONTIN) capsule 300 mg (has no administration in time range)   pantoprazole (PROTONIX) tablet 40 mg (has no administration in time range)   piperacillin-tazobactam (ZOSYN) 3.375 g in 0.9% sodium chloride (MBP/ADV) 100 mL MBP (0 g IntraVENous IV Completed 2/27/23 2326)   sodium chloride 0.9 % bolus infusion 1,000 mL (1,000 mL IntraVENous New Bag 2/27/23 2326)        ED Course as of 02/28/23 0052   Mon Feb 27, 2023 2131 ED [facility] ED SEPSIS NOTE:     9:31 PM The patient now meets criteria for: Severe Sepsis    Fluid resuscitation with: Based on patient's history and ability to tolerate IV fluids, patient and/or family/caregiver refuse the 30cc/kg bolus  Due to concern for rapidly advancing infection and deterioration of patient's condition, antibiotics are started STAT and cultures ordered. [CM]   2316 Labs reviewed. Notable for leukocytosis, WBC 13.4, CHELLE sCr 5.60, UA with nitrates and leukocytes. Patient is on HFNC with improvement in respiratory rate and work of breathing. Remains normotensive with HR in the 120's. Received zosyn previously. Will discuss with ICU for admission as patient is at high risk for decompensation. [HW]   e Feb 28, 2023   0038 Discussed with ICU. Patient admitted to ICU. Will obtain CT abd/pelvis per recommendations to eval for hydronephrosis. Patient remains comfortable on HFNC. Family members at bedside. [HW]      ED Course User Index  [CM] Terence-Cedric Matos MD  [HW] Mykel Tapia MD        EKG is performed at 21: 24, independently interpreted by myself as sinus tachycardia, no ST segment elevation concerning for ACS. Patient is tolerating high flow well, his work of breathing has improved, he is no longer as tachypneic. CBC with leukocytosis of 13.4, UA is positive for UTI. Basic metabolic panel with elevated BUN/creatinine 79/5.6, his prior creatinines have been normal.  Otherwise no worrisome electrolyte abnormalities. Chest x-ray is independently interpreted by myself as no acute obvious infiltrate. His pH is elevated at 7.52. COVID test is negative. Had extended discussion with his 2 daughters at bedside, he is a full code.     I reviewed his discharge summary from 2/10/2023, noted to have cervical cord tumor with cord compression status post ORIF next surgery, metastatic disease with unknown primary. On reevaluation, he is now resting comfortably, breathing is nonlabored, he continues to tolerate high flow well with saturations of 96%. He will be admitted to the ICU. Critical Care Time:     I have spent 60 minutes of critical care time in evaluating and treating this patient. This includes time spent at bedside, time with family and decision makers, documentation, review of labs and imaging, and/or consultation with specialists. It does not include time spent on separately billed procedures. This patient presents with a critical illness or injury that acutely impairs one or more vital organ systems such that there is a high probability of imminent or life threatening deterioration in the patient's condition. This case involved decision making of high complexity to assess, manipulate, and support vital organ system failure and/or to prevent further life threatening deterioration of the patient's condition. Failure to initiate these interventions on an urgent basis would likely result in sudden, clinically significant or life threatening deterioration in the patient's condition. Abnormal findings supporting critical care: Hypoxia, respiratory distress, CHELLE, UTI  Interventions to support critical care: Antibiotics, high flow, IV fluid  Failure to intervene may result in: Respiratory compromise, hypoxic respiratory failure, renal compromise, electrolyte abnormalities, shock    Disposition:  ICU  Teodora Darby  results have been reviewed with him. He has been counseled regarding his diagnosis, treatment, and plan. He verbally conveys understanding and agreement of the signs, symptoms, diagnosis, treatment and prognosis and additionally agrees to follow up as discussed. He also agrees with the care-plan and conveys that all of his questions have been answered.   I have also provided discharge instructions for him that include: educational information regarding their diagnosis and treatment, and list of reasons why they would want to return to the ED prior to their follow-up appointment, should his condition change. PLAN:  1. Current Discharge Medication List        2.    Follow-up Information    None       Return to ED if worse     Diagnosis     Clinical Impression: Acute sepsis with endorgan dysfunction secondary to urinary tract infection, acute kidney injury, acute hypoxic respiratory failure

## 2023-03-01 NOTE — PROGRESS NOTES
1430  Praying and asking for the angels to come and take him. Denies pain when asked several times. Recheck temp now 100.7 Ax after Tylenol given.

## 2023-03-01 NOTE — PROGRESS NOTES
Palliative Medicine Consult  Tone: [phone] ([phone])    Patient Name: Temo Dial  YOB: 1955    Date of Initial Consult: 2/28/23  Date of Today's Visit: 3/1/2023  Reason for Consult: Care Decisions  Requesting Provider: Christine Tellez NP   Primary Care Physician: Vicenta De Leon MD     SUMMARY:   Temo Dial is a 69 y.o. with a past history of DM2, anxiety, htn, and recent diagnosis of metastatic [address] (to lung and spine- cervical), who was admitted on 2/27/2023 from rehab with a diagnosis of severe sepsis due to UTI and PNA. PALLIATIVE DIAGNOSES:   Goals of care  Cervical neck pain- cancer pain  Delirium  DNR Discussion  Palliative Care     PLAN:   Mr Graciela Vences is still uncomfortable, has received 0.5mg of hydromorphone every 3 hours since midnight. Will increase this to 1mg to see if we can get better relief. Trying to go slow so that we hopefully do not over sedate him. Asked nursing to also try the SL option, but low threshold to give an IV dose (within 1 hour of SL dose) if pain not controlled. Trying to see if he can get pain relief from SL dosing for home  Weaning off high-flow, still has a ways to go, but making progress  I will not be here tomorrow but will ask my colleague to check in briefly to adjust meds as needed. Have not consulted hospice yet, want him to get a little closer to discharge so as not to confuse the family  Initial consult note routed to primary continuity provider and/or primary health care team members  Communicated plan of care with: Ksenia H[facility] Team\    Addendum:  Met with daughter Radha Castillo at bedside along with some other family members. She is struggling with accepting that his decline is something that we cannot \"fix\". She has amazing support from family though, and the goal for symptom management and oxygen wean to try and transition home with hospice still remains in place.  Ehsan Jordan for him to have juice/soda for comfort if he wants, as he gets very restless when he is thirsty which is making it harder to treat/assess his pain     GOALS OF CARE / TREATMENT PREFERENCES:     GOALS OF CARE:  Patient/Health Care Proxy Stated Goals: Comfort    TREATMENT PREFERENCES:   Code Status: DNR    Advance Care Planning:  [x] The [facility] Interdisciplinary Team has updated the ACP Navigator with Health Care Decision Maker and Patient Capacity      Primary Decision Maker: Rox Arora - Daughter - [phone]    Secondary Decision Maker: Diego Soto - Daughter - [phone] Planning 2/28/2023   Confirm Advance Directive Yes, on file   Patient Would Like to Complete Advance Directive -       Medical Interventions: Limited additional interventions       Other:    As far as possible, the palliative care team has discussed with patient / health care proxy about goals of care / treatment preferences for patient.      HISTORY:     History obtained from: chart, daughter    CHIEF COMPLAINT: :My back hurts so bad\"    HPI/SUBJECTIVE:    The patient is:   [x] Verbal and participatory  [] Non-participatory due to:        Clinical Pain Assessment (nonverbal scale for severity on nonverbal patients):   Clinical Pain Assessment  Severity: 8  Location: neck and shoulders  Character: unable to tell me  Duration: unable to tell me  Effect: unable to tell me  Factors: unable to tell me  Frequency: unable to tell me          Duration: for how long has pt been experiencing pain (e.g., 2 days, 1 month, years)  Frequency: how often pain is an issue (e.g., several times per day, once every few days, constant)     FUNCTIONAL ASSESSMENT:     Palliative Performance Scale (PPS):          PSYCHOSOCIAL/SPIRITUAL SCREENING:     Palliative IDT has assessed this patient for cultural preferences / practices and a referral made as appropriate to needs (Cultural Services, Patient Advocacy, Ethics, etc.)    Any spiritual / Restorationism concerns:  [] Yes /  [x] No   If \"Yes\" to discuss with pastoral care during IDT     Does caregiver feel burdened by caring for their loved one:   [] Yes /  [x] No /  [] No Caregiver Present/Available [] No Caregiver [] Pt Lives at Facility  If \"Yes\" to discuss with social work during IDT    Anticipatory grief assessment:   [x] Normal  / [] Maladaptive     If \"Maladaptive\" to discuss with social work during IDT    ESAS Anxiety: Anxiety: 3    ESAS Depression: Depression: 0        REVIEW OF SYSTEMS:     Positive and pertinent negative findings in ROS are noted above in HPI. The following systems were [x] reviewed / [] unable to be reviewed as noted in HPI  Other findings are noted below. Systems: constitutional, ears/nose/mouth/throat, respiratory, gastrointestinal, genitourinary, musculoskeletal, integumentary, neurologic, psychiatric, endocrine. Positive findings noted below. Modified ESAS Completed by: provider   Fatigue: 4     Depression: 0 Pain: 8   Anxiety: 3     Anorexia: 10 Dyspnea: 5           Stool Occurrence(s): 1        PHYSICAL EXAM:     From RN flowsheet:  Wt Readings from Last 3 Encounters:   02/27/23 281 lb 4.9 oz (127.6 kg)   02/03/23 294 lb (133.4 kg)   01/28/23 280 lb (127 kg)     Blood pressure (!) 147/88, pulse (!) 102, temperature 99.4 °F (37.4 °C), resp. rate 30, height 6' 2\" (1.88 m), weight 281 lb 4.9 oz (127.6 kg), SpO2 93 %.     Pain Scale 1: Numeric (0 - 10)  Pain Intensity 1: 3  Pain Onset 1: acute  Pain Location 1: Back, Arm  Pain Orientation 1: Mid  Pain Description 1: Aching  Pain Intervention(s) 1: Repositioned  Last bowel movement, if known:     Constitutional: appears in a lot of pain, conversational but oriented to person only  Eyes: pupils equal, anicteric  ENMT: dry mucous membranes, poor dentition  Cardiovascular: regular rhythm, distal pulses intact  Respiratory: breathing not labored, symmetric  Gastrointestinal: soft non-tender, +bowel sounds  Musculoskeletal: no deformity, no tenderness to palpation  Skin: warm, dry  Other:       HISTORY:     Active Problems:    Respiratory failure (HCC) (2/28/2023)      Severe sepsis ([facility] (2/28/2023)      Goals of care, counseling/discussion (2/28/2023)      Neck pain (2/28/2023)      Delirium (2/28/2023)      DNR (do not resuscitate) discussion (2/28/2023)      Palliative care by specialist (2/28/2023)    Past Medical History:   Diagnosis Date    Depression     DJD (degenerative joint disease) 3/8/2010    HTN (hypertension) 3/8/2010    STATES NO Ana Donovan MD STOPPED    Insomnia     NIDDM (non-insulin dependent diabetes mellitus) 3/8/2010    Other ill-defined conditions(799.89)     BPH    Other ill-defined conditions(799.89)     previous hx of DTs from ETOH withdrawal    Other ill-defined conditions(799.89)     DDD/back problems    Psychiatric disorder     paranoid schizophrenia, anxiety    Substance abuse ([facility] [address]     Suicidal thoughts       Past Surgical History:   Procedure Laterality Date    BIOPSY PROSTATE      HX UROLOGICAL      turp      Family History   Problem Relation Age of Onset    Cancer Mother         lymphoma    Heart Disease Father         CHF      History reviewed, no pertinent family history.   Social History     Tobacco Use    Smoking status: Former     Packs/day: 1.00     Years: 10.00     Pack years: 10.00     Types: Cigarettes    Smokeless tobacco: Never    Tobacco comments:     patient has been decreasing amount of cigarettes   Substance Use Topics    Alcohol use: No     Alcohol/week: 35.0 standard drinks     Types: 42 Cans of beer per week     Comment: states stopped drinking 1 month ago     Allergies   Allergen Reactions    Motrin [Ibuprofen] Other (comments)     \"Stomach ulcers\"        Current Facility-Administered Medications   Medication Dose Route Frequency    HYDROmorphone (DILAUDID) injection 1 mg  1 mg IntraVENous Q3H PRN    HYDROmorphone (DILAUDID) 1 mg/mL oral solution 2 mg  2 mg SubLINGual Q4H PRN    sodium chloride (NS) flush 5-40 mL 5-40 mL IntraVENous Q8H    sodium chloride (NS) flush 5-40 mL  5-40 mL IntraVENous PRN    acetaminophen (TYLENOL) tablet 650 mg  650 mg Oral Q6H PRN    Or    acetaminophen (TYLENOL) suppository 650 mg  650 mg Rectal Q6H PRN    polyethylene glycol (MIRALAX) packet 17 g  17 g Oral DAILY PRN    enoxaparin (LOVENOX) injection 30 mg  30 mg SubCUTAneous DAILY    [Held by provider] entecavir (BARACLUDE) tablet 0.5 mg  0.5 mg Oral ACD    [Held by provider] gabapentin (NEURONTIN) capsule 300 mg  300 mg Oral Q8H    glucose chewable tablet 16 g  4 Tablet Oral PRN    glucagon (GLUCAGEN) injection 1 mg  1 mg IntraMUSCular PRN    dextrose 10% infusion 0-250 mL  0-250 mL IntraVENous PRN    insulin lispro (HUMALOG) injection   SubCUTAneous Q6H    piperacillin-tazobactam (ZOSYN) 3.375 g in 0.9% sodium chloride (MBP/ADV) 100 mL MBP  3.375 g IntraVENous Q8H    alcohol 62% (NOZIN) nasal  1 Ampule  1 Ampule Topical Q12H    balsam peru-castor oiL (VENELEX) ointment   Topical BID    pantoprazole (PROTONIX) 40 mg in 0.9% sodium chloride 10 mL injection  40 mg IntraVENous DAILY    azithromycin (ZITHROMAX) 500 mg in 0.9% sodium chloride 250 mL ([id number])  500 mg IntraVENous Q24H    lidocaine 4 % patch 3 Patch  3 Patch TransDERmal Q24H    sodium chloride (NS) flush 5-40 mL  5-40 mL IntraVENous PRN    sodium chloride (NS) flush 5-10 mL  5-10 mL IntraVENous PRN          LAB AND IMAGING FINDINGS:     Lab Results   Component Value Date/Time    WBC 11.3 (H) 03/01/2023 03:30 AM    HGB 14.7 03/01/2023 03:30 AM    PLATELET 273 (L) 85/05/7679 03:30 AM     Lab Results   Component Value Date/Time    Sodium 147 (H) 03/01/2023 03:30 AM    Potassium 3.3 (L) 03/01/2023 03:30 AM    Chloride 112 (H) 03/01/2023 03:30 AM    CO2 30 03/01/2023 03:30 AM    BUN 43 (H) 03/01/2023 03:30 AM    Creatinine 1.18 03/01/2023 03:30 AM    Calcium 9.5 03/01/2023 03:30 AM    Magnesium 2.7 (H) 03/01/2023 03:30 AM    Phosphorus 2.1 (L) 03/01/2023 03:30 AM      Lab Results   Component Value Date/Time    Alk. phosphatase 194 (H) 02/27/2023 09:29 PM    Protein, total 8.5 (H) 02/27/2023 09:29 PM    Albumin 2.3 (L) 02/27/2023 09:29 PM    Globulin 6.2 (H) 02/27/2023 09:29 PM     Lab Results   Component Value Date/Time    INR 1.3 (H) 02/06/2023 01:54 AM    Prothrombin time 13.0 (H) 02/06/2023 01:54 AM    aPTT 29.1 01/30/2023 08:28 AM      No results found for: IRON, FE, TIBC, IBCT, PSAT, FERR   No results found for: PH, PCO2, PO2  No components found for: [place]   Lab Results   Component Value Date/Time     06/24/2012 03:20 AM    CK - MB 3.8 (H) 06/21/2012 08:00 PM                Total time:   Counseling / coordination time, spent as noted above:   > 50% counseling / coordination?:     Prolonged service was provided for  []30 min   []75 min in face to face time in the presence of the patient, spent as noted above. Time Start:   Time End:   Note: this can only be billed with 32873 (initial) or 56606 (follow up). If multiple start / stop times, list each separately.

## 2023-03-01 NOTE — TELEPHONE ENCOUNTER
[email or web address] Information noted on patient being admitted to [address] and appt will be canceled for today. (KF)

## 2023-03-01 NOTE — TELEPHONE ENCOUNTER
Daughter called to let Dr. Petra Marx know that patient has been admitted to the hospital at [facility]. Patient admitted for respiratory failure. Daughter advised to call the office to reschedule follow up appt upon discharge. Follow up appt for 3/3/23 canceled.

## 2023-03-01 NOTE — PROGRESS NOTES
1900: Bedside and Verbal shift change report given to Rajeev Kelley RN (oncoming nurse) by Neha Mercado RN (offgoing nurse). Report included the following information SBAR, Kardex, ED Summary, Procedure Summary, Intake/Output, MAR, Recent Results, Cardiac Rhythm Sinus Rhythm, Alarm Parameters , and Quality Measures. Family at bedside. 2000: Complex assessment done. See flowsheet for details. Patient is alert, oriented to person and place, and follows command. Noted with slurred speech and delayed responses. Currently on sinus tachycardia, stable blood pressure and febrile; T 102. Dry skin tear at right cheek buttocks. 2011: Tylenol suppository given as needed. 2154: Complaint of pain at his back with pain scale of 8/10. PRN meds given. 0000: Complex assessment done. No changes noted. 0111: Complaint of pain at his back with pain scale of 7/10. Dilaudid IV PRN given. 0130: Noted loose large bowel motion, perineal care done. Changed underpads and gown. 0200: Febrile; T 100.8, tepid sponge bathed rendered. Tylenol suppository PRN given. 0400: Complex assessment done. No changes noted. Labs collected. 0422: Complaint of pain at his back and arms with pain scale of 7/10. Dilaudid IV PRN given. 0700: Bedside and Verbal shift change report given to Johny Law RN (oncoming nurse) by Rajeev Kelley RN (offgoing nurse). Report included the following information SBAR, Kardex, ED Summary, Intake/Output, MAR, Recent Results, Cardiac Rhythm Sinus Tachy, Alarm Parameters , and Quality Measures.

## 2023-03-01 NOTE — PROGRESS NOTES
0700 - received report on patient from [facility].    0800 - patient assessment completed. He is alert and oriented x2. Hands grasp strength is equal bilaterally. Patients arms are very weak. Daughters states that this is his baseline. Currently on High flow nasal canula. Patient currently tolerating the high flow but requires redirection occasionally. Lung sounds are diminished. Sinus on the monitor. Pulses are palpable no edema. Patient does have a skin tear on his sacrum. Bowel sounds are hypoactive. 1000 - interdisciplinary rounds completed. 1200 - reassessment completed, no changes noted. 1600 - reassessment completed, no changes noted. [address] from oncology at the bedside. Updated patients family on condition and how we are going to be focusing on controlling his pain more. 1726 - Lottie from palliative at the bedside. Patient transitioned to DNR and PRN pain regiment adjusted. See MAR for administrations. 1800 - IV dilaudid given with Lottie verbal okay. 1900 - report given to WINSTON Enriquez.

## 2023-03-01 NOTE — PROGRESS NOTES
Critical Care Progress Note  Roma Quintero MD          Date of Service:  3/1/2023  NAME:  John Bentley  :  1955  MRN:  [id number]      Subjective/Hospital course:      64-year-old man with past medical history significant for type 2 diabetes, anxiety/depression, and hypertension, recent diagnosis of metastatic disease of the spine, lungs and liver (recent admission  to 2/10 for cervical cord tumor with cord compression  s/p C5 corpectomy,arthrodesis with cage and anterior cervical plate 3/11) presents to ED [facility] for SOB, hypoxia, and fever. Upon arrival to ED [facility] pt in respiratory distress, SPO2 70% on RA. Placed on HFNC with improvement in WOB and SPO2. Labs notable for WBC 13.4, lactic acid of 2.68, CHELLE  with Cr 5.60 (baseline normal), UA with nitrates and leukocytes. Of note pt had to be straight cathed for ua and had 700cc in his bladder. Given pt on 100% FiO2 and 60 LPM of HFNC, ICU consulted for admission. 3/01 - improved pain control. Remains on HFNC. Febrile overnight        Problem list:   Severe sepsis  Acute hypoxic respiratory failure  CAP  UTI  CHELLE  DM2         Assessment/Plan:   Severe Sepsis due to UTI + PNA  -urine culture and blood culture pending  -Broad spectrum abx  -CT abdomen showed Patchy bilateral airspace disease and bilateral lower lobe consolidation with air bronchograms and  Mild bilateral hydronephrosis may be related to distended urinary bladder.         Acute hypoxic respiratory failure due to community acquired PNA  -cont HFNC for goal SPO2 >92%  -CT showed patchy bilateral lower lobe consolidations  -antibiotics as above  -covid and flu negative     CHELLE-likely post obstructive  -Cr 5.6 up from baseline of normal  - likely due to post obstructive, much improved with mills placement  -strict I/OS  -avoid nephrotoxic agents     DM2  -FS insulin sliding scale     Metastatic cancer-unknown primary  -CT abd  showed Heterogeneous liver concerning for metastatic disease and Osseous metastatic disease.  -Metastatic disease to spine. Mets to lungs, lumbar spine, and liver seen on additional imaging during previous admission  -surgical pathology sent on 1/31 showed Metastatic poorly differentiated carcinoma  --heme/onc - not a candidate for therapy    Pain  - Appreciate palliative care and pain management recommendations. - Continue goals of care. Would like to consider home hospice if able     I personally spent 35 minutes of critical care time. This is time spent at this critically ill patient's bedside actively involved in patient care as well as the coordination of care and discussions with the patient's family. This does not include any procedural time which has been billed separately. Review of Systems:   ROS   I want juice     Vital Signs:   Patient Vitals for the past 4 hrs:   BP Temp Pulse Resp SpO2   03/01/23 1030 (!) 150/88 -- (!) 106 23 94 %   03/01/23 1000 (!) 141/85 -- (!) 108 25 94 %   03/01/23 0930 136/79 -- (!) 105 29 90 %   03/01/23 0900 (!) 147/88 -- (!) 102 30 93 %   03/01/23 0830 (!) 141/85 -- (!) 102 22 93 %   03/01/23 0800 (!) 140/84 99.4 °F (37.4 °C) 99 25 93 %   03/01/23 0730 (!) 147/85 99.4 °F (37.4 °C) (!) 101 24 93 %   03/01/23 0726 -- -- -- -- 94 %        Intake/Output Summary (Last 24 hours) at 3/1/2023 1100  Last data filed at 3/1/2023 1127  Gross per 24 hour   Intake 500 ml   Output 2075 ml   Net -1575 ml        Physical Examination:    Physical Exam  Constitutional:       Appearance: He is ill-appearing. HENT:      Mouth/Throat:      Mouth: Mucous membranes are dry. Eyes:      Pupils: Pupils are equal, round, and reactive to light. Cardiovascular:      Rate and Rhythm: Regular rhythm. Tachycardia present. Pulses: Normal pulses. Pulmonary:      Breath sounds: Rhonchi present. Comments: Tachypnea   Abdominal:      General: Abdomen is flat. Palpations: Abdomen is soft. Tenderness:  There is abdominal tenderness (epigastric). Musculoskeletal:      Right lower leg: Edema present. Left lower leg: Edema present. Skin:     General: Skin is warm. Neurological:      Mental Status: He is alert. Comments: Oriented to self       Labs and Imaging:   Reviewed.       Medications:     Current Facility-Administered Medications   Medication Dose Route Frequency    HYDROmorphone (DILAUDID) injection 1 mg  1 mg IntraVENous Q3H PRN    HYDROmorphone (DILAUDID) 1 mg/mL oral solution 2 mg  2 mg SubLINGual Q4H PRN    sodium chloride (NS) flush 5-40 mL  5-40 mL IntraVENous Q8H    sodium chloride (NS) flush 5-40 mL  5-40 mL IntraVENous PRN    acetaminophen (TYLENOL) tablet 650 mg  650 mg Oral Q6H PRN    Or    acetaminophen (TYLENOL) suppository 650 mg  650 mg Rectal Q6H PRN    polyethylene glycol (MIRALAX) packet 17 g  17 g Oral DAILY PRN    enoxaparin (LOVENOX) injection 30 mg  30 mg SubCUTAneous DAILY    [Held by provider] entecavir (BARACLUDE) tablet 0.5 mg  0.5 mg Oral ACD    [Held by provider] gabapentin (NEURONTIN) capsule 300 mg  300 mg Oral Q8H    glucose chewable tablet 16 g  4 Tablet Oral PRN    glucagon (GLUCAGEN) injection 1 mg  1 mg IntraMUSCular PRN    dextrose 10% infusion 0-250 mL  0-250 mL IntraVENous PRN    insulin lispro (HUMALOG) injection   SubCUTAneous Q6H    piperacillin-tazobactam (ZOSYN) 3.375 g in 0.9% sodium chloride (MBP/ADV) 100 mL MBP  3.375 g IntraVENous Q8H    alcohol 62% (NOZIN) nasal  1 Ampule  1 Ampule Topical Q12H    balsam peru-castor oiL (VENELEX) ointment   Topical BID    pantoprazole (PROTONIX) 40 mg in 0.9% sodium chloride 10 mL injection  40 mg IntraVENous DAILY    azithromycin (ZITHROMAX) 500 mg in 0.9% sodium chloride 250 mL ([id number])  500 mg IntraVENous Q24H    lidocaine 4 % patch 3 Patch  3 Patch TransDERmal Q24H    sodium chloride (NS) flush 5-40 mL  5-40 mL IntraVENous PRN    sodium chloride (NS) flush 5-10 mL  5-10 mL IntraVENous PRN ______________________________________________________________________  [facility] LENGTH OF STAY: - - -  ACTUAL LENGTH OF STAY:          1                 Saulo Mejia MD   Pulmonary/[facility]  [address] [facility]  [phone]

## 2023-03-01 NOTE — PROGRESS NOTES
Spiritual Care Assessment/Progress Note  [facility]      NAME: Petra Urena      MRN: [id number]  AGE: 69 y.o. SEX: male  Moravian Affiliation: Restorationist   Language: English     3/1/2023     Total Time (in minutes): 5     Spiritual Assessment begun in [facility] CRITICAL CARE 2 through conversation with:         []Patient        [] Family    [] Friend(s)        Reason for Consult: Palliative Care, Initial/Spiritual Assessment     Spiritual beliefs: (Please include comment if needed)     [] Identifies with a gilmar tradition:         [] Supported by a gilmar community:            [] Claims no spiritual orientation:           [] Seeking spiritual identity:                [] Adheres to an individual form of spirituality:           [x] Not able to assess:                           Identified resources for coping:      [] Prayer                               [] Music                  [] Guided Imagery     [] Family/friends                 [] Pet visits     [] Devotional reading                         [] Unknown     [] Other:                                             Interventions offered during this visit: (See comments for more details)    Patient Interventions: Initial visit           Plan of Care:     [] Support spiritual and/or cultural needs    [] Support AMD and/or advance care planning process      [] Support grieving process   [] Coordinate Rites and/or Rituals    [] Coordination with community clergy   [] No spiritual needs identified at this time   [] Detailed Plan of Care below (See Comments)  [] Make referral to Music Therapy  [] Make referral to Pet Therapy     [] Make referral to Addiction services  [] Make referral to [facility]  [] Make referral to Spiritual Care Partner  [] No future visits requested        [x] Contact Spiritual Care for further referrals     Comments: Attempted initial spiritual assessment with palliative pt in 2532.  Pt being cleaned by staff, unable to respond at this time, no family present. Family had been present earlier. Contact Spiritual Care for any further referrals.  Deandre Tejeda M.Div, [facility]   Paging Service [phone])

## 2023-03-02 NOTE — PROGRESS NOTES
1900: Bedside and Verbal shift change report given to Pelon Perrin RN (oncoming nurse) by Eddy Torres RN (offgoing nurse). Report included the following information SBAR, Kardex, Intake/Output, MAR, Recent Results, Cardiac Rhythm Sinus Tachy, Alarm Parameters , and Quality Measures. 2000: Complex assessment done. See flowsheet for details. Patient is alert, oriented to person and place, and follows command. Noted with slurred speech and delayed responses. Restless. Currently on sinus tachycardia, stable blood pressure and febrile; T 101.4.     2030: Noted loose large bowel motion. Perineal care done. Underpads changed. Tylenol suppository PRN given. Patient is asking to go home. 2046: Complaint of generalized pain, patient is restless. Dilaudid PRN given. See MAR.     2130: Family at bedside. Patient is calm with his family. 0000: Complex assessment done. No changes noted. 0200: CHG bathed given. 7549: Febrile, Tylenol suppository PRN given. 6955: Complaint of back and arm pain patient is restless. Dilaudid PRN given. See MAR.    0400: Complex assessment done. No changes noted. 9868: Complaint of back and arm pain patient is restless. Dilaudid PRN given. See MAR.    0700: Bedside and Verbal shift change report given to WINSTON Marti (oncoming nurse) by Pelon Perrin RN (offgoing nurse). Report included the following information SBAR, Kardex, ED Summary, Procedure Summary, Intake/Output, MAR, Recent Results, Cardiac Rhythm Sinus Tachycardia, Alarm Parameters , and Quality Measures.

## 2023-03-02 NOTE — PROGRESS NOTES
Nutrition Note    MST triggered for unsure of wt loss. Case discussed during CCU rounds. Palliative is following pt, he is allowed clears for comfort. Plans are to go comfort care and eventually home with hospice once pain controlled. No plans for aggressive measures. Will cancel nutrition trigger for assessment. Please reconsult prn, otherwise pt will be assessed per LOS protocol.      Electronically signed by Chito Case RD, [address]  on 3/2/2023 at 12:50 PM    Contact: ext [phone]

## 2023-03-02 NOTE — PROGRESS NOTES
P&T-Approved DVT Prophylaxis Dosing    Per P&T Committee-approved protocol enoxaparin has been adjusted to 30 mg q12h based on weight and renal function as shown in the table below.          BERE Patterson

## 2023-03-02 NOTE — PROGRESS NOTES
Palliative Medicine Consult  Tone: [phone]    Patient Name: Ade aZmorano  YOB: 1955    Date of Initial Consult: 2/28/23  Date of Today's Visit: 3/2/2023  Reason for Consult: Care Decisions  Requesting Provider: Faviola Corbin NP   Primary Care Physician: Aroldo Norton MD     SUMMARY:   Ade Zamorano is a 69 y.o. with a past history of DM2, anxiety, htn, and recent diagnosis of metastatic [address] (to lung and spine- cervical), who was admitted on 2/27/2023 from rehab with a diagnosis of severe sepsis due to UTI and PNA. PALLIATIVE DIAGNOSES:   Goals of care  Cervical neck pain- cancer pain  Delirium  DNR Discussion  Palliative Care     PLAN:   Chart reviewed including labs, imaging, notes. Discussed case with Dr. Helene Juarez and bedside nurse. Pt is less conversant today, does not endorse significant pain  Cervical neck pain: increased dilaudid yesterday, he is slightly less interactive but seems more comfortable. I feel his change in mental status is more related to sodium and delirum. Continue 1mg IV dilaudid prn with the SL dilaudid  Weaning off high-flow, still has a ways to go, but making progress  GOC: will follow up tomorrow to address goals of care with family.   Initial consult note routed to primary continuity provider and/or primary health care team members  Communicated plan of care with: Palliative [facility] Team\        GOALS OF CARE / TREATMENT PREFERENCES:     GOALS OF CARE:  Patient/Health Care Proxy Stated Goals: Comfort    TREATMENT PREFERENCES:   Code Status: DNR    Advance Care Planning:  [x] The [facility] Interdisciplinary Team has updated the ACP Navigator with Health Care Decision Maker and Patient Capacity      Primary Decision Maker: Michaelle Archibald - Daughter - [phone]    Secondary Decision Maker: Benny Cedillo - Daughter - [phone] [employer] Planning 2/28/2023   Confirm Advance Directive Yes, on file   Patient Would Like to Complete Advance Directive -       Medical Interventions: Limited additional interventions       Other:    As far as possible, the palliative care team has discussed with patient / health care proxy about goals of care / treatment preferences for patient. HISTORY:     History obtained from: chart, daughter    CHIEF COMPLAINT: I'm ok    HPI/SUBJECTIVE:    The patient is:   [x] Verbal and participatory  [] Non-participatory due to:      Does not participate as much today based on reports from yesterday.  He does not complain of significant pain    Clinical Pain Assessment (nonverbal scale for severity on nonverbal patients):   Clinical Pain Assessment  Severity: 8  Location: neck and shoulders  Character: unable to tell me  Duration: unable to tell me  Effect: unable to tell me  Factors: unable to tell me  Frequency: unable to tell me     Activity (Movement): Restless, excessive activity and/or withdrawal reflexes    Duration: for how long has pt been experiencing pain (e.g., 2 days, 1 month, years)  Frequency: how often pain is an issue (e.g., several times per day, once every few days, constant)     FUNCTIONAL ASSESSMENT:     Palliative Performance Scale (PPS):          PSYCHOSOCIAL/SPIRITUAL SCREENING:     Palliative IDT has assessed this patient for cultural preferences / practices and a referral made as appropriate to needs (Cultural Services, Patient Advocacy, Ethics, etc.)    Any spiritual / Uatsdin concerns:  [] Yes /  [x] No   If \"Yes\" to discuss with pastoral care during IDT     Does caregiver feel burdened by caring for their loved one:   [] Yes /  [x] No /  [] No Caregiver Present/Available [] No Caregiver [] Pt Lives at Facility  If \"Yes\" to discuss with social work during IDT    Anticipatory grief assessment:   [x] Normal  / [] Maladaptive     If \"Maladaptive\" to discuss with social work during IDT    ESAS Anxiety: Anxiety: 3    ESAS Depression: Depression: 0        REVIEW OF SYSTEMS:     Positive and pertinent negative findings in ROS are noted above in HPI. The following systems were [x] reviewed / [] unable to be reviewed as noted in HPI  Other findings are noted below. Systems: constitutional, ears/nose/mouth/throat, respiratory, gastrointestinal, genitourinary, musculoskeletal, integumentary, neurologic, psychiatric, endocrine. Positive findings noted below. Modified ESAS Completed by: provider   Fatigue: 4     Depression: 0 Pain: 8   Anxiety: 3     Anorexia: 10 Dyspnea: 5           Stool Occurrence(s): 1        PHYSICAL EXAM:     From RN flowsheet:  Wt Readings from Last 3 Encounters:   02/27/23 281 lb 4.9 oz (127.6 kg)   02/03/23 294 lb (133.4 kg)   01/28/23 280 lb (127 kg)     Blood pressure 122/84, pulse (!) 111, temperature (!) 102.4 °F (39.1 °C), resp. rate 21, height 6' 2\" (1.88 m), weight 281 lb 4.9 oz (127.6 kg), SpO2 96 %.     Pain Scale 1: Adult Nonverbal Pain Scale  Pain Intensity 1: 5  Pain Onset 1: acute  Pain Location 1:  (all over)  Pain Orientation 1: Mid  Pain Description 1: Aching  Pain Intervention(s) 1: Medication (see MAR)  Last bowel movement, if known:     Constitutional: awake, but not as interactive  Eyes: pupils equal, anicteric  ENMT: dry mucous membranes, poor dentition  Cardiovascular: regular rhythm, distal pulses intact  Respiratory: breathing not labored, symmetric  Gastrointestinal: soft non-tender, +bowel sounds  Musculoskeletal: no deformity, no tenderness to palpation  Skin: warm, dry  Other:       HISTORY:     Active Problems:    Respiratory failure (HCC) (2/28/2023)      Severe sepsis ([address] (2/28/2023)      Goals of care, counseling/discussion (2/28/2023)      Neck pain (2/28/2023)      Delirium (2/28/2023)      DNR (do not resuscitate) discussion (2/28/2023)      Palliative care by specialist (2/28/2023)    Past Medical History:   Diagnosis Date    Depression     DJD (degenerative joint disease) 3/8/2010    HTN (hypertension) 3/8/2010    STATES NO LONGER TAKING MEDICATION-STATES MD STOPPED    Insomnia     NIDDM (non-insulin dependent diabetes mellitus) 3/8/2010    Other ill-defined conditions(799.89)     BPH    Other ill-defined conditions(799.89)     previous hx of DTs from ETOH withdrawal    Other ill-defined conditions(799.89)     DDD/back problems    Psychiatric disorder     paranoid schizophrenia, anxiety    Substance abuse ([facility] [address]     Suicidal thoughts       Past Surgical History:   Procedure Laterality Date    BIOPSY PROSTATE      HX UROLOGICAL      turp      Family History   Problem Relation Age of Onset    Cancer Mother         lymphoma    Heart Disease Father         CHF      History reviewed, no pertinent family history.   Social History     Tobacco Use    Smoking status: Former     Packs/day: 1.00     Years: 10.00     Pack years: 10.00     Types: Cigarettes    Smokeless tobacco: Never    Tobacco comments:     patient has been decreasing amount of cigarettes   Substance Use Topics    Alcohol use: No     Alcohol/week: 35.0 standard drinks     Types: 42 Cans of beer per week     Comment: states stopped drinking 1 month ago     Allergies   Allergen Reactions    Motrin [Ibuprofen] Other (comments)     \"Stomach ulcers\"        Current Facility-Administered Medications   Medication Dose Route Frequency    dextrose 5% with KCl 20 mEq/L infusion   IntraVENous CONTINUOUS    enoxaparin (LOVENOX) injection 30 mg  30 mg SubCUTAneous Q12H    HYDROmorphone (DILAUDID) injection 1 mg  1 mg IntraVENous Q3H PRN    HYDROmorphone (DILAUDID) 1 mg/mL oral solution 2 mg  2 mg SubLINGual Q4H PRN    sodium chloride (NS) flush 5-40 mL  5-40 mL IntraVENous Q8H    sodium chloride (NS) flush 5-40 mL  5-40 mL IntraVENous PRN    acetaminophen (TYLENOL) tablet 650 mg  650 mg Oral Q6H PRN    Or    acetaminophen (TYLENOL) suppository 650 mg  650 mg Rectal Q6H PRN    polyethylene glycol (MIRALAX) packet 17 g  17 g Oral DAILY PRN    [Held by provider] entecavir (BARACLUDE) tablet 0.5 mg  0.5 mg Oral ACD    gabapentin (NEURONTIN) capsule 300 mg  300 mg Oral Q8H    glucose chewable tablet 16 g  4 Tablet Oral PRN    glucagon (GLUCAGEN) injection 1 mg  1 mg IntraMUSCular PRN    dextrose 10% infusion 0-250 mL  0-250 mL IntraVENous PRN    piperacillin-tazobactam (ZOSYN) 3.375 g in 0.9% sodium chloride (MBP/ADV) 100 mL MBP  3.375 g IntraVENous Q8H    alcohol 62% (NOZIN) nasal  1 Ampule  1 Ampule Topical Q12H    balsam peru-castor oiL (VENELEX) ointment   Topical BID    pantoprazole (PROTONIX) 40 mg in 0.9% sodium chloride 10 mL injection  40 mg IntraVENous DAILY    lidocaine 4 % patch 3 Patch  3 Patch TransDERmal Q24H          LAB AND IMAGING FINDINGS:     Lab Results   Component Value Date/Time    WBC 11.1 03/02/2023 03:23 AM    HGB 15.1 03/02/2023 03:23 AM    PLATELET 138 (L) 74/69/2976 03:23 AM     Lab Results   Component Value Date/Time    Sodium 151 (H) 03/02/2023 03:23 AM    Potassium 3.4 (L) 03/02/2023 03:23 AM    Chloride 117 (H) 03/02/2023 03:23 AM    CO2 30 03/02/2023 03:23 AM    BUN 40 (H) 03/02/2023 03:23 AM    Creatinine 1.12 03/02/2023 03:23 AM    Calcium 9.6 03/02/2023 03:23 AM    Magnesium 2.8 (H) 03/02/2023 03:23 AM    Phosphorus 2.4 (L) 03/02/2023 03:23 AM      Lab Results   Component Value Date/Time    Alk.  phosphatase 194 (H) 02/27/2023 09:29 PM    Protein, total 8.5 (H) 02/27/2023 09:29 PM    Albumin 2.3 (L) 02/27/2023 09:29 PM    Globulin 6.2 (H) 02/27/2023 09:29 PM     Lab Results   Component Value Date/Time    INR 1.3 (H) 02/06/2023 01:54 AM    Prothrombin time 13.0 (H) 02/06/2023 01:54 AM    aPTT 29.1 01/30/2023 08:28 AM      No results found for: IRON, FE, TIBC, IBCT, PSAT, FERR   No results found for: PH, PCO2, PO2  No components found for: [place]   Lab Results   Component Value Date/Time     06/24/2012 03:20 AM    CK - MB 3.8 (H) 06/21/2012 08:00 PM                Total time: 40 minutes  Counseling / coordination time, spent as noted above:   > 50% counseling / coordination?: Prolonged service was provided for  []30 min   []75 min in face to face time in the presence of the patient, spent as noted above. Time Start:   Time End:   Note: this can only be billed with 02976 (initial) or 35024 (follow up). If multiple start / stop times, list each separately.

## 2023-03-02 NOTE — PROGRESS NOTES
TRANSFER - IN REPORT:    Verbal report received from WINSTON Marti (ext. [phone]) on Daly Redding  being received from CCU Rm # [phone] for routine progression of care      Report consisted of patients Situation, Background, Assessment and   Recommendations(SBAR). Information from the following report(s) SBAR, Kardex, and MAR was reviewed with the receiving nurse. Opportunity for questions and clarification was provided. Assessment completed upon patients arrival to unit and care assumed.

## 2023-03-02 NOTE — PROGRESS NOTES
0700- Bedside and Verbal shift change report given to Kaia MONTERO (oncoming nurse) by Kari Owusu RN  (offgoing nurse). Report included the following information SBAR, Kardex, Intake/Output, MAR, and Recent Results. 0800- Shift assessment completed. Patient resting in bed. A&O to name; delayed response; restless; verbalized patient in pain all over but unable to rate the pain; will give the oral pain med; mills intact; VS stable    0915- Updated Dr. Stacy Finch at the bedside about the patient. 1200- Patient has a temp of 102.9. Administered tylenol. Notified Dr. Gerda Salinas at the bedside about the same and positive blood cultures. 1430- Patient's temp persist at 102.4; placed ice packs under neck and arm; Dr. Gerda Salinas notified. Updated patient's family at the bedside. 1500- Administered one time dose of 325mg of Tylenol. 1600- Patient's temp still at 102. 2. Dr. Gerda Salinas notified. [phone]- called to give report; RN busy caring for another patient; she will thang back. 1715 - TRANSFER - OUT REPORT:    Verbal report given to Jaylin(name) on Earl Kieran  being transferred to Oncology(unit) for routine progression of care       Report consisted of patients Situation, Background, Assessment and   Recommendations(SBAR). Information from the following report(s) SBAR, Kardex, ED Summary, Intake/Output, MAR, and Recent Results was reviewed with the receiving nurse. Lines:   Peripheral IV 02/27/23 Right Antecubital (Active)   Site Assessment Clean, dry, & intact 03/02/23 1600   Phlebitis Assessment 0 03/02/23 1600   Infiltration Assessment 0 03/02/23 1600   Dressing Status Clean, dry, & intact 03/02/23 1600   Dressing Type Tape;Transparent 03/02/23 1600   Hub Color/Line Status Pink;Flushed 03/02/23 1600   Action Taken Open ports on tubing capped 03/02/23 1600   Alcohol Cap Used Yes 03/02/23 1600       Peripheral IV 02/28/23 Anterior; Left Forearm (Active)   Site Assessment Clean, dry, & intact 03/02/23 1600 Phlebitis Assessment 0 03/02/23 1600   Infiltration Assessment 0 03/02/23 1600   Dressing Status Clean, dry, & intact 03/02/23 1600   Dressing Type Tape;Transparent 03/02/23 1600   Hub Color/Line Status Pink;Flushed 03/02/23 1600   Action Taken Open ports on tubing capped 03/02/23 1600   Alcohol Cap Used Yes 03/02/23 1600       Peripheral IV 02/28/23 Posterior;Right Hand (Active)   Site Assessment Clean, dry, & intact 03/02/23 1600   Phlebitis Assessment 0 03/02/23 1600   Infiltration Assessment 0 03/02/23 1600   Dressing Status Clean, dry, & intact 03/02/23 1600   Dressing Type Tape;Transparent 03/02/23 1600   Hub Color/Line Status Pink;Flushed 03/02/23 1600   Action Taken Open ports on tubing capped 03/02/23 1600   Alcohol Cap Used Yes 03/02/23 1600        Opportunity for questions and clarification was provided.       Patient transported with:   Monitor  Registered Nurse  Tech    Heated high flow

## 2023-03-02 NOTE — PROGRESS NOTES
Progress Note    Patient: Martine Hernandez MRN: [id number]  SSN: [id number]    YOB: 1955  Age: 69 y.o. Sex: male      Admit Date: 2/27/2023    LOS: 2 days     Subjective:   69 M with recently diagnosed widely metastatic cancer hepatocellular carcinoma. Admitted 02/28 to ICU with diagnoses of severe sepsis, acute hypoxic respiratory failure, pyuria - suspected UTI, suspected PNA, acute kidney injury. Seen by Oncology 02/28 who documented that he is not a candidate for chemotherapy and recommended Hospice. Seen by Palliative Care 02/28 and DNR established. SUBJ: presently nonverbal after dilaudid administered for pain. Per RN, previously was answering questions and indicated that he is \"ready to go to God\". No overt respiratory distress on HHFNC 55%    Objective:     Vitals:    03/02/23 0700 03/02/23 0800 03/02/23 0855 03/02/23 1128   BP: (!) 146/89 (!) 143/89     Pulse: 99 (!) 102     Resp: 27 27     Temp:  98.3 °F (36.8 °C)     SpO2: 92% 92% 93% 93%   Weight:       Height:            Intake and Output:  Current Shift: 03/02 0701 - 03/02 1900  In: -   Out: 75 [Urine:75]  Last three shifts: 02/28 1901 - 03/02 0700  In: 1680 [P.O.:480; I.V.:1200]  Out: 2576 [Urine:2575]    Physical Exam:   GENERAL: fatigued, no distress, appears stated age  EYE: negative  THROAT & NECK: normal and JVP not visualized  LUNG: clear to auscultation bilaterally  HEART: regular rate and rhythm, S1, S2 normal, no murmur, click, rub or gallop  ABDOMEN: soft, non-tender. Bowel sounds normal. No masses,  no organomegaly  EXTREMITIES:  extremities normal, atraumatic, no cyanosis or edema  NEUROLOGIC: negative on limited exam    Lab/Data Review:  Lab results reviewed. For significant abnormal values and values requiring intervention, see assessment and plan. No new CXR    Assessment:   Acute hypoxic respiratory failure  Probable RLL PNA. Doubt atypical PNA  Pyuria - likely UTI  CHELLE, resolving. Cr improving.  Baseline Cr 0.82  Severe sepsis  Widely metastatic HCC with mets to C-spine, lungs  Mild thrombocytopenia  Severe cancer pain  Adult failure to thrive  DNR      Plan:   Discussed with Palliative Care  Recommended transfer to Hospice and Comfort measures only  For now, continue HHFNC  Continue Zosyn  DC azithromycin  Continue Dilaudid as needed  Transfer to medical bed without cardiac monitoring  Transfer to Hospitalist Service - discussed with Dr Durham Call  After transfer, [facility] Service will sign off    Signed By: Carolina Lucero MD     March 2, 2023

## 2023-03-03 NOTE — PROGRESS NOTES
End of Shift Note    Bedside shift change report given to Hughes Pallas, RN (oncoming nurse) by Ollie Faye RN(offgoing nurse). Report included the following information SBAR, Kardex, and MAR    Shift worked: 7p-7a     Shift summary and any significant changes:    Patient A/Ox0, best rest, total care, imlls present for comfort, patient on 6L NC. Mills care completed during our shift. Patient family present at bedside at the beginning of shift. All scheduled medications given except for Gabapentin due to patients inability to swallow. PRN medications given during our shift are as follows: Tylenol x2 (suppository) due to fever and Dilaudid X2 for pain. In addition to the suppository, we placed ice packs under the patients axillary area bilaterally and the pelvic area. Morning labs not obtained - contacted PICC team for placement of an endurance catheter. Concerns for physician to address: N/A     Zone phone for oncoming shift:  N/A       Activity:  Activity Level: Bed Rest  Number times ambulated in hallways past shift: 0  Number of times OOB to chair past shift: 0    Cardiac:   Cardiac Monitoring: Yes      Cardiac Rhythm: Sinus Tachy    Access:  Current line(s): PIV     Genitourinary:   Urinary status: mills    Respiratory:   O2 Device: Nasal cannula  Chronic home O2 use?: YES  Incentive spirometer at bedside: NO       GI:  Last Bowel Movement Date: 03/02/23  Current diet:  DIET NPO Sips of Clear Liquids  Passing flatus: YES  Tolerating current diet: NO       Pain Management:   Patient states pain is manageable on current regimen: YES    Skin:  Lit Score: 10  Interventions: speciality bed, float heels, and PT/OT consult    Patient Safety:  Fall Score:  Total Score: 3  Interventions: bed/chair alarm, assistive device (walker, cane, etc), and gripper socks  High Fall Risk: Yes    Length of Stay:  Expected LOS: 5d 0h  Actual LOS: [address]

## 2023-03-03 NOTE — PROGRESS NOTES
End of Shift Note    Bedside shift change report given to Leo Benitez RN (oncoming nurse) by Cristian Harris RN (offgoing nurse). Report included the following information SBAR, Kardex, and MAR    Shift worked:  7a - 7:30p     Shift summary and any significant changes:     Pt changed from hi-flow O2 to 6L O2 via NC by RT just prior to arrival to Onc     Scheduled meds given. Capsule of gabapentin emptied into medicine cup and mixed w/ small amount of apple juice. Pt able to swallow but appeared to have difficulty, taking multiple attempts to get juice down. PRN IV dilaudid given x 1 for pain \"all over\"     Dual skin assessment completed; skin tear mid gluteal cleft and abrasion L lower outer buttock documented in avatar     Incontinence care x 1; BM x 1     Concerns for physician to address:       Zone phone for oncoming shift:   [phone]       Activity:  Activity Level: Bed Rest  Number times ambulated in hallways past shift: 0  Number of times OOB to chair past shift: 0    Cardiac:   Cardiac Monitoring: No      Cardiac Rhythm: Sinus Tachy    Access:  Current line(s): PIV     Genitourinary:   Urinary status: mills    Respiratory:   O2 Device: Nasal cannula  Chronic home O2 use?: YES  Incentive spirometer at bedside: NO       GI:  Last Bowel Movement Date: 03/02/23  Current diet:  DIET NPO Sips of Clear Liquids  Passing flatus: YES  Tolerating current diet: NO       Pain Management:   Patient states pain is manageable on current regimen: NO    Skin:  Lit Score: 11  Interventions: float heels, limit briefs, internal/external urinary devices, and nutritional support     Patient Safety:  Fall Score:  Total Score: 3  Interventions: bed/chair alarm, gripper socks, pt to call before getting OOB, and stay with me (per policy)  High Fall Risk: Yes    Length of Stay:  Expected LOS: 5d 0h  Actual LOS: 2      Cristian Harris RN

## 2023-03-03 NOTE — PROGRESS NOTES
Hospitalist Progress Note    NAME: Lora Davis   :  1955   MRN:  [id number]            Assessment / Plan:  Severe Sepsis due to UTI + PNA  -urine culture with morganella morganii and blood cultures with 1/2 GNR  -Broad spectrum abx  -CT abdomen showed Patchy bilateral airspace disease and bilateral lower lobe consolidation with air bronchograms and  Mild bilateral hydronephrosis may be related to distended urinary bladder. Acute hypoxic respiratory failure due to community acquired PNA  -cont HFNC for goal SPO2 >92%  -CT showed patchy bilateral lower lobe consolidations  -antibiotics as above  -covid and flu negative     CHELLE-likely post obstructive  -Cr 5.6 up from baseline of normal  - likely due to post obstructive, much improved with mills placement  - Mills for strict I's and O's, can transition to comfort mills if hospice decided  - Avoid nephrotoxic agents     DM2  -FS insulin sliding scale     Metastatic cancer-unknown primary  -CT abd  showed heterogeneous liver concerning for metastatic disease and osseous metastatic disease.  -Metastatic disease to spine. Mets to lungs, lumbar spine, and liver seen on additional imaging during previous admission  -surgical pathology sent on  showed Metastatic poorly differentiated carcinoma  -heme/onc - not a candidate for therapy     Pain  - Appreciate palliative care and pain management recommendations. - Continue goals of care. Would like to consider home hospice     30.0 - 39.9 Obese / Body mass index is 36.12 kg/m². Code status: DNR  Prophylaxis: Lovenox  Recommended Disposition:  Hospice likely  JARET: <48 hrs? Barriers: GOC meetings       Subjective:     Chief Complaint / Reason for Physician Visit  Discussed with RN events overnight. Patient seen with daughter present at bedside. Well versed in current plan of care. Interested in home with hospice if possible.     Review of Systems:  Symptom Y/N Comments  Symptom Y/N Comments Fever/Chills y   Chest Pain n    Poor Appetite    Edema     Cough    Abdominal Pain n    Sputum    Joint Pain     SOB/SAPP y   Pruritis/Rash     Nausea/vomit    Tolerating PT/OT     Diarrhea    Tolerating Diet     Constipation    Other       Could NOT obtain due to:      Objective:     VITALS:   Last 24hrs VS reviewed since prior progress note. Most recent are:  Patient Vitals for the past 24 hrs:   Temp Pulse Resp BP SpO2   03/02/23 1801 99.5 °F (37.5 °C) (!) 103 20 (!) 110/94 95 %   03/02/23 1751 -- -- -- -- 96 %   03/02/23 1600 (!) 102.2 °F (39 °C) (!) 105 29 130/83 96 %   03/02/23 1521 -- -- -- -- 96 %   03/02/23 1400 (!) 102.4 °F (39.1 °C) (!) 111 21 122/84 91 %   03/02/23 1300 (!) 102.4 °F (39.1 °C) (!) 108 26 123/76 92 %   03/02/23 1200 (!) 102.9 °F (39.4 °C) (!) 107 26 134/83 93 %   03/02/23 1128 -- -- -- -- 93 %   03/02/23 0855 -- -- -- -- 93 %   03/02/23 0800 98.3 °F (36.8 °C) (!) 102 27 (!) 143/89 92 %   03/02/23 0700 -- 99 27 (!) 146/89 92 %   03/02/23 0600 -- 100 (!) 31 (!) 143/88 95 %   03/02/23 0500 -- 99 28 (!) 149/89 95 %   03/02/23 0400 99.7 °F (37.6 °C) 98 27 (!) 148/90 93 %   03/02/23 0318 -- -- -- -- 95 %   03/02/23 0300 -- 98 26 (!) 146/94 94 %   03/02/23 0200 -- (!) 101 23 (!) 153/91 90 %   03/02/23 0100 -- 100 28 (!) 147/92 94 %   03/02/23 0000 (!) 100.5 °F (38.1 °C) 100 25 (!) 142/87 94 %   03/01/23 2316 -- -- -- -- 92 %   03/01/23 2300 -- (!) 103 24 (!) 147/91 91 %   03/01/23 2200 -- (!) 103 25 (!) 146/88 92 %   03/01/23 2100 -- (!) 104 27 (!) 149/89 91 %   03/01/23 2007 -- -- -- -- 93 %   03/01/23 2000 (!) 101.4 °F (38.6 °C) (!) 104 29 (!) 149/93 93 %       Intake/Output Summary (Last 24 hours) at 3/2/2023 1919  Last data filed at 3/2/2023 1500  Gross per 24 hour   Intake 450 ml   Output 965 ml   Net -515 ml        PHYSICAL EXAM:  General: WD. Alert, cooperative, no acute distress. Chills  EENT:  EOMI. Anicteric sclerae. MMM  Resp:  Coarse breath sounds bilaterally, no wheezing or rales. No accessory muscle use  CV:  Regular  rhythm,  No edema  GI:  Soft, Non distended, Non tender. +Bowel sounds  Neurologic:  Alert and oriented X 3, normal speech,   Psych:   Good insight. Not anxious nor agitated  Skin:  No rashes. No jaundice    Procedures: see electronic medical records for all procedures/Xrays and details which were not copied into this note but were reviewed prior to creation of Plan. LABS:  I reviewed today's most current labs and imaging studies. Pertinent labs include:  Recent Labs     03/02/23 0323 03/01/23 0330 02/28/23 0353   WBC 11.1 11.3* 12.3*   HGB 15.1 14.7 14.4   HCT 48.1 46.3 43.6   * 131* 136*     Recent Labs     03/02/23 0323 03/01/23 0330 02/28/23  1126 02/28/23 0353 02/27/23 2129 02/27/23 2129   * 147* 140 140  --  136   K 3.4* 3.3* 3.3* 2.9*  --  4.4   * 112* 107 105  --  101   CO2 30 30 26 25  --  23   * 155* 179* 171*  --  177*   BUN 40* 43* 52* 62*  --  78*   CREA 1.12 1.18 1.71* 3.02*  --  5.60*   CA 9.6 9.5 9.2 8.4*  --  8.9   MG 2.8* 2.7* 2.4 2.3   < >  --    PHOS 2.4* 2.1*  --  3.7  --   --    ALB  --   --   --   --   --  2.3*   TBILI  --   --   --   --   --  3.1*   ALT  --   --   --   --   --  81*    < > = values in this interval not displayed. Signed: Zahra López MD        Reviewed most current lab test results and cultures  YES  Reviewed most current radiology test results   YES  Review and summation of old records today    NO  Reviewed patient's current orders and MAR    YES  PMH/ reviewed - no change compared to H&P  ________________________________________________________________________  Care Plan discussed with:    Comments   Patient x    Family  x Daughter present at bedside   RN x    Care Manager     Consultant  x                     x Multidiciplinary team rounds were held today with , nursing, pharmacist and clinical coordinator.   Patient's plan of care was discussed; medications were reviewed and discharge planning was addressed.      ________________________________________________________________________  Total NON critical care TIME:  55   Minutes    Total CRITICAL CARE TIME Spent:   Minutes non procedure based      Comments   >50% of visit spent in counseling and coordination of care x    ________________________________________________________________________  Felicity Cadena MD

## 2023-03-03 NOTE — PROGRESS NOTES
CM: Corrina Amaro is currently working with pt in the [facility]. CM acknowledge consult, for hospice. CM entered referral to [address] [place] [address], via [facility]. CM will review d/c plans and needs with hospice liaisons once info session is completed. CM will continue to follow.     Corrina Amaro, CLARK, [address] [place] [address]

## 2023-03-03 NOTE — HOSPICE
[facility]  [phone]    Nursing Note   Patient Name: Avery Range  YOB: 1955  Age: 69 y.o. [address] Naomie Mendenhall RN Note:      Referral received and hospice consult noted. Chart reviewed. Will assess patient and contact family if they are not at bedside. If there are any questions, please call the main [address] number at [phone]. Thank you for the opportunity to be of service to this patient and his family. 1530:  Met with family at bedside: Amanda/daughter, patient's sister and wife. Call was placed to [address] [place] [address] who is presently at work and cannot meet until tomorrow.   Hospice meeting scheduled for Saturday, 3/4, at 10am.

## 2023-03-03 NOTE — PROGRESS NOTES
Hospitalist Progress Note    Subjective:   Daily Progress Note: 3/3/2023 7:30 AM    Patient is resting and arousable to pain. GCS of 8.     Current Facility-Administered Medications   Medication Dose Route Frequency    traMADoL (ULTRAM) tablet 50 mg  50 mg Oral Q6H PRN    hydrALAZINE (APRESOLINE) 20 mg/mL injection 10 mg  10 mg IntraVENous QID PRN    melatonin tablet 1.5 mg  1.5 mg Oral QHS PRN    benzonatate (TESSALON) capsule 100 mg  100 mg Oral TID PRN    cyanocobalamin (VITAMIN B12) tablet 1,000 mcg  1,000 mcg Oral DAILY    finasteride (PROSCAR) tablet 5 mg  5 mg Oral DAILY    folic acid (FOLVITE) tablet 1 mg  1 mg Oral DAILY    tamsulosin (FLOMAX) capsule 0.4 mg  0.4 mg Oral QHS    traZODone (DESYREL) tablet 200 mg  200 mg Oral QHS PRN    hydrOXYzine HCL (ATARAX) tablet 25 mg  25 mg Oral TID PRN    dextrose 5% with KCl 20 mEq/L infusion   IntraVENous CONTINUOUS    enoxaparin (LOVENOX) injection 30 mg  30 mg SubCUTAneous Q12H    HYDROmorphone (DILAUDID) injection 1 mg  1 mg IntraVENous Q3H PRN    HYDROmorphone (DILAUDID) 1 mg/mL oral solution 2 mg  2 mg SubLINGual Q4H PRN    sodium chloride (NS) flush 5-40 mL  5-40 mL IntraVENous Q8H    sodium chloride (NS) flush 5-40 mL  5-40 mL IntraVENous PRN    acetaminophen (TYLENOL) tablet 650 mg  650 mg Oral Q6H PRN    Or    acetaminophen (TYLENOL) suppository 650 mg  650 mg Rectal Q6H PRN    polyethylene glycol (MIRALAX) packet 17 g  17 g Oral DAILY PRN    [Held by provider] entecavir (BARACLUDE) tablet 0.5 mg  0.5 mg Oral ACD    gabapentin (NEURONTIN) capsule 300 mg  300 mg Oral Q8H    glucose chewable tablet 16 g  4 Tablet Oral PRN    glucagon (GLUCAGEN) injection 1 mg  1 mg IntraMUSCular PRN    dextrose 10% infusion 0-250 mL  0-250 mL IntraVENous PRN    piperacillin-tazobactam (ZOSYN) 3.375 g in 0.9% sodium chloride (MBP/ADV) 100 mL MBP  3.375 g IntraVENous Q8H    alcohol 62% (NOZIN) nasal  1 Ampule  1 Ampule Topical Q12H    balsam peru-castor oiL (VENELEX) ointment   Topical BID    pantoprazole (PROTONIX) 40 mg in 0.9% sodium chloride 10 mL injection  40 mg IntraVENous DAILY    lidocaine 4 % patch 3 Patch  3 Patch TransDERmal Q24H        Review of Systems  Review of Systems   Unable to perform ROS: Acuity of condition          Objective:     Visit Vitals  BP 99/67   Pulse 100   Temp 99.5 °F (37.5 °C)   Resp 28   Ht 6' 2\" (1.88 m)   Wt 127.6 kg (281 lb 4.9 oz)   SpO2 100%   BMI 36.12 kg/m²    O2 Flow Rate (L/min): 6 l/min O2 Device: Nasal cannula    Temp (24hrs), Av.6 °F (38.7 °C), Min:99.5 °F (37.5 °C), Max:103.1 °F (39.5 °C)      No intake/output data recorded.  1901 -  0700  In: 450 [I.V.:450]  Out: 965 [Urine:965]    CT ABD PELV WO CONT   Final Result      1. Patchy bilateral airspace disease and bilateral lower lobe consolidation with   air bronchograms. 2. Heterogeneous liver concerning for metastatic disease. 3. Mild bilateral hydronephrosis may be related to distended urinary bladder. Mcfadden catheter positioned within the prostate, should be repositioned. 4. Osseous metastatic disease. The findings were called to the ER nurse on 2023 at [phone] by myself.      789. XR CHEST PORT   Final Result   Bibasilar atelectasis and diminished lung volumes. PHYSICAL EXAM:    Physical Exam  Vitals and nursing note reviewed. Constitutional:       Appearance: He is ill-appearing and diaphoretic. HENT:      Head: Normocephalic and atraumatic. Mouth/Throat:      Mouth: Mucous membranes are dry. Cardiovascular:      Rate and Rhythm: Normal rate and regular rhythm. Pulmonary:      Effort: No respiratory distress. Breath sounds: No wheezing. Comments: On nasal canula  Abdominal:      General: Bowel sounds are normal. There is no distension. Palpations: Abdomen is soft. Tenderness: There is no abdominal tenderness. Musculoskeletal:      Right lower leg: No edema.       Left lower leg: No edema.   Skin:     General: Skin is warm. Capillary Refill: Capillary refill takes less than 2 seconds. Neurological:      Mental Status: He is alert. He is disoriented. Comments: GCS of 8   Psychiatric:      Comments: Unable to assess        Data Review    No results found for this or any previous visit (from the past 24 hour(s)). Assessment/Plan:     Active Problems:    Respiratory failure ([address] (2/28/2023)      Severe sepsis ([address] (2/28/2023)      Goals of care, counseling/discussion (2/28/2023)      Neck pain (2/28/2023)      Delirium (2/28/2023)      DNR (do not resuscitate) discussion (2/28/2023)      Palliative care by specialist (2/28/2023)      Hospital Course: Petra Urena is a 69 M with a PMH of alcohol abuse, DM, HTN, and recently diagnosed metastatic hepatocellular carcinoma who presented with shortness of breath. In ED 70% on RA, febrile at 102. 6. Patient place on nrb 15L. Initial labs significant for WBC of 13.4, creatinine of 5.6, BNP of 502, and elevated LFTs.  pH 7.5/31.9/26. Influenza and COVID-negative. EKG was sinus tachycardia. UA with leuk esterase, nitrates, pyuria, and bacteria. CXR with bibasilar atelectasis and diminished lung volumes. Patient admitted to ICU for further management. Patient started on zosyn, azithromycin, and IVF. Mcfadden inserted. Oncology consulted. Oncology deemed he is not a candidate for chemotherapy and recommended hospice. CT of the abdomen pelvis showed patchy bilateral airspace disease and bilateral lower lobe consolidation with air bronchograms, heterogeneous liver, bilateral hydronephrosis and osseous metastatic disease. Palliative care consulted. Patient elected to be a DNR. Urine culture grew morganella morganii, azithromycin discontinued. Overnight patient unable to swallow.      Severe sepsis, POA  2/2 UTI/PNA  Continue on zosyn  2/27 blood: GNR in single bottle paired culture NGTD, prelim  2/27 urine: morganella morganii    Acute respiratory failure with hypoxia  Currently on HFNC, wean as tolerated  Goal SPO2 >92%    Acute kidney injury  Hydronephrosis  2/2 post-obstructive   Continue on IVF  Monitor Is & Os and UOP  Avoid nephrotoxic medications    BPH  Hold tamsulosin and finasteride    Hypernatremia  Continue on D5    Diabetes Mellitus, type II  Continue on SSI PRN    Metastatic HCC  CT abd/pelvis as above  1/31 pathology showed metastatic poorly differentiated carcinoma  Oncology deemed not a surgical candidate  DNR    Cancer-related pain  Continue on gabapentin and lidocaine patches  As needed: angelica  GOC discussion - would like to consider home hospice  Appreciate palliative care and pain management recommendations     DVT Prophylaxis: lovenox  GI Prophylaxis: protonix  COD STATUS: DNR  Discharge and disposition barriers: [address] discussion with palliative, O2 weaning, 24hr    Care Plan discussed with: Patient/Family and Nurse    Total time spent with patient: 35 minutes.     This care involved high complexity medical decision making in which I personally:  Reviewed the flow sheet and previous days notes  Reviewed and summarized records/history from previous days note or discussions with staff and family  Reviewed High Risk Drug therapy requiring intensive monitoring for toxicity to include but is not limited to steroids, pressors, and antibiotics  Reviewed and/or ordered clinical laboratory tests  Reviewed images and/or ordered radiology tests  Reviewed the patient's EKG/telemetry

## 2023-03-03 NOTE — PROGRESS NOTES
Palliative Medicine Consult  Tone: [phone] ([phone]    Patient Name: Jassi Suazo  YOB: 1955    Date of Initial Consult: 2/28/23  Date of Today's Visit: 3/3/2023  Reason for Consult: Care Decisions  Requesting Provider: Nestor Tejada NP   Primary Care Physician: Lissy Greer MD     SUMMARY:   Jassi Suazo is a 69 y.o. with a past history of DM2, anxiety, htn, and recent diagnosis of metastatic [address] (to lung and spine- cervical), who was admitted on 2/27/2023 from rehab with a diagnosis of severe sepsis due to UTI and PNA. PALLIATIVE DIAGNOSES:   Goals of care  Cervical neck pain- cancer pain  Delirium  DNR Discussion  Palliative Care     PLAN:   Chart reviewed including labs, imaging, notes. Pt is less conversant today, he is unable to participate in discussion. Spoke with Margie Schulte and sister in the room. Discussed with Kyler Vasquez via phone  [address]: discussed transitioning patient to comfort care which entails a focus on symptom management, we would limit lab draws, IVF, artificial nutrition, IV abx, transfusions etc. Kyler Vasquez expressed understanding and would like to proceed with this transition  Discussed hospice services, family is hoping pt may be able to stay in the hospital on hospice services. Hospice consult placed  Cervical neck pain:  Continue 1mg IV dilaudid prn with the SL dilaudid.  He has required 4 doses IV dilaudid and 2 doses SL in last 24 hours  Dyspnea / Acute respiratory failure: weaned off high flow and is now on NC  Initial consult note routed to primary continuity provider and/or primary health care team members  Communicated plan of care with: Palliative IDT, [address] / TREATMENT PREFERENCES:     GOALS OF CARE:  Patient/Health Care Proxy Stated Goals: Comfort    TREATMENT PREFERENCES:   Code Status: DNR    Advance Care Planning:  [x] [facility] Interdisciplinary Team has updated the ACP Navigator with [place] and Patient Capacity      Primary Decision Maker: Carline Prince - Daughter - [phone]    Secondary Decision Maker: Vic Humhprey - Daughter - [phone]  Advance Care Planning 2/28/2023   Confirm Advance Directive Yes, on file   Patient Would Like to Complete Advance Directive -       Medical Interventions: Limited additional interventions       Other:    As far as possible, the palliative care team has discussed with patient / health care proxy about goals of care / treatment preferences for patient.      HISTORY:     History obtained from: chart, daughter    CHIEF COMPLAINT: I'm ok    HPI/SUBJECTIVE:    The patient is:   [] Verbal and participatory  [x] Non-participatory due to:      Somnolent today, not as interactive    Clinical Pain Assessment (nonverbal scale for severity on nonverbal patients):   Clinical Pain Assessment  Severity: 8  Location: neck and shoulders  Character: unable to tell me  Duration: unable to tell me  Effect: unable to tell me  Factors: unable to tell me  Frequency: unable to tell me     Activity (Movement): Lying quietly, normal position    Duration: for how long has pt been experiencing pain (e.g., 2 days, 1 month, years)  Frequency: how often pain is an issue (e.g., several times per day, once every few days, constant)     FUNCTIONAL ASSESSMENT:     Palliative Performance Scale (PPS):  PPS: 30       PSYCHOSOCIAL/SPIRITUAL SCREENING:     Palliative IDT has assessed this patient for cultural preferences / practices and a referral made as appropriate to needs (Cultural Services, Patient Advocacy, Ethics, etc.)    Any spiritual / Yazidi concerns:  [] Yes /  [x] No   If \"Yes\" to discuss with pastoral care during IDT     Does caregiver feel burdened by caring for their loved one:   [] Yes /  [x] No /  [] No Caregiver Present/Available [] No Caregiver [] Pt Lives at [facility]  If \"Yes\" to discuss with social work during IDT    Anticipatory grief assessment:   [x] Normal  / [] Maladaptive     If \"Maladaptive\" to discuss with social work during IDT    ESAS Anxiety: Anxiety: 3    ESAS Depression: Depression: 0        REVIEW OF SYSTEMS:     Positive and pertinent negative findings in ROS are noted above in HPI. The following systems were [x] reviewed / [] unable to be reviewed as noted in HPI  Other findings are noted below. Systems: constitutional, ears/nose/mouth/throat, respiratory, gastrointestinal, genitourinary, musculoskeletal, integumentary, neurologic, psychiatric, endocrine. Positive findings noted below. Modified ESAS Completed by: provider   Fatigue: 4     Depression: 0 Pain: 8   Anxiety: 3     Anorexia: 10 Dyspnea: 5           Stool Occurrence(s): 1        PHYSICAL EXAM:     From RN flowsheet:  Wt Readings from Last 3 Encounters:   02/27/23 281 lb 4.9 oz (127.6 kg)   02/03/23 294 lb (133.4 kg)   01/28/23 280 lb (127 kg)     Blood pressure 99/67, pulse 100, temperature (!) 100.6 °F (38.1 °C), resp. rate 28, height 6' 2\" (1.88 m), weight 281 lb 4.9 oz (127.6 kg), SpO2 100 %.     Pain Scale 1: Adult Nonverbal Pain Scale  Pain Intensity 1: 5  Pain Onset 1: acute  Pain Location 1: Other (comment) (\"all over\" per pt)  Pain Orientation 1: Mid  Pain Description 1:  (FEVER)  Pain Intervention(s) 1: Medication (see MAR)  Last bowel movement, if known:     Constitutional: sleeping, does not arouse  Eyes: pupils equal, anicteric  ENMT: dry mucous membranes, poor dentition  Cardiovascular: regular rhythm, distal pulses intact  Respiratory: breathing not labored, symmetric  Gastrointestinal: soft non-tender, +bowel sounds  Musculoskeletal: no deformity, no tenderness to palpation  Skin: warm, dry  Other:       HISTORY:     Active Problems:    Respiratory failure (HCC) (2/28/2023)      Severe sepsis ([address] (2/28/2023)      Goals of care, counseling/discussion (2/28/2023)      Neck pain (2/28/2023)      Delirium (2/28/2023)      DNR (do not resuscitate) discussion (2/28/2023)      Palliative care by specialist (2/28/2023)    Past Medical History:   Diagnosis Date    BPH (benign prostatic hyperplasia)     Depression     DJD (degenerative joint disease) 03/08/2010    Hepatocellular carcinoma (HCC)     HTN (hypertension) 03/08/2010    STATES NO LONGER TAKING MEDICATION-STATES MD STOPPED    Insomnia     NIDDM (non-insulin dependent diabetes mellitus) 03/08/2010    Psychiatric disorder     paranoid schizophrenia, anxiety    Substance abuse ([facility] [address]     previous hx of Dts from ETOH    Suicidal thoughts       Past Surgical History:   Procedure Laterality Date    BIOPSY PROSTATE      HX UROLOGICAL      turp      Family History   Problem Relation Age of Onset    Cancer Mother         lymphoma    Heart Disease Father         CHF      History reviewed, no pertinent family history.   Social History     Tobacco Use    Smoking status: Former     Packs/day: 1.00     Years: 10.00     Pack years: 10.00     Types: Cigarettes    Smokeless tobacco: Never    Tobacco comments:     patient has been decreasing amount of cigarettes   Substance Use Topics    Alcohol use: No     Alcohol/week: 35.0 standard drinks     Types: 42 Cans of beer per week     Comment: states stopped drinking 1 month ago     Allergies   Allergen Reactions    Motrin [Ibuprofen] Other (comments)     \"Stomach ulcers\"        Current Facility-Administered Medications   Medication Dose Route Frequency    traMADoL (ULTRAM) tablet 50 mg  50 mg Oral Q6H PRN    hydrALAZINE (APRESOLINE) 20 mg/mL injection 10 mg  10 mg IntraVENous QID PRN    melatonin tablet 1.5 mg  1.5 mg Oral QHS PRN    benzonatate (TESSALON) capsule 100 mg  100 mg Oral TID PRN    cyanocobalamin (VITAMIN B12) tablet 1,000 mcg  1,000 mcg Oral DAILY    finasteride (PROSCAR) tablet 5 mg  5 mg Oral DAILY    folic acid (FOLVITE) tablet 1 mg  1 mg Oral DAILY    tamsulosin (FLOMAX) capsule 0.4 mg  0.4 mg Oral QHS    traZODone (DESYREL) tablet 200 mg  200 mg Oral QHS PRN    hydrOXYzine HCL (ATARAX) tablet 25 mg  25 mg Oral TID PRN    dextrose 5% with KCl 20 mEq/L infusion   IntraVENous CONTINUOUS    enoxaparin (LOVENOX) injection 30 mg  30 mg SubCUTAneous Q12H    HYDROmorphone (DILAUDID) injection 1 mg  1 mg IntraVENous Q3H PRN    HYDROmorphone (DILAUDID) 1 mg/mL oral solution 2 mg  2 mg SubLINGual Q4H PRN    sodium chloride (NS) flush 5-40 mL  5-40 mL IntraVENous Q8H    sodium chloride (NS) flush 5-40 mL  5-40 mL IntraVENous PRN    acetaminophen (TYLENOL) tablet 650 mg  650 mg Oral Q6H PRN    Or    acetaminophen (TYLENOL) suppository 650 mg  650 mg Rectal Q6H PRN    polyethylene glycol (MIRALAX) packet 17 g  17 g Oral DAILY PRN    [Held by provider] entecavir (BARACLUDE) tablet 0.5 mg  0.5 mg Oral ACD    gabapentin (NEURONTIN) capsule 300 mg  300 mg Oral Q8H    glucose chewable tablet 16 g  4 Tablet Oral PRN    glucagon (GLUCAGEN) injection 1 mg  1 mg IntraMUSCular PRN    dextrose 10% infusion 0-250 mL  0-250 mL IntraVENous PRN    piperacillin-tazobactam (ZOSYN) 3.375 g in 0.9% sodium chloride (MBP/ADV) 100 mL MBP  3.375 g IntraVENous Q8H    alcohol 62% (NOZIN) nasal  1 Ampule  1 Ampule Topical Q12H    balsam peru-castor oiL (VENELEX) ointment   Topical BID    pantoprazole (PROTONIX) 40 mg in 0.9% sodium chloride 10 mL injection  40 mg IntraVENous DAILY    lidocaine 4 % patch 3 Patch  3 Patch TransDERmal Q24H          LAB AND IMAGING FINDINGS:     Lab Results   Component Value Date/Time    WBC 11.1 03/02/2023 03:23 AM    HGB 15.1 03/02/2023 03:23 AM    PLATELET 083 (L) 87/73/6902 03:23 AM     Lab Results   Component Value Date/Time    Sodium 151 (H) 03/02/2023 03:23 AM    Potassium 3.4 (L) 03/02/2023 03:23 AM    Chloride 117 (H) 03/02/2023 03:23 AM    CO2 30 03/02/2023 03:23 AM    BUN 40 (H) 03/02/2023 03:23 AM    Creatinine 1.12 03/02/2023 03:23 AM    Calcium 9.6 03/02/2023 03:23 AM    Magnesium 2.8 (H) 03/02/2023 03:23 AM    Phosphorus 2.4 (L) 03/02/2023 03:23 AM      Lab Results   Component Value Date/Time Alk. phosphatase 194 (H) 02/27/2023 09:29 PM    Protein, total 8.5 (H) 02/27/2023 09:29 PM    Albumin 2.3 (L) 02/27/2023 09:29 PM    Globulin 6.2 (H) 02/27/2023 09:29 PM     Lab Results   Component Value Date/Time    INR 1.3 (H) 02/06/2023 01:54 AM    Prothrombin time 13.0 (H) 02/06/2023 01:54 AM    aPTT 29.1 01/30/2023 08:28 AM      No results found for: IRON, FE, TIBC, IBCT, PSAT, FERR   No results found for: PH, PCO2, PO2  No components found for: [place]   Lab Results   Component Value Date/Time     06/24/2012 03:20 AM    CK - MB 3.8 (H) 06/21/2012 08:00 PM                Total time: 60 minutes  Counseling / coordination time, spent as noted above:   > 50% counseling / coordination?:     Prolonged service was provided for  []30 min   []75 min in face to face time in the presence of the patient, spent as noted above. Time Start:   Time End:   Note: this can only be billed with 68254 (initial) or 84537 (follow up). If multiple start / stop times, list each separately.

## 2023-03-04 NOTE — PROGRESS NOTES
SPEECH PATHOLOGY BEDSIDE SWALLOW EVALUATION/DISCHARGE  Patient: Coleen Izaguirre (48 y.o. male)  Date: 3/4/2023  Primary Diagnosis: Severe sepsis ([facility] [address] [A41.9, R65.20]  Respiratory failure ([facility] [address] [J96.90]       Precautions:        ASSESSMENT :  Based on the objective data described below, the patient presents with a functional oral phase with limited consistencies trialed and increased risk for silent aspiration given suspected recurrent laryngeal nerve involvement. Note Chest CT revealed \"Bilateral lower lobe airspace opacities are predominantly subpleural. Nodule in the posterior basilar left lower lobe measures 1 cm. Left upper lobe nodule measures 0.6 cm. Other left upper lobe nodule is smaller and ill-defined. Right upper lobe is clear. \" Vocal quality observed to be severely aphonic. Patient with subjectively assessed decreased cough strength and in conjunction with dysphonia, there's concern for vocal fold closure and subsequent airway protection. Patient with no overt signs of aspiration with ice chip, thin liquid, and puree consistencies. However given potential ?recurrent laryngeal nerve involvement, silent aspiration cannot be ruled out at bedside. Educated patient's family at bedside re: SLP evaluation. Given goals for comfort, would consider Full Liquid diet and pureed snacks per patient's preference/desire to eat/drink. Discussed pillars of aspiration, mobility, dependence for feeding, and dependence for oral care. Discussed Flexible Endoscopic Evaluation of Swallow (FEES) at bedside and that further imaging would not necessarily be indicated if [address] stay the same. Family verbalized understanding. RN educated re: SLP evaluation and recommendations. Skilled acute therapy provided by a speech-language pathologist is not indicated at this time given goals for comfort.      PLAN :  Recommendations:  -- Patient with subjectively assessed decreased cough strength and in conjunction with severely aphonic vocal quality, there's concern for vocal fold closure and subsequent airway protection. Given potential ?recurrent laryngeal nerve involvement, silent aspiration cannot be ruled out at bedside  -- Given goals for comfort, would consider Full Liquid diet and pureed snacks per patient's preference/desire to eat/drink  -- Stringent oral care  -- Skilled acute therapy provided by a speech-language pathologist is not indicated at this time given goals for comfort    Discharge Recommendations: Hospice     SUBJECTIVE:   Patient stated Keyonna Vasquez. OBJECTIVE:     Past Medical History:   Diagnosis Date    BPH (benign prostatic hyperplasia)     Depression     DJD (degenerative joint disease) 03/08/2010    Hepatocellular carcinoma (HCC)     HTN (hypertension) 03/08/2010    STATES NO Gregorio Camargo MD STOPPED    Insomnia     NIDDM (non-insulin dependent diabetes mellitus) 03/08/2010    Psychiatric disorder     paranoid schizophrenia, anxiety    Substance abuse ([facility] [address]     previous hx of Dts from ETOH    Suicidal thoughts      Past Surgical History:   Procedure Laterality Date    BIOPSY PROSTATE      HX UROLOGICAL      turp     Prior Level of Function/Home Situation:   Home Situation  Home Environment: Apartment  # Steps to Enter: 0  One/Two Story Residence: One story  Living Alone: No  Support Systems: [place]  Patient Expects to be Discharged to[de-identified] Home with hospice  Current DME Used/Available at Home: Adaptive bathing aides    Diet prior to admission: Regular/Thin Liquid  Current Diet:  NPO   Cognitive and Communication Status:  Neurologic State: Eyes open to voice  Orientation Level: Other (Comment) (Severely aphonic vocal quality limiting patient's intelligibility)  Cognition: Unable to assess (comment)     Perseveration: Perseverates during conversation  Safety/Judgement: Not assessed    Oral Assessment:  Oral Assessment  Dentition: Edentulous; Other (comment) (Edentulous upper;  Natural lower)  Oral Hygiene: Moist oral mucosa    P.O. Trials:  Patient Position: Semi-upright in bed  Vocal quality prior to P.O.: Aphonic  Consistency Presented: Ice chips; Thin liquid;Puree  How Presented: SLP-fed/presented;Spoon;Straw;Successive swallows     Bolus Acceptance: No impairment  Bolus Formation/Control: No impairment     Propulsion: No impairment  Oral Residue: None        Aspiration Signs/Symptoms: Other (comment) (At increased risk for silent aspiration given suspected recurrent laryngeal nerve involvement)  Pharyngeal Phase Characteristics: Audible swallow;Multiple swallows                   NOMS:   The NOMS functional outcome measure was used to quantify this patient's level of swallowing impairment. Based on the NOMS, the patient was determined to be at level 4 for swallow function. NOMS Swallowing Levels:  Level 1 (CN): NPO  Level 2 (CM): NPO but takes consistency in therapy  Level 3 (CL): Takes less than 50% of nutrition p.o. and continues with nonoral feedings; and/or safe with mod cues; and/or max diet restriction  Level 4 (CK): Safe swallow but needs mod cues; and/or mod diet restriction; and/or still requires some nonoral feeding/supplements  Level 5 (CJ): Safe swallow with min diet restriction; and/or needs min cues  Level 6 (CI): Independent with p.o.; rare cues; usually self cues; may need to avoid some foods or needs extra time  Level 7 ([address] [place] [address]): Independent for all p.o.  YANNA. (2003). National Outcomes Measurement System (NOMS): Adult Speech-Language Pathology User's Guide. Pain:  Pain Scale 1: Adult Nonverbal Pain Scale  Pain Intensity 1: 0       After treatment:   Patient left in no apparent distress in bed, Call bell within reach, Nursing notified, and Caregiver / family present    COMMUNICATION/EDUCATION:   Patient's family was educated regarding role of SLP, increased risk for silent aspiration, comfort diet recommendations, and safe swallow precautions.  They demonstrated good understanding as evidenced by verbalizing understanding. The patient's plan of care including recommendations, planned interventions, and recommended diet changes were discussed with: Registered nurse.      Thank you for this referral.  Avery Corbett, SLP  Time Calculation: 25 mins

## 2023-03-04 NOTE — PROGRESS NOTES
..End of Shift Note    Bedside shift change report given to [id number] Dominguez PATRICIA Champion (oncoming nurse) by Jessica Oswald RN (offgoing nurse). Report included the following information SBAR, Kardex, Intake/Output, MAR, Recent Results, and Med Rec Status    Shift worked:  [id number]     Shift summary and any significant changes:     Hospice consult today with pt and family at bedside. Family present at bedside throughout the day. Pt given pain medications for back pain, see MAR. Pt takes medications crushed in apple sauce. Speech pathology consult today to evaluate swallowing. Pt advanced to full liquid diet. Caring rounds completed. Concerns for physician to address:  none     Zone phone for oncoming shift:   [phone]       Activity:  Activity Level: Bed Rest  Number times ambulated in hallways past shift: 0  Number of times OOB to chair past shift: 0    Cardiac:   Cardiac Monitoring: No      Cardiac Rhythm: Sinus Tachy    Access:  Current line(s): PIV     Genitourinary:   Urinary status: mills    Respiratory:   O2 Device: Nasal cannula  Chronic home O2 use?: NO  Incentive spirometer at bedside: N/A       GI:  Last Bowel Movement Date: 03/03/23  Current diet:  ADULT DIET Full Liquid  DIET ONE TIME MESSAGE  Passing flatus: YES  Tolerating current diet: NO       Pain Management:   Patient states pain is manageable on current regimen: YES    Skin:  Lit Score: 10  Interventions: float heels, internal/external urinary devices, and nutritional support     Patient Safety:  Fall Score:  Total Score: 3  Interventions: bed/chair alarm and gripper socks  High Fall Risk: Yes    Length of Stay:  Expected LOS: 5d 0h  Actual LOS: [address], RN

## 2023-03-04 NOTE — PROGRESS NOTES
End of Shift Note    Bedside shift change report given to Yolis Fuller RN (oncoming nurse) by Ulysses Gonzalez RN (offgoing nurse). Report included the following information SBAR, Kardex, and MAR    Shift worked:  7a - 7:30p     Shift summary and any significant changes:     Scheduled meds given     PRN dilaudid given x 3     PRN tylenol suppository given x 1 for temp 100.6     Hospice consulted     Family present at bedside     Concerns for physician to address:       Zone phone for oncoming shift:   [phone]       Activity:  Activity Level: Bed Rest  Number times ambulated in hallways past shift: 0  Number of times OOB to chair past shift: 0    Cardiac:   Cardiac Monitoring: No      Cardiac Rhythm: Sinus Tachy    Access:  Current line(s): PIV     Genitourinary:   Urinary status: mills    Respiratory:   O2 Device: Nasal cannula  Chronic home O2 use?: NO  Incentive spirometer at bedside: NO       GI:  Last Bowel Movement Date: 03/03/23  Current diet:  DIET NPO Sips of Clear Liquids  Passing flatus: YES  Tolerating current diet: NO       Pain Management:   Patient states pain is manageable on current regimen: YES    Skin:  Lit Score: 10  Interventions: float heels, limit briefs, and internal/external urinary devices    Patient Safety:  Fall Score:  Total Score: 3  Interventions: bed/chair alarm, gripper socks, and pt to call before getting OOB  High Fall Risk: Yes    Length of Stay:  Expected LOS: 5d 0h  Actual LOS: 3      Ulysses Gonzalez RN

## 2023-03-04 NOTE — PROGRESS NOTES
Hospitalist Progress Note    Subjective:   Daily Progress Note: 3/4/2023 7:30 AM    Patient is resting and having some facial ticks. Patient and patient family elected for home hospice while in hospital want to continue on IV antibiotics.     Current Facility-Administered Medications   Medication Dose Route Frequency    traMADoL (ULTRAM) tablet 50 mg  50 mg Oral Q6H PRN    hydrALAZINE (APRESOLINE) 20 mg/mL injection 10 mg  10 mg IntraVENous QID PRN    melatonin tablet 1.5 mg  1.5 mg Oral QHS PRN    benzonatate (TESSALON) capsule 100 mg  100 mg Oral TID PRN    cyanocobalamin (VITAMIN B12) tablet 1,000 mcg  1,000 mcg Oral DAILY    finasteride (PROSCAR) tablet 5 mg  5 mg Oral DAILY    folic acid (FOLVITE) tablet 1 mg  1 mg Oral DAILY    tamsulosin (FLOMAX) capsule 0.4 mg  0.4 mg Oral QHS    traZODone (DESYREL) tablet 200 mg  200 mg Oral QHS PRN    hydrOXYzine HCL (ATARAX) tablet 25 mg  25 mg Oral TID PRN    dextrose 5% with KCl 20 mEq/L infusion   IntraVENous CONTINUOUS    enoxaparin (LOVENOX) injection 30 mg  30 mg SubCUTAneous Q12H    HYDROmorphone (DILAUDID) injection 1 mg  1 mg IntraVENous Q3H PRN    HYDROmorphone (DILAUDID) 1 mg/mL oral solution 2 mg  2 mg SubLINGual Q4H PRN    sodium chloride (NS) flush 5-40 mL  5-40 mL IntraVENous Q8H    sodium chloride (NS) flush 5-40 mL  5-40 mL IntraVENous PRN    acetaminophen (TYLENOL) tablet 650 mg  650 mg Oral Q6H PRN    Or    acetaminophen (TYLENOL) suppository 650 mg  650 mg Rectal Q6H PRN    polyethylene glycol (MIRALAX) packet 17 g  17 g Oral DAILY PRN    [Held by provider] entecavir (BARACLUDE) tablet 0.5 mg  0.5 mg Oral ACD    gabapentin (NEURONTIN) capsule 300 mg  300 mg Oral Q8H    glucose chewable tablet 16 g  4 Tablet Oral PRN    glucagon (GLUCAGEN) injection 1 mg  1 mg IntraMUSCular PRN    dextrose 10% infusion 0-250 mL  0-250 mL IntraVENous PRN    piperacillin-tazobactam (ZOSYN) 3.375 g in 0.9% sodium chloride (MBP/ADV) 100 mL MBP  3.375 g IntraVENous Q8H alcohol 62% (NOZIN) nasal  1 Ampule  1 Ampule Topical Q12H    balsam peru-castor oiL (VENELEX) ointment   Topical BID    pantoprazole (PROTONIX) 40 mg in 0.9% sodium chloride 10 mL injection  40 mg IntraVENous DAILY    lidocaine 4 % patch 3 Patch  3 Patch TransDERmal Q24H        Review of Systems  Review of Systems   Unable to perform ROS: Acuity of condition          Objective:     Visit Vitals  /66 (BP 1 Location: Left upper arm, BP Patient Position: Supine)   Pulse 90   Temp 99.4 °F (37.4 °C)   Resp 17   Ht 6' 2\" (1.88 m)   Wt 127.6 kg (281 lb 4.9 oz)   SpO2 100%   BMI 36.12 kg/m²    O2 Flow Rate (L/min): 6 l/min O2 Device: Nasal cannula    Temp (24hrs), Av °F (37.8 °C), Min:99 °F (37.2 °C), Max:101 °F (38.3 °C)      No intake/output data recorded.  1901 -  0700  In: -   Out: 500 [Urine:500]    CT ABD PELV WO CONT   Final Result      1. Patchy bilateral airspace disease and bilateral lower lobe consolidation with   air bronchograms. 2. Heterogeneous liver concerning for metastatic disease. 3. Mild bilateral hydronephrosis may be related to distended urinary bladder. Mcfadden catheter positioned within the prostate, should be repositioned. 4. Osseous metastatic disease. The findings were called to the ER nurse on 2023 at [phone] by myself.      789. XR CHEST PORT   Final Result   Bibasilar atelectasis and diminished lung volumes. PHYSICAL EXAM:    Physical Exam  Vitals and nursing note reviewed. Constitutional:       Appearance: He is ill-appearing and diaphoretic. HENT:      Head: Normocephalic and atraumatic. Mouth/Throat:      Mouth: Mucous membranes are dry. Cardiovascular:      Rate and Rhythm: Normal rate and regular rhythm. Pulmonary:      Effort: No respiratory distress. Breath sounds: No wheezing. Comments: On nasal canula  Abdominal:      General: Bowel sounds are normal. There is no distension.       Palpations: Abdomen is soft. Tenderness: There is no abdominal tenderness. Musculoskeletal:      Right lower leg: No edema. Left lower leg: No edema. Skin:     General: Skin is warm. Capillary Refill: Capillary refill takes less than 2 seconds. Neurological:      Mental Status: He is alert. He is disoriented. Comments: GCS of 8   Psychiatric:      Comments: Unable to assess        Data Review    No results found for this or any previous visit (from the past 24 hour(s)). Assessment/Plan:     Active Problems:    Respiratory failure ([address] (2/28/2023)      Severe sepsis ([address] (2/28/2023)      Goals of care, counseling/discussion (2/28/2023)      Neck pain (2/28/2023)      Delirium (2/28/2023)      DNR (do not resuscitate) discussion (2/28/2023)      Palliative care by specialist (2/28/2023)      Hospital Course: Eros Aaron is a 69 M with a PMH of alcohol abuse, DM, HTN, and recently diagnosed metastatic hepatocellular carcinoma who presented with shortness of breath. In ED 70% on RA, febrile at 102. 6. Patient place on nrb 15L. Initial labs significant for WBC of 13.4, creatinine of 5.6, BNP of 502, and elevated LFTs.  pH 7.5/31.9/26. Influenza and COVID-negative. EKG was sinus tachycardia. UA with leuk esterase, nitrates, pyuria, and bacteria. CXR with bibasilar atelectasis and diminished lung volumes. Patient admitted to ICU for further management. Patient started on zosyn, azithromycin, and IVF. Mcfadden inserted. Oncology consulted. Oncology deemed he is not a candidate for chemotherapy and recommended hospice. CT of the abdomen pelvis showed patchy bilateral airspace disease and bilateral lower lobe consolidation with air bronchograms, heterogeneous liver, bilateral hydronephrosis and osseous metastatic disease. Palliative care consulted. Patient elected to be a DNR. Urine culture grew morganella morganii and azithromycin discontinued. Overnight patient unable to swallow.  Patient and patient family elected for home hospice while in hospital want to continue on IV antibiotics. SLP consulted. Severe sepsis, POA  2/2 UTI/PNA  Continue on zosyn  2/27 blood: GNR in single bottle paired culture NGTD, prelim  2/27 urine: morganella morganii    Acute respiratory failure with hypoxia  Currently on HFNC, wean as tolerated  Goal SPO2 >92%    Acute kidney injury  Hydronephrosis  2/2 post-obstructive   Continue on IVF  Monitor Is & Os and UOP  Avoid nephrotoxic medications    BPH  Hold tamsulosin and finasteride    Hypernatremia  Continue on D5    Diabetes Mellitus, type II  Continue on SSI as needed    Metastatic HCC  CT abd/pelvis as above  1/31 pathology showed metastatic poorly differentiated carcinoma  Oncology deemed not a surgical candidate  DNR    Cancer-related pain  Continue on gabapentin and lidocaine patches  As needed: angelica  GO discussion - would like to consider home hospice  Appreciate palliative care and pain management recommendations    DVT Prophylaxis: lovenox  GI Prophylaxis: protonix  COD STATUS: DNR  Discharge and disposition barriers: hospice delivery of equipment 3/5 or 3/6, 24-48hr    Care Plan discussed with: Patient/Family and Nurse    Total time spent with patient: 35 minutes.     This care involved high complexity medical decision making in which I personally:  Reviewed the flow sheet and previous days notes  Reviewed and summarized records/history from previous days note or discussions with staff and family  Reviewed High Risk Drug therapy requiring intensive monitoring for toxicity to include but is not limited to steroids, pressors, and antibiotics  Reviewed and/or ordered clinical laboratory tests  Reviewed images and/or ordered radiology tests  Reviewed the patient's EKG/telemetry

## 2023-03-04 NOTE — PROGRESS NOTES
End of Shift Note    Bedside shift change report given to WINSTON Condon (oncoming nurse) by Ollie Collins RN(offgoing nurse). Report included the following information SBAR, Kardex, and MAR    Shift worked:  7p-7a     Shift summary and any significant changes:    Patient A/Ox0, incontinent with mills for comfort, total care. Patient had 1 BM during shift. CHG/Full bath given, with mills care provided, and linen changed. Patient received all scheduled medications except for Flomax and Neurontin due to NPO status and patients inability to swallow. Prn medications administered during shift were: Dilaudid x4, and Tylenol suppository for fever. Concerns for physician to address: Patient has been spiking fever periodically throughout the night. Other medication options may be needed to combat this. Zone phone for oncoming shift:  N/A     Activity:  Activity Level: Bed Rest  Number times ambulated in hallways past shift: 0  Number of times OOB to chair past shift: 0    Cardiac:   Cardiac Monitoring: No      Cardiac Rhythm: Sinus Tachy    Access:  Current line(s): PIV     Genitourinary:   Urinary status: incontinent and mills    Respiratory:   O2 Device: Nasal cannula  Chronic home O2 use?: NO  Incentive spirometer at bedside: NO       GI:  Last Bowel Movement Date: 03/03/23  Current diet:  DIET NPO Sips of Clear Liquids  Passing flatus: YES  Tolerating current diet: NO       Pain Management:   Patient states pain is manageable on current regimen: YES    Skin:  Lit Score: 10  Interventions: turn team, speciality bed, float heels, and increase time out of bed    Patient Safety:  Fall Score:  Total Score: 3  Interventions: bed/chair alarm, assistive device (walker, cane, etc), and gripper socks  High Fall Risk: Yes    Length of Stay:  Expected LOS: 5d 0h  Actual LOS: 6[facility]

## 2023-03-04 NOTE — HOSPICE
[facility]  [phone]    Nursing Note   Patient Name: Liban Monroe  YOB: 1955  Age: 69 y.o. [address] [facility] RN Note:      Entered room: patient in bed, alert with family at bedside. Patient asking for \"water\" and verified with Roseanna MONTERO the diet order that allows for sips of clear liquid and ice chips. Asked patient if he was have pain and he stated yes. Informed Roseanna MONTERO who provided hydromorphone/SL. Met with Carline/daughter/MPOA1, Amanda/daughter/MPOA2 and other family members in a private area outside the patient's room. Discussed Hospice philosophy, general plan of care, levels of care, services and on call procedures. Per Vermillion, the patient told her that he wants to go home. Family shared that they will need to arrange caregivers among the family. Patient has Medicaid and suggested contacting them to see if they can help with caregiver assistance in the home. Family has requested a swallow test to assess safety in swallowing. This hospice liaison stated that this request will be shared with Ellie SILVERMAN. Vermillion wants to honor her father's wish to take him home with hospice services. Amanda plans to submit [facility] paperwork to her employer on Monday. Plan is to deliver equipment either Sunday or Monday; anticipate discharge on Tuesday. If there are any questions, please call the main [address] number at [phone].    1600: Family requested 2nd meeting to discuss patient's condition. Ellie SILVERMAN attended to answer family's questions in reference to patient's condition, use of IV antibiotics and IV fluids; all questions were answered. Family would like to talk with CM about placement with hospice services. If it becomes too much of a financial burden, family will look into taking patient home with hospice services.

## 2023-03-05 PROBLEM — R13.10 DYSPHAGIA: Status: ACTIVE | Noted: 2023-01-01

## 2023-03-05 PROBLEM — C79.51 HEPATOCELLULAR CARCINOMA METASTATIC TO BONE (HCC): Status: ACTIVE | Noted: 2023-01-01

## 2023-03-05 PROBLEM — C22.0 HEPATOCELLULAR CARCINOMA METASTATIC TO BONE (HCC): Status: ACTIVE | Noted: 2023-01-01

## 2023-03-05 PROBLEM — F10.939 ALCOHOL WITHDRAWAL (HCC): Status: RESOLVED | Noted: 2020-03-16 | Resolved: 2023-01-01

## 2023-03-05 PROBLEM — J96.01 ACUTE RESPIRATORY FAILURE WITH HYPOXIA (HCC): Status: ACTIVE | Noted: 2023-01-01

## 2023-03-05 PROBLEM — J15.6 PNEUMONIA DUE TO GRAM-NEGATIVE BACTERIA (HCC): Status: ACTIVE | Noted: 2023-01-01

## 2023-03-05 PROBLEM — N30.00 ACUTE CYSTITIS WITHOUT HEMATURIA: Status: ACTIVE | Noted: 2023-01-01

## 2023-03-05 NOTE — PROGRESS NOTES
End of Shift Note    Bedside shift change report given to Roseanna MONTERO(oncoming nurse) by Carl Kerns RN (offgoing nurse). Report included the following information SBAR, Kardex, and MAR    Shift worked:  Night     Shift summary and any significant changes:    0000:Pt;s breathing has just changed; fast and shallow. Unresponsive to sternum rub and Md informed. VS continues to stay within the normal range. Reviewed by the MD and a phone call made to both of his daughters( for updates on decline of condition) on record but none of them  picked. Pt being monitored. It's 3711 now and O2 sat trending down    between 79-86%. Increased to 6 liters and another call made to the two daughters but no one is picking. 8646: no chest movement, no pulse palpated at the carotid/ femoral site, Charge nurse, MD &  informed . Death certified and several calls to the daughters are still going unanswered. 0630; Daughter Ricardo Goode called , given the sad news and she is going to inform the sister, Malaika Menezes and come with her this morning.    Concerns for physician to address:      Zone phone for oncoming shift:            Carl Kerns, RN

## 2023-03-05 NOTE — PROGRESS NOTES
Pt pronounced  at 3:45am. Family notified by night shift RN. Family did not arrive until 12:45pm and did not leave body until 2:00pm. Pt prepared and transport called.

## 2023-03-05 NOTE — BSMART NOTE
[address] March 5, 2023       To Whom It May Concern,    This is to certify that Ashley Sherrill passed away on 3/5/2023 at 3:45 AM. Please excuse Allan Chow. Please feel free to contact my office if you have any questions or concerns. Thank you for your assistance in this matter.       Sincerely,  HERRERA Lindsey

## 2023-03-05 NOTE — PROGRESS NOTES
Spiritual Care Assessment/Progress Note  [facility]      NAME: Eros Aaron      MRN: [id number]  AGE: 69 y.o. SEX: male  Voodoo Affiliation: Confucianism   Language: English     3/5/2023     Total Time (in minutes): 20     Spiritual Assessment begun in [facility] through conversation with:         [x]Patient        [] Family    [] Friend(s)        Reason for Consult: Death, Hospice     Spiritual beliefs: (Please include comment if needed)     [] Identifies with a gilmar tradition:         [] Supported by a gilmar community:            [] Claims no spiritual orientation:           [] Seeking spiritual identity:                [] Adheres to an individual form of spirituality:           [x] Not able to assess:                           Identified resources for coping:      [x] Prayer                               [] Music                  [] Guided Imagery     [] Family/friends                 [] Pet visits     [] Devotional reading                         [] Unknown     [] Other:                                             Interventions offered during this visit: (See comments for more details)    Patient Interventions: Death, Hospice           Plan of Care:     [] Support spiritual and/or cultural needs    [] Support AMD and/or advance care planning process      [] Support grieving process   [] Coordinate Rites and/or Rituals    [] Coordination with community clergy   [] No spiritual needs identified at this time   [] Detailed Plan of Care below (See Comments)  [] Make referral to Music Therapy  [] Make referral to Pet Therapy     [] Make referral to Addiction services  [] Make referral to [facility]  [] Make referral to Spiritual Care Partner  [] No future visits requested        [] Contact Spiritual Care for further referrals     Comments: At patient  bedside no family present offered prayer. [address] [place] [address], Cole Ruiz. [address] ([address]67)

## 2023-03-05 NOTE — PROGRESS NOTES
Physician Pronouncement of Death    Called by nursing to pronounce patient. Death expected: YES  Family informed: NO - multiple documented attempts to contact family overnight    Physical Exam:  No corneal reflex. No gag reflex. No heart sounds on auscultation. No spontaneous breath sounds. No withdrawal from painful stimuli. Time of Death: 2598    Signed: Azam Christopher DO  3/5/2023 5:14 AM    Death summary and discharge to be completed by primary attending.

## 2023-03-05 NOTE — DEATH NOTE
Death Summary     Patient ID:    Marco Antonio Skelton  [id number]  89 y.o.  1955    Admit date: 2/27/2023    Date and time of death:  3/5/2023 at 3:45AM    Chronic Diagnoses:    Problem List as of 3/5/2023 Date Reviewed: 3/5/2023            Codes Class Noted - Resolved    Hepatocellular carcinoma metastatic to bone [facility]) ICD-10-CM: C79.51, C22.0  ICD-9-CM: 198.5, 155.0  3/5/2023 - Present        Acute respiratory failure with hypoxia [facility]) ICD-10-CM: J96.01  ICD-9-CM: 518.81  2/28/2023 - Present        Severe sepsis ([facility] ICD-10-CM: A41.9, R65.20  ICD-9-CM: 038.9, 995.92  2/28/2023 - Present        Pneumonia due to gram-negative bacteria ([facility] ICD-10-CM: J15.6  ICD-9-CM: 482.83  3/5/2023 - Present        Acute cystitis without hematuria ICD-10-CM: N30.00  ICD-9-CM: 595.0  3/5/2023 - Present        Dysphagia ICD-10-CM: R13.10  ICD-9-CM: 787.20  3/5/2023 - Present        Goals of care, counseling/discussion ICD-10-CM: Z71.89  ICD-9-CM: V65.49  2/28/2023 - Present        Neck pain ICD-10-CM: M54.2  ICD-9-CM: 723.1  2/28/2023 - Present        Delirium ICD-10-CM: R41.0  ICD-9-CM: 780.09  2/28/2023 - Present        DNR (do not resuscitate) discussion ICD-10-CM: Z71.89  ICD-9-CM: V65.49  2/28/2023 - Present        Palliative care by specialist ICD-10-CM: Z51.5  ICD-9-CM: V66.7  2/28/2023 - Present        Cervical myelopathy with cervical radiculopathy (HCC) ICD-10-CM: G95.9, M54.12  ICD-9-CM: 721.1  1/29/2023 - Present        Depression ICD-10-CM: F32. 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  ICD-9-CM: 291.3  1/15/2013 - Present        Alcohol abuse ICD-10-CM: F10.10  ICD-9-CM: 305.00  1/15/2013 - Present        Noncompliance with treatment (Chronic) ICD-10-CM: V29.424  ICD-9-CM: V15.81  5/18/2011 - Present        HTN (hypertension) (Chronic) ICD-10-CM: I10  ICD-9-CM: 401.9  3/8/2010 - Present        NIDDM (non-insulin dependent diabetes mellitus) (Chronic) ICD-9-CM: 250.00  3/8/2010 - Present        DJD (degenerative joint disease) ICD-10-CM: M19.90  ICD-9-CM: 715.90  3/8/2010 - Present    Overview Signed 3/8/2010  8:30 AM by Reinier Johnson     Spine               RESOLVED: Alcohol withdrawal ([address] ICD-10-CM: O92.064  ICD-9-CM: 291.81  3/16/2020 - 3/5/2023        RESOLVED: Psychosis ([address] ICD-10-CM: K80  ICD-9-CM: 298.9  1/15/2013 - 3/5/2023    Overview Signed 1/15/2013 11:18 AM by Terri Flores MD     A/V Hallucinations             RESOLVED: rule out Paranoid schizophrenia ICD-10-CM: F20.0  ICD-9-CM: 295.30  5/17/2011 - 1/15/2013           Final Diagnoses: Active Problems:    Hepatocellular carcinoma metastatic to bone ([address] (3/5/2023)      Acute respiratory failure with hypoxia (HCC) (2/28/2023)      Severe sepsis ([address] (2/28/2023)      Pneumonia due to gram-negative bacteria ([address] (3/5/2023)      Acute cystitis without hematuria (3/5/2023)      Dysphagia (3/5/2023)      Goals of care, counseling/discussion (2/28/2023)      Neck pain (2/28/2023)      Delirium (2/28/2023)      DNR (do not resuscitate) discussion (2/28/2023)      Palliative care by specialist (2/28/2023)      Reason for Hospitalization: acute respiratory failure with hypoxia and severe sepsis 2/2 pneumonia and UTI in the setting of metastatic [address]      Hospital Course: Danette Proctor is a 69 M with a PMH of alcohol abuse, DM, HTN, and recently diagnosed metastatic hepatocellular carcinoma who presented with shortness of breath. In ED 70% on RA, febrile at 102. 6. Patient place on non-rebreather mask at 15L. Initial labs significant for WBC of 13.4, creatinine of 5.6, BNP of 502, elevated LFTs, pH 7.5/31.9/26, influenza and COVID-negative.  UA with leuk esterase, nitrates, pyuria, and bacteria c/w UTI. EKG was sinus tachycardia. CXR with bibasilar atelectasis and diminished lung volumes. Patient admitted to ICU for further management. Patient started on zosyn, azithromycin, and IVF. Mcfadden inserted. Oncology consulted. CT of the abdomen pelvis showed patchy bilateral airspace disease and bilateral lower lobe consolidation with air bronchograms, heterogeneous liver, bilateral hydronephrosis and osseous metastatic disease. Oncology deemed he is not a candidate for chemotherapy and recommended hospice. Palliative care consulted. Patient elected to be a DNR. Urine culture grew morganella morganii and azithromycin discontinued. Overnight, 3/2 patient unable to swallow and made NPO to protect from aspiration. Patient and patient family elected for home hospice but while in hospital want to continue on IV antibiotics. SLP consulted. SLP recommended full liquid and pureed snack diet. On the night of 3/5/2023 patient became unresponsive with guppy breathing. Patient is not suffering at this point. Time of death 3:45AM.    Family notified.        HERRERA Velázquez  3/5/2023  7:36 AM

## 2023-03-05 NOTE — PROGRESS NOTES
Cross coverage note: This 41-year-old male admitted on February 27, 2023 diagnosed with metastatic hepatocellular carcinoma who is supposed to go home in the morning with hospice. Patient's condition has declined overnight and now is unresponsive with guppy breathing. Attempted to contact family and called both daughters Kavon Whitlock and Vermillion with no answers. Left word with patient's nurse to contact me if family returns phone call. Patient is not suffering at this point and will continue to monitor closely.

## 2023-03-05 NOTE — BSMART NOTE
[address] March 5, 2023       To Whom It May Concern,    This is to certify that Ingris Galdamez passed away on 3/5/2023 at 3:45 AM. Please excuse Chandrikapaz Rudolph. Please feel free to contact my office if you have any questions or concerns. Thank you for your assistance in this matter.       Sincerely,  HERRERA Magaña

## 2023-03-05 NOTE — BSMART NOTE
[address] March 5, 2023       To Whom It May Concern,    This is to certify that Martine Hernandez passed away on 3/5/2023 at 3:45 AM. Please excuse Clemente Cardoza. Please feel free to contact my office if you have any questions or concerns. Thank you for your assistance in this matter.       Sincerely,  HERRERA Duffy
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
## 2024-03-21 NOTE — ED NOTES
Discharge instructions given to patient by MD and nurse. Pt has been given counseling  and verbalizes understanding. IV d/c. Pt ambulated off of unit in no signs of distress. Pt was given bus pass to get home. 03-Mar-1989

## 2025-03-18 NOTE — BH NOTES
GROUP THERAPY PROGRESS NOTE    Patient did not participate in psychotherapy group.     CLARK Chin, Supervisee in Social Work L hand grasp 5/5/normal performance
